# Patient Record
Sex: MALE | Race: WHITE | Employment: OTHER | URBAN - NONMETROPOLITAN AREA
[De-identification: names, ages, dates, MRNs, and addresses within clinical notes are randomized per-mention and may not be internally consistent; named-entity substitution may affect disease eponyms.]

---

## 2017-02-24 ENCOUNTER — HOSPITAL ENCOUNTER (OUTPATIENT)
Dept: PULMONOLOGY | Age: 67
Discharge: HOME | End: 2017-02-24
Payer: OTHER GOVERNMENT

## 2017-02-24 PROCEDURE — 94729 DIFFUSING CAPACITY: CPT

## 2017-02-24 PROCEDURE — 94727 GAS DIL/WSHOT DETER LNG VOL: CPT

## 2017-02-24 PROCEDURE — 6360000002 HC RX W HCPCS: Performed by: INTERNAL MEDICINE

## 2017-02-24 PROCEDURE — 94060 EVALUATION OF WHEEZING: CPT

## 2017-02-24 RX ORDER — ALBUTEROL SULFATE 2.5 MG/3ML
2.5 SOLUTION RESPIRATORY (INHALATION) EVERY 6 HOURS PRN
Status: DISCONTINUED | OUTPATIENT
Start: 2017-02-24 | End: 2017-02-26 | Stop reason: HOSPADM

## 2017-03-14 ENCOUNTER — OFFICE VISIT (OUTPATIENT)
Dept: PRIMARY CARE CLINIC | Age: 67
End: 2017-03-14
Payer: MEDICARE

## 2017-03-14 VITALS
BODY MASS INDEX: 39.93 KG/M2 | SYSTOLIC BLOOD PRESSURE: 140 MMHG | HEART RATE: 74 BPM | WEIGHT: 269.6 LBS | HEIGHT: 69 IN | OXYGEN SATURATION: 93 % | DIASTOLIC BLOOD PRESSURE: 82 MMHG

## 2017-03-14 DIAGNOSIS — I10 ESSENTIAL HYPERTENSION: Primary | ICD-10-CM

## 2017-03-14 DIAGNOSIS — L57.0 ACTINIC KERATOSIS OF SCALP: ICD-10-CM

## 2017-03-14 DIAGNOSIS — J44.9 CHRONIC OBSTRUCTIVE PULMONARY DISEASE, UNSPECIFIED COPD TYPE (HCC): ICD-10-CM

## 2017-03-14 DIAGNOSIS — E78.5 HYPERLIPIDEMIA, UNSPECIFIED HYPERLIPIDEMIA TYPE: ICD-10-CM

## 2017-03-14 PROCEDURE — 4040F PNEUMOC VAC/ADMIN/RCVD: CPT | Performed by: FAMILY MEDICINE

## 2017-03-14 PROCEDURE — G8427 DOCREV CUR MEDS BY ELIG CLIN: HCPCS | Performed by: FAMILY MEDICINE

## 2017-03-14 PROCEDURE — G8484 FLU IMMUNIZE NO ADMIN: HCPCS | Performed by: FAMILY MEDICINE

## 2017-03-14 PROCEDURE — 3017F COLORECTAL CA SCREEN DOC REV: CPT | Performed by: FAMILY MEDICINE

## 2017-03-14 PROCEDURE — 99214 OFFICE O/P EST MOD 30 MIN: CPT | Performed by: FAMILY MEDICINE

## 2017-03-14 PROCEDURE — 1036F TOBACCO NON-USER: CPT | Performed by: FAMILY MEDICINE

## 2017-03-14 PROCEDURE — G8926 SPIRO NO PERF OR DOC: HCPCS | Performed by: FAMILY MEDICINE

## 2017-03-14 PROCEDURE — G8419 CALC BMI OUT NRM PARAM NOF/U: HCPCS | Performed by: FAMILY MEDICINE

## 2017-03-14 PROCEDURE — 1123F ACP DISCUSS/DSCN MKR DOCD: CPT | Performed by: FAMILY MEDICINE

## 2017-03-14 PROCEDURE — 3023F SPIROM DOC REV: CPT | Performed by: FAMILY MEDICINE

## 2017-03-14 ASSESSMENT — ENCOUNTER SYMPTOMS
SHORTNESS OF BREATH: 0
COUGH: 0

## 2017-04-17 ENCOUNTER — HOSPITAL ENCOUNTER (OUTPATIENT)
Dept: GENERAL RADIOLOGY | Facility: HOSPITAL | Age: 67
Discharge: HOME | End: 2017-04-17
Admitting: ORTHOPAEDIC SURGERY

## 2017-04-17 ENCOUNTER — APPOINTMENT (OUTPATIENT)
Dept: PREADMISSION TESTING | Facility: HOSPITAL | Age: 67
End: 2017-04-17

## 2017-04-17 VITALS
WEIGHT: 263.8 LBS | SYSTOLIC BLOOD PRESSURE: 180 MMHG | DIASTOLIC BLOOD PRESSURE: 91 MMHG | HEART RATE: 60 BPM | BODY MASS INDEX: 39.98 KG/M2 | RESPIRATION RATE: 18 BRPM | HEIGHT: 68 IN | OXYGEN SATURATION: 95 %

## 2017-04-17 LAB
ALBUMIN SERPL-MCNC: 4 G/DL (ref 3.5–5)
ALBUMIN/GLOB SERPL: 1.2 G/DL (ref 1.1–2.5)
ALP SERPL-CCNC: 91 U/L (ref 24–120)
ALT SERPL W P-5'-P-CCNC: <15 U/L (ref 0–54)
ANION GAP SERPL CALCULATED.3IONS-SCNC: 12 MMOL/L (ref 4–13)
APTT PPP: 30.9 SECONDS (ref 24.1–34.8)
AST SERPL-CCNC: 27 U/L (ref 7–45)
BASOPHILS # BLD AUTO: 0.02 10*3/MM3 (ref 0–0.2)
BASOPHILS NFR BLD AUTO: 0.3 % (ref 0–2)
BILIRUB SERPL-MCNC: 0.6 MG/DL (ref 0.1–1)
BILIRUB UR QL STRIP: NEGATIVE
BUN BLD-MCNC: 20 MG/DL (ref 5–21)
BUN/CREAT SERPL: 23.3 (ref 7–25)
CALCIUM SPEC-SCNC: 9 MG/DL (ref 8.4–10.4)
CHLORIDE SERPL-SCNC: 102 MMOL/L (ref 98–110)
CLARITY UR: CLEAR
CO2 SERPL-SCNC: 27 MMOL/L (ref 24–31)
COLOR UR: YELLOW
CREAT BLD-MCNC: 0.86 MG/DL (ref 0.5–1.4)
DEPRECATED RDW RBC AUTO: 43.1 FL (ref 40–54)
EOSINOPHIL # BLD AUTO: 0.39 10*3/MM3 (ref 0–0.7)
EOSINOPHIL NFR BLD AUTO: 6 % (ref 0–4)
ERYTHROCYTE [DISTWIDTH] IN BLOOD BY AUTOMATED COUNT: 13.8 % (ref 12–15)
GFR SERPL CREATININE-BSD FRML MDRD: 89 ML/MIN/1.73
GLOBULIN UR ELPH-MCNC: 3.3 GM/DL
GLUCOSE BLD-MCNC: 118 MG/DL (ref 70–100)
GLUCOSE UR STRIP-MCNC: NEGATIVE MG/DL
HCT VFR BLD AUTO: 41.4 % (ref 40–52)
HGB BLD-MCNC: 14.1 G/DL (ref 14–18)
HGB UR QL STRIP.AUTO: NEGATIVE
IMM GRANULOCYTES # BLD: 0.02 10*3/MM3 (ref 0–0.03)
IMM GRANULOCYTES NFR BLD: 0.3 % (ref 0–5)
INR PPP: 0.9 (ref 0.91–1.09)
KETONES UR QL STRIP: NEGATIVE
LEUKOCYTE ESTERASE UR QL STRIP.AUTO: NEGATIVE
LYMPHOCYTES # BLD AUTO: 1.06 10*3/MM3 (ref 0.72–4.86)
LYMPHOCYTES NFR BLD AUTO: 16.3 % (ref 15–45)
MCH RBC QN AUTO: 29.2 PG (ref 28–32)
MCHC RBC AUTO-ENTMCNC: 34.1 G/DL (ref 33–36)
MCV RBC AUTO: 85.7 FL (ref 82–95)
MONOCYTES # BLD AUTO: 0.41 10*3/MM3 (ref 0.19–1.3)
MONOCYTES NFR BLD AUTO: 6.3 % (ref 4–12)
NEUTROPHILS # BLD AUTO: 4.62 10*3/MM3 (ref 1.87–8.4)
NEUTROPHILS NFR BLD AUTO: 70.8 % (ref 39–78)
NITRITE UR QL STRIP: NEGATIVE
PH UR STRIP.AUTO: 6 [PH] (ref 5–8)
PLATELET # BLD AUTO: 238 10*3/MM3 (ref 130–400)
PMV BLD AUTO: 10.1 FL (ref 6–12)
POTASSIUM BLD-SCNC: 4.3 MMOL/L (ref 3.5–5.3)
PROT SERPL-MCNC: 7.3 G/DL (ref 6.3–8.7)
PROT UR QL STRIP: NEGATIVE
PROTHROMBIN TIME: 12.4 SECONDS (ref 11.9–14.6)
RBC # BLD AUTO: 4.83 10*6/MM3 (ref 4.8–5.9)
SODIUM BLD-SCNC: 141 MMOL/L (ref 135–145)
SP GR UR STRIP: 1.02 (ref 1–1.03)
UROBILINOGEN UR QL STRIP: NORMAL
WBC NRBC COR # BLD: 6.52 10*3/MM3 (ref 4.8–10.8)

## 2017-04-17 PROCEDURE — 81003 URINALYSIS AUTO W/O SCOPE: CPT

## 2017-04-17 PROCEDURE — 93005 ELECTROCARDIOGRAM TRACING: CPT

## 2017-04-17 PROCEDURE — 71020 HC CHEST PA AND LATERAL: CPT

## 2017-04-17 PROCEDURE — 93010 ELECTROCARDIOGRAM REPORT: CPT | Performed by: INTERNAL MEDICINE

## 2017-04-17 PROCEDURE — 36415 COLL VENOUS BLD VENIPUNCTURE: CPT

## 2017-04-17 PROCEDURE — 85610 PROTHROMBIN TIME: CPT

## 2017-04-17 PROCEDURE — 80053 COMPREHEN METABOLIC PANEL: CPT

## 2017-04-17 PROCEDURE — 85730 THROMBOPLASTIN TIME PARTIAL: CPT

## 2017-04-17 PROCEDURE — 85025 COMPLETE CBC W/AUTO DIFF WBC: CPT

## 2017-04-17 RX ORDER — LISINOPRIL 10 MG/1
10 TABLET ORAL DAILY
COMMUNITY
End: 2019-05-21

## 2017-04-17 RX ORDER — ESCITALOPRAM OXALATE 10 MG/1
10 TABLET ORAL DAILY
COMMUNITY
End: 2019-05-21

## 2017-04-17 RX ORDER — PANTOPRAZOLE SODIUM 20 MG/1
20 TABLET, DELAYED RELEASE ORAL DAILY
COMMUNITY

## 2017-04-17 RX ORDER — OXYCODONE AND ACETAMINOPHEN 10; 325 MG/1; MG/1
1 TABLET ORAL EVERY 6 HOURS PRN
COMMUNITY
End: 2017-06-16 | Stop reason: HOSPADM

## 2017-04-17 RX ORDER — CELECOXIB 200 MG/1
200 CAPSULE ORAL DAILY
COMMUNITY
End: 2017-06-16 | Stop reason: HOSPADM

## 2017-04-17 NOTE — DISCHARGE INSTRUCTIONS
DAY OF SURGERY INSTRUCTIONS        YOUR SURGEON: SANDOR BEAN    PROCEDURE: NECK SURGERY    DATE OF SURGERY: April 24, 2017    ARRIVAL TIME: AS DIRECTED BY OFFICE    DAY OF SURGERY TAKE ONLY THESE MEDICATIONS: NONE        BEFORE YOU COME TO THE HOSPITAL  (Pre-op instructions)  • Do not eat, drink, smoke or chew gum after midnight the night before surgery.  This also includes no mints.  • Morning of surgery take only the medicines you have been instructed with a sip of water unless otherwise instructed  by your physician.  • Do not shave, wear makeup or dark nail polish.  • Remove all jewelry including rings.  • Leave anything you consider valuable at home.  • Leave your suitcase in the car until after your surgery.  • Bring the following with you if applicable:  o Picture ID and insurance, Medicare or Medicaid cards  o Co-pay/deductible required by insurance (cash, check, credit card)  o Medications (no narcotics) in original bottles (not a list) including all over-the-counter medications.  o Copy of advance directive, living will or power-of- documents if not brought to PAT  o CPAP or BIPAP mask and tubing  o Skin prep instruction sheet  o Relaxation aids (MP3 player, book, magazine)  • Confirm your arrival time with you surgeon the day before your surgery (surgery times are subject to change)  • On the day of surgery check in at registration located at the main entrance of the hospital.       Outpatient Surgery Guidelines, Adult  Outpatient procedures are those for which the person having the procedure is allowed to go home the same day as the procedure. Various procedures are done on an outpatient basis. You should follow some general guidelines if you will be having an outpatient procedure.  LET YOUR HEALTH CARE PROVIDER KNOW ABOUT:  · Any allergies you have.  · All medicines you are taking, including vitamins, herbs, eye drops, creams, and over-the-counter medicines.  · Previous problems you or  members of your family have had with the use of anesthetics.  · Any blood disorders you have.  · Previous surgeries you have had.  · Medical conditions you have.  RISKS AND COMPLICATIONS  Your health care provider will discuss possible risks and complications with you before surgery. Common risks and complications include:    · Problems due to the use of anesthetics.  · Blood loss and replacement (does not apply to minor surgical procedures).  · Temporary increase in pain due to surgery.  · Uncorrected pain or problems that the surgery was meant to correct.  · Infection.  · New damage.  BEFORE THE PROCEDURE  · Ask your health care provider about changing or stopping your regular medicines. You may need to stop taking certain medicines in the days or weeks before the procedure.  · Stop smoking at least 2 weeks before surgery. This lowers your risk for complications during and after surgery. Ask your health care provider for help with this if needed.  · Eat your usual meals and a light supper the day before surgery. Continue fluid intake. Do not drink alcohol.  · Do not eat or drink after midnight the night before your surgery.   · Arrange for someone to take you home and to stay with you for 24 hours after the procedure. Medicine given for your procedure may affect your ability to drive or to care for yourself.  · Call your health care provider's office if you develop an illness or problem that may prevent you from safely having your procedure.  AFTER THE PROCEDURE  After surgery, you will be taken to a recovery area, where your progress will be monitored. If there are no complications, you will be allowed to go home when you are awake, stable, and taking fluids well. You may have numbness around the surgical site. Healing will take some time. You will have tenderness at the surgical site and may have some swelling and bruising. You may also have some nausea.  HOME CARE INSTRUCTIONS  · Do not drive for 24 hours, or  as directed by your health care provider. Do not drive while taking prescription pain medicines.  · Do not drink alcohol for 24 hours.  · Do not make important decisions or sign legal documents for 24 hours.  · You may resume a normal diet and activities as directed.  · Do not lift anything heavier than 10 pounds (4.5 kg) or play contact sports until your health care provider says it is okay.  · Change your bandages (dressings) as directed.  · Only take over-the-counter or prescription medicines as directed by your health care provider.  · Follow up with your health care provider as directed.  SEEK MEDICAL CARE IF:  · You have increased bleeding (more than a small spot) from the surgical site.  · You have redness, swelling, or increasing pain in the wound.  · You see pus coming from the wound.  · You have a fever.  · You notice a bad smell coming from the wound or dressing.  · You feel lightheaded or faint.  · You develop a rash.  · You have trouble breathing.  · You develop allergies.  MAKE SURE YOU:  · Understand these instructions.  · Will watch your condition.  · Will get help right away if you are not doing well or get worse.     This information is not intended to replace advice given to you by your health care provider. Make sure you discuss any questions you have with your health care provider.     Document Released: 09/12/2002 Document Revised: 05/03/2016 Document Reviewed: 05/22/2014  [employer] Interactive Patient Education ©2016 [employer]       Fall Prevention in Hospitals, Adult  As a hospital patient, your condition and the treatments you receive can increase your risk for falls. Some additional risk factors for falls in a hospital include:  · Being in an unfamiliar environment.  · Being on bed rest.  · Your surgery.  · Taking certain medicines.  · Your tubing requirements, such as intravenous (IV) therapy or catheters.  It is important that you learn how to decrease fall risks while at the hospital.  Below are important tips that can help prevent falls.  SAFETY TIPS FOR PREVENTING FALLS  Talk about your risk of falling.  · Ask your health care provider why you are at risk for falling. Is it your medicine, illness, tubing placement, or something else?  · Make a plan with your health care provider to keep you safe from falls.  · Ask your health care provider or pharmacist about side effects of your medicines. Some medicines can make you dizzy or affect your coordination.  Ask for help.  · Ask for help before getting out of bed. You may need to press your call button.  · Ask for assistance in getting safely to the toilet.  · Ask for a walker or cane to be put at your bedside. Ask that most of the side rails on your bed be placed up before your health care provider leaves the room.  · Ask family or friends to sit with you.  · Ask for things that are out of your reach, such as your glasses, hearing aids, telephone, bedside table, or call button.  Follow these tips to avoid falling:  · Stay lying or seated, rather than standing, while waiting for help.  · Wear rubber-soled slippers or shoes whenever you walk in the hospital.  · Avoid quick, sudden movements.  ¨ Change positions slowly.  ¨ Sit on the side of your bed before standing.  ¨ Stand up slowly and wait before you start to walk.  · Let your health care provider know if there is a spill on the floor.  · Pay careful attention to the medical equipment, electrical cords, and tubes around you.  · When you need help, use your call button by your bed or in the bathroom. Wait for one of your health care providers to help you.  · If you feel dizzy or unsure of your footing, return to bed and wait for assistance.  · Avoid being distracted by the TV, telephone, or another person in your room.  · Do not lean or support yourself on rolling objects, such as IV poles or bedside tables.     This information is not intended to replace advice given to you by your health care  provider. Make sure you discuss any questions you have with your health care provider.     Document Released: 12/15/2001 Document Revised: 01/08/2016 Document Reviewed: 08/25/2013  [employer] Interactive Patient Education ©2016 [employer]       Surgical Site Infections FAQs  What is a Surgical Site Infection (SSI)?  A surgical site infection is an infection that occurs after surgery in the part of the body where the surgery took place. Most patients who have surgery do not develop an infection. However, infections develop in about 1 to 3 out of every 100 patients who have surgery.  Some of the common symptoms of a surgical site infection are:  · Redness and pain around the area where you had surgery  · Drainage of cloudy fluid from your surgical wound  · Fever  Can SSIs be treated?  Yes. Most surgical site infections can be treated with antibiotics. The antibiotic given to you depends on the bacteria (germs) causing the infection. Sometimes patients with SSIs also need another surgery to treat the infection.  What are some of the things that hospitals are doing to prevent SSIs?  To prevent SSIs, doctors, nurses, and other healthcare providers:  · Clean their hands and arms up to their elbows with an antiseptic agent just before the surgery.  · Clean their hands with soap and water or an alcohol-based hand rub before and after caring for each patient.  · May remove some of your hair immediately before your surgery using electric clippers if the hair is in the same area where the procedure will occur. They should not shave you with a razor.  · Wear special hair covers, masks, gowns, and gloves during surgery to keep the surgery area clean.  · Give you antibiotics before your surgery starts. In most cases, you should get antibiotics within 60 minutes before the surgery starts and the antibiotics should be stopped within 24 hours after surgery.  · Clean the skin at the site of your surgery with a special soap that  kills germs.  What can I do to help prevent SSIs?  Before your surgery:  · Tell your doctor about other medical problems you may have. Health problems such as allergies, diabetes, and obesity could affect your surgery and your treatment.  · Quit smoking. Patients who smoke get more infections. Talk to your doctor about how you can quit before your surgery.  · Do not shave near where you will have surgery. Shaving with a razor can irritate your skin and make it easier to develop an infection.  At the time of your surgery:  · Speak up if someone tries to shave you with a razor before surgery. Ask why you need to be shaved and talk with your surgeon if you have any concerns.  · Ask if you will get antibiotics before surgery.  After your surgery:  · Make sure that your healthcare providers clean their hands before examining you, either with soap and water or an alcohol-based hand rub.  · If you do not see your providers clean their hands, please ask them to do so.  · Family and friends who visit you should not touch the surgical wound or dressings.  · Family and friends should clean their hands with soap and water or an alcohol-based hand rub before and after visiting you. If you do not see them clean their hands, ask them to clean their hands.  What do I need to do when I go home from the hospital?  · Before you go home, your doctor or nurse should explain everything you need to know about taking care of your wound. Make sure you understand how to care for your wound before you leave the hospital.  · Always clean your hands before and after caring for your wound.  · Before you go home, make sure you know who to contact if you have questions or problems after you get home.  · If you have any symptoms of an infection, such as redness and pain at the surgery site, drainage, or fever, call your doctor immediately.  If you have additional questions, please ask your doctor or nurse.  Developed and co-sponsored by The Society  for Healthcare Epidemiology of [place] (SHEA); Infectious Diseases Society of [place] (IDSA); American Hospital Association; Association for Professionals in Infection Control and Epidemiology (APIC); Centers for Disease Control and Prevention (CDC); and The Joint Commission.     This information is not intended to replace advice given to you by your health care provider. Make sure you discuss any questions you have with your health care provider.     Document Released: 12/23/2014 Document Revised: 01/08/2016 Document Reviewed: 03/02/2016  [employer] Interactive Patient Education ©2016 [employer]       [facility]  CHG 4% Patient Instruction Sheet    Preparing the Skin Before Surgery  Preparing or “prepping” skin before surgery can reduce the risk of infection at the surgical site. To make the process easier,[facility] has chosen 4% Chlorhexidine Gluconate (CHG) antiseptic solution.   The steps below outline the prepping process and should be carefully followed.                                                                                                                                                      Prep the skin at the following time(s):                                                      We recommend you shower the night before surgery, and again the morning of surgery with the 4% CHG antiseptic solution using half of the bottle and a cloth each time.  Dress in clean clothes/sleepwear after showering.  See instructions below for application.          Do not apply any lotions or moisturizers.       Do not shave the area to be prepped for at least 2 days prior to surgery.    Clipping the hair may be done immediately prior to your surgery at the hospital    if needed.    Directions:  Thoroughly rinse your body with water.  Apply 4% CHG to a cloth and wash skin gently, paying special attention to the operative site.  Rinse again thoroughly.  Once you have begun using this product do not apply anything  else to your skin. If itching or redness persists, rinse affected areas and discontinue use.    When using this product:  • Keep out of eyes, ears, and mouth.  • If solution should contact these areas, rinse out promptly and thoroughly with water.  • For external use only.  • Do not use in genital area, on your face or head.      PATIENT/FAMILY/RESPONSIBLE PARTY VERBALIZES UNDERSTANDING OF ABOVE EDUCATION

## 2017-04-19 ENCOUNTER — HOSPITAL ENCOUNTER (INPATIENT)
Age: 67
LOS: 3 days | Discharge: HOME | DRG: 243 | End: 2017-04-22
Attending: EMERGENCY MEDICINE | Admitting: INTERNAL MEDICINE
Payer: MEDICARE

## 2017-04-19 ENCOUNTER — APPOINTMENT (OUTPATIENT)
Dept: GENERAL RADIOLOGY | Age: 67
DRG: 243 | End: 2017-04-19
Payer: MEDICARE

## 2017-04-19 DIAGNOSIS — R07.89 CHEST DISCOMFORT: Primary | ICD-10-CM

## 2017-04-19 DIAGNOSIS — I48.91 ATRIAL FIBRILLATION, NEW ONSET (HCC): ICD-10-CM

## 2017-04-19 LAB
ALBUMIN SERPL-MCNC: 4.3 G/DL (ref 3.5–5.2)
ALP BLD-CCNC: 92 U/L (ref 40–130)
ALT SERPL-CCNC: 21 U/L (ref 5–41)
ANION GAP SERPL CALCULATED.3IONS-SCNC: 17 MMOL/L (ref 7–19)
AST SERPL-CCNC: 33 U/L (ref 5–40)
BASE EXCESS ARTERIAL: -0.2 MMOL/L (ref -2–2)
BASOPHILS ABSOLUTE: 0.1 K/UL (ref 0–0.2)
BASOPHILS RELATIVE PERCENT: 0.5 % (ref 0–1)
BILIRUB SERPL-MCNC: 0.4 MG/DL (ref 0.2–1.2)
BUN BLDV-MCNC: 14 MG/DL (ref 8–23)
CALCIUM SERPL-MCNC: 9.5 MG/DL (ref 8.8–10.2)
CARBOXYHEMOGLOBIN ARTERIAL: 1.4 % (ref 0–5)
CHLORIDE BLD-SCNC: 97 MMOL/L (ref 98–111)
CO2: 26 MMOL/L (ref 22–29)
CREAT SERPL-MCNC: 1.3 MG/DL (ref 0.5–1.2)
EOSINOPHILS ABSOLUTE: 0.3 K/UL (ref 0–0.6)
EOSINOPHILS RELATIVE PERCENT: 2.5 % (ref 0–5)
GFR NON-AFRICAN AMERICAN: 55
GLOBULIN: 2.9 G/DL
GLUCOSE BLD-MCNC: 105 MG/DL (ref 74–109)
HCO3 ARTERIAL: 24 MMOL/L (ref 22–26)
HCT VFR BLD CALC: 46.3 % (ref 42–52)
HEMOGLOBIN, ART, EXTENDED: 14.8 G/DL (ref 14–18)
HEMOGLOBIN: 15.2 G/DL (ref 14–18)
INR BLD: 0.99 (ref 0.88–1.18)
LYMPHOCYTES ABSOLUTE: 1.5 K/UL (ref 1.1–4.5)
LYMPHOCYTES RELATIVE PERCENT: 13.6 % (ref 20–40)
MCH RBC QN AUTO: 29.5 PG (ref 27–31)
MCHC RBC AUTO-ENTMCNC: 32.8 G/DL (ref 33–37)
MCV RBC AUTO: 89.7 FL (ref 80–94)
METHEMOGLOBIN ARTERIAL: 0.6 %
MONOCYTES ABSOLUTE: 0.9 K/UL (ref 0–0.9)
MONOCYTES RELATIVE PERCENT: 8.2 % (ref 0–10)
NEUTROPHILS ABSOLUTE: 8.2 K/UL (ref 1.5–7.5)
NEUTROPHILS RELATIVE PERCENT: 74.7 % (ref 50–65)
O2 CONTENT ARTERIAL: 18.5 ML/DL
O2 SAT, ARTERIAL: 89.3 %
O2 THERAPY: ABNORMAL
PCO2 ARTERIAL: 37 MMHG (ref 35–45)
PDW BLD-RTO: 13.5 % (ref 11.5–14.5)
PERFORMED ON: NORMAL
PERFORMED ON: NORMAL
PH ARTERIAL: 7.42 (ref 7.35–7.45)
PLATELET # BLD: 291 K/UL (ref 130–400)
PMV BLD AUTO: 9.9 FL (ref 7.4–10.4)
PO2 ARTERIAL: 55 MMHG (ref 80–100)
POC TROPONIN I: 0 NG/ML (ref 0–0.08)
POC TROPONIN I: 0 NG/ML (ref 0–0.08)
POTASSIUM SERPL-SCNC: 4.2 MMOL/L (ref 3.5–5)
POTASSIUM, WHOLE BLOOD: 4.1
PRO-BNP: 178 PG/ML (ref 0–900)
PROTHROMBIN TIME: 13.1 SEC (ref 12–14.6)
RBC # BLD: 5.16 M/UL (ref 4.7–6.1)
SODIUM BLD-SCNC: 140 MMOL/L (ref 136–145)
TOTAL PROTEIN: 7.2 G/DL (ref 6.6–8.7)
TROPONIN: <0.01 NG/ML (ref 0–0.03)
WBC # BLD: 11 K/UL (ref 4.8–10.8)

## 2017-04-19 PROCEDURE — 6370000000 HC RX 637 (ALT 250 FOR IP): Performed by: INTERNAL MEDICINE

## 2017-04-19 PROCEDURE — 71010 XR CHEST PORTABLE: CPT

## 2017-04-19 PROCEDURE — 93005 ELECTROCARDIOGRAM TRACING: CPT

## 2017-04-19 PROCEDURE — 84132 ASSAY OF SERUM POTASSIUM: CPT

## 2017-04-19 PROCEDURE — 80053 COMPREHEN METABOLIC PANEL: CPT

## 2017-04-19 PROCEDURE — 36415 COLL VENOUS BLD VENIPUNCTURE: CPT

## 2017-04-19 PROCEDURE — 99223 1ST HOSP IP/OBS HIGH 75: CPT | Performed by: INTERNAL MEDICINE

## 2017-04-19 PROCEDURE — 83880 ASSAY OF NATRIURETIC PEPTIDE: CPT

## 2017-04-19 PROCEDURE — 36600 WITHDRAWAL OF ARTERIAL BLOOD: CPT

## 2017-04-19 PROCEDURE — 85610 PROTHROMBIN TIME: CPT

## 2017-04-19 PROCEDURE — 85025 COMPLETE CBC W/AUTO DIFF WBC: CPT

## 2017-04-19 PROCEDURE — 2580000003 HC RX 258: Performed by: INTERNAL MEDICINE

## 2017-04-19 PROCEDURE — 99285 EMERGENCY DEPT VISIT HI MDM: CPT

## 2017-04-19 PROCEDURE — 84484 ASSAY OF TROPONIN QUANT: CPT

## 2017-04-19 PROCEDURE — 6360000002 HC RX W HCPCS: Performed by: EMERGENCY MEDICINE

## 2017-04-19 PROCEDURE — 2700000000 HC OXYGEN THERAPY PER DAY

## 2017-04-19 PROCEDURE — 2140000000 HC CCU INTERMEDIATE R&B

## 2017-04-19 PROCEDURE — 82803 BLOOD GASES ANY COMBINATION: CPT

## 2017-04-19 PROCEDURE — 99284 EMERGENCY DEPT VISIT MOD MDM: CPT | Performed by: EMERGENCY MEDICINE

## 2017-04-19 RX ORDER — PANTOPRAZOLE SODIUM 40 MG/1
40 TABLET, DELAYED RELEASE ORAL
Status: DISCONTINUED | OUTPATIENT
Start: 2017-04-20 | End: 2017-04-22 | Stop reason: HOSPADM

## 2017-04-19 RX ORDER — 0.9 % SODIUM CHLORIDE 0.9 %
1000 INTRAVENOUS SOLUTION INTRAVENOUS ONCE
Status: DISCONTINUED | OUTPATIENT
Start: 2017-04-19 | End: 2017-04-21

## 2017-04-19 RX ORDER — ACETAMINOPHEN 325 MG/1
650 TABLET ORAL EVERY 4 HOURS PRN
Status: DISCONTINUED | OUTPATIENT
Start: 2017-04-19 | End: 2017-04-19 | Stop reason: SDUPTHER

## 2017-04-19 RX ORDER — SODIUM CHLORIDE 0.9 % (FLUSH) 0.9 %
10 SYRINGE (ML) INJECTION EVERY 12 HOURS SCHEDULED
Status: DISCONTINUED | OUTPATIENT
Start: 2017-04-19 | End: 2017-04-21 | Stop reason: SDUPTHER

## 2017-04-19 RX ORDER — CELECOXIB 200 MG/1
200 CAPSULE ORAL DAILY
Status: DISCONTINUED | OUTPATIENT
Start: 2017-04-19 | End: 2017-04-22 | Stop reason: HOSPADM

## 2017-04-19 RX ORDER — FENOFIBRATE 54 MG/1
54 TABLET ORAL DAILY
Status: DISCONTINUED | OUTPATIENT
Start: 2017-04-19 | End: 2017-04-20 | Stop reason: CLARIF

## 2017-04-19 RX ORDER — ACETAMINOPHEN 325 MG/1
650 TABLET ORAL EVERY 4 HOURS PRN
Status: DISCONTINUED | OUTPATIENT
Start: 2017-04-19 | End: 2017-04-21 | Stop reason: SDUPTHER

## 2017-04-19 RX ORDER — SODIUM CHLORIDE 9 MG/ML
INJECTION, SOLUTION INTRAVENOUS CONTINUOUS
Status: DISCONTINUED | OUTPATIENT
Start: 2017-04-19 | End: 2017-04-21 | Stop reason: SDUPTHER

## 2017-04-19 RX ORDER — CARBIDOPA/LEVODOPA 25MG-250MG
1 TABLET ORAL 2 TIMES DAILY
Status: DISCONTINUED | OUTPATIENT
Start: 2017-04-19 | End: 2017-04-20 | Stop reason: DRUGHIGH

## 2017-04-19 RX ORDER — LISINOPRIL 20 MG/1
40 TABLET ORAL DAILY
Status: DISCONTINUED | OUTPATIENT
Start: 2017-04-19 | End: 2017-04-22 | Stop reason: HOSPADM

## 2017-04-19 RX ORDER — ESCITALOPRAM OXALATE 10 MG/1
10 TABLET ORAL DAILY
Status: DISCONTINUED | OUTPATIENT
Start: 2017-04-19 | End: 2017-04-21 | Stop reason: SDUPTHER

## 2017-04-19 RX ORDER — ONDANSETRON 2 MG/ML
4 INJECTION INTRAMUSCULAR; INTRAVENOUS ONCE
Status: DISCONTINUED | OUTPATIENT
Start: 2017-04-19 | End: 2017-04-21

## 2017-04-19 RX ORDER — OXYCODONE HYDROCHLORIDE 5 MG/1
10 TABLET ORAL EVERY 4 HOURS PRN
Status: DISCONTINUED | OUTPATIENT
Start: 2017-04-19 | End: 2017-04-22 | Stop reason: HOSPADM

## 2017-04-19 RX ORDER — ASPIRIN 81 MG/1
81 TABLET, CHEWABLE ORAL DAILY
Status: DISCONTINUED | OUTPATIENT
Start: 2017-04-19 | End: 2017-04-22 | Stop reason: HOSPADM

## 2017-04-19 RX ORDER — SODIUM CHLORIDE 0.9 % (FLUSH) 0.9 %
10 SYRINGE (ML) INJECTION PRN
Status: DISCONTINUED | OUTPATIENT
Start: 2017-04-19 | End: 2017-04-21 | Stop reason: SDUPTHER

## 2017-04-19 RX ORDER — ALBUTEROL SULFATE 90 UG/1
2 AEROSOL, METERED RESPIRATORY (INHALATION) EVERY 6 HOURS PRN
Status: DISCONTINUED | OUTPATIENT
Start: 2017-04-19 | End: 2017-04-22 | Stop reason: HOSPADM

## 2017-04-19 RX ORDER — ONDANSETRON 2 MG/ML
4 INJECTION INTRAMUSCULAR; INTRAVENOUS EVERY 6 HOURS PRN
Status: DISCONTINUED | OUTPATIENT
Start: 2017-04-19 | End: 2017-04-21 | Stop reason: SDUPTHER

## 2017-04-19 RX ADMIN — ESCITALOPRAM OXALATE 10 MG: 10 TABLET, FILM COATED ORAL at 22:57

## 2017-04-19 RX ADMIN — WARFARIN SODIUM 7.5 MG: 2.5 TABLET ORAL at 22:57

## 2017-04-19 RX ADMIN — SODIUM CHLORIDE: 9 INJECTION, SOLUTION INTRAVENOUS at 20:13

## 2017-04-19 RX ADMIN — LISINOPRIL 40 MG: 20 TABLET ORAL at 22:56

## 2017-04-19 RX ADMIN — ASPIRIN 81 MG CHEWABLE TABLET 81 MG: 81 TABLET CHEWABLE at 22:57

## 2017-04-19 RX ADMIN — OXYCODONE HYDROCHLORIDE 10 MG: 5 TABLET ORAL at 22:56

## 2017-04-19 RX ADMIN — CELECOXIB 200 MG: 200 CAPSULE ORAL at 22:57

## 2017-04-19 RX ADMIN — ENOXAPARIN SODIUM 120 MG: 150 INJECTION SUBCUTANEOUS at 23:09

## 2017-04-19 RX ADMIN — FENOFIBRATE 54 MG: 54 TABLET ORAL at 22:57

## 2017-04-19 ASSESSMENT — ENCOUNTER SYMPTOMS
VOMITING: 0
NAUSEA: 1
COUGH: 0
SHORTNESS OF BREATH: 1
BACK PAIN: 0

## 2017-04-19 ASSESSMENT — PAIN SCALES - GENERAL
PAINLEVEL_OUTOF10: 0
PAINLEVEL_OUTOF10: 4
PAINLEVEL_OUTOF10: 3
PAINLEVEL_OUTOF10: 4
PAINLEVEL_OUTOF10: 3

## 2017-04-19 ASSESSMENT — PAIN DESCRIPTION - LOCATION
LOCATION: BACK
LOCATION: CHEST

## 2017-04-20 ENCOUNTER — APPOINTMENT (OUTPATIENT)
Dept: NUCLEAR MEDICINE | Age: 67
DRG: 243 | End: 2017-04-20
Payer: MEDICARE

## 2017-04-20 LAB
ALBUMIN SERPL-MCNC: 3.9 G/DL (ref 3.5–5.2)
ALP BLD-CCNC: 79 U/L (ref 40–130)
ALT SERPL-CCNC: 14 U/L (ref 5–41)
ANION GAP SERPL CALCULATED.3IONS-SCNC: 17 MMOL/L (ref 7–19)
AST SERPL-CCNC: 21 U/L (ref 5–40)
BASOPHILS ABSOLUTE: 0 K/UL (ref 0–0.2)
BASOPHILS RELATIVE PERCENT: 0.4 % (ref 0–1)
BILIRUB SERPL-MCNC: 0.3 MG/DL (ref 0.2–1.2)
BUN BLDV-MCNC: 22 MG/DL (ref 8–23)
CALCIUM SERPL-MCNC: 8.7 MG/DL (ref 8.8–10.2)
CHLORIDE BLD-SCNC: 93 MMOL/L (ref 98–111)
CHOLESTEROL, TOTAL: 201 MG/DL (ref 160–199)
CO2: 26 MMOL/L (ref 22–29)
CREAT SERPL-MCNC: 2.5 MG/DL (ref 0.5–1.2)
EKG P AXIS: NORMAL DEGREES
EKG P AXIS: NORMAL DEGREES
EKG P-R INTERVAL: NORMAL MS
EKG P-R INTERVAL: NORMAL MS
EKG Q-T INTERVAL: 478 MS
EKG Q-T INTERVAL: 506 MS
EKG QRS DURATION: 110 MS
EKG QRS DURATION: 120 MS
EKG QTC CALCULATION (BAZETT): 471 MS
EKG QTC CALCULATION (BAZETT): 489 MS
EKG T AXIS: 62 DEGREES
EKG T AXIS: 63 DEGREES
EOSINOPHILS ABSOLUTE: 0.3 K/UL (ref 0–0.6)
EOSINOPHILS RELATIVE PERCENT: 2.8 % (ref 0–5)
GFR NON-AFRICAN AMERICAN: 26
GLOBULIN: 2.8 G/DL
GLUCOSE BLD-MCNC: 114 MG/DL (ref 74–109)
HCT VFR BLD CALC: 39.9 % (ref 42–52)
HDLC SERPL-MCNC: 66 MG/DL (ref 55–121)
HEMOGLOBIN: 13.5 G/DL (ref 14–18)
INR BLD: 1.07 (ref 0.88–1.18)
LDL CHOLESTEROL CALCULATED: 78 MG/DL
LV EF: 60 %
LVEF MODALITY: NORMAL
LYMPHOCYTES ABSOLUTE: 1.3 K/UL (ref 1.1–4.5)
LYMPHOCYTES RELATIVE PERCENT: 13.5 % (ref 20–40)
MCH RBC QN AUTO: 29.7 PG (ref 27–31)
MCHC RBC AUTO-ENTMCNC: 33.8 G/DL (ref 33–37)
MCV RBC AUTO: 87.7 FL (ref 80–94)
MONOCYTES ABSOLUTE: 0.8 K/UL (ref 0–0.9)
MONOCYTES RELATIVE PERCENT: 7.9 % (ref 0–10)
NEUTROPHILS ABSOLUTE: 7.5 K/UL (ref 1.5–7.5)
NEUTROPHILS RELATIVE PERCENT: 75.1 % (ref 50–65)
PDW BLD-RTO: 13.6 % (ref 11.5–14.5)
PLATELET # BLD: 268 K/UL (ref 130–400)
PMV BLD AUTO: 9.9 FL (ref 7.4–10.4)
POTASSIUM SERPL-SCNC: 4.2 MMOL/L (ref 3.5–5)
PROTHROMBIN TIME: 13.9 SEC (ref 12–14.6)
RBC # BLD: 4.55 M/UL (ref 4.7–6.1)
SODIUM BLD-SCNC: 136 MMOL/L (ref 136–145)
T3 FREE: 3.9 PG/ML (ref 2–4.4)
T4 FREE: 1.2 NG/ML (ref 0.9–1.7)
TOTAL PROTEIN: 6.7 G/DL (ref 6.6–8.7)
TRIGL SERPL-MCNC: 286 MG/DL (ref 150–199)
TROPONIN: <0.01 NG/ML (ref 0–0.03)
TROPONIN: <0.01 NG/ML (ref 0–0.03)
TSH SERPL DL<=0.05 MIU/L-ACNC: 4.87 UIU/ML (ref 0.27–4.2)
WBC # BLD: 10 K/UL (ref 4.8–10.8)

## 2017-04-20 PROCEDURE — 3430000000 HC RX DIAGNOSTIC RADIOPHARMACEUTICAL: Performed by: INTERNAL MEDICINE

## 2017-04-20 PROCEDURE — 84443 ASSAY THYROID STIM HORMONE: CPT

## 2017-04-20 PROCEDURE — 93005 ELECTROCARDIOGRAM TRACING: CPT

## 2017-04-20 PROCEDURE — 2700000000 HC OXYGEN THERAPY PER DAY

## 2017-04-20 PROCEDURE — 80061 LIPID PANEL: CPT

## 2017-04-20 PROCEDURE — 93017 CV STRESS TEST TRACING ONLY: CPT

## 2017-04-20 PROCEDURE — 99221 1ST HOSP IP/OBS SF/LOW 40: CPT | Performed by: UROLOGY

## 2017-04-20 PROCEDURE — 84439 ASSAY OF FREE THYROXINE: CPT

## 2017-04-20 PROCEDURE — 36415 COLL VENOUS BLD VENIPUNCTURE: CPT

## 2017-04-20 PROCEDURE — 80053 COMPREHEN METABOLIC PANEL: CPT

## 2017-04-20 PROCEDURE — 85025 COMPLETE CBC W/AUTO DIFF WBC: CPT

## 2017-04-20 PROCEDURE — 6360000002 HC RX W HCPCS: Performed by: INTERNAL MEDICINE

## 2017-04-20 PROCEDURE — 6370000000 HC RX 637 (ALT 250 FOR IP): Performed by: INTERNAL MEDICINE

## 2017-04-20 PROCEDURE — 85610 PROTHROMBIN TIME: CPT

## 2017-04-20 PROCEDURE — 2140000000 HC CCU INTERMEDIATE R&B

## 2017-04-20 PROCEDURE — A9500 TC99M SESTAMIBI: HCPCS | Performed by: INTERNAL MEDICINE

## 2017-04-20 PROCEDURE — 84484 ASSAY OF TROPONIN QUANT: CPT

## 2017-04-20 PROCEDURE — 78452 HT MUSCLE IMAGE SPECT MULT: CPT

## 2017-04-20 PROCEDURE — 99024 POSTOP FOLLOW-UP VISIT: CPT | Performed by: INTERNAL MEDICINE

## 2017-04-20 PROCEDURE — 84481 FREE ASSAY (FT-3): CPT

## 2017-04-20 PROCEDURE — 93306 TTE W/DOPPLER COMPLETE: CPT

## 2017-04-20 RX ORDER — CARBIDOPA/LEVODOPA 25MG-250MG
1 TABLET ORAL NIGHTLY PRN
Status: DISCONTINUED | OUTPATIENT
Start: 2017-04-20 | End: 2017-04-21 | Stop reason: SDUPTHER

## 2017-04-20 RX ORDER — CHLORHEXIDINE GLUCONATE 4 G/100ML
SOLUTION TOPICAL ONCE
Status: DISCONTINUED | OUTPATIENT
Start: 2017-04-20 | End: 2017-04-21

## 2017-04-20 RX ORDER — FENOFIBRATE 160 MG/1
160 TABLET ORAL DAILY
Status: DISCONTINUED | OUTPATIENT
Start: 2017-04-21 | End: 2017-04-22 | Stop reason: HOSPADM

## 2017-04-20 RX ORDER — ESCITALOPRAM OXALATE 10 MG/1
10 TABLET ORAL EVERY EVENING
COMMUNITY
End: 2017-05-04 | Stop reason: SDUPTHER

## 2017-04-20 RX ORDER — CARBIDOPA/LEVODOPA 25MG-250MG
1 TABLET ORAL NIGHTLY PRN
COMMUNITY
End: 2018-02-18

## 2017-04-20 RX ADMIN — TETRAKIS(2-METHOXYISOBUTYLISOCYANIDE)COPPER(I) TETRAFLUOROBORATE 30 MILLICURIE: 1 INJECTION, POWDER, LYOPHILIZED, FOR SOLUTION INTRAVENOUS at 10:23

## 2017-04-20 RX ADMIN — PANTOPRAZOLE SODIUM 40 MG: 40 TABLET, DELAYED RELEASE ORAL at 06:19

## 2017-04-20 RX ADMIN — FENOFIBRATE 54 MG: 54 TABLET ORAL at 10:57

## 2017-04-20 RX ADMIN — TETRAKIS(2-METHOXYISOBUTYLISOCYANIDE)COPPER(I) TETRAFLUOROBORATE 10 MILLICURIE: 1 INJECTION, POWDER, LYOPHILIZED, FOR SOLUTION INTRAVENOUS at 10:22

## 2017-04-20 RX ADMIN — ASPIRIN 81 MG CHEWABLE TABLET 81 MG: 81 TABLET CHEWABLE at 10:58

## 2017-04-20 RX ADMIN — LISINOPRIL 40 MG: 20 TABLET ORAL at 10:57

## 2017-04-20 RX ADMIN — CARBIDOPA AND LEVODOPA 1 TABLET: 25; 250 TABLET ORAL at 10:57

## 2017-04-20 RX ADMIN — REGADENOSON 0.4 MG: 0.08 INJECTION, SOLUTION INTRAVENOUS at 10:23

## 2017-04-20 RX ADMIN — CELECOXIB 200 MG: 200 CAPSULE ORAL at 10:57

## 2017-04-20 RX ADMIN — OXYCODONE HYDROCHLORIDE 10 MG: 5 TABLET ORAL at 21:14

## 2017-04-20 RX ADMIN — OXYCODONE HYDROCHLORIDE 10 MG: 5 TABLET ORAL at 10:57

## 2017-04-20 RX ADMIN — MAGNESIUM HYDROXIDE 30 ML: 400 SUSPENSION ORAL at 16:41

## 2017-04-20 RX ADMIN — ESCITALOPRAM OXALATE 10 MG: 10 TABLET, FILM COATED ORAL at 10:57

## 2017-04-20 ASSESSMENT — PAIN SCALES - GENERAL
PAINLEVEL_OUTOF10: 4
PAINLEVEL_OUTOF10: 0
PAINLEVEL_OUTOF10: 5

## 2017-04-21 ENCOUNTER — APPOINTMENT (OUTPATIENT)
Dept: GENERAL RADIOLOGY | Age: 67
DRG: 243 | End: 2017-04-21
Payer: MEDICARE

## 2017-04-21 LAB
ANION GAP SERPL CALCULATED.3IONS-SCNC: 14 MMOL/L (ref 7–19)
BUN BLDV-MCNC: 27 MG/DL (ref 8–23)
CALCIUM SERPL-MCNC: 8.6 MG/DL (ref 8.8–10.2)
CHLORIDE BLD-SCNC: 101 MMOL/L (ref 98–111)
CO2: 26 MMOL/L (ref 22–29)
CREAT SERPL-MCNC: 1.9 MG/DL (ref 0.5–1.2)
GFR NON-AFRICAN AMERICAN: 36
GLUCOSE BLD-MCNC: 101 MG/DL (ref 74–109)
INR BLD: 1.08 (ref 0.88–1.18)
POTASSIUM SERPL-SCNC: 4.4 MMOL/L (ref 3.5–5)
PROTHROMBIN TIME: 14 SEC (ref 12–14.6)
SODIUM BLD-SCNC: 141 MMOL/L (ref 136–145)

## 2017-04-21 PROCEDURE — 6370000000 HC RX 637 (ALT 250 FOR IP): Performed by: INTERNAL MEDICINE

## 2017-04-21 PROCEDURE — 02H63JZ INSERTION OF PACEMAKER LEAD INTO RIGHT ATRIUM, PERCUTANEOUS APPROACH: ICD-10-PCS | Performed by: INTERNAL MEDICINE

## 2017-04-21 PROCEDURE — B2111ZZ FLUOROSCOPY OF MULTIPLE CORONARY ARTERIES USING LOW OSMOLAR CONTRAST: ICD-10-PCS | Performed by: INTERNAL MEDICINE

## 2017-04-21 PROCEDURE — 4A023N7 MEASUREMENT OF CARDIAC SAMPLING AND PRESSURE, LEFT HEART, PERCUTANEOUS APPROACH: ICD-10-PCS | Performed by: INTERNAL MEDICINE

## 2017-04-21 PROCEDURE — 71010 XR CHEST PORTABLE: CPT

## 2017-04-21 PROCEDURE — 93005 ELECTROCARDIOGRAM TRACING: CPT

## 2017-04-21 PROCEDURE — 36415 COLL VENOUS BLD VENIPUNCTURE: CPT

## 2017-04-21 PROCEDURE — 80048 BASIC METABOLIC PNL TOTAL CA: CPT

## 2017-04-21 PROCEDURE — C1898 LEAD, PMKR, OTHER THAN TRANS: HCPCS

## 2017-04-21 PROCEDURE — 99024 POSTOP FOLLOW-UP VISIT: CPT | Performed by: INTERNAL MEDICINE

## 2017-04-21 PROCEDURE — 93455 CORONARY ART/GRFT ANGIO S&I: CPT | Performed by: INTERNAL MEDICINE

## 2017-04-21 PROCEDURE — 2580000003 HC RX 258: Performed by: INTERNAL MEDICINE

## 2017-04-21 PROCEDURE — 02HK3JZ INSERTION OF PACEMAKER LEAD INTO RIGHT VENTRICLE, PERCUTANEOUS APPROACH: ICD-10-PCS | Performed by: INTERNAL MEDICINE

## 2017-04-21 PROCEDURE — 85610 PROTHROMBIN TIME: CPT

## 2017-04-21 PROCEDURE — 6360000002 HC RX W HCPCS

## 2017-04-21 PROCEDURE — 33208 INSRT HEART PM ATRIAL & VENT: CPT | Performed by: INTERNAL MEDICINE

## 2017-04-21 PROCEDURE — C1887 CATHETER, GUIDING: HCPCS

## 2017-04-21 PROCEDURE — 2500000003 HC RX 250 WO HCPCS

## 2017-04-21 PROCEDURE — 2140000000 HC CCU INTERMEDIATE R&B

## 2017-04-21 PROCEDURE — 6360000002 HC RX W HCPCS: Performed by: INTERNAL MEDICINE

## 2017-04-21 PROCEDURE — 0JH606Z INSERTION OF PACEMAKER, DUAL CHAMBER INTO CHEST SUBCUTANEOUS TISSUE AND FASCIA, OPEN APPROACH: ICD-10-PCS | Performed by: INTERNAL MEDICINE

## 2017-04-21 PROCEDURE — C1894 INTRO/SHEATH, NON-LASER: HCPCS

## 2017-04-21 PROCEDURE — 2720000001 HC MISC SURG SUPPLY STERILE $51-500

## 2017-04-21 PROCEDURE — 2709999900 HC NON-CHARGEABLE SUPPLY

## 2017-04-21 PROCEDURE — C1785 PMKR, DUAL, RATE-RESP: HCPCS

## 2017-04-21 PROCEDURE — 6370000000 HC RX 637 (ALT 250 FOR IP)

## 2017-04-21 PROCEDURE — B2151ZZ FLUOROSCOPY OF LEFT HEART USING LOW OSMOLAR CONTRAST: ICD-10-PCS | Performed by: INTERNAL MEDICINE

## 2017-04-21 RX ORDER — ESCITALOPRAM OXALATE 10 MG/1
10 TABLET ORAL EVERY EVENING
Status: DISCONTINUED | OUTPATIENT
Start: 2017-04-21 | End: 2017-04-22 | Stop reason: HOSPADM

## 2017-04-21 RX ORDER — SODIUM CHLORIDE 9 MG/ML
INJECTION, SOLUTION INTRAVENOUS CONTINUOUS
Status: ACTIVE | OUTPATIENT
Start: 2017-04-21 | End: 2017-04-21

## 2017-04-21 RX ORDER — ACETAMINOPHEN 325 MG/1
650 TABLET ORAL EVERY 4 HOURS PRN
Status: DISCONTINUED | OUTPATIENT
Start: 2017-04-21 | End: 2017-04-21

## 2017-04-21 RX ORDER — SODIUM CHLORIDE 0.9 % (FLUSH) 0.9 %
10 SYRINGE (ML) INJECTION PRN
Status: DISCONTINUED | OUTPATIENT
Start: 2017-04-21 | End: 2017-04-21

## 2017-04-21 RX ORDER — SENNA AND DOCUSATE SODIUM 50; 8.6 MG/1; MG/1
2 TABLET, FILM COATED ORAL DAILY PRN
Status: DISCONTINUED | OUTPATIENT
Start: 2017-04-21 | End: 2017-04-22 | Stop reason: HOSPADM

## 2017-04-21 RX ORDER — SODIUM CHLORIDE 0.9 % (FLUSH) 0.9 %
10 SYRINGE (ML) INJECTION EVERY 12 HOURS SCHEDULED
Status: DISCONTINUED | OUTPATIENT
Start: 2017-04-21 | End: 2017-04-22 | Stop reason: HOSPADM

## 2017-04-21 RX ORDER — SODIUM CHLORIDE 0.9 % (FLUSH) 0.9 %
10 SYRINGE (ML) INJECTION PRN
Status: DISCONTINUED | OUTPATIENT
Start: 2017-04-21 | End: 2017-04-21 | Stop reason: SDUPTHER

## 2017-04-21 RX ORDER — SODIUM CHLORIDE 0.9 % (FLUSH) 0.9 %
10 SYRINGE (ML) INJECTION EVERY 12 HOURS SCHEDULED
Status: DISCONTINUED | OUTPATIENT
Start: 2017-04-21 | End: 2017-04-21

## 2017-04-21 RX ORDER — SODIUM CHLORIDE 0.9 % (FLUSH) 0.9 %
10 SYRINGE (ML) INJECTION PRN
Status: DISCONTINUED | OUTPATIENT
Start: 2017-04-21 | End: 2017-04-22 | Stop reason: HOSPADM

## 2017-04-21 RX ORDER — ONDANSETRON 2 MG/ML
4 INJECTION INTRAMUSCULAR; INTRAVENOUS EVERY 6 HOURS PRN
Status: DISCONTINUED | OUTPATIENT
Start: 2017-04-21 | End: 2017-04-21

## 2017-04-21 RX ORDER — CARBIDOPA/LEVODOPA 25MG-250MG
1 TABLET ORAL NIGHTLY PRN
Status: DISCONTINUED | OUTPATIENT
Start: 2017-04-21 | End: 2017-04-22 | Stop reason: HOSPADM

## 2017-04-21 RX ORDER — SODIUM CHLORIDE 0.9 % (FLUSH) 0.9 %
10 SYRINGE (ML) INJECTION EVERY 12 HOURS SCHEDULED
Status: DISCONTINUED | OUTPATIENT
Start: 2017-04-21 | End: 2017-04-21 | Stop reason: SDUPTHER

## 2017-04-21 RX ORDER — ONDANSETRON 2 MG/ML
4 INJECTION INTRAMUSCULAR; INTRAVENOUS EVERY 6 HOURS PRN
Status: DISCONTINUED | OUTPATIENT
Start: 2017-04-21 | End: 2017-04-22 | Stop reason: HOSPADM

## 2017-04-21 RX ORDER — ACETAMINOPHEN 325 MG/1
650 TABLET ORAL EVERY 4 HOURS PRN
Status: DISCONTINUED | OUTPATIENT
Start: 2017-04-21 | End: 2017-04-22 | Stop reason: HOSPADM

## 2017-04-21 RX ADMIN — ASPIRIN 81 MG CHEWABLE TABLET 81 MG: 81 TABLET CHEWABLE at 11:29

## 2017-04-21 RX ADMIN — PANTOPRAZOLE SODIUM 40 MG: 40 TABLET, DELAYED RELEASE ORAL at 05:49

## 2017-04-21 RX ADMIN — CEFAZOLIN 3 G: 1 INJECTION, POWDER, FOR SOLUTION INTRAMUSCULAR; INTRAVENOUS; PARENTERAL at 15:43

## 2017-04-21 RX ADMIN — SODIUM CHLORIDE: 9 INJECTION, SOLUTION INTRAVENOUS at 11:26

## 2017-04-21 RX ADMIN — CELECOXIB 200 MG: 200 CAPSULE ORAL at 11:25

## 2017-04-21 RX ADMIN — ESCITALOPRAM OXALATE 10 MG: 10 TABLET ORAL at 17:17

## 2017-04-21 RX ADMIN — FENOFIBRATE 160 MG: 160 TABLET ORAL at 11:28

## 2017-04-21 RX ADMIN — STANDARDIZED SENNA CONCENTRATE AND DOCUSATE SODIUM 2 TABLET: 8.6; 5 TABLET, FILM COATED ORAL at 21:31

## 2017-04-21 RX ADMIN — ESCITALOPRAM OXALATE 10 MG: 10 TABLET, FILM COATED ORAL at 11:31

## 2017-04-21 RX ADMIN — OXYCODONE HYDROCHLORIDE 10 MG: 5 TABLET ORAL at 21:31

## 2017-04-21 RX ADMIN — LISINOPRIL 40 MG: 20 TABLET ORAL at 11:28

## 2017-04-21 RX ADMIN — Medication 10 ML: at 22:35

## 2017-04-21 RX ADMIN — CEFAZOLIN 3 G: 1 INJECTION, POWDER, FOR SOLUTION INTRAMUSCULAR; INTRAVENOUS; PARENTERAL at 21:34

## 2017-04-21 RX ADMIN — OXYCODONE HYDROCHLORIDE 10 MG: 5 TABLET ORAL at 11:38

## 2017-04-21 RX ADMIN — OXYCODONE HYDROCHLORIDE 10 MG: 5 TABLET ORAL at 05:49

## 2017-04-21 RX ADMIN — MAGNESIUM HYDROXIDE 30 ML: 400 SUSPENSION ORAL at 15:53

## 2017-04-21 ASSESSMENT — PAIN SCALES - GENERAL
PAINLEVEL_OUTOF10: 8
PAINLEVEL_OUTOF10: 4
PAINLEVEL_OUTOF10: 2
PAINLEVEL_OUTOF10: 4
PAINLEVEL_OUTOF10: 8

## 2017-04-21 ASSESSMENT — PAIN DESCRIPTION - LOCATION: LOCATION: BACK

## 2017-04-22 VITALS
OXYGEN SATURATION: 97 % | TEMPERATURE: 98.9 F | HEIGHT: 68 IN | WEIGHT: 267.4 LBS | BODY MASS INDEX: 40.53 KG/M2 | RESPIRATION RATE: 18 BRPM | SYSTOLIC BLOOD PRESSURE: 155 MMHG | HEART RATE: 68 BPM | DIASTOLIC BLOOD PRESSURE: 88 MMHG

## 2017-04-22 LAB
ANION GAP SERPL CALCULATED.3IONS-SCNC: 14 MMOL/L (ref 7–19)
BUN BLDV-MCNC: 16 MG/DL (ref 8–23)
CALCIUM SERPL-MCNC: 8.7 MG/DL (ref 8.8–10.2)
CHLORIDE BLD-SCNC: 100 MMOL/L (ref 98–111)
CO2: 26 MMOL/L (ref 22–29)
CREAT SERPL-MCNC: 0.9 MG/DL (ref 0.5–1.2)
GFR NON-AFRICAN AMERICAN: >60
GLUCOSE BLD-MCNC: 93 MG/DL (ref 74–109)
INR BLD: 1.12 (ref 0.88–1.18)
POTASSIUM SERPL-SCNC: 4.1 MMOL/L (ref 3.5–5)
PROTHROMBIN TIME: 14.4 SEC (ref 12–14.6)
SODIUM BLD-SCNC: 140 MMOL/L (ref 136–145)

## 2017-04-22 PROCEDURE — 99024 POSTOP FOLLOW-UP VISIT: CPT | Performed by: INTERNAL MEDICINE

## 2017-04-22 PROCEDURE — 6370000000 HC RX 637 (ALT 250 FOR IP): Performed by: INTERNAL MEDICINE

## 2017-04-22 PROCEDURE — 2580000003 HC RX 258: Performed by: INTERNAL MEDICINE

## 2017-04-22 PROCEDURE — 85610 PROTHROMBIN TIME: CPT

## 2017-04-22 PROCEDURE — 36415 COLL VENOUS BLD VENIPUNCTURE: CPT

## 2017-04-22 PROCEDURE — 80048 BASIC METABOLIC PNL TOTAL CA: CPT

## 2017-04-22 PROCEDURE — 93005 ELECTROCARDIOGRAM TRACING: CPT

## 2017-04-22 RX ORDER — BISACODYL 10 MG
10 SUPPOSITORY, RECTAL RECTAL DAILY PRN
Status: DISCONTINUED | OUTPATIENT
Start: 2017-04-22 | End: 2017-04-22 | Stop reason: HOSPADM

## 2017-04-22 RX ADMIN — OXYCODONE HYDROCHLORIDE 10 MG: 5 TABLET ORAL at 13:54

## 2017-04-22 RX ADMIN — BISACODYL 10 MG: 10 SUPPOSITORY RECTAL at 11:21

## 2017-04-22 RX ADMIN — Medication 10 ML: at 08:10

## 2017-04-22 RX ADMIN — ASPIRIN 81 MG CHEWABLE TABLET 81 MG: 81 TABLET CHEWABLE at 08:09

## 2017-04-22 RX ADMIN — OXYCODONE HYDROCHLORIDE 10 MG: 5 TABLET ORAL at 06:09

## 2017-04-22 RX ADMIN — PANTOPRAZOLE SODIUM 40 MG: 40 TABLET, DELAYED RELEASE ORAL at 06:09

## 2017-04-22 RX ADMIN — FENOFIBRATE 160 MG: 160 TABLET ORAL at 08:09

## 2017-04-22 RX ADMIN — LISINOPRIL 40 MG: 20 TABLET ORAL at 08:09

## 2017-04-22 RX ADMIN — CELECOXIB 200 MG: 200 CAPSULE ORAL at 08:09

## 2017-04-22 ASSESSMENT — PAIN SCALES - GENERAL
PAINLEVEL_OUTOF10: 3

## 2017-04-24 ENCOUNTER — TELEPHONE (OUTPATIENT)
Dept: PRIMARY CARE CLINIC | Age: 67
End: 2017-04-24

## 2017-04-25 LAB
EKG P AXIS: 48 DEGREES
EKG P AXIS: 50 DEGREES
EKG P AXIS: 63 DEGREES
EKG P AXIS: NORMAL DEGREES
EKG P-R INTERVAL: 130 MS
EKG P-R INTERVAL: 154 MS
EKG P-R INTERVAL: 186 MS
EKG P-R INTERVAL: NORMAL MS
EKG Q-T INTERVAL: 416 MS
EKG Q-T INTERVAL: 424 MS
EKG Q-T INTERVAL: 458 MS
EKG Q-T INTERVAL: 474 MS
EKG QRS DURATION: 106 MS
EKG QRS DURATION: 106 MS
EKG QRS DURATION: 112 MS
EKG QRS DURATION: 122 MS
EKG QTC CALCULATION (BAZETT): 433 MS
EKG QTC CALCULATION (BAZETT): 439 MS
EKG QTC CALCULATION (BAZETT): 463 MS
EKG QTC CALCULATION (BAZETT): 469 MS
EKG T AXIS: 41 DEGREES
EKG T AXIS: 47 DEGREES
EKG T AXIS: 54 DEGREES
EKG T AXIS: 59 DEGREES

## 2017-04-26 PROBLEM — Z95.0 PACEMAKER: Status: ACTIVE | Noted: 2017-04-21

## 2017-05-01 ENCOUNTER — OFFICE VISIT (OUTPATIENT)
Dept: CARDIOLOGY | Age: 67
End: 2017-05-01

## 2017-05-01 DIAGNOSIS — Z51.89 VISIT FOR WOUND CHECK: Primary | ICD-10-CM

## 2017-05-01 PROCEDURE — 1036F TOBACCO NON-USER: CPT | Performed by: INTERNAL MEDICINE

## 2017-05-01 PROCEDURE — 99999 PR OFFICE/OUTPT VISIT,PROCEDURE ONLY: CPT | Performed by: INTERNAL MEDICINE

## 2017-05-04 ENCOUNTER — OFFICE VISIT (OUTPATIENT)
Dept: PRIMARY CARE CLINIC | Age: 67
End: 2017-05-04
Payer: MEDICARE

## 2017-05-04 VITALS
RESPIRATION RATE: 20 BRPM | DIASTOLIC BLOOD PRESSURE: 90 MMHG | HEIGHT: 68 IN | BODY MASS INDEX: 39.4 KG/M2 | HEART RATE: 76 BPM | SYSTOLIC BLOOD PRESSURE: 160 MMHG | WEIGHT: 260 LBS | OXYGEN SATURATION: 97 % | TEMPERATURE: 98.2 F

## 2017-05-04 DIAGNOSIS — I49.5 SICK SINUS SYNDROME DUE TO SA NODE DYSFUNCTION (HCC): Primary | ICD-10-CM

## 2017-05-04 DIAGNOSIS — Z95.0 PACEMAKER: ICD-10-CM

## 2017-05-04 DIAGNOSIS — F32.A DEPRESSION, UNSPECIFIED DEPRESSION TYPE: ICD-10-CM

## 2017-05-04 DIAGNOSIS — I10 ESSENTIAL HYPERTENSION: ICD-10-CM

## 2017-05-04 PROCEDURE — 99496 TRANSJ CARE MGMT HIGH F2F 7D: CPT | Performed by: FAMILY MEDICINE

## 2017-05-04 RX ORDER — ESCITALOPRAM OXALATE 20 MG/1
20 TABLET ORAL DAILY
Qty: 30 TABLET | Refills: 11 | Status: SHIPPED | OUTPATIENT
Start: 2017-05-04 | End: 2017-06-07 | Stop reason: DRUGHIGH

## 2017-05-04 RX ORDER — CHLORTHALIDONE 25 MG/1
25 TABLET ORAL DAILY
Qty: 30 TABLET | Refills: 11 | Status: SHIPPED | OUTPATIENT
Start: 2017-05-04 | End: 2017-09-11

## 2017-06-02 ENCOUNTER — OFFICE VISIT (OUTPATIENT)
Dept: CARDIOLOGY | Age: 67
End: 2017-06-02
Payer: MEDICARE

## 2017-06-02 VITALS
DIASTOLIC BLOOD PRESSURE: 88 MMHG | SYSTOLIC BLOOD PRESSURE: 158 MMHG | HEART RATE: 76 BPM | BODY MASS INDEX: 40.01 KG/M2 | HEIGHT: 68 IN | WEIGHT: 264 LBS

## 2017-06-02 DIAGNOSIS — Z95.0 PACEMAKER: ICD-10-CM

## 2017-06-02 DIAGNOSIS — I49.5 SICK SINUS SYNDROME DUE TO SA NODE DYSFUNCTION (HCC): Primary | ICD-10-CM

## 2017-06-02 DIAGNOSIS — I48.91 ATRIAL FIBRILLATION WITH SLOW VENTRICULAR RESPONSE (HCC): ICD-10-CM

## 2017-06-02 PROCEDURE — G8417 CALC BMI ABV UP PARAM F/U: HCPCS | Performed by: CLINICAL NURSE SPECIALIST

## 2017-06-02 PROCEDURE — 99213 OFFICE O/P EST LOW 20 MIN: CPT | Performed by: CLINICAL NURSE SPECIALIST

## 2017-06-02 PROCEDURE — 3017F COLORECTAL CA SCREEN DOC REV: CPT | Performed by: CLINICAL NURSE SPECIALIST

## 2017-06-02 PROCEDURE — 1036F TOBACCO NON-USER: CPT | Performed by: CLINICAL NURSE SPECIALIST

## 2017-06-02 PROCEDURE — 4040F PNEUMOC VAC/ADMIN/RCVD: CPT | Performed by: CLINICAL NURSE SPECIALIST

## 2017-06-02 PROCEDURE — 1123F ACP DISCUSS/DSCN MKR DOCD: CPT | Performed by: CLINICAL NURSE SPECIALIST

## 2017-06-02 PROCEDURE — G8427 DOCREV CUR MEDS BY ELIG CLIN: HCPCS | Performed by: CLINICAL NURSE SPECIALIST

## 2017-06-02 PROCEDURE — 93288 INTERROG EVL PM/LDLS PM IP: CPT | Performed by: CLINICAL NURSE SPECIALIST

## 2017-06-07 ENCOUNTER — OFFICE VISIT (OUTPATIENT)
Dept: PRIMARY CARE CLINIC | Age: 67
End: 2017-06-07
Payer: MEDICARE

## 2017-06-07 VITALS
HEIGHT: 68 IN | DIASTOLIC BLOOD PRESSURE: 82 MMHG | WEIGHT: 265.2 LBS | OXYGEN SATURATION: 96 % | BODY MASS INDEX: 40.19 KG/M2 | SYSTOLIC BLOOD PRESSURE: 162 MMHG | TEMPERATURE: 98.8 F | HEART RATE: 70 BPM

## 2017-06-07 DIAGNOSIS — I49.5 SICK SINUS SYNDROME DUE TO SA NODE DYSFUNCTION (HCC): ICD-10-CM

## 2017-06-07 DIAGNOSIS — F32.A DEPRESSION, UNSPECIFIED DEPRESSION TYPE: ICD-10-CM

## 2017-06-07 DIAGNOSIS — I10 ESSENTIAL HYPERTENSION: Primary | ICD-10-CM

## 2017-06-07 PROCEDURE — G8417 CALC BMI ABV UP PARAM F/U: HCPCS | Performed by: FAMILY MEDICINE

## 2017-06-07 PROCEDURE — G8427 DOCREV CUR MEDS BY ELIG CLIN: HCPCS | Performed by: FAMILY MEDICINE

## 2017-06-07 PROCEDURE — 1036F TOBACCO NON-USER: CPT | Performed by: FAMILY MEDICINE

## 2017-06-07 PROCEDURE — 99214 OFFICE O/P EST MOD 30 MIN: CPT | Performed by: FAMILY MEDICINE

## 2017-06-07 PROCEDURE — 3017F COLORECTAL CA SCREEN DOC REV: CPT | Performed by: FAMILY MEDICINE

## 2017-06-07 PROCEDURE — 1123F ACP DISCUSS/DSCN MKR DOCD: CPT | Performed by: FAMILY MEDICINE

## 2017-06-07 PROCEDURE — 4040F PNEUMOC VAC/ADMIN/RCVD: CPT | Performed by: FAMILY MEDICINE

## 2017-06-07 RX ORDER — ESCITALOPRAM OXALATE 20 MG/1
10 TABLET ORAL DAILY
Qty: 30 TABLET | Refills: 0 | Status: SHIPPED
Start: 2017-06-07 | End: 2017-11-08 | Stop reason: DRUGHIGH

## 2017-06-07 RX ORDER — AMLODIPINE BESYLATE 5 MG/1
5 TABLET ORAL DAILY
Qty: 30 TABLET | Refills: 3 | Status: SHIPPED | OUTPATIENT
Start: 2017-06-07 | End: 2017-10-06 | Stop reason: SDUPTHER

## 2017-06-07 ASSESSMENT — ENCOUNTER SYMPTOMS
SHORTNESS OF BREATH: 0
COLOR CHANGE: 0
COUGH: 0

## 2017-06-09 ENCOUNTER — APPOINTMENT (OUTPATIENT)
Dept: PREADMISSION TESTING | Facility: HOSPITAL | Age: 67
End: 2017-06-09

## 2017-06-09 ENCOUNTER — HOSPITAL ENCOUNTER (OUTPATIENT)
Dept: GENERAL RADIOLOGY | Facility: HOSPITAL | Age: 67
Discharge: HOME | End: 2017-06-09
Admitting: ORTHOPAEDIC SURGERY

## 2017-06-09 VITALS
OXYGEN SATURATION: 95 % | BODY MASS INDEX: 39.56 KG/M2 | HEART RATE: 66 BPM | WEIGHT: 261 LBS | DIASTOLIC BLOOD PRESSURE: 84 MMHG | RESPIRATION RATE: 20 BRPM | SYSTOLIC BLOOD PRESSURE: 150 MMHG | HEIGHT: 68 IN

## 2017-06-09 LAB
ALBUMIN SERPL-MCNC: 4.4 G/DL (ref 3.5–5)
ALBUMIN/GLOB SERPL: 1.2 G/DL (ref 1.1–2.5)
ALP SERPL-CCNC: 91 U/L (ref 24–120)
ALT SERPL W P-5'-P-CCNC: 20 U/L (ref 0–54)
ANION GAP SERPL CALCULATED.3IONS-SCNC: 11 MMOL/L (ref 4–13)
APTT PPP: 30.3 SECONDS (ref 24.1–34.8)
AST SERPL-CCNC: 38 U/L (ref 7–45)
BACTERIA UR QL AUTO: ABNORMAL /HPF
BASOPHILS # BLD AUTO: 0.03 10*3/MM3 (ref 0–0.2)
BASOPHILS NFR BLD AUTO: 0.4 % (ref 0–2)
BILIRUB SERPL-MCNC: 0.7 MG/DL (ref 0.1–1)
BILIRUB UR QL STRIP: NEGATIVE
BUN BLD-MCNC: 18 MG/DL (ref 5–21)
BUN/CREAT SERPL: 17.8 (ref 7–25)
CALCIUM SPEC-SCNC: 9.5 MG/DL (ref 8.4–10.4)
CHLORIDE SERPL-SCNC: 105 MMOL/L (ref 98–110)
CLARITY UR: CLEAR
CO2 SERPL-SCNC: 28 MMOL/L (ref 24–31)
COLOR UR: YELLOW
CREAT BLD-MCNC: 1.01 MG/DL (ref 0.5–1.4)
DEPRECATED RDW RBC AUTO: 45.9 FL (ref 40–54)
EOSINOPHIL # BLD AUTO: 0.31 10*3/MM3 (ref 0–0.7)
EOSINOPHIL NFR BLD AUTO: 3.7 % (ref 0–4)
ERYTHROCYTE [DISTWIDTH] IN BLOOD BY AUTOMATED COUNT: 14.1 % (ref 12–15)
GFR SERPL CREATININE-BSD FRML MDRD: 74 ML/MIN/1.73
GLOBULIN UR ELPH-MCNC: 3.6 GM/DL
GLUCOSE BLD-MCNC: 93 MG/DL (ref 70–100)
GLUCOSE UR STRIP-MCNC: NEGATIVE MG/DL
HCT VFR BLD AUTO: 42.2 % (ref 40–52)
HGB BLD-MCNC: 14 G/DL (ref 14–18)
HGB UR QL STRIP.AUTO: ABNORMAL
HYALINE CASTS UR QL AUTO: ABNORMAL /LPF
IMM GRANULOCYTES # BLD: 0.04 10*3/MM3 (ref 0–0.03)
IMM GRANULOCYTES NFR BLD: 0.5 % (ref 0–5)
INR PPP: 0.89 (ref 0.91–1.09)
KETONES UR QL STRIP: NEGATIVE
LEUKOCYTE ESTERASE UR QL STRIP.AUTO: NEGATIVE
LYMPHOCYTES # BLD AUTO: 1.51 10*3/MM3 (ref 0.72–4.86)
LYMPHOCYTES NFR BLD AUTO: 18.2 % (ref 15–45)
MCH RBC QN AUTO: 29.5 PG (ref 28–32)
MCHC RBC AUTO-ENTMCNC: 33.2 G/DL (ref 33–36)
MCV RBC AUTO: 89 FL (ref 82–95)
MONOCYTES # BLD AUTO: 0.73 10*3/MM3 (ref 0.19–1.3)
MONOCYTES NFR BLD AUTO: 8.8 % (ref 4–12)
NEUTROPHILS # BLD AUTO: 5.66 10*3/MM3 (ref 1.87–8.4)
NEUTROPHILS NFR BLD AUTO: 68.4 % (ref 39–78)
NITRITE UR QL STRIP: NEGATIVE
PH UR STRIP.AUTO: 5.5 [PH] (ref 5–8)
PLATELET # BLD AUTO: 282 10*3/MM3 (ref 130–400)
PMV BLD AUTO: 9.8 FL (ref 6–12)
POTASSIUM BLD-SCNC: 4.7 MMOL/L (ref 3.5–5.3)
PROT SERPL-MCNC: 8 G/DL (ref 6.3–8.7)
PROT UR QL STRIP: NEGATIVE
PROTHROMBIN TIME: 12.3 SECONDS (ref 11.9–14.6)
RBC # BLD AUTO: 4.74 10*6/MM3 (ref 4.8–5.9)
RBC # UR: ABNORMAL /HPF
REF LAB TEST METHOD: ABNORMAL
SODIUM BLD-SCNC: 144 MMOL/L (ref 135–145)
SP GR UR STRIP: 1.02 (ref 1–1.03)
SQUAMOUS #/AREA URNS HPF: ABNORMAL /HPF
UROBILINOGEN UR QL STRIP: ABNORMAL
WBC NRBC COR # BLD: 8.28 10*3/MM3 (ref 4.8–10.8)
WBC UR QL AUTO: ABNORMAL /HPF

## 2017-06-09 PROCEDURE — 93010 ELECTROCARDIOGRAM REPORT: CPT | Performed by: INTERNAL MEDICINE

## 2017-06-09 PROCEDURE — 80053 COMPREHEN METABOLIC PANEL: CPT | Performed by: ORTHOPAEDIC SURGERY

## 2017-06-09 PROCEDURE — 85610 PROTHROMBIN TIME: CPT | Performed by: ORTHOPAEDIC SURGERY

## 2017-06-09 PROCEDURE — 81001 URINALYSIS AUTO W/SCOPE: CPT | Performed by: ORTHOPAEDIC SURGERY

## 2017-06-09 PROCEDURE — 93005 ELECTROCARDIOGRAM TRACING: CPT

## 2017-06-09 PROCEDURE — 85730 THROMBOPLASTIN TIME PARTIAL: CPT | Performed by: ORTHOPAEDIC SURGERY

## 2017-06-09 PROCEDURE — 85025 COMPLETE CBC W/AUTO DIFF WBC: CPT | Performed by: ORTHOPAEDIC SURGERY

## 2017-06-09 PROCEDURE — 71010 HC CHEST PA OR AP: CPT

## 2017-06-09 RX ORDER — ALBUTEROL SULFATE 90 UG/1
2 AEROSOL, METERED RESPIRATORY (INHALATION) EVERY 4 HOURS PRN
COMMUNITY

## 2017-06-09 RX ORDER — AMLODIPINE BESYLATE 5 MG/1
5 TABLET ORAL DAILY
COMMUNITY
End: 2019-05-21

## 2017-06-09 RX ORDER — IBUPROFEN 200 MG
200 TABLET ORAL EVERY 6 HOURS PRN
COMMUNITY
End: 2017-06-16 | Stop reason: HOSPADM

## 2017-06-09 NOTE — DISCHARGE INSTRUCTIONS
DAY OF SURGERY INSTRUCTIONS        YOUR SURGEON: dr andrade    PROCEDURE: ***CORPECTOMY C6, PARTIAL CORPECTOMY C5, ANTERIOR CERVICAL DISCECTOMY C3-5, ANTERIOR FUSION WITH INSTRUMENTATION C3-7  CERVICAL DISCECTOMY C 3-5, ANTERIOR FUSION WITH INSTRUMENTATION C 3-7    DATE OF SURGERY: ***6/14/2017    ARRIVAL TIME: AS DIRECTED BY OFFICE    DAY OF SURGERY TAKE ONLY THESE MEDICATIONS: ***amlodipine, and may use inhalers if needed        BEFORE YOU COME TO THE HOSPITAL  (Pre-op instructions)  • Do not eat, drink, smoke or chew gum after midnight the night before surgery.  This also includes no mints.  • Morning of surgery take only the medicines you have been instructed with a sip of water unless otherwise instructed  by your physician.  • Do not shave, wear makeup or dark nail polish.  • Remove all jewelry including rings.  • Leave anything you consider valuable at home.  • Leave your suitcase in the car until after your surgery.  • Bring the following with you if applicable:  o Picture ID and insurance, Medicare or Medicaid cards  o Co-pay/deductible required by insurance (cash, check, credit card)  o Copy of advance directive, living will or power-of- documents if not brought to PAT  o CPAP or BIPAP mask and tubing  o Relaxation aids (MP3 player, book, magazine)  • Confirm your arrival time with you surgeon the day before your surgery (surgery times are subject to change)  • On the day of surgery check in at registration located at the main entrance of the hospital.       Outpatient Surgery Guidelines, Adult  Outpatient procedures are those for which the person having the procedure is allowed to go home the same day as the procedure. Various procedures are done on an outpatient basis. You should follow some general guidelines if you will be having an outpatient procedure.  LET YOUR HEALTH CARE PROVIDER KNOW ABOUT:  · Any allergies you have.  · All medicines you are taking, including vitamins, herbs, eye drops,  creams, and over-the-counter medicines.  · Previous problems you or members of your family have had with the use of anesthetics.  · Any blood disorders you have.  · Previous surgeries you have had.  · Medical conditions you have.  RISKS AND COMPLICATIONS  Your health care provider will discuss possible risks and complications with you before surgery. Common risks and complications include:    · Problems due to the use of anesthetics.  · Blood loss and replacement (does not apply to minor surgical procedures).  · Temporary increase in pain due to surgery.  · Uncorrected pain or problems that the surgery was meant to correct.  · Infection.  · New damage.  BEFORE THE PROCEDURE  · Ask your health care provider about changing or stopping your regular medicines. You may need to stop taking certain medicines in the days or weeks before the procedure.  · Stop smoking at least 2 weeks before surgery. This lowers your risk for complications during and after surgery. Ask your health care provider for help with this if needed.  · Eat your usual meals and a light supper the day before surgery. Continue fluid intake. Do not drink alcohol.  · Do not eat or drink after midnight the night before your surgery.   · Arrange for someone to take you home and to stay with you for 24 hours after the procedure. Medicine given for your procedure may affect your ability to drive or to care for yourself.  · Call your health care provider's office if you develop an illness or problem that may prevent you from safely having your procedure.  AFTER THE PROCEDURE  After surgery, you will be taken to a recovery area, where your progress will be monitored. If there are no complications, you will be allowed to go home when you are awake, stable, and taking fluids well. You may have numbness around the surgical site. Healing will take some time. You will have tenderness at the surgical site and may have some swelling and bruising. You may also have  some nausea.  HOME CARE INSTRUCTIONS  · Do not drive for 24 hours, or as directed by your health care provider. Do not drive while taking prescription pain medicines.  · Do not drink alcohol for 24 hours.  · Do not make important decisions or sign legal documents for 24 hours.  · You may resume a normal diet and activities as directed.  · Do not lift anything heavier than 10 pounds (4.5 kg) or play contact sports until your health care provider says it is okay.  · Change your bandages (dressings) as directed.  · Only take over-the-counter or prescription medicines as directed by your health care provider.  · Follow up with your health care provider as directed.  SEEK MEDICAL CARE IF:  · You have increased bleeding (more than a small spot) from the surgical site.  · You have redness, swelling, or increasing pain in the wound.  · You see pus coming from the wound.  · You have a fever.  · You notice a bad smell coming from the wound or dressing.  · You feel lightheaded or faint.  · You develop a rash.  · You have trouble breathing.  · You develop allergies.  MAKE SURE YOU:  · Understand these instructions.  · Will watch your condition.  · Will get help right away if you are not doing well or get worse.     This information is not intended to replace advice given to you by your health care provider. Make sure you discuss any questions you have with your health care provider.     Document Released: 09/12/2002 Document Revised: 05/03/2016 Document Reviewed: 05/22/2014  [employer] Interactive Patient Education ©2016 [employer]       Fall Prevention in Hospitals, Adult  As a hospital patient, your condition and the treatments you receive can increase your risk for falls. Some additional risk factors for falls in a hospital include:  · Being in an unfamiliar environment.  · Being on bed rest.  · Your surgery.  · Taking certain medicines.  · Your tubing requirements, such as intravenous (IV) therapy or catheters.  It is  important that you learn how to decrease fall risks while at the hospital. Below are important tips that can help prevent falls.  SAFETY TIPS FOR PREVENTING FALLS  Talk about your risk of falling.  · Ask your health care provider why you are at risk for falling. Is it your medicine, illness, tubing placement, or something else?  · Make a plan with your health care provider to keep you safe from falls.  · Ask your health care provider or pharmacist about side effects of your medicines. Some medicines can make you dizzy or affect your coordination.  Ask for help.  · Ask for help before getting out of bed. You may need to press your call button.  · Ask for assistance in getting safely to the toilet.  · Ask for a walker or cane to be put at your bedside. Ask that most of the side rails on your bed be placed up before your health care provider leaves the room.  · Ask family or friends to sit with you.  · Ask for things that are out of your reach, such as your glasses, hearing aids, telephone, bedside table, or call button.  Follow these tips to avoid falling:  · Stay lying or seated, rather than standing, while waiting for help.  · Wear rubber-soled slippers or shoes whenever you walk in the hospital.  · Avoid quick, sudden movements.  ¨ Change positions slowly.  ¨ Sit on the side of your bed before standing.  ¨ Stand up slowly and wait before you start to walk.  · Let your health care provider know if there is a spill on the floor.  · Pay careful attention to the medical equipment, electrical cords, and tubes around you.  · When you need help, use your call button by your bed or in the bathroom. Wait for one of your health care providers to help you.  · If you feel dizzy or unsure of your footing, return to bed and wait for assistance.  · Avoid being distracted by the TV, telephone, or another person in your room.  · Do not lean or support yourself on rolling objects, such as IV poles or bedside tables.     This  information is not intended to replace advice given to you by your health care provider. Make sure you discuss any questions you have with your health care provider.     Document Released: 12/15/2001 Document Revised: 01/08/2016 Document Reviewed: 08/25/2013  [employer] Interactive Patient Education ©2016 [employer]       Surgical Site Infections FAQs  What is a Surgical Site Infection (SSI)?  A surgical site infection is an infection that occurs after surgery in the part of the body where the surgery took place. Most patients who have surgery do not develop an infection. However, infections develop in about 1 to 3 out of every 100 patients who have surgery.  Some of the common symptoms of a surgical site infection are:  · Redness and pain around the area where you had surgery  · Drainage of cloudy fluid from your surgical wound  · Fever  Can SSIs be treated?  Yes. Most surgical site infections can be treated with antibiotics. The antibiotic given to you depends on the bacteria (germs) causing the infection. Sometimes patients with SSIs also need another surgery to treat the infection.  What are some of the things that hospitals are doing to prevent SSIs?  To prevent SSIs, doctors, nurses, and other healthcare providers:  · Clean their hands and arms up to their elbows with an antiseptic agent just before the surgery.  · Clean their hands with soap and water or an alcohol-based hand rub before and after caring for each patient.  · May remove some of your hair immediately before your surgery using electric clippers if the hair is in the same area where the procedure will occur. They should not shave you with a razor.  · Wear special hair covers, masks, gowns, and gloves during surgery to keep the surgery area clean.  · Give you antibiotics before your surgery starts. In most cases, you should get antibiotics within 60 minutes before the surgery starts and the antibiotics should be stopped within 24 hours after  surgery.  · Clean the skin at the site of your surgery with a special soap that kills germs.  What can I do to help prevent SSIs?  Before your surgery:  · Tell your doctor about other medical problems you may have. Health problems such as allergies, diabetes, and obesity could affect your surgery and your treatment.  · Quit smoking. Patients who smoke get more infections. Talk to your doctor about how you can quit before your surgery.  · Do not shave near where you will have surgery. Shaving with a razor can irritate your skin and make it easier to develop an infection.  At the time of your surgery:  · Speak up if someone tries to shave you with a razor before surgery. Ask why you need to be shaved and talk with your surgeon if you have any concerns.  · Ask if you will get antibiotics before surgery.  After your surgery:  · Make sure that your healthcare providers clean their hands before examining you, either with soap and water or an alcohol-based hand rub.  · If you do not see your providers clean their hands, please ask them to do so.  · Family and friends who visit you should not touch the surgical wound or dressings.  · Family and friends should clean their hands with soap and water or an alcohol-based hand rub before and after visiting you. If you do not see them clean their hands, ask them to clean their hands.  What do I need to do when I go home from the hospital?  · Before you go home, your doctor or nurse should explain everything you need to know about taking care of your wound. Make sure you understand how to care for your wound before you leave the hospital.  · Always clean your hands before and after caring for your wound.  · Before you go home, make sure you know who to contact if you have questions or problems after you get home.  · If you have any symptoms of an infection, such as redness and pain at the surgery site, drainage, or fever, call your doctor immediately.  If you have additional  questions, please ask your doctor or nurse.  Developed and co-sponsored by The Society for Healthcare Epidemiology of [place] (SHEA); Infectious Diseases Society of [place] (IDSA); American Hospital Association; Association for Professionals in Infection Control and Epidemiology (APIC); Centers for Disease Control and Prevention (CDC); and The Joint Commission.     This information is not intended to replace advice given to you by your health care provider. Make sure you discuss any questions you have with your health care provider.     Document Released: 12/23/2014 Document Revised: 01/08/2016 Document Reviewed: 03/02/2016  [employer] Interactive Patient Education ©2016 [employer]       [facility]  CHG 4% Patient Instruction Sheet    Preparing the Skin Before Surgery  Preparing or “prepping” skin before surgery can reduce the risk of infection at the surgical site. To make the process easier,[facility] has chosen 4% Chlorhexidine Gluconate (CHG) antiseptic solution.   The steps below outline the prepping process and should be carefully followed.                                                                                                                                                      Prep the skin at the following time(s):                                                      We recommend you shower the night before surgery, and again the morning of surgery with the 4% CHG antiseptic solution using half of the bottle and a cloth each time.  Dress in clean clothes/sleepwear after showering.  See instructions below for application.          Do not apply any lotions or moisturizers.       Do not shave the area to be prepped for at least 2 days prior to surgery.    Clipping the hair may be done immediately prior to your surgery at the hospital    if needed.    Directions:  Thoroughly rinse your body with water.  Apply 4% CHG to a cloth and wash skin gently, paying special attention to the operative site.   Rinse again thoroughly.  Once you have begun using this product do not apply anything else to your skin. If itching or redness persists, rinse affected areas and discontinue use.    When using this product:  • Keep out of eyes, ears, and mouth.  • If solution should contact these areas, rinse out promptly and thoroughly with water.  • For external use only.  • Do not use in genital area, on your face or head.      PATIENT/FAMILY/RESPONSIBLE PARTY VERBALIZES UNDERSTANDING OF ABOVE EDUCATION.  COPY OF PAIN SCALE GIVEN AND REVIEWED WITH VERBALIZED UNDERSTANDING.

## 2017-06-14 ENCOUNTER — ANESTHESIA EVENT (OUTPATIENT)
Dept: PERIOP | Facility: HOSPITAL | Age: 67
End: 2017-06-14

## 2017-06-14 ENCOUNTER — HOSPITAL ENCOUNTER (INPATIENT)
Facility: HOSPITAL | Age: 67
LOS: 2 days | Discharge: HOME | End: 2017-06-16
Attending: ORTHOPAEDIC SURGERY | Admitting: ORTHOPAEDIC SURGERY

## 2017-06-14 ENCOUNTER — APPOINTMENT (OUTPATIENT)
Dept: GENERAL RADIOLOGY | Facility: HOSPITAL | Age: 67
End: 2017-06-14

## 2017-06-14 ENCOUNTER — ANESTHESIA (OUTPATIENT)
Dept: PERIOP | Facility: HOSPITAL | Age: 67
End: 2017-06-14

## 2017-06-14 DIAGNOSIS — Z74.09 MOBILITY IMPAIRED: ICD-10-CM

## 2017-06-14 DIAGNOSIS — Z78.9 DECREASED ACTIVITIES OF DAILY LIVING (ADL): ICD-10-CM

## 2017-06-14 PROBLEM — M48.02 STENOSIS, CERVICAL SPINE: Status: ACTIVE | Noted: 2017-06-14

## 2017-06-14 LAB
ABO GROUP BLD: NORMAL
BLD GP AB SCN SERPL QL: NEGATIVE
RH BLD: POSITIVE

## 2017-06-14 PROCEDURE — 86900 BLOOD TYPING SEROLOGIC ABO: CPT | Performed by: ORTHOPAEDIC SURGERY

## 2017-06-14 PROCEDURE — G8978 MOBILITY CURRENT STATUS: HCPCS | Performed by: PHYSICAL THERAPIST

## 2017-06-14 PROCEDURE — G8988 SELF CARE GOAL STATUS: HCPCS

## 2017-06-14 PROCEDURE — 97161 PT EVAL LOW COMPLEX 20 MIN: CPT

## 2017-06-14 PROCEDURE — 0RB30ZZ EXCISION OF CERVICAL VERTEBRAL DISC, OPEN APPROACH: ICD-10-PCS | Performed by: ORTHOPAEDIC SURGERY

## 2017-06-14 PROCEDURE — 86850 RBC ANTIBODY SCREEN: CPT | Performed by: ORTHOPAEDIC SURGERY

## 2017-06-14 PROCEDURE — 25010000002 MIDAZOLAM PER 1 MG: Performed by: NURSE ANESTHETIST, CERTIFIED REGISTERED

## 2017-06-14 PROCEDURE — 94799 UNLISTED PULMONARY SVC/PX: CPT

## 2017-06-14 PROCEDURE — 25010000002 HYDROMORPHONE PER 4 MG: Performed by: NURSE ANESTHETIST, CERTIFIED REGISTERED

## 2017-06-14 PROCEDURE — 76000 FLUOROSCOPY <1 HR PHYS/QHP: CPT

## 2017-06-14 PROCEDURE — C1713 ANCHOR/SCREW BN/BN,TIS/BN: HCPCS | Performed by: ORTHOPAEDIC SURGERY

## 2017-06-14 PROCEDURE — 25010000002 PROPOFOL 1000 MG/ML EMULSION: Performed by: NURSE ANESTHETIST, CERTIFIED REGISTERED

## 2017-06-14 PROCEDURE — 25010000003 CEFAZOLIN PER 500 MG: Performed by: NURSE ANESTHETIST, CERTIFIED REGISTERED

## 2017-06-14 PROCEDURE — 25010000002 HYDROMORPHONE PER 4 MG: Performed by: ORTHOPAEDIC SURGERY

## 2017-06-14 PROCEDURE — 94640 AIRWAY INHALATION TREATMENT: CPT

## 2017-06-14 PROCEDURE — 72040 X-RAY EXAM NECK SPINE 2-3 VW: CPT

## 2017-06-14 PROCEDURE — 97165 OT EVAL LOW COMPLEX 30 MIN: CPT

## 2017-06-14 PROCEDURE — G8979 MOBILITY GOAL STATUS: HCPCS | Performed by: PHYSICAL THERAPIST

## 2017-06-14 PROCEDURE — G8987 SELF CARE CURRENT STATUS: HCPCS

## 2017-06-14 PROCEDURE — 0RG20A0 FUSION OF 2 OR MORE CERVICAL VERTEBRAL JOINTS WITH INTERBODY FUSION DEVICE, ANTERIOR APPROACH, ANTERIOR COLUMN, OPEN APPROACH: ICD-10-PCS | Performed by: ORTHOPAEDIC SURGERY

## 2017-06-14 PROCEDURE — 94760 N-INVAS EAR/PLS OXIMETRY 1: CPT

## 2017-06-14 PROCEDURE — 86901 BLOOD TYPING SEROLOGIC RH(D): CPT | Performed by: ORTHOPAEDIC SURGERY

## 2017-06-14 PROCEDURE — 25010000002 PROPOFOL 10 MG/ML EMULSION: Performed by: NURSE ANESTHETIST, CERTIFIED REGISTERED

## 2017-06-14 PROCEDURE — 4A11X4G MONITORING OF PERIPHERAL NERVOUS ELECTRICAL ACTIVITY, INTRAOPERATIVE, EXTERNAL APPROACH: ICD-10-PCS | Performed by: ORTHOPAEDIC SURGERY

## 2017-06-14 DEVICE — IMPLANTABLE DEVICE: Type: IMPLANTABLE DEVICE | Site: SPINE CERVICAL | Status: FUNCTIONAL

## 2017-06-14 DEVICE — SPACR PEEK COHERE 7D LRD 8X14X16MM: Type: IMPLANTABLE DEVICE | Site: SPINE CERVICAL | Status: FUNCTIONAL

## 2017-06-14 DEVICE — SCRW BONE MAXAN VA 4X14MM: Type: IMPLANTABLE DEVICE | Site: SPINE CERVICAL | Status: FUNCTIONAL

## 2017-06-14 DEVICE — IMPLANTABLE DEVICE: Type: IMPLANTABLE DEVICE | Status: FUNCTIONAL

## 2017-06-14 DEVICE — MATRX BONE PRIMAGEN CELL 1CC: Type: IMPLANTABLE DEVICE | Status: FUNCTIONAL

## 2017-06-14 RX ORDER — SODIUM CHLORIDE 0.9 % (FLUSH) 0.9 %
1-10 SYRINGE (ML) INJECTION AS NEEDED
Status: DISCONTINUED | OUTPATIENT
Start: 2017-06-14 | End: 2017-06-16 | Stop reason: HOSPADM

## 2017-06-14 RX ORDER — ONDANSETRON 2 MG/ML
4 INJECTION INTRAMUSCULAR; INTRAVENOUS AS NEEDED
Status: DISCONTINUED | OUTPATIENT
Start: 2017-06-14 | End: 2017-06-14 | Stop reason: HOSPADM

## 2017-06-14 RX ORDER — DIPHENHYDRAMINE HCL 25 MG
25 CAPSULE ORAL NIGHTLY PRN
Status: DISCONTINUED | OUTPATIENT
Start: 2017-06-14 | End: 2017-06-16 | Stop reason: HOSPADM

## 2017-06-14 RX ORDER — HYDRALAZINE HYDROCHLORIDE 20 MG/ML
5 INJECTION INTRAMUSCULAR; INTRAVENOUS
Status: DISCONTINUED | OUTPATIENT
Start: 2017-06-14 | End: 2017-06-14 | Stop reason: HOSPADM

## 2017-06-14 RX ORDER — MORPHINE SULFATE 2 MG/ML
2 INJECTION, SOLUTION INTRAMUSCULAR; INTRAVENOUS AS NEEDED
Status: DISCONTINUED | OUTPATIENT
Start: 2017-06-14 | End: 2017-06-14 | Stop reason: HOSPADM

## 2017-06-14 RX ORDER — FAMOTIDINE 20 MG/1
20 TABLET, FILM COATED ORAL 2 TIMES DAILY
Status: DISCONTINUED | OUTPATIENT
Start: 2017-06-14 | End: 2017-06-16 | Stop reason: HOSPADM

## 2017-06-14 RX ORDER — SODIUM CHLORIDE 9 MG/ML
INJECTION, SOLUTION INTRAVENOUS AS NEEDED
Status: DISCONTINUED | OUTPATIENT
Start: 2017-06-14 | End: 2017-06-14 | Stop reason: HOSPADM

## 2017-06-14 RX ORDER — ALBUTEROL SULFATE 2.5 MG/3ML
2.5 SOLUTION RESPIRATORY (INHALATION) EVERY 4 HOURS PRN
Status: DISCONTINUED | OUTPATIENT
Start: 2017-06-14 | End: 2017-06-16 | Stop reason: HOSPADM

## 2017-06-14 RX ORDER — NALOXONE HCL 0.4 MG/ML
0.04 VIAL (ML) INJECTION AS NEEDED
Status: DISCONTINUED | OUTPATIENT
Start: 2017-06-14 | End: 2017-06-14 | Stop reason: HOSPADM

## 2017-06-14 RX ORDER — SUFENTANIL CITRATE 50 UG/ML
INJECTION EPIDURAL; INTRAVENOUS AS NEEDED
Status: DISCONTINUED | OUTPATIENT
Start: 2017-06-14 | End: 2017-06-14 | Stop reason: SURG

## 2017-06-14 RX ORDER — MIDAZOLAM HYDROCHLORIDE 1 MG/ML
1 INJECTION INTRAMUSCULAR; INTRAVENOUS
Status: DISCONTINUED | OUTPATIENT
Start: 2017-06-14 | End: 2017-06-14 | Stop reason: HOSPADM

## 2017-06-14 RX ORDER — BUDESONIDE AND FORMOTEROL FUMARATE DIHYDRATE 160; 4.5 UG/1; UG/1
2 AEROSOL RESPIRATORY (INHALATION)
Status: DISCONTINUED | OUTPATIENT
Start: 2017-06-14 | End: 2017-06-16 | Stop reason: HOSPADM

## 2017-06-14 RX ORDER — KETAMINE HYDROCHLORIDE 50 MG/ML
INJECTION, SOLUTION, CONCENTRATE INTRAMUSCULAR; INTRAVENOUS AS NEEDED
Status: DISCONTINUED | OUTPATIENT
Start: 2017-06-14 | End: 2017-06-14 | Stop reason: SURG

## 2017-06-14 RX ORDER — IPRATROPIUM BROMIDE AND ALBUTEROL SULFATE 2.5; .5 MG/3ML; MG/3ML
3 SOLUTION RESPIRATORY (INHALATION) ONCE
Status: COMPLETED | OUTPATIENT
Start: 2017-06-14 | End: 2017-06-14

## 2017-06-14 RX ORDER — PROPOFOL 10 MG/ML
VIAL (ML) INTRAVENOUS AS NEEDED
Status: DISCONTINUED | OUTPATIENT
Start: 2017-06-14 | End: 2017-06-14 | Stop reason: SURG

## 2017-06-14 RX ORDER — ONDANSETRON 2 MG/ML
4 INJECTION INTRAMUSCULAR; INTRAVENOUS EVERY 6 HOURS PRN
Status: DISCONTINUED | OUTPATIENT
Start: 2017-06-14 | End: 2017-06-16 | Stop reason: HOSPADM

## 2017-06-14 RX ORDER — AMLODIPINE BESYLATE 5 MG/1
5 TABLET ORAL DAILY
Status: DISCONTINUED | OUTPATIENT
Start: 2017-06-14 | End: 2017-06-16 | Stop reason: HOSPADM

## 2017-06-14 RX ORDER — LIDOCAINE HYDROCHLORIDE 20 MG/ML
INJECTION, SOLUTION INFILTRATION; PERINEURAL AS NEEDED
Status: DISCONTINUED | OUTPATIENT
Start: 2017-06-14 | End: 2017-06-14 | Stop reason: SURG

## 2017-06-14 RX ORDER — LIDOCAINE HYDROCHLORIDE 40 MG/ML
SOLUTION TOPICAL AS NEEDED
Status: DISCONTINUED | OUTPATIENT
Start: 2017-06-14 | End: 2017-06-14 | Stop reason: SURG

## 2017-06-14 RX ORDER — ALBUTEROL SULFATE 90 UG/1
2 AEROSOL, METERED RESPIRATORY (INHALATION) EVERY 4 HOURS PRN
Status: DISCONTINUED | OUTPATIENT
Start: 2017-06-14 | End: 2017-06-14 | Stop reason: CLARIF

## 2017-06-14 RX ORDER — SODIUM CHLORIDE 0.9 % (FLUSH) 0.9 %
1-10 SYRINGE (ML) INJECTION AS NEEDED
Status: DISCONTINUED | OUTPATIENT
Start: 2017-06-14 | End: 2017-06-14 | Stop reason: HOSPADM

## 2017-06-14 RX ORDER — SODIUM CHLORIDE, SODIUM LACTATE, POTASSIUM CHLORIDE, CALCIUM CHLORIDE 600; 310; 30; 20 MG/100ML; MG/100ML; MG/100ML; MG/100ML
100 INJECTION, SOLUTION INTRAVENOUS CONTINUOUS PRN
Status: DISCONTINUED | OUTPATIENT
Start: 2017-06-14 | End: 2017-06-14 | Stop reason: HOSPADM

## 2017-06-14 RX ORDER — ONDANSETRON 2 MG/ML
4 INJECTION INTRAMUSCULAR; INTRAVENOUS EVERY 6 HOURS PRN
Status: DISCONTINUED | OUTPATIENT
Start: 2017-06-14 | End: 2017-06-14 | Stop reason: SDUPTHER

## 2017-06-14 RX ORDER — METOCLOPRAMIDE HYDROCHLORIDE 5 MG/ML
5 INJECTION INTRAMUSCULAR; INTRAVENOUS
Status: DISCONTINUED | OUTPATIENT
Start: 2017-06-14 | End: 2017-06-14 | Stop reason: HOSPADM

## 2017-06-14 RX ORDER — LABETALOL HYDROCHLORIDE 5 MG/ML
5 INJECTION, SOLUTION INTRAVENOUS
Status: DISCONTINUED | OUTPATIENT
Start: 2017-06-14 | End: 2017-06-14 | Stop reason: HOSPADM

## 2017-06-14 RX ORDER — MAGNESIUM HYDROXIDE 1200 MG/15ML
LIQUID ORAL AS NEEDED
Status: DISCONTINUED | OUTPATIENT
Start: 2017-06-14 | End: 2017-06-14 | Stop reason: HOSPADM

## 2017-06-14 RX ORDER — FAMOTIDINE 10 MG/ML
20 INJECTION, SOLUTION INTRAVENOUS 2 TIMES DAILY
Status: DISCONTINUED | OUTPATIENT
Start: 2017-06-14 | End: 2017-06-16 | Stop reason: HOSPADM

## 2017-06-14 RX ORDER — SODIUM CHLORIDE 9 MG/ML
75 INJECTION, SOLUTION INTRAVENOUS CONTINUOUS
Status: DISCONTINUED | OUTPATIENT
Start: 2017-06-14 | End: 2017-06-16 | Stop reason: HOSPADM

## 2017-06-14 RX ORDER — IPRATROPIUM BROMIDE AND ALBUTEROL SULFATE 2.5; .5 MG/3ML; MG/3ML
3 SOLUTION RESPIRATORY (INHALATION) ONCE AS NEEDED
Status: DISCONTINUED | OUTPATIENT
Start: 2017-06-14 | End: 2017-06-14 | Stop reason: HOSPADM

## 2017-06-14 RX ORDER — FLUMAZENIL 0.1 MG/ML
0.2 INJECTION INTRAVENOUS AS NEEDED
Status: DISCONTINUED | OUTPATIENT
Start: 2017-06-14 | End: 2017-06-14 | Stop reason: HOSPADM

## 2017-06-14 RX ORDER — SODIUM CHLORIDE, SODIUM LACTATE, POTASSIUM CHLORIDE, CALCIUM CHLORIDE 600; 310; 30; 20 MG/100ML; MG/100ML; MG/100ML; MG/100ML
100 INJECTION, SOLUTION INTRAVENOUS CONTINUOUS
Status: DISCONTINUED | OUTPATIENT
Start: 2017-06-14 | End: 2017-06-14 | Stop reason: SDUPTHER

## 2017-06-14 RX ORDER — OXYCODONE HCL 20 MG/1
20 TABLET, FILM COATED, EXTENDED RELEASE ORAL ONCE
Status: COMPLETED | OUTPATIENT
Start: 2017-06-14 | End: 2017-06-14

## 2017-06-14 RX ORDER — SODIUM CHLORIDE 9 MG/ML
75 INJECTION, SOLUTION INTRAVENOUS CONTINUOUS
Status: DISCONTINUED | OUTPATIENT
Start: 2017-06-14 | End: 2017-06-14 | Stop reason: SDUPTHER

## 2017-06-14 RX ORDER — SODIUM CHLORIDE, SODIUM LACTATE, POTASSIUM CHLORIDE, CALCIUM CHLORIDE 600; 310; 30; 20 MG/100ML; MG/100ML; MG/100ML; MG/100ML
100 INJECTION, SOLUTION INTRAVENOUS CONTINUOUS
Status: DISCONTINUED | OUTPATIENT
Start: 2017-06-14 | End: 2017-06-16 | Stop reason: HOSPADM

## 2017-06-14 RX ORDER — ONDANSETRON 4 MG/1
4 TABLET, FILM COATED ORAL EVERY 6 HOURS PRN
Status: DISCONTINUED | OUTPATIENT
Start: 2017-06-14 | End: 2017-06-16 | Stop reason: HOSPADM

## 2017-06-14 RX ORDER — OXYCODONE AND ACETAMINOPHEN 10; 325 MG/1; MG/1
2 TABLET ORAL EVERY 4 HOURS PRN
Status: DISCONTINUED | OUTPATIENT
Start: 2017-06-14 | End: 2017-06-16 | Stop reason: HOSPADM

## 2017-06-14 RX ORDER — MEPERIDINE HYDROCHLORIDE 25 MG/ML
12.5 INJECTION INTRAMUSCULAR; INTRAVENOUS; SUBCUTANEOUS
Status: DISCONTINUED | OUTPATIENT
Start: 2017-06-14 | End: 2017-06-14 | Stop reason: HOSPADM

## 2017-06-14 RX ORDER — MIDAZOLAM HYDROCHLORIDE 1 MG/ML
2 INJECTION INTRAMUSCULAR; INTRAVENOUS
Status: DISCONTINUED | OUTPATIENT
Start: 2017-06-14 | End: 2017-06-14 | Stop reason: HOSPADM

## 2017-06-14 RX ORDER — CEFAZOLIN SODIUM 1 G/3ML
INJECTION, POWDER, FOR SOLUTION INTRAMUSCULAR; INTRAVENOUS AS NEEDED
Status: DISCONTINUED | OUTPATIENT
Start: 2017-06-14 | End: 2017-06-14 | Stop reason: SURG

## 2017-06-14 RX ORDER — LISINOPRIL 10 MG/1
10 TABLET ORAL DAILY
Status: DISCONTINUED | OUTPATIENT
Start: 2017-06-14 | End: 2017-06-16 | Stop reason: HOSPADM

## 2017-06-14 RX ORDER — ESCITALOPRAM OXALATE 10 MG/1
10 TABLET ORAL DAILY
Status: DISCONTINUED | OUTPATIENT
Start: 2017-06-14 | End: 2017-06-16 | Stop reason: HOSPADM

## 2017-06-14 RX ORDER — ONDANSETRON 4 MG/1
4 TABLET, ORALLY DISINTEGRATING ORAL EVERY 6 HOURS PRN
Status: DISCONTINUED | OUTPATIENT
Start: 2017-06-14 | End: 2017-06-16 | Stop reason: HOSPADM

## 2017-06-14 RX ADMIN — CEFAZOLIN 1 G: 1 INJECTION, POWDER, FOR SOLUTION INTRAMUSCULAR; INTRAVENOUS; PARENTERAL at 14:15

## 2017-06-14 RX ADMIN — SODIUM CHLORIDE, POTASSIUM CHLORIDE, SODIUM LACTATE AND CALCIUM CHLORIDE: 600; 310; 30; 20 INJECTION, SOLUTION INTRAVENOUS at 08:45

## 2017-06-14 RX ADMIN — HYDROMORPHONE HYDROCHLORIDE 0.5 MG: 1 INJECTION, SOLUTION INTRAMUSCULAR; INTRAVENOUS; SUBCUTANEOUS at 12:35

## 2017-06-14 RX ADMIN — LISINOPRIL 10 MG: 10 TABLET ORAL at 14:08

## 2017-06-14 RX ADMIN — LIDOCAINE HYDROCHLORIDE 100 MG: 20 INJECTION, SOLUTION INFILTRATION; PERINEURAL at 07:03

## 2017-06-14 RX ADMIN — SODIUM CHLORIDE 75 ML/HR: 9 INJECTION, SOLUTION INTRAVENOUS at 13:37

## 2017-06-14 RX ADMIN — OXYCODONE HYDROCHLORIDE AND ACETAMINOPHEN 2 TABLET: 10; 325 TABLET ORAL at 16:56

## 2017-06-14 RX ADMIN — SUFENTANIL CITRATE 50 MCG: 50 INJECTION, SOLUTION EPIDURAL; INTRAVENOUS at 07:45

## 2017-06-14 RX ADMIN — LABETALOL HYDROCHLORIDE 5 MG: 5 INJECTION, SOLUTION INTRAVENOUS at 11:55

## 2017-06-14 RX ADMIN — ESCITALOPRAM 10 MG: 10 TABLET, FILM COATED ORAL at 14:07

## 2017-06-14 RX ADMIN — PROPOFOL 200 MG: 10 INJECTION, EMULSION INTRAVENOUS at 07:03

## 2017-06-14 RX ADMIN — OXYCODONE HYDROCHLORIDE AND ACETAMINOPHEN 2 TABLET: 10; 325 TABLET ORAL at 22:16

## 2017-06-14 RX ADMIN — HYDROMORPHONE HYDROCHLORIDE 1 MG: 1 INJECTION, SOLUTION INTRAMUSCULAR; INTRAVENOUS; SUBCUTANEOUS at 13:44

## 2017-06-14 RX ADMIN — BUDESONIDE AND FORMOTEROL FUMARATE DIHYDRATE 2 PUFF: 160; 4.5 AEROSOL RESPIRATORY (INHALATION) at 21:12

## 2017-06-14 RX ADMIN — SUFENTANIL CITRATE 50 MCG: 50 INJECTION, SOLUTION EPIDURAL; INTRAVENOUS at 07:03

## 2017-06-14 RX ADMIN — SUFENTANIL CITRATE 50 MCG: 50 INJECTION, SOLUTION EPIDURAL; INTRAVENOUS at 07:06

## 2017-06-14 RX ADMIN — KETAMINE HYDROCHLORIDE 50 MG: 50 INJECTION, SOLUTION, CONCENTRATE INTRAMUSCULAR; INTRAVENOUS at 07:00

## 2017-06-14 RX ADMIN — CEFAZOLIN 2 G: 1 INJECTION, POWDER, FOR SOLUTION INTRAVENOUS at 07:00

## 2017-06-14 RX ADMIN — SODIUM CHLORIDE, POTASSIUM CHLORIDE, SODIUM LACTATE AND CALCIUM CHLORIDE 100 ML/HR: 600; 310; 30; 20 INJECTION, SOLUTION INTRAVENOUS at 06:37

## 2017-06-14 RX ADMIN — OXYCODONE HYDROCHLORIDE 20 MG: 20 TABLET, FILM COATED, EXTENDED RELEASE ORAL at 06:00

## 2017-06-14 RX ADMIN — MIDAZOLAM HYDROCHLORIDE 2 MG: 1 INJECTION, SOLUTION INTRAMUSCULAR; INTRAVENOUS at 06:43

## 2017-06-14 RX ADMIN — LIDOCAINE HYDROCHLORIDE 1 EACH: 40 SOLUTION TOPICAL at 07:04

## 2017-06-14 RX ADMIN — AMLODIPINE BESYLATE 5 MG: 5 TABLET ORAL at 14:07

## 2017-06-14 RX ADMIN — KETAMINE HYDROCHLORIDE 100 MG: 50 INJECTION, SOLUTION, CONCENTRATE INTRAMUSCULAR; INTRAVENOUS at 08:29

## 2017-06-14 RX ADMIN — FAMOTIDINE 20 MG: 20 TABLET, FILM COATED ORAL at 14:07

## 2017-06-14 RX ADMIN — PROPOFOL 25 MCG/KG/MIN: 10 INJECTION, EMULSION INTRAVENOUS at 07:00

## 2017-06-14 RX ADMIN — SODIUM CHLORIDE, POTASSIUM CHLORIDE, SODIUM LACTATE AND CALCIUM CHLORIDE: 600; 310; 30; 20 INJECTION, SOLUTION INTRAVENOUS at 08:40

## 2017-06-14 RX ADMIN — SODIUM CHLORIDE, POTASSIUM CHLORIDE, SODIUM LACTATE AND CALCIUM CHLORIDE 100 ML/HR: 600; 310; 30; 20 INJECTION, SOLUTION INTRAVENOUS at 06:36

## 2017-06-14 RX ADMIN — LIDOCAINE HYDROCHLORIDE 0.5 ML: 10 INJECTION, SOLUTION EPIDURAL; INFILTRATION; INTRACAUDAL; PERINEURAL at 06:36

## 2017-06-14 RX ADMIN — HYDROMORPHONE HYDROCHLORIDE 0.5 MG: 1 INJECTION, SOLUTION INTRAMUSCULAR; INTRAVENOUS; SUBCUTANEOUS at 12:40

## 2017-06-14 RX ADMIN — CEFAZOLIN 1 G: 1 INJECTION, POWDER, FOR SOLUTION INTRAMUSCULAR; INTRAVENOUS; PARENTERAL at 22:16

## 2017-06-14 RX ADMIN — IPRATROPIUM BROMIDE AND ALBUTEROL SULFATE 3 ML: .5; 3 SOLUTION RESPIRATORY (INHALATION) at 06:43

## 2017-06-14 NOTE — PLAN OF CARE
Problem: Patient Care Overview (Adult)  Goal: Plan of Care Review  Outcome: Ongoing (interventions implemented as appropriate)    06/14/17 1631   Coping/Psychosocial Response Interventions   Plan Of Care Reviewed With patient   Patient Care Overview   Progress progress toward functional goals as expected   Outcome Evaluation   Outcome Summary/Follow up Plan Physical therapy evaluation completed. Patient presents with impaired balance and decreased functional activity tolerance . PT is CGA for gait and transfers. Patient will benefit from skilled therapy for othrotic training, education for precaution, gait , stair and transfer trainng to decrease risk for fall. Therapy recommends pt to discharge home with assist.          Problem: Inpatient Physical Therapy  Goal: Bed Mobility Goal LTG- PT  Outcome: Ongoing (interventions implemented as appropriate)    06/14/17 1631   Bed Mobility PT LTG   Bed Mobility PT LTG, Date Established 06/14/17   Bed Mobility PT LTG, Time to Achieve by discharge   Bed Mobility PT LTG, Activity Type all bed mobility   Bed Mobility PT LTG, [place] Level independent       Goal: Transfer Training Goal 1 LTG- PT  Outcome: Ongoing (interventions implemented as appropriate)    06/14/17 1631   Transfer Training PT LTG   Transfer Training PT LTG, Date Established 06/14/17   Transfer Training PT LTG, Time to Achieve by discharge   Transfer Training PT LTG, Activity Type all transfers   Transfer Training PT LTG, [place] Level independent       Goal: Gait Training Goal LTG- PT  Outcome: Ongoing (interventions implemented as appropriate)    06/14/17 1631   Gait Training PT LTG   Gait Training Goal PT LTG, Date Established 06/14/17   Gait Training Goal PT LTG, Time to Achieve by discharge   Gait Training Goal PT LTG, [place] Level independent   Gait Training Goal PT LTG, Distance to Achieve 350 ft       Goal: Stair Training Goal LTG- PT  Outcome: Ongoing (interventions implemented as  appropriate)    06/14/17 1631   Stair Training PT LTG   Stair Training Goal PT LTG, Date Established 06/14/17   Stair Training Goal PT LTG, Time to Achieve by discharge   Stair Training Goal PT LTG, Number of Steps 4   Stair Training Goal PT LTG, [place] Level independent

## 2017-06-14 NOTE — PLAN OF CARE
Problem: Perioperative Period (Adult)  Goal: Signs and Symptoms of Listed Potential Problems Will be Absent or Manageable (Perioperative Period)  Outcome: Outcome(s) achieved Date Met:  06/14/17 06/14/17 1719   Perioperative Period   Problems Assessed (Perioperative Period) pain   Problems Present (Perioperative Period) pain         Problem: Fall Risk (Adult)  Goal: Identify Related Risk Factors and Signs and Symptoms  Outcome: Outcome(s) achieved Date Met:  06/14/17 06/14/17 1719   Fall Risk   Fall Risk: Related Risk Factors environment unfamiliar

## 2017-06-14 NOTE — ANESTHESIA PREPROCEDURE EVALUATION
Anesthesia Evaluation     Patient summary reviewed and Nursing notes reviewed   NPO Solid Status: > 8 hours  NPO Liquid Status: > 8 hours     Airway   Mallampati: II  no difficulty expected  Dental    (+) poor dentition        Pulmonary    (+) COPD mild,   Cardiovascular   Exercise tolerance: good (4-7 METS)    ECG reviewed    (+) pacemaker (medtronic atrial paced) interrogated <1 month ago, hypertension poorly controlled,       Neuro/Psych  (+) numbness, psychiatric history Anxiety,    GI/Hepatic/Renal/Endo    (+) obesity, morbid obesity, GERD,     Musculoskeletal     (+) neck pain,   Abdominal    Substance History      OB/GYN          Other   (+) arthritis                                   Anesthesia Plan    ASA 3     general and MAC   total IV anesthesia  intravenous induction   Anesthetic plan and risks discussed with patient.

## 2017-06-14 NOTE — ANESTHESIA PROCEDURE NOTES
Airway  Urgency: elective    Airway not difficult    General Information and Staff    Patient location during procedure: OR  CRNA: PALAK BIANCHI    Indications and Patient Condition  Indications for airway management: airway protection    Preoxygenated: yes  MILS maintained throughout  Mask difficulty assessment: 1 - vent by mask    Final Airway Details  Final airway type: endotracheal airway      Successful airway: ETT  Cuffed: yes   Successful intubation technique: direct laryngoscopy  Endotracheal tube insertion site: oral  Blade: [place]  Blade size: #2  ETT size: 7.5 mm  Cormack-Lehane Classification: grade I - full view of glottis  Placement verified by: chest auscultation, capnometry and palpation of cuff   Cuff volume (mL): 6  Measured from: lips  ETT to lips (cm): 21  Number of attempts at approach: 1

## 2017-06-14 NOTE — THERAPY EVALUATION
Acute Care - Physical Therapy Initial Evaluation  [facility]     Patient Name: Chinedu Monaco  : 1950  MRN: [id number]  Today's Date: 2017   Onset of Illness/Injury or Date of Surgery Date: 17  Date of Referral to PT: 17  Referring Physician: ANGUS Aguilar      Admit Date: 2017     Visit Dx:    ICD-10-CM ICD-9-CM   1. Decreased activities of daily living (ADL) Z78.9 V49.89   2. Mobility impaired Z74.09 799.89     Patient Active Problem List   Diagnosis   • Stenosis, cervical spine     Past Medical History:   Diagnosis Date   • Anxiety    • Chronic neck pain    • COPD (chronic obstructive pulmonary disease)    • Degenerative cervical disc    • GERD (gastroesophageal reflux disease)    • Hypertension    • Irregular heart beat     sss, had pacemaker implanted 2017   • Neck pain    • Osteoarthritis    • Radiculopathy of cervical region    • Wears glasses      Past Surgical History:   Procedure Laterality Date   • PACEMAKER IMPLANTATION  2017    dr james, [facility]   • TOTAL KNEE ARTHROPLASTY Bilateral               PT ASSESSMENT (last 72 hours)      PT Evaluation       17 1554 17 1541    Rehab Evaluation    Document Type evaluation   See MAR  -ND evaluation  -MS (r) RZ (t) MS (c)    Subjective Information agree to therapy;complains of;fatigue;pain  -ND agree to therapy;complains of;fatigue;pain  -MS    General Information    Patient Profile Review yes  -ND yes  -MS (r) RZ (t) MS (c)    Onset of Illness/Injury or Date of Surgery Date 17  -ND 17  -MS (r) RZ (t) MS (c)    Referring Physician ANGUS Aguilar  -ND John Prince PA  -MS (r) RZ (t) MS (c)    General Observations Pt. fowlers, alert, ASPEN Collar, IV site, SCDs donned  -ND Pt alert in fowlers position, hemovac intact, C collar on, IV and folley cath intact.  SCDs donned   -MS (r) RZ (t) MS (c)    Pertinent History Of Current Problem Pt. having increasing neck pain through  shoulders down arms, numbness/tingling BUE. S/P 6/14/17 corpectomy C6, partial corpectomy C5, anterior cervical discectomy C3-5, anterior fusion with instrumentation. Labs therapeutic. XR cervical spine, XR chest.   -ND Pt had chronic neck pain that failed conservative care. Patient had a hx of B UE radiculoparthy in C4- C5 distribution. MRI indicated severe stenosis C3- C7 worse at C3-C4, C5-C6 B and worse on Left C6- C7 and C4- 5 moderately severe. Pt was admitted on 6/14/17 for elective surgery and underwent C6 corpectomy, C5 partial corpectomy, ACDF C3-4 , C4-5, anterior interbody fusion C5-6, C6-7 and anterior spinal fusion with instrumentation C3-7      -MS (r) RZ (t) MS (c)    Precautions/Limitations brace on when up;fall precautions;spinal precautions  -ND spinal precautions;brace on when up  -MS    Prior Level of Function independent:;all household mobility;community mobility;ADL's;home management;cleaning;driving  -ND independent:;community mobility;gait;transfer;ADL's;home management;cooking;cleaning;driving  -MS (r) RZ (t) MS (c)    Equipment Currently Used at Home raised toilet;grab bar  -ND grab bar;raised toilet  -MS (r) RZ (t) MS (c)    Plans/Goals Discussed With patient;agreed upon  -ND patient;agreed upon  -MS (r) RZ (t) MS (c)    Risks Reviewed patient:;LOB;nausea/vomiting;dizziness;increased discomfort;change in vital signs;increased drainage;lines disloged  -ND patient:;LOB;nausea/vomiting;dizziness;increased discomfort;lines disloged  -MS (r) RZ (t) MS (c)    Benefits Reviewed patient:;improve function;increase independence;increase strength;increase balance;decrease pain;decrease risk of DVT;improve skin integrity;increase knowledge  -ND patient:;improve function;increase strength;increase independence;increase balance;decrease pain  -MS (r) RZ (t) MS (c)    Barriers to Rehab none identified  -ND none identified  -MS (r) RZ (t) MS (c)    Living Environment    Lives With spouse  -ND spouse  -MS  (r) RZ (t) MS (c)    Living Arrangements house  -ND house  -MS (r) RZ (t) MS (c)    Home Accessibility bed and bath on same level;stairs to enter home   walk in shower  -ND bed and bath on same level;stairs to enter home   walk in shower  -MS (r) RZ (t) MS (c)    Number of Stairs to Enter Home 2  -ND 2  -MS (r) RZ (t) MS (c)    Clinical Impression    Date of Referral to PT  06/14/17  -MS (r) RZ (t) MS (c)    PT Diagnosis  Patient presents with decreased balance and decreased functional activity tolerance   -MS (r) RZ (t) MS (c)    Prognosis  Good  -MS (r) RZ (t) MS (c)    Patient/Family Goals Statement  return home   -MS (r) RZ (t) MS (c)    Criteria for Skilled Therapeutic Interventions Met  yes;treatment indicated  -MS (r) RZ (t) MS (c)    Pathology/Pathophysiology Noted (Describe Specifically for Each System)  musculoskeletal  -MS (r) RZ (t) MS (c)    Impairments Found (describe specific impairments)  aerobic capacity/endurance;gait, locomotion, and balance;muscle performance  -MS (r) RZ (t) MS (c)    Rehab Potential  good, to achieve stated therapy goals  -MS (r) RZ (t) MS (c)    Predicted Duration of Therapy Intervention (days/wks)  until discharge   -MS (r) RZ (t) MS (c)    Pain Assessment    Pain Assessment 0-10  -ND 0-10  -MS (r) RZ (t) MS (c)    Pain Score 4  -ND 4  -MS (r) RZ (t) MS (c)    Pain Type Chronic pain;Surgical pain  -ND Chronic pain;Surgical pain  -MS (r) RZ (t) MS (c)    Pain Location Neck  -ND Neck   incison   -MS (r) RZ (t) MS (c)    Pain Descriptors Aching  -ND Aching  -MS (r) RZ (t) MS (c)    Pain Frequency Constant/continuous  -ND Constant/continuous  -MS (r) RZ (t) MS (c)    Pain Intervention(s) Medication (See MAR);Repositioned  -ND Medication (See MAR);Repositioned;Ambulation/increased activity  -MS (r) RZ (t) MS (c)    Response to Interventions tolerated  -ND Patient tolerated intervention well with out increased discomfort   -MS (r) RZ (t) MS (c)    Vision Assessment/Intervention     Visual Impairment WFL with corrective lenses  -ND WFL with corrective lenses  -MS (r) RZ (t) MS (c)    Cognitive Assessment/Intervention    Current Cognitive/Communication Assessment functional  -ND functional  -MS (r) RZ (t) MS (c)    Orientation Status oriented x 4  -ND oriented x 4  -MS (r) RZ (t) MS (c)    Follows Commands/Answers Questions 100% of the time;able to follow multi-step instructions  -% of the time;able to follow multi-step instructions  -MS (r) RZ (t) MS (c)    Personal Safety WNL/WFL  -ND WNL/WFL  -MS (r) RZ (t) MS (c)    Personal Safety Interventions fall prevention program maintained;gait belt;nonskid shoes/slippers when out of bed;supervised activity  -ND fall prevention program maintained;gait belt;muscle strengthening facilitated;supervised activity;nonskid shoes/slippers when out of bed  -MS (r) RZ (t) MS (c)    ROM (Range of Motion)    General ROM no range of motion deficits identified  -ND no range of motion deficits identified  -MS (r) RZ (t) MS (c)    General ROM Detail  B UE and LE ROM WNL   -MS (r) RZ (t) MS (c)    MMT (Manual Muscle Testing)    General MMT Assessment no strength deficits identified  -ND no strength deficits identified  -MS (r) RZ (t) MS (c)    General MMT Assessment Detail  B LE strength grossly 4+/5  -MS (r) RZ (t) MS (c)    Bed Mobility, Assessment/Treatment    Bed Mobility, Assistive Device bed rails;head of bed elevated  -ND bed rails;head of bed elevated  -MS (r) RZ (t) MS (c)    Bed Mobility, Scoot/Bridge, [place] contact guard assist  -ND contact guard assist;verbal cues required  -MS (r) RZ (t) MS (c)    Bed Mob, Supine to Sit, [place] contact guard assist  -ND contact guard assist;verbal cues required  -MS (r) RZ (t) MS (c)    Bed Mob, Sit to Supine, [place] supervision required  -ND supervision required;verbal cues required  -MS (r) RZ (t) MS (c)    Bed Mobility, Safety Issues  decreased use of arms for pushing/pulling  -MS (r) RZ (t) MS  (c)    Bed Mobility, Impairments  pain;impaired balance  -MS (r) RZ (t) MS (c)    Transfer Assessment/Treatment    Transfers, Sit-Stand [place] verbal cues required;nonverbal cues required (demo/gesture);contact guard assist  -ND contact guard assist;verbal cues required;nonverbal cues required (demo/gesture)  -MS (r) RZ (t) MS (c)    Transfers, Stand-Sit [place] verbal cues required;nonverbal cues required (demo/gesture);contact guard assist  -ND contact guard assist;verbal cues required;nonverbal cues required (demo/gesture)  -MS (r) RZ (t) MS (c)    Transfer, Safety Issues step length decreased  -ND step length decreased;balance decreased during turns  -MS (r) RZ (t) MS (c)    Transfer, Impairments strength decreased;impaired balance  -ND strength decreased;impaired balance;pain  -MS (r) RZ (t) MS (c)    Gait Assessment/Treatment    Gait, [place] Level  contact guard assist;verbal cues required;nonverbal cues required (demo/gesture)  -MS (r) RZ (t) MS (c)    Gait, Distance (Feet)  250  -MS (r) RZ (t) MS (c)    Gait, Gait Pattern Analysis  swing-through gait  -MS (r) RZ (t) MS (c)    Gait, Gait Deviations  bilateral:;[place] decreased;decreased heel strike;double stance time increased;narrow base  -MS (r) RZ (t) MS (c)    Gait, Safety Issues  step length decreased;balance decreased during turns  -MS (r) RZ (t) MS (c)    Gait, Impairments  pain;strength decreased;impaired balance  -MS (r) RZ (t) MS (c)    Motor Skills/Interventions    Additional Documentation Balance Skills Training (Group);Fine Motor Coordination Training (Group);Gross Motor Coordination Training (Group)  -ND Balance Skills Training (Group)  -MS (r) RZ (t) MS (c)    Balance Skills Training    Sitting-Level of Assistance Close supervision  -ND Close supervision  -MS (r) RZ (t) MS (c)    Sitting-Balance Support Right upper extremity supported;Left upper extremity supported;Feet supported  -ND Right upper extremity supported;Left upper  extremity supported;Feet supported  -MS (r) RZ (t) MS (c)    Sitting-Balance Activities  Reaching for objects;Reaching across midline  -MS (r) RZ (t) MS (c)    Standing-Level of Assistance Contact guard  -ND Contact guard  -MS (r) RZ (t) MS (c)    Static Standing Balance Support No upper extremity supported  -ND No upper extremity supported  -MS (r) RZ (t) MS (c)    Standing-Balance Activities  Weight Shift A-P;Forward lean;Reaching for objects  -MS (r) RZ (t) MS (c)    Gait Balance-Level of Assistance Contact guard  -ND Contact guard  -MS (r) RZ (t) MS (c)    Gait Balance Support No upper extremity supported  -ND No upper extremity supported  -MS (r) RZ (t) MS (c)    Orthotics Prosthetics    Additional Documentation  Orthosis Location (Group);Orthosis Management/Training (Group)  -MS (r) RZ (t) MS (c)    Orthosis Location    Orthosis Location/Type  neck/back  -MS (r) RZ (t) MS (c)    Orthosis Management/Training    Orthosis Indications  immobilize, protect/position healing structures;rest, reduce pain;restrict motion  -MS (r) RZ (t) MS (c)    Orthosis Skills Training  doffing orthosis;donning orthosis;orthosis maintenance;purpose/goals of orthosis;restrictions/precautions  -MS (r) RZ (t) MS (c)    Orthosis Wear Schedule  wear full time  -MS (r) RZ (t) MS (c)    Orthosis Adjustment Comment  tightened and centered   -MS (r) RZ (t) MS (c)    Sensory Assessment/Intervention    Sensory Impairment --   Able to localize and discriminate light and deep touch  -ND --   B LE and UE intact and symmetrical light touch, sharp dull  -MS (r) RZ (t) MS (c)    Positioning and Restraints    Pre-Treatment Position in bed  -ND in bed  -MS (r) RZ (t) MS (c)    Post Treatment Position bed  -ND bed  -MS (r) RZ (t) MS (c)    In Bed fowlers;call light within reach;encouraged to call for assist;side rails up x2  -ND fowlers;call light within reach;encouraged to call for assist;side rails up x2;with brace  -MS (r) RZ (t) MS (c)      06/14/17  1540       Rehab Evaluation    Document Type --  -ND     General Information    Patient Profile Review --  -ND     Onset of Illness/Injury or Date of Surgery Date --  -ND     Referring Physician --  -ND     Pertinent History Of Current Problem --  -ND     Precautions/Limitations --  -ND       User Key  (r) = Recorded By, (t) = Taken By, (c) = Cosigned By    Initials Name Provider Type    MS Sanjana JARVIS Alexis, PT DPT Physical Therapist    ND Johnna Montesinos, OTR/L Occupational Therapist    BRIT Smith, PT Student PT Student          Physical Therapy Education     Title: PT OT SLP Therapies (Active)     Topic: Physical Therapy (Active)     Point: Mobility training (Done)    Learning Progress Summary    Learner Readiness Method Response Comment Documented by Status   Patient Acceptance E VU Pt was educated on spinal precautions, orthosis wear schedule, gait and transfer training. Pt educated on therapy's role in their care.  06/14/17 1635 Done               Point: Precautions (Done)    Learning Progress Summary    Learner Readiness Method Response Comment Documented by Status   Patient Acceptance E VU Pt was educated on spinal precautions, orthosis wear schedule, gait and transfer training. Pt educated on therapy's role in their care.  06/14/17 1635 Done                      User Key     Initials Effective Dates Name Provider Type Discipline     05/08/17 -  Jessika Smith, PT Student PT Student PT                PT Recommendation and Plan  Anticipated Discharge Disposition: home with assist  Planned Therapy Interventions: balance training, gait training, bed mobility training, orthotic fitting/training, patient/family education, stair training, strengthening, transfer training, home exercise program  PT Frequency: 2 times/day  Plan of Care Review  Plan Of Care Reviewed With: patient  Progress: progress toward functional goals as expected  Outcome Summary/Follow up Plan: Physical therapy evaluation  completed. Patient presents with impaired balance and decreased functional activity tolerance . PT is CGA for gait and transfers.  Patient will benefit from skilled therapy for othrotic  training, education for precaution, gait , stair and transfer trainng to decrease risk for fall. Therapy recommends pt to discharge home with assist.           IP PT Goals       06/14/17 1631          Bed Mobility PT LTG    Bed Mobility PT LTG, Date Established 06/14/17  -MS (r) RZ (t) MS (c)      Bed Mobility PT LTG, Time to Achieve by discharge  -MS (r) RZ (t) MS (c)      Bed Mobility PT LTG, Activity Type all bed mobility  -MS (r) RZ (t) MS (c)      Bed Mobility PT LTG, [place] Level independent  -MS (r) RZ (t) MS (c)      Transfer Training PT LTG    Transfer Training PT LTG, Date Established 06/14/17  -MS (r) RZ (t) MS (c)      Transfer Training PT LTG, Time to Achieve by discharge  -MS (r) RZ (t) MS (c)      Transfer Training PT LTG, Activity Type all transfers  -MS (r) RZ (t) MS (c)      Transfer Training PT LTG, [place] Level independent  -MS (r) RZ (t) MS (c)      Gait Training PT LTG    Gait Training Goal PT LTG, Date Established 06/14/17  -MS (r) RZ (t) MS (c)      Gait Training Goal PT LTG, Time to Achieve by discharge  -MS (r) RZ (t) MS (c)      Gait Training Goal PT LTG, [place] Level independent  -MS (r) RZ (t) MS (c)      Gait Training Goal PT LTG, Distance to Achieve 350 ft  -MS (r) RZ (t) MS (c)      Stair Training PT LTG    Stair Training Goal PT LTG, Date Established 06/14/17  -MS (r) RZ (t) MS (c)      Stair Training Goal PT LTG, Time to Achieve by discharge  -MS (r) RZ (t) MS (c)      Stair Training Goal PT LTG, Number of Steps 4  -MS (r) RZ (t) MS (c)      Stair Training Goal PT LTG, [place] Level independent  -MS (r) RZ (t) MS (c)        User Key  (r) = Recorded By, (t) = Taken By, (c) = Cosigned By    Initials Name Provider Type    MS Sanjana Espinoza, PT DPT Physical Therapist    BRIT Rosen  CHRIS Smith, PT Student PT Student                Outcome Measures       06/14/17 1600 06/14/17 1541       How much help from another person do you currently need...    Turning from your back to your side while in flat bed without using bedrails?  4  -MS (r) RZ (t) MS (c)     Moving from lying on back to sitting on the side of a flat bed without bedrails?  3  -MS (r) RZ (t) MS (c)     Moving to and from a bed to a chair (including a wheelchair)?  4  -MS (r) RZ (t) MS (c)     Standing up from a chair using your arms (e.g., wheelchair, bedside chair)?  3  -MS (r) RZ (t) MS (c)     Climbing 3-5 steps with a railing?  3  -MS (r) RZ (t) MS (c)     To walk in hospital room?  3  -MS (r) RZ (t) MS (c)     AM-PAC 6 Clicks Score  20  -MS (r) RZ (t)     How much help from another is currently needed...    Putting on and taking off regular lower body clothing? 3  -ND      Bathing (including washing, rinsing, and drying) 3  -ND      Toileting (which includes using toilet bed pan or urinal) 3  -ND      Putting on and taking off regular upper body clothing 3  -ND      Taking care of personal grooming (such as brushing teeth) 3  -ND      Eating meals 4  -ND      Score 19  -ND      Functional Assessment    Outcome Measure Options AM-PAC 6 Clicks Daily Activity (OT)  -ND AM-PAC 6 Clicks Basic Mobility (PT)  -MS (r) RZ (t) MS (c)       User Key  (r) = Recorded By, (t) = Taken By, (c) = Cosigned By    Initials Name Provider Type    MS Sanjana Espinoza, PT DPT Physical Therapist    RADHA Montesinos, OTR/L Occupational Therapist    BRIT Smith, PT Student PT Student           Time Calculation:         PT Charges       06/14/17 1636          Time Calculation    Start Time 1538  -MS (r) RZ (t) MS (c)      Stop Time 1616  -MS (r) RZ (t) MS (c)      Time Calculation (min) 38 min  -MS (r) RZ (t)      PT Received On 06/14/17  -MS (r) RZ (t) MS (c)      PT Goal Re-Cert Due Date 06/14/17  -MS (r) RZ (t) MS (c)        User Key  (r) =  Recorded By, (t) = Taken By, (c) = Cosigned By    Initials Name Provider Type    MS Sanjana JARVIS Alexis, PT DPT Physical Therapist    RCARLOS Smith, PT Student PT Student          Therapy Charges for Today     Code Description Service Date Service Provider Modifiers Qty    24127465967 HC PT EVAL LOW COMPLEXITY 3 6/14/2017 Jessika Smith, PT Student GP, KX 1          PT G-Codes  PT Professional Judgement Used?: Yes  Outcome Measure Options: AM-PAC 6 Clicks Daily Activity (OT)  Score: 20  Functional Limitation: Mobility: Walking and moving around  Mobility: Walking and Moving Around Current Status (): At least 20 percent but less than 40 percent impaired, limited or restricted  Mobility: Walking and Moving Around Goal Status (): At least 1 percent but less than 20 percent impaired, limited or restricted      Jessika Smith, PT Student  6/14/2017

## 2017-06-14 NOTE — OP NOTE
CERVICAL CORPECTOMY, CERVICAL DISCECTOMY ANTERIOR FUSION WITH INSTRUMENTATION  Procedure Note    Chinedu Monaco  6/14/2017    Pre-op Diagnosis:     1.  Increasing chronic neck pain  2.  Bilateral shoulder and arm radiculopathy, right worse than left  3.  Mild cervical spondylotic myelopathy  4.  Multilevel cervical degenerative disc disease C3 to C7  5.  Multilevel cervical facet arthropathy  6.  Central and severe bilateral foraminal stenosis C3 7  7.  Occipital headaches    Post-op Diagnosis:    same    Procedure/CPT® Codes:    1.  Corpectomy C6  2.  Partial corpectomy C5  3.  Anterior cervical discectomy with interbody fusion C3-4, C4-5  4.  Anterior interbody fusion C5-6, C6-7  5.  Anterior spinal instrumentation C3 to 7 (Biomet anterior plate and screws)  6.  Use of PEEK interbody biomechanical device for fusion C3-4, C4-5, C5-7 ([employer] PEEK spacers x 2, Lanx corpectomy spacer)  7.  Use of locally obtained autograft bone for fusion C3 to 7  8.  Use of allograft bone matrix for fusion C3-7  9.  Use of operating microscope for decompression and microdissection  10.  Use of fluoroscopy for confirmation of surgical level, placement of instrumentation and PEEK spacers  11.  Intraoperative neuromonitoring    Anesthesia: General    Surgeon: TIFFANY Marc MD    Assistant: John Prince PA-C    Estimated Blood Loss: 200 mL    Complications: None    Condition: Stable to PACU.    Indications:    The patient is a 66-year-old who sees Dr. Herb Vanegas for medical issues.  He presented to the office with increasing chronic neck pain as well as symptoms down both shoulders and arms with the right side worse than the left.  He had signs and symptoms consistent with early cervical spondylotic myelopathy.  Imaging studies revealed advanced degenerative changes throughout the cervical spine with degenerative disc disease and facet arthropathy contributing to central and severe foraminal stenosis from C3 to  7.    After failing conservative measures, it was mutually decided that surgery would be the best option.  Risks, benefits, and complications of surgery were discussed with the patient.  The patient appeared well informed and wished to proceed.  We specifically discussed the risks of infection, blood loss, nerve root injury, CSF leak, spinal cord injury, and the possibility of incomplete resolution of symptoms.  We also discussed the possible risk of a nonunion and the potential need for additional surgery in the event of a pseudoarthrosis or hardware failure.    Operative Procedure:    After obtaining informed consent and verifying the correct operative levels, the patient was brought to the operating room and placed supine on an operating table.  A general anesthetic was provided by the anesthesia service with the assistance of an endotracheal tube.  Once this was properly positioned and secured, a bump was placed under the patient's shoulders placing the neck into a gentle extended position.  Head halter traction was then used with 10 pounds weight to maintain head position.  The shoulders were taped using 3 inch wide cloth tape to assist with radiographic visualization.  Fluoroscopy was used to identify the disc spaces from C3 to 7, and the skin was marked for our planned incision.  The anterior neck region was then prepped and draped in usual sterile fashion.  A surgical timeout was taken to confirm this was the correct patient, we are working at the correct levels, and that preoperative antibiotics were given in a timely fashion.    A transverse incision was then created over the anterior cervical region using a 10 blade scalpel directly centered over the levels of interest.  Dissection was carried sharply through subcutaneous tissues.  The platysma was divided in line with the incision and blunt dissection was carried along the medial border of the sternocleidomastoid muscle, lateral to the strap musculature  the neck.  The carotid pulse was then palpated, and dissection was carried medial to the carotid sheath and lateral to the trachea and esophagus.  Handheld Cloward retractors along with Kitners were used to dissect through pretracheal and prevertebral fascia.  A radiographic marker was placed into one of the disc spaces and fluoroscopy was used to confirm that we were indeed at the correct levels.  The longus coli muscles were then elevated from either side of the spine using Bovie cautery.    An initial discectomy was started by creating an annulotomy with Bovie cautery.  A Henning elevator was used to remove some of the disc material off of the endplates.  Disc material was initially removed using forward angled curettes and pituitaries.  Some anterior osteophytes were removed using a rongeur.  All retrieved bone was cleaned, morcellized and saved for later packing into the disc spaces to assist with obtaining a fusion.  [place] pins were then placed into C3 and C5 to allow gentle distraction across the disc spaces of C3-4 and C4-5.  The microscope was then positioned for the microdissection and decompression portion of the procedure.    I started at the C3-4 and C4-5 disc spaces using Kerrisons to remove remaining anterior osteophytes off of the endplates.  Straight curettes were used to remove the cartilaginous endplates and all remaining disc material.  The high-speed bur was then used to remove posterior osteophytes and to contour the endplates in preparation for fusion.  A forward angled curette was used to free up the posterior margins of the vertebral bodies, releasing the posterior longitudinal ligament.  Posterior osteophytes, posterior disc material, and the PLL were all resected using Kerrisons.  Kerrisons were also used to perform a bilateral neural foraminotomy.  At the end of the discectomy decompression, the central spinal canal and the exiting nerve roots at both C3-4 and C4-5 appeared to be free of  compression.  Bleeding in the epidural space was controlled using FloSeal.  The wound was then copiously irrigated with saline solution.    Trial spacers from the [employer] PEEK instrumentation set were tapped into position.  Once the appropriate sized spacer was determined using the trials, actual PEEK spacers the same size as the trials were delivered to the field and a sterile fashion.  The spacers where packed as tightly as possible with locally obtained autograft bone and allograft bone chips.  The spacers were then malleted into position into the disc spaces of C3-4 and C4-5.  They were placed as PEEK interbody biomechanical devices to assist with fusion.    After confirming appropriate position of the spacers, the Veblen pins were removed and attention was turned to C5-6 and C6-7.      Given the relatively severe central stenosis as well as the advanced degenerative changes at both C5-6 and C6-7, it was felt necessary to perform a corpectomy of C6 in order to adequately and safely decompress the central spinal canal.  After initially preparing the disc spaces previously, I used a rongeur to remove the central portion of the C6 vertebral body.  All retrieved bone was saved for later packing back into spacers to assist with obtaining a fusion.  The rongeur was used as posterior as safely possible, leaving a thin shelf of posterior bone.  I used a forward angled curettes to release this as much as possible and then resected it with Kerrisons.  The curettes were used to release the posterior longitudinal ligament and this was resected using Kerrisons as well revealing the central spinal canal.  Kerrisons were used to remove osteophytes from the superior endplate of C7 and to perform a neural foraminotomy of the exiting nerve roots at that area.  Bleeding in the epidural space was controlled using FloSeal.    There was still very severe stenosis at the posterior aspect of the endplate inferiorly of C5.  I  tried to decompress this area with Kerrisons and curettes, but it was felt that a partial corpectomy would be necessary to more safely decompress this area.  Using a combination of Kerrisons, the high-speed bur, and the rongeur, I removed approximately 50% of the C5 vertebral body inferiorly.  This allowed much more visualization to safely decompress the central spinal canal.  I then performed a bilateral neural foraminotomy with Kerrisons of the exiting nerve roots at what was the C5-6 disc space.  Again bleeding was controlled using FloSeal.  The wound was copiously irrigated with saline solution.  At the end of the decompression involving the corpectomy of C6 and the partial corpectomy of C5, the central spinal canal and all exiting nerve roots were free of compression.    The calipers were then used to measure the corpectomy defect from the superior endplate of C7 up to the remaining portion of C5.  An appropriately sized corpectomy cage from the [employer] instrumentation set with the larger footprint was then packed as tightly as possible with locally obtained autograft bone.  This PEEK corpectomy cage was then tapped into position spanning the corpectomy defect from the remaining portion of C5 down to C7.  This spacer was placed as a PEEK interbody by mechanical device to assist with fusion.    After confirming that all the PEEK spacers were properly positioned with fluoroscopy, remaining anterior osteophytes were contoured off of the vertebral bodies using a combination of the high-speed bur and the Rongeur to allow for the best possible plate fixation.  An anterior plate from the Biomet instrumentation set was then chosen to span C3 to 7.  This was held into position using a series of screws.    Final fluoroscopy imaging confirmed adequate position of the plate and screws as well as the PEEK spacers.  All implants appeared to be properly positioned with good maintenance of cervical alignment.  A final inspection  of the operative field was then undertaken to ensure that we had adequate hemostasis.  Bleeding at this point was controlled with FloSeal and bipolar cautery.  A drain was then placed anterior to the spine exiting through a small poke hole inferior of the incision.    Closure was accomplished by reapproximating the platysma with a 3-0 Vicryl.  Immediate subcutaneous tissues were reapproximated with a 4-0 Vicryl in a buried fashion.  Final skin closure was accomplished with Mastisol and Steri-Strips.  The wound was then washed and a sterile Bioclusive dressing was applied.  The patient was then placed into a hard cervical collar, extubated, and sent to the recovery room in good stable condition.    The patient tolerated the procedure well.  There were no complications.  We estimated blood loss to be 200 mL.  Intraoperative neuromonitoring was used during the procedure.  We used motor evoked potentials, free run EMG, and SSEPs.  There were no neuro monitoring signal changes during the procedure.    John Prince PA-C provided critical assistance during the procedure.  His assistance was medically necessary in order to allow the procedure to occur in the most safe and efficient manner.  He assisted with not only patient positioning and wound closure, but more importantly with retraction of delicate neurovascular structures, assistance during the corpectomy and discectomy decompressions, as well as the placement of the PEEK spacers and instrumentation to obtain a fusion.    TIFFANY Marc MD     Date: 6/14/2017  Time: 11:13 AM

## 2017-06-14 NOTE — THERAPY EVALUATION
Acute Care - Occupational Therapy Initial Evaluation  [facility]     Patient Name: Chinedu Monaco  : 1950  MRN: [id number]  Today's Date: 2017  Onset of Illness/Injury or Date of Surgery Date: 17  Date of Referral to OT: 17  Referring Physician: ANGUS Aguilar    Admit Date: 2017       ICD-10-CM ICD-9-CM   1. Decreased activities of daily living (ADL) Z78.9 V49.89     Patient Active Problem List   Diagnosis   • Stenosis, cervical spine     Past Medical History:   Diagnosis Date   • Anxiety    • Chronic neck pain    • COPD (chronic obstructive pulmonary disease)    • Degenerative cervical disc    • GERD (gastroesophageal reflux disease)    • Hypertension    • Irregular heart beat     sss, had pacemaker implanted 2017   • Neck pain    • Osteoarthritis    • Radiculopathy of cervical region    • Wears glasses      Past Surgical History:   Procedure Laterality Date   • PACEMAKER IMPLANTATION  2017    dr james, [facility]ronic   • TOTAL KNEE ARTHROPLASTY Bilateral               OT ASSESSMENT FLOWSHEET (last 72 hours)      OT Evaluation       17 1554 17 1541 17 1540          Rehab Evaluation    Document Type evaluation   See MAR  -ND (P)  evaluation  -RZ --  -ND      Subjective Information agree to therapy;complains of;fatigue;pain  -ND        General Information    Patient Profile Review yes  -ND (P)  yes  -RZ --  -ND      Onset of Illness/Injury or Date of Surgery Date 17  -ND (P)  17  -RZ --  -ND      Referring Physician ANGUS Aguilar  -ND (P)  John Prince PA  -RZ --  -ND      General Observations Pt. fowlers, alert, ASPEN Collar, IV site, SCDs donned  -ND (P)  Pt alert in fowlers position, C collar on, IV and folley cath intact.  SCDs donned   -RZ       Pertinent History Of Current Problem Pt. having increasing neck pain through shoulders down arms, numbness/tingling BUE. S/P 17 corpectomy C6, partial corpectomy C5,  anterior cervical discectomy C3-5, anterior fusion with instrumentation. Labs therapeutic. XR cervical spine, XR chest.   -ND (P)  Pt had chronic neck pain that failed conservative care. Patient had a hx of B UE radiculoparthy in C4- C5 distribution. MRI indicated severe stenosis C3- C7 worse at C3-C4, C5-C6 B and worse on Left C6- C7 and C4- 5 moderately severe. Pt was admitted on 6/14/17 for elective surgery and underwent C6 corpectomy, C5 partial corpectomy, ACDF C3-4 , C4-5, anterior interbody fusion C5-6, C6-7 and anterior spinal fusion with instrumentation C3-7      -RZ --  -ND      Precautions/Limitations brace on when up;fall precautions;spinal precautions  -ND (P)  spinal precautions;pacemaker;brace on when up  -RZ --  -ND      Prior Level of Function independent:;all household mobility;community mobility;ADL's;home management;cleaning;driving  -ND (P)  independent:;community mobility;gait;transfer;ADL's;home management;cooking;cleaning;driving  -RZ       Equipment Currently Used at Home raised toilet;grab bar  -ND (P)  grab bar;raised toilet  -RZ       Plans/Goals Discussed With patient;agreed upon  -ND (P)  patient;agreed upon  -RZ       Risks Reviewed patient:;LOB;nausea/vomiting;dizziness;increased discomfort;change in vital signs;increased drainage;lines disloged  -ND (P)  patient:;LOB;nausea/vomiting;dizziness;increased discomfort;lines disloged  -RZ       Benefits Reviewed patient:;improve function;increase independence;increase strength;increase balance;decrease pain;decrease risk of DVT;improve skin integrity;increase knowledge  -ND (P)  patient:;improve function;increase strength;increase independence;increase balance;decrease pain  -RZ       Barriers to Rehab none identified  -ND (P)  none identified  -RZ       Living Environment    Lives With spouse  -ND (P)  spouse  -RZ       Living Arrangements house  -ND (P)  house  -RZ       Home Accessibility bed and bath on same level;stairs to enter home    walk in shower  -ND (P)  bed and bath on same level;stairs to enter home   walk in shower  -RZ       Number of Stairs to Enter Home 2  -ND (P)  2  -RZ       Clinical Impression    Date of Referral to OT 06/14/17  -ND  --  -ND      OT Diagnosis decreased adl  -ND  --  -ND      Prognosis GOOD  -ND        Impairments Found (describe specific impairments) gait, locomotion, and balance;ergonomics and body mechanics  -ND        Patient/Family Goals Statement to go home  -ND        Criteria for Skilled Therapeutic Interventions Met yes;treatment indicated  -ND        Rehab Potential good, to achieve stated therapy goals  -ND        Therapy Frequency 3-5 times/wk  -ND        Predicted Duration of Therapy Intervention (days/wks) 10 days  -ND        Anticipated Discharge Disposition home with assist  -ND        Pain Assessment    Pain Assessment 0-10  -ND (P)  0-10  -RZ       Pain Score 4  -ND        Pain Type Chronic pain;Surgical pain  -ND        Pain Location Neck  -ND        Pain Descriptors Aching  -ND        Pain Frequency Constant/continuous  -ND        Pain Intervention(s) Medication (See MAR);Repositioned  -ND        Response to Interventions tolerated  -ND        Vision Assessment/Intervention    Visual Impairment WFL with corrective lenses  -ND        Cognitive Assessment/Intervention    Current Cognitive/Communication Assessment functional  -ND        Orientation Status oriented x 4  -ND        Follows Commands/Answers Questions 100% of the time;able to follow multi-step instructions  -ND        Personal Safety WNL/WFL  -ND        Personal Safety Interventions fall prevention program maintained;gait belt;nonskid shoes/slippers when out of bed;supervised activity  -ND        ROM (Range of Motion)    General ROM no range of motion deficits identified  -ND        MMT (Manual Muscle Testing)    General MMT Assessment no strength deficits identified  -ND        Bed Mobility, Assessment/Treatment    Bed Mobility,  Assistive Device bed rails;head of bed elevated  -ND        Bed Mobility, Scoot/Bridge, [place] contact guard assist  -ND        Bed Mob, Supine to Sit, [place] contact guard assist  -ND        Bed Mob, Sit to Supine, [place] supervision required  -ND        Transfer Assessment/Treatment    Transfers, Sit-Stand [place] verbal cues required;nonverbal cues required (demo/gesture);contact guard assist  -ND        Transfers, Stand-Sit [place] verbal cues required;nonverbal cues required (demo/gesture);contact guard assist  -ND        Transfer, Safety Issues step length decreased  -ND        Transfer, Impairments strength decreased;impaired balance  -ND        Functional Mobility    Functional Mobility- Ind. Level nonverbal cues required (demo/gesture);contact guard assist;verbal cues required  -ND        Functional Mobility-Distance (Feet) --   in room in galvan  -ND        Functional Mobility- Safety Issues step length decreased  -ND        Lower Body Dressing Assessment/Training    LB Dressing Assess/Train, Clothing Type doffing:;donning:;socks  -ND        LB Dressing Assess/Train, Position edge of bed  -ND        LB Dressing Assess/Train, [place] contact guard assist  -ND        LB Dressing Assess/Train, Impairments strength decreased;impaired balance  -ND        Motor Skills/Interventions    Additional Documentation Balance Skills Training (Group);Fine Motor Coordination Training (Group);Gross Motor Coordination Training (Group)  -ND        Balance Skills Training    Sitting-Level of Assistance Close supervision  -ND        Sitting-Balance Support Right upper extremity supported;Left upper extremity supported;Feet supported  -ND        Standing-Level of Assistance Contact guard  -ND        Static Standing Balance Support No upper extremity supported  -ND        Gait Balance-Level of Assistance Contact guard  -ND        Gait Balance Support No upper extremity supported  -ND        Sensory  Assessment/Intervention    Sensory Impairment --   Able to localize and discriminate light and deep touch  -ND        General Therapy Interventions    Planned Therapy Interventions activity intolerance;ADL retraining;bed mobility training;balance training;transfer training  -ND        Positioning and Restraints    Pre-Treatment Position in bed  -ND        Post Treatment Position bed  -ND        In Bed fowlers;call light within reach;encouraged to call for assist;side rails up x2  -ND          User Key  (r) = Recorded By, (t) = Taken By, (c) = Cosigned By    Initials Name Effective Dates    ND Johnna Montesinos, OTR/L 10/21/16 -     RZ Jessika Smith, PT Student 05/08/17 -            Occupational Therapy Education     Title: PT OT SLP Therapies (Done)     Topic: Occupational Therapy (Done)     Point: ADL training (Done)    Description: Instruct learner(s) on proper safety adaptation and remediation techniques during self care or transfers.   Instruct in proper use of assistive devices.    Learning Progress Summary    Learner Readiness Method Response Comment Documented by Status   Patient Acceptance E LILA,NR Pt. educated on cervical precautions, cervical collar, benefit of activity, progression with poc ND 06/14/17 1621 Done               Point: Home exercise program (Done)    Description: Instruct learner(s) on appropriate technique for monitoring, assisting and/or progressing therapeutic exercises/activities.    Learning Progress Summary    Learner Readiness Method Response Comment Documented by Status   Patient Acceptance E LILA,NR Pt. educated on cervical precautions, cervical collar, benefit of activity, progression with poc ND 06/14/17 1621 Done               Point: Precautions (Done)    Description: Instruct learner(s) on prescribed precautions during self-care and functional transfers.    Learning Progress Summary    Learner Readiness Method Response Comment Documented by Status   Patient Acceptance E LILA,NR Pt.  educated on cervical precautions, cervical collar, benefit of activity, progression with poc ND 06/14/17 1621 Done               Point: Body mechanics (Done)    Description: Instruct learner(s) on proper positioning and spine alignment during self-care, functional mobility activities and/or exercises.    Learning Progress Summary    Learner Readiness Method Response Comment Documented by Status   Patient Acceptance E VU,NR Pt. educated on cervical precautions, cervical collar, benefit of activity, progression with poc ND 06/14/17 1621 Done                      User Key     Initials Effective Dates Name Provider Type Discipline    ND 10/21/16 -  Johnna Montesinos, OTR/L Occupational Therapist OT                  OT Recommendation and Plan  Anticipated Discharge Disposition: home with assist  Planned Therapy Interventions: activity intolerance, ADL retraining, bed mobility training, balance training, transfer training  Therapy Frequency: 3-5 times/wk  Plan of Care Review  Plan Of Care Reviewed With: patient  Progress: progress toward functional goals as expected  Outcome Summary/Follow up Plan: OT eval completed. Pt. required CGA for bed mob. Pt. CGA for LB dressing. Pt. CGA to complete sit to stand t/f and fx mob. Pt. cont to benefit from skilled OT d/t decreased balance, decreased act tolerance, decreased knowledge, decreased ind in ADLs. 6/14/17 1615          OT Goals       06/14/17 1622          Transfer Training OT LTG    Transfer Training OT LTG, Date Established 06/14/17  -ND      Transfer Training OT LTG, Time to Achieve by discharge  -ND      Transfer Training OT LTG, Activity Type all transfers  -ND      Transfer Training OT LTG, [place] Level independent  -ND      Patient Education OT LTG    Patient Education OT LTG, Date Established 06/14/17  -ND      Patient Education OT LTG, Time to Achieve by discharge  -ND      Patient Education OT LTG, Education Type precautions per surgeon;brace  use/care;positioning;posture/body mechanics;home safety;energy conservation;work simplification  -ND      Patient Education OT LTG, Education Understanding independent;demonstrates adequately;verbalizes understanding  -ND      ADL OT LTG    ADL OT LTG, Date Established 06/14/17  -ND      ADL OT LTG, Time to Achieve by discharge  -ND      ADL OT LTG, Activity Type ADL skills  -ND      ADL OT LTG, [place] Level standby assist  -ND        User Key  (r) = Recorded By, (t) = Taken By, (c) = Cosigned By    Initials Name Provider Type    RADHA Montesinos OTR/L Occupational Therapist                Outcome Measures       06/14/17 1600          How much help from another is currently needed...    Putting on and taking off regular lower body clothing? 3  -ND      Bathing (including washing, rinsing, and drying) 3  -ND      Toileting (which includes using toilet bed pan or urinal) 3  -ND      Putting on and taking off regular upper body clothing 3  -ND      Taking care of personal grooming (such as brushing teeth) 3  -ND      Eating meals 4  -ND      Score 19  -ND      Functional Assessment    Outcome Measure Options AM-PAC 6 Clicks Daily Activity (OT)  -ND        User Key  (r) = Recorded By, (t) = Taken By, (c) = Cosigned By    Initials Name Provider Type    RADHA Montesinos OTR/L Occupational Therapist          Time Calculation:   OT Start Time: 1541 (10 min chart review not included)  OT Stop Time: 1615  OT Time Calculation (min): 34 min    Therapy Charges for Today     Code Description Service Date Service Provider Modifiers Qty    92676251156 HC OT SELFCARE CURRENT 6/14/2017 ATTILA Yoo/L GO, CK 1    04556970321 HC OT SELFCARE PROJECTED 6/14/2017 ATTILA Yoo/L GO, CJ 1    [id number]  OT EVAL LOW COMPLEXITY 4 6/14/2017 ATTILA Yoo/L GO, KX 1          OT G-codes  OT Functional Scales Options: AM-PAC 6 Clicks Daily Activity (OT)  Functional Limitation: Self care  Self Care  Current Status (): At least 40 percent but less than 60 percent impaired, limited or restricted  Self Care Goal Status (): At least 20 percent but less than 40 percent impaired, limited or restricted    Johnna Montesinos, OTR/COURTNEY  6/14/2017

## 2017-06-14 NOTE — PLAN OF CARE
Problem: Patient Care Overview (Adult)  Goal: Plan of Care Review  Outcome: Ongoing (interventions implemented as appropriate)    06/14/17 1311   Coping/Psychosocial Response Interventions   Plan Of Care Reviewed With patient   Patient Care Overview   Progress improving   Outcome Evaluation   Outcome Summary/Follow up Plan dilaudid helped--meets pacu dc criteria         Problem: Perioperative Period (Adult)  Goal: Signs and Symptoms of Listed Potential Problems Will be Absent or Manageable (Perioperative Period)  Outcome: Ongoing (interventions implemented as appropriate)

## 2017-06-14 NOTE — PLAN OF CARE
Problem: Patient Care Overview (Adult)  Goal: Plan of Care Review  Outcome: Ongoing (interventions implemented as appropriate)    06/14/17 0636   Coping/Psychosocial Response Interventions   Plan Of Care Reviewed With patient   Patient Care Overview   Progress no change         Problem: Perioperative Period (Adult)  Goal: Signs and Symptoms of Listed Potential Problems Will be Absent or Manageable (Perioperative Period)  Outcome: Ongoing (interventions implemented as appropriate)    06/14/17 0636   Perioperative Period   Problems Assessed (Perioperative Period) hypothermia;physiologic stress response;situational response   Problems Present (Perioperative Period) none

## 2017-06-14 NOTE — PLAN OF CARE
Problem: Patient Care Overview (Adult)  Goal: Plan of Care Review  Outcome: Ongoing (interventions implemented as appropriate)    06/14/17 1622   Coping/Psychosocial Response Interventions   Plan Of Care Reviewed With patient   Patient Care Overview   Progress progress toward functional goals as expected   Outcome Evaluation   Outcome Summary/Follow up Plan OT eval completed. Pt. required CGA for bed mob. Pt. CGA for LB dressing. Pt. CGA to complete sit to stand t/f and fx mob. Pt. cont to benefit from skilled OT d/t decreased balance, decreased act tolerance, decreased knowledge, decreased ind in ADLs. 6/14/17 1615         Problem: Inpatient Occupational Therapy  Goal: Transfer Training Goal 1 LTG- OT  Outcome: Ongoing (interventions implemented as appropriate)    06/14/17 1622   Transfer Training OT LTG   Transfer Training OT LTG, Date Established 06/14/17   Transfer Training OT LTG, Time to Achieve by discharge   Transfer Training OT LTG, Activity Type all transfers   Transfer Training OT LTG, [place] Level independent       Goal: Patient Education Goal LTG- OT  Outcome: Ongoing (interventions implemented as appropriate)    06/14/17 1622   Patient Education OT LTG   Patient Education OT LTG, Date Established 06/14/17   Patient Education OT LTG, Time to Achieve by discharge   Patient Education OT LTG, Education Type precautions per surgeon;brace use/care;positioning;posture/body mechanics;home safety;energy conservation;work simplification   Patient Education OT LTG, Education Understanding independent;demonstrates adequately;verbalizes understanding       Goal: ADL Goal LTG- OT  Outcome: Ongoing (interventions implemented as appropriate)    06/14/17 1622   ADL OT LTG   ADL OT LTG, Date Established 06/14/17   ADL OT LTG, Time to Achieve by discharge   ADL OT LTG, Activity Type ADL skills   ADL OT LTG, [place] Level standby assist

## 2017-06-14 NOTE — ANESTHESIA POSTPROCEDURE EVALUATION
Patient: Chinedu Monaco    Procedure Summary     Date Anesthesia Start Anesthesia Stop Room / Location    06/14/17 0700 1128 [facility] / [facility]       Procedure Diagnosis Surgeon Provider    CORPECTOMY C6, PARTIAL CORPECTOMY C5, ANTERIOR CERVICAL DISCECTOMY C3-5, ANTERIOR FUSION WITH INSTRUMENTATION C3-7 (N/A Spine Cervical); CERVICAL DISCECTOMY C 3-5, ANTERIOR FUSION WITH INSTRUMENTATION C 3-7 (N/A Spine Cervical) (M54.12) MD Flavio Bailey CRNA          Anesthesia Type: general, MAC  Last vitals  /79 (06/14/17 1245)    Temp      Pulse 71 (06/14/17 1245)   Resp 20 (06/14/17 1245)    SpO2 95 % (06/14/17 1245)      Post Anesthesia Care and Evaluation    Patient location during evaluation: PACU  Patient participation: complete - patient participated  Level of consciousness: awake  Pain score: 2  Pain management: adequate  Airway patency: patent  Anesthetic complications: No anesthetic complications  PONV Status: none  Cardiovascular status: acceptable  Respiratory status: acceptable  Hydration status: acceptable    Comments: Blood pressure 175/79, pulse 71, temperature 97.5 °F (36.4 °C), temperature source Temporal Artery , resp. rate 20, SpO2 95 %.

## 2017-06-15 PROBLEM — J43.1 PANLOBULAR EMPHYSEMA (HCC): Chronic | Status: ACTIVE | Noted: 2017-06-15

## 2017-06-15 PROBLEM — I10 ESSENTIAL HYPERTENSION: Chronic | Status: ACTIVE | Noted: 2017-06-15

## 2017-06-15 PROBLEM — K21.9 GASTROESOPHAGEAL REFLUX DISEASE WITHOUT ESOPHAGITIS: Chronic | Status: ACTIVE | Noted: 2017-06-15

## 2017-06-15 PROBLEM — F41.9 ANXIETY: Chronic | Status: ACTIVE | Noted: 2017-06-15

## 2017-06-15 LAB
ANION GAP SERPL CALCULATED.3IONS-SCNC: 14 MMOL/L (ref 4–13)
BASOPHILS # BLD AUTO: 0.02 10*3/MM3 (ref 0–0.2)
BASOPHILS NFR BLD AUTO: 0.1 % (ref 0–2)
BUN BLD-MCNC: 10 MG/DL (ref 5–21)
BUN/CREAT SERPL: 12.7 (ref 7–25)
CALCIUM SPEC-SCNC: 8.9 MG/DL (ref 8.4–10.4)
CHLORIDE SERPL-SCNC: 101 MMOL/L (ref 98–110)
CO2 SERPL-SCNC: 27 MMOL/L (ref 24–31)
CREAT BLD-MCNC: 0.79 MG/DL (ref 0.5–1.4)
DEPRECATED RDW RBC AUTO: 46.2 FL (ref 40–54)
EOSINOPHIL # BLD AUTO: 0.04 10*3/MM3 (ref 0–0.7)
EOSINOPHIL NFR BLD AUTO: 0.3 % (ref 0–4)
ERYTHROCYTE [DISTWIDTH] IN BLOOD BY AUTOMATED COUNT: 14.4 % (ref 12–15)
GFR SERPL CREATININE-BSD FRML MDRD: 98 ML/MIN/1.73
GLUCOSE BLD-MCNC: 138 MG/DL (ref 70–100)
HCT VFR BLD AUTO: 41.4 % (ref 40–52)
HGB BLD-MCNC: 13.7 G/DL (ref 14–18)
IMM GRANULOCYTES # BLD: 0.04 10*3/MM3 (ref 0–0.03)
IMM GRANULOCYTES NFR BLD: 0.3 % (ref 0–5)
LYMPHOCYTES # BLD AUTO: 0.85 10*3/MM3 (ref 0.72–4.86)
LYMPHOCYTES NFR BLD AUTO: 6.4 % (ref 15–45)
MCH RBC QN AUTO: 29.5 PG (ref 28–32)
MCHC RBC AUTO-ENTMCNC: 33.1 G/DL (ref 33–36)
MCV RBC AUTO: 89 FL (ref 82–95)
MONOCYTES # BLD AUTO: 1.18 10*3/MM3 (ref 0.19–1.3)
MONOCYTES NFR BLD AUTO: 8.8 % (ref 4–12)
NEUTROPHILS # BLD AUTO: 11.25 10*3/MM3 (ref 1.87–8.4)
NEUTROPHILS NFR BLD AUTO: 84.1 % (ref 39–78)
PLATELET # BLD AUTO: 231 10*3/MM3 (ref 130–400)
PMV BLD AUTO: 10.3 FL (ref 6–12)
POTASSIUM BLD-SCNC: 3.9 MMOL/L (ref 3.5–5.3)
RBC # BLD AUTO: 4.65 10*6/MM3 (ref 4.8–5.9)
SODIUM BLD-SCNC: 142 MMOL/L (ref 135–145)
WBC NRBC COR # BLD: 13.38 10*3/MM3 (ref 4.8–10.8)

## 2017-06-15 PROCEDURE — 63710000001 DIPHENHYDRAMINE PER 50 MG: Performed by: ORTHOPAEDIC SURGERY

## 2017-06-15 PROCEDURE — 94799 UNLISTED PULMONARY SVC/PX: CPT

## 2017-06-15 PROCEDURE — 80048 BASIC METABOLIC PNL TOTAL CA: CPT | Performed by: ORTHOPAEDIC SURGERY

## 2017-06-15 PROCEDURE — 25010000002 DEXAMETHASONE PER 1 MG: Performed by: PHYSICIAN ASSISTANT

## 2017-06-15 PROCEDURE — 25010000002 HYDRALAZINE PER 20 MG: Performed by: PHYSICIAN ASSISTANT

## 2017-06-15 PROCEDURE — 85025 COMPLETE CBC W/AUTO DIFF WBC: CPT | Performed by: ORTHOPAEDIC SURGERY

## 2017-06-15 PROCEDURE — 97116 GAIT TRAINING THERAPY: CPT

## 2017-06-15 PROCEDURE — 97535 SELF CARE MNGMENT TRAINING: CPT

## 2017-06-15 PROCEDURE — 25010000002 HYDROMORPHONE PER 4 MG: Performed by: ORTHOPAEDIC SURGERY

## 2017-06-15 RX ORDER — DEXAMETHASONE SODIUM PHOSPHATE 10 MG/ML
10 INJECTION INTRAMUSCULAR; INTRAVENOUS EVERY 8 HOURS
Status: COMPLETED | OUTPATIENT
Start: 2017-06-15 | End: 2017-06-16

## 2017-06-15 RX ORDER — HYDRALAZINE HYDROCHLORIDE 20 MG/ML
10 INJECTION INTRAMUSCULAR; INTRAVENOUS ONCE
Status: COMPLETED | OUTPATIENT
Start: 2017-06-15 | End: 2017-06-15

## 2017-06-15 RX ORDER — HYDRALAZINE HYDROCHLORIDE 20 MG/ML
10 INJECTION INTRAMUSCULAR; INTRAVENOUS EVERY 6 HOURS PRN
Status: DISCONTINUED | OUTPATIENT
Start: 2017-06-15 | End: 2017-06-16 | Stop reason: HOSPADM

## 2017-06-15 RX ORDER — CLONIDINE HYDROCHLORIDE 0.1 MG/1
0.1 TABLET ORAL 4 TIMES DAILY PRN
Status: DISCONTINUED | OUTPATIENT
Start: 2017-06-15 | End: 2017-06-16 | Stop reason: HOSPADM

## 2017-06-15 RX ADMIN — LISINOPRIL 10 MG: 10 TABLET ORAL at 08:23

## 2017-06-15 RX ADMIN — DEXAMETHASONE SODIUM PHOSPHATE 10 MG: 10 INJECTION, SOLUTION INTRAMUSCULAR; INTRAVENOUS at 16:45

## 2017-06-15 RX ADMIN — HYDRALAZINE HYDROCHLORIDE 10 MG: 20 INJECTION INTRAMUSCULAR; INTRAVENOUS at 14:59

## 2017-06-15 RX ADMIN — HYDROMORPHONE HYDROCHLORIDE 1 MG: 1 INJECTION, SOLUTION INTRAMUSCULAR; INTRAVENOUS; SUBCUTANEOUS at 01:42

## 2017-06-15 RX ADMIN — ESCITALOPRAM 10 MG: 10 TABLET, FILM COATED ORAL at 09:53

## 2017-06-15 RX ADMIN — FAMOTIDINE 20 MG: 20 TABLET, FILM COATED ORAL at 17:01

## 2017-06-15 RX ADMIN — HYDROMORPHONE HYDROCHLORIDE 1 MG: 1 INJECTION, SOLUTION INTRAMUSCULAR; INTRAVENOUS; SUBCUTANEOUS at 13:02

## 2017-06-15 RX ADMIN — CEFAZOLIN 1 G: 1 INJECTION, POWDER, FOR SOLUTION INTRAMUSCULAR; INTRAVENOUS; PARENTERAL at 05:03

## 2017-06-15 RX ADMIN — BUDESONIDE AND FORMOTEROL FUMARATE DIHYDRATE 2 PUFF: 160; 4.5 AEROSOL RESPIRATORY (INHALATION) at 08:03

## 2017-06-15 RX ADMIN — OXYCODONE HYDROCHLORIDE AND ACETAMINOPHEN 2 TABLET: 10; 325 TABLET ORAL at 08:31

## 2017-06-15 RX ADMIN — FAMOTIDINE 20 MG: 20 TABLET, FILM COATED ORAL at 08:23

## 2017-06-15 RX ADMIN — DIPHENHYDRAMINE HYDROCHLORIDE 25 MG: 25 CAPSULE ORAL at 05:02

## 2017-06-15 RX ADMIN — OXYCODONE HYDROCHLORIDE AND ACETAMINOPHEN 2 TABLET: 10; 325 TABLET ORAL at 16:46

## 2017-06-15 RX ADMIN — DEXAMETHASONE SODIUM PHOSPHATE 10 MG: 10 INJECTION, SOLUTION INTRAMUSCULAR; INTRAVENOUS at 08:23

## 2017-06-15 RX ADMIN — HYDROMORPHONE HYDROCHLORIDE 1 MG: 1 INJECTION, SOLUTION INTRAMUSCULAR; INTRAVENOUS; SUBCUTANEOUS at 04:50

## 2017-06-15 RX ADMIN — OXYCODONE HYDROCHLORIDE AND ACETAMINOPHEN 2 TABLET: 10; 325 TABLET ORAL at 20:48

## 2017-06-15 RX ADMIN — BUDESONIDE AND FORMOTEROL FUMARATE DIHYDRATE 2 PUFF: 160; 4.5 AEROSOL RESPIRATORY (INHALATION) at 20:57

## 2017-06-15 RX ADMIN — HYDRALAZINE HYDROCHLORIDE 10 MG: 20 INJECTION INTRAMUSCULAR; INTRAVENOUS at 08:24

## 2017-06-15 RX ADMIN — AMLODIPINE BESYLATE 5 MG: 5 TABLET ORAL at 08:23

## 2017-06-15 RX ADMIN — HYDRALAZINE HYDROCHLORIDE 10 MG: 20 INJECTION INTRAMUSCULAR; INTRAVENOUS at 16:45

## 2017-06-15 NOTE — PLAN OF CARE
Problem: Patient Care Overview (Adult)  Goal: Plan of Care Review  Outcome: Ongoing (interventions implemented as appropriate)    06/15/17 0930   Coping/Psychosocial Response Interventions   Plan Of Care Reviewed With patient   Patient Care Overview   Progress improving   Outcome Evaluation   Outcome Summary/Follow up Plan Pt improving well. Pt requires supervision for transfers and SBA for gait training. Pt ambulated 400 feet with no AD. Educated pt and wife on proper use/care of brace, don/doff brace and extra pads, as well as safety with mobility. Pt's O2 stated above 93 on room air throughout treatment session, so doffed O2 and notified RN. Continue with PT POC.

## 2017-06-15 NOTE — THERAPY TREATMENT NOTE
Acute Care - Physical Therapy Treatment Note  [facility]     Patient Name: Chinedu Monaco  : 1950  MRN: [id number]  Today's Date: 6/15/2017  Onset of Illness/Injury or Date of Surgery Date: 17  Date of Referral to PT: 17  Referring Physician: ANGUS Aguilar    Admit Date: 2017    Visit Dx:    ICD-10-CM ICD-9-CM   1. Decreased activities of daily living (ADL) Z78.9 V49.89   2. Mobility impaired Z74.09 799.89     Patient Active Problem List   Diagnosis   • Stenosis, cervical spine               Adult Rehabilitation Note       06/15/17 0930 06/15/17 0729       Rehab Assessment/Intervention    Discipline physical therapy assistant  -TR occupational therapy assistant  -TS     Document Type therapy note (daily note)  -TR therapy note (daily note)  -TS     Subjective Information agree to therapy  -TR agree to therapy;no complaints  -TS     Patient Effort, Rehab Treatment good  -TR      Precautions/Limitations fall precautions;spinal precautions  -TR fall precautions;spinal precautions   aspen collar at all time  -TS     Equipment Issued to Patient  --   second set of pads for aspen collar  -TS     Recorded by [TR] Dorcas Quinteros, IRVIN [TS] Randa Conde, DIAZ/L     Pain Assessment    Pain Assessment 0-10  -TR No/denies pain  -TS     Pain Score 3  -TR      Post Pain Score 3  -TR      Pain Type Surgical pain  -TR      Pain Location Neck  -TR      Pain Orientation Anterior  -TR      Pain Descriptors Aching  -TR      Pain Frequency Constant/continuous  -TR      Pain Intervention(s) Medication (See MAR)  -TR      Response to Interventions tolerated  -TR      Recorded by [TR] Dorcas Quinteros, IRVIN [TS] Randa Conde, DIAZ/L     Cognitive Assessment/Intervention    Current Cognitive/Communication Assessment functional  -TR      Orientation Status oriented x 4  -TR      Follows Commands/Answers Questions able to follow multi-step instructions;100% of the time  -TR      Personal Safety  Interventions fall prevention program maintained;gait belt;nonskid shoes/slippers when out of bed  -TR fall prevention program maintained;gait belt;nonskid shoes/slippers when out of bed  -TS     Recorded by [TR] Dorcas Quinteros PTA [TS] EMILY Levin/L     Bed Mobility, Assessment/Treatment    Bed Mobility, Assistive Device  head of bed elevated  -TS     Bed Mobility, Scoot/Bridge, [place]  supervision required  -TS     Bed Mob, Supine to Sit, [place]  supervision required  -TS     Bed Mobility, Comment up in chair  -TR      Recorded by [TR] Dorcas Quinteros PTA [TS] EMILY Levin/L     Transfer Assessment/Treatment    Transfers, Sit-Stand [place] stand by assist  -TR stand by assist  -TS     Transfers, Stand-Sit [place] stand by assist  -TR stand by assist  -TS     Toilet Transfer, [place] supervision required  -TR      Transfer, Impairments strength decreased  -TR      Recorded by [TR] Dorcas Quinteros PTA [TS] EMILY Levin/L     Gait Assessment/Treatment    Gait, [place] Level stand by assist;verbal cues required  -TR      Gait, Distance (Feet) 400  -TR      Gait, Gait Pattern Analysis swing-through gait  -TR      Gait, Gait Deviations narrow base  -TR      Gait, Impairments strength decreased  -TR      Gait, Comment initally pt with increased carmen requiring V/C's to slowdown   -TR      Recorded by [TR] Dorcas Quinteros PTA      Functional Mobility    Functional Mobility- Ind. Level  --   SBA  -TS     Functional Mobility- Comment  in room  -TS     Recorded by  [TS] MEILY Levin/L     Lower Body Bathing Assessment/Training    LB Bathing Assess/Train, Position  sitting;standing  -TS     LB Bathing Assess/Train, [place] Level  set up required  -TS     Recorded by  [TS] EMILY Levin/L     Lower Body Dressing Assessment/Training    LB Dressing Assess/Train, Clothing Type  doffing:;donning:;socks  -TS      LB Dressing Assess/Train, Position  sitting  -TS     LB Dressing Assess/Train, [place]  set up required  -TS     LB Dressing Assess/Train, Comment  pt able to cross BLE over opposite knee to get to foot  -TS     Recorded by  [TS] LUIS EDUARDO Levin     Toileting Assessment/Training    Toileting Assess/Train, Assistive Device  urinal  -TS     Toileting Assess/Train, Position  standing  -TS     Toileting Assess/Train, Indepen Level  set up required  -TS     Recorded by  [TS] LUIS EDUARDO Levin     Grooming Assessment/Training    Grooming Assess/Train, Position  sitting  -TS     Grooming Assess/Train, Indepen Level  set up required  -TS     Recorded by  [TS] LUIS EDUARDO Levin     Orthotics Prosthetics    Additional Documentation Orthosis Location (Group);Orthosis Management/Training (Group)  -TR      Recorded by [TR] Dorcas Quinteros PTA      Orthosis Location    Orthosis Location/Type neck/back  -TR      Orthosis, Neck/Back --   [place]  -TR      Recorded by [TR] Dorcas Quinteros PTA      Orthosis Management/Training    Orthosis Indications immobilize, protect/position healing structures;rest, reduce pain;restrict motion  -TR      Orthosis Skills Training doffing orthosis;donning orthosis;activity limitations;orthosis maintenance  -TR orthosis cleaning;orthosis maintenance;restrictions/precautions  -TS     Orthosis Wear Schedule wear full time  -TR      Recorded by [TR] Dorcas Quinteros PTA [TS] LUIS EDUARDO Levin     Positioning and Restraints    Pre-Treatment Position sitting in chair/recliner  -TR in bed  -TS     Post Treatment Position chair  -TR chair  -TS     In Chair reclined;call light within reach;encouraged to call for assist;with family/caregiver  -TR sitting;call light within reach;encouraged to call for assist;with brace  -TS     Recorded by [TR] Dorcas Quinteros PTA [TS] LUIS EDUARDO Levin       User Key  (r) = Recorded By, (t) = Taken  By, (c) = Cosigned By    Initials Name Effective Dates    TS Randa HUSEYIN Conde, DIAZ/L 08/02/16 -     TR Dorcas Maude [place], PTA 08/02/16 -                 IP PT Goals       06/14/17 1631          Bed Mobility PT LTG    Bed Mobility PT LTG, Date Established 06/14/17  -MS (r) RZ (t) MS (c)      Bed Mobility PT LTG, Time to Achieve by discharge  -MS (r) RZ (t) MS (c)      Bed Mobility PT LTG, Activity Type all bed mobility  -MS (r) RZ (t) MS (c)      Bed Mobility PT LTG, [place] Level independent  -MS (r) RZ (t) MS (c)      Transfer Training PT LTG    Transfer Training PT LTG, Date Established 06/14/17  -MS (r) RZ (t) MS (c)      Transfer Training PT LTG, Time to Achieve by discharge  -MS (r) RZ (t) MS (c)      Transfer Training PT LTG, Activity Type all transfers  -MS (r) RZ (t) MS (c)      Transfer Training PT LTG, [place] Level independent  -MS (r) RZ (t) MS (c)      Gait Training PT LTG    Gait Training Goal PT LTG, Date Established 06/14/17  -MS (r) RZ (t) MS (c)      Gait Training Goal PT LTG, Time to Achieve by discharge  -MS (r) RZ (t) MS (c)      Gait Training Goal PT LTG, [place] Level independent  -MS (r) RZ (t) MS (c)      Gait Training Goal PT LTG, Distance to Achieve 350 ft  -MS (r) RZ (t) MS (c)      Stair Training PT LTG    Stair Training Goal PT LTG, Date Established 06/14/17  -MS (r) RZ (t) MS (c)      Stair Training Goal PT LTG, Time to Achieve by discharge  -MS (r) RZ (t) MS (c)      Stair Training Goal PT LTG, Number of Steps 4  -MS (r) RZ (t) MS (c)      Stair Training Goal PT LTG, [place] Level independent  -MS (r) RZ (t) MS (c)        User Key  (r) = Recorded By, (t) = Taken By, (c) = Cosigned By    Initials Name Provider Type    MS Sanjana Espinoza, PT DPT Physical Therapist    BRIT Smith, PT Student PT Student          Physical Therapy Education     Title: PT OT SLP Therapies (Active)     Topic: Physical Therapy (Active)     Point: Mobility training (Done)     Learning Progress Summary    Learner Readiness Method Response Comment Documented by Status   Patient Acceptance E VU Pt was educated on spinal precautions, orthosis wear schedule, gait and transfer training. Pt educated on therapy's role in their care. R 06/14/17 1635 Done               Point: Precautions (Done)    Learning Progress Summary    Learner Readiness Method Response Comment Documented by Status   Patient Acceptance E,D,TB VU,DU Neck precautions, bracde use/care, connie doff brace and additional pads. TR 06/15/17 0930 Done    Acceptance E VU Pt was educated on spinal precautions, orthosis wear schedule, gait and transfer training. Pt educated on therapy's role in their care. R 06/14/17 1635 Done   Significant Other Acceptance E,D,TB VU,DU Neck precautions, bracde use/care, connie doff brace and additional pads. TR 06/15/17 0930 Done                      User Key     Initials Effective Dates Name Provider Type Discipline    TR 08/02/16 -  Dorcas Quinteros, IRVIN Physical Therapy Assistant PT     05/08/17 -  Jessika Smith PT Student PT Student PT                    PT Recommendation and Plan  Anticipated Discharge Disposition: home with assist  Planned Therapy Interventions: balance training, gait training, bed mobility training, orthotic fitting/training, patient/family education, stair training, strengthening, transfer training, home exercise program  PT Frequency: 2 times/day  Plan of Care Review  Plan Of Care Reviewed With: patient  Progress: improving  Outcome Summary/Follow up Plan: Pt improvingn well. Pt requires supervision for transfers and SBA for gait training. Pt ambualted 400 feet with no AD. Educayed pt and wife on proper use/care of brace, don/doff brace and extra pads, as well as safety withmobility. Pt's O2 stated above 93 on room air throughout treatment session, so doffed O2 and notified RN.           Outcome Measures       06/15/17 0930 06/15/17 0900 06/14/17 1600    How much help  from another person do you currently need...    Turning from your back to your side while in flat bed without using bedrails? 4  -TR      Moving from lying on back to sitting on the side of a flat bed without bedrails? 3  -TR      Moving to and from a bed to a chair (including a wheelchair)? 4  -TR      Standing up from a chair using your arms (e.g., wheelchair, bedside chair)? 4  -TR      Climbing 3-5 steps with a railing? 3  -TR      To walk in hospital room? 3  -TR      AM-PAC 6 Clicks Score 21  -TR      How much help from another is currently needed...    Putting on and taking off regular lower body clothing?  3  -TS 3  -ND    Bathing (including washing, rinsing, and drying)  3  -TS 3  -ND    Toileting (which includes using toilet bed pan or urinal)  3  -TS 3  -ND    Putting on and taking off regular upper body clothing  4  -TS 3  -ND    Taking care of personal grooming (such as brushing teeth)  4  -TS 3  -ND    Eating meals  4  -TS 4  -ND    Score  21  -TS 19  -ND    Functional Assessment    Outcome Measure Options AM-PAC 6 Clicks Basic Mobility (PT)  -TR AM-PAC 6 Clicks Daily Activity (OT)  -TS AM-PAC 6 Clicks Daily Activity (OT)  -ND      06/14/17 1541          How much help from another person do you currently need...    Turning from your back to your side while in flat bed without using bedrails? 4  -MS (r) RZ (t) MS (c)      Moving from lying on back to sitting on the side of a flat bed without bedrails? 3  -MS (r) RZ (t) MS (c)      Moving to and from a bed to a chair (including a wheelchair)? 4  -MS (r) RZ (t) MS (c)      Standing up from a chair using your arms (e.g., wheelchair, bedside chair)? 3  -MS (r) RZ (t) MS (c)      Climbing 3-5 steps with a railing? 3  -MS (r) RZ (t) MS (c)      To walk in hospital room? 3  -MS (r) RZ (t) MS (c)      AM-PAC 6 Clicks Score 20  -MS (r) RZ (t)      Functional Assessment    Outcome Measure Options AM-PAC 6 Clicks Basic Mobility (PT)  -MS (r) RZ (t) MS (c)         User Key  (r) = Recorded By, (t) = Taken By, (c) = Cosigned By    Initials Name Provider Type    TS Randa Conde, DIAZ/L Occupational Therapy Assistant    MS Sanjana Espinoza, PT DPT Physical Therapist    SARAH Quinteros PTA Physical Therapy Assistant    RADHA Montesinos, OTR/L Occupational Therapist    BRIT Smith, PT Student PT Student           Time Calculation:         PT Charges       06/15/17 0930          Time Calculation    Start Time 0930  -TR      Stop Time 0955  -TR      Time Calculation (min) 25 min  -TR      PT Received On 06/15/17  -TR      PT Goal Re-Cert Due Date 06/24/17  -TR      Time Calculation- PT    Total Timed Code Minutes- PT 25 minute(s)  -TR        User Key  (r) = Recorded By, (t) = Taken By, (c) = Cosigned By    Initials Name Provider Type    SARAH Quinteros PTA Physical Therapy Assistant          Therapy Charges for Today     Code Description Service Date Service Provider Modifiers Qty    19089924069 HC GAIT TRAINING EA 15 MIN 6/15/2017 Dorcas Quinteros PTA GP, KX 2          PT G-Codes  PT Professional Judgement Used?: Yes  Outcome Measure Options: AM-PAC 6 Clicks Basic Mobility (PT)  Score: 20  Functional Limitation: Mobility: Walking and moving around  Mobility: Walking and Moving Around Current Status (): At least 20 percent but less than 40 percent impaired, limited or restricted  Mobility: Walking and Moving Around Goal Status (): At least 1 percent but less than 20 percent impaired, limited or restricted    Dorcas Quinteros PTA  6/15/2017

## 2017-06-15 NOTE — THERAPY TREATMENT NOTE
Acute Care - Occupational Therapy Treatment Note  [facility]     Patient Name: Chinedu Monaco  : 1950  MRN: [id number]  Today's Date: 6/15/2017  Onset of Illness/Injury or Date of Surgery Date: 17  Date of Referral to OT: 17  Referring Physician: ANGUS Aguilar      Admit Date: 2017    Visit Dx:     ICD-10-CM ICD-9-CM   1. Decreased activities of daily living (ADL) Z78.9 V49.89   2. Mobility impaired Z74.09 799.89     Patient Active Problem List   Diagnosis   • Stenosis, cervical spine             Adult Rehabilitation Note       06/15/17 0729          Rehab Assessment/Intervention    Discipline occupational therapy assistant  -TS      Document Type therapy note (daily note)  -TS      Subjective Information agree to therapy;no complaints  -TS      Precautions/Limitations fall precautions;spinal precautions   aspen collar at all time  -TS      Equipment Issued to Patient --   second set of pads for aspen collar  -TS      Recorded by [TS] EMILY Levin/L      Pain Assessment    Pain Assessment No/denies pain  -TS      Recorded by [TS] EMILY Levin/L      Cognitive Assessment/Intervention    Personal Safety Interventions fall prevention program maintained;gait belt;nonskid shoes/slippers when out of bed  -TS      Recorded by [TS] EMILY Levin/L      Bed Mobility, Assessment/Treatment    Bed Mobility, Assistive Device head of bed elevated  -TS      Bed Mobility, Scoot/Bridge, [place] supervision required  -TS      Bed Mob, Supine to Sit, [place] supervision required  -TS      Recorded by [TS] EMILY Levin/L      Transfer Assessment/Treatment    Transfers, Sit-Stand [place] stand by assist  -TS      Transfers, Stand-Sit [place] stand by assist  -TS      Recorded by [TS] EMILY Levin/L      Functional Mobility    Functional Mobility- Ind. Level --   SBA  -TS      Functional Mobility- Comment in room  -TS       Recorded by [TS] LUIS EDUARDO Levin      Lower Body Bathing Assessment/Training    LB Bathing Assess/Train, Position sitting;standing  -TS      LB Bathing Assess/Train, [place] Level set up required  -TS      Recorded by [TS] LUIS EDUARDO Levin      Lower Body Dressing Assessment/Training    LB Dressing Assess/Train, Clothing Type doffing:;donning:;socks  -TS      LB Dressing Assess/Train, Position sitting  -TS      LB Dressing Assess/Train, [place] set up required  -TS      LB Dressing Assess/Train, Comment pt able to cross BLE over opposite knee to get to foot  -TS      Recorded by [TS] LUIS EDUARDO Levin      Toileting Assessment/Training    Toileting Assess/Train, Assistive Device urinal  -TS      Toileting Assess/Train, Position standing  -TS      Toileting Assess/Train, Indep[place] Level set up required  -TS      Recorded by [TS] LUIS EDUARDO Levin      Grooming Assessment/Training    Grooming Assess/Train, Position sitting  -TS      Grooming Assess/Train, Indep[place] Level set up required  -TS      Recorded by [TS] LUIS EDUARDO Levin      Orthosis Management/Training    Orthosis Skills Training orthosis cleaning;orthosis maintenance;restrictions/precautions  -TS      Recorded by [TS] LUIS EDUARDO Levin      Positioning and Restraints    Pre-Treatment Position in bed  -TS      Post Treatment Position chair  -TS      In Chair sitting;call light within reach;encouraged to call for assist;with brace  -TS      Recorded by [TS] LUIS EDUARDO Levin        User Key  (r) = Recorded By, (t) = Taken By, (c) = Cosigned By    Initials Name Effective Dates    TS LUIS EDUARDO Levin 08/02/16 -                 OT Goals       06/14/17 1622          Transfer Training OT LTG    Transfer Training OT LTG, Date Established 06/14/17  -ND      Transfer Training OT LTG, Time to Achieve by discharge  -ND      Transfer Training OT LTG, Activity Type all transfers   -ND      Transfer Training OT LTG, [place] Level independent  -ND      Patient Education OT LTG    Patient Education OT LTG, Date Established 06/14/17  -ND      Patient Education OT LTG, Time to Achieve by discharge  -ND      Patient Education OT LTG, Education Type precautions per surgeon;brace use/care;positioning;posture/body mechanics;home safety;energy conservation;work simplification  -ND      Patient Education OT LTG, Education Understanding independent;demonstrates adequately;verbalizes understanding  -ND      ADL OT LTG    ADL OT LTG, Date Established 06/14/17  -ND      ADL OT LTG, Time to Achieve by discharge  -ND      ADL OT LTG, Activity Type ADL skills  -ND      ADL OT LTG, [place] Level standby assist  -ND        User Key  (r) = Recorded By, (t) = Taken By, (c) = Cosigned By    Initials Name Provider Type    RADHA Montesinos, OTR/L Occupational Therapist          Occupational Therapy Education     Title: PT OT SLP Therapies (Active)     Topic: Occupational Therapy (Done)     Point: ADL training (Done)    Description: Instruct learner(s) on proper safety adaptation and remediation techniques during self care or transfers.   Instruct in proper use of assistive devices.    Learning Progress Summary    Learner Readiness Method Response Comment Documented by Status   Patient Acceptance E,D LILA [place] collar, ADL's, neck precautions TS 06/15/17 0922 Done    Acceptance E LILA,NR Pt. educated on cervical precautions, cervical collar, benefit of activity, progression with poc ND 06/14/17 1621 Done               Point: Home exercise program (Done)    Description: Instruct learner(s) on appropriate technique for monitoring, assisting and/or progressing therapeutic exercises/activities.    Learning Progress Summary    Learner Readiness Method Response Comment Documented by Status   Patient Acceptance E LILA,NR Pt. educated on cervical precautions, cervical collar, benefit of activity, progression with poc  ND 06/14/17 1621 Done               Point: Precautions (Done)    Description: Instruct learner(s) on prescribed precautions during self-care and functional transfers.    Learning Progress Summary    Learner Readiness Method Response Comment Documented by Status   Patient Acceptance E,D VU [place] collar, ADL's, neck precautions TS 06/15/17 0922 Done    Acceptance E VU,NR Pt. educated on cervical precautions, cervical collar, benefit of activity, progression with poc ND 06/14/17 1621 Done               Point: Body mechanics (Done)    Description: Instruct learner(s) on proper positioning and spine alignment during self-care, functional mobility activities and/or exercises.    Learning Progress Summary    Learner Readiness Method Response Comment Documented by Status   Patient Acceptance E VU,NR Pt. educated on cervical precautions, cervical collar, benefit of activity, progression with poc ND 06/14/17 1621 Done                      User Key     Initials Effective Dates Name Provider Type Discipline     08/02/16 -  Randa Conde DIAZ/L Occupational Therapy Assistant OT    ND 10/21/16 -  Johnna Montesinos, OTR/L Occupational Therapist OT                  OT Recommendation and Plan  Anticipated Discharge Disposition: home with assist  Planned Therapy Interventions: activity intolerance, ADL retraining, bed mobility training, balance training, transfer training  Therapy Frequency: 3-5 times/wk  Plan of Care Review  Plan Of Care Reviewed With: patient  Progress: progress toward functional goals as expected  Outcome Summary/Follow up Plan: Pt completed bed mobility with S. Transfers with SBA and takes steps in room. Pt required set up only for LB dressing and toileting with urinal. Pt provided extra pads for aspen collar and provided instruction and education. Pt plans to dicharge home tomorrow with spouse. Continue OT POC         Outcome Measures       06/15/17 0900 06/14/17 1600 06/14/17 1541    How much help from  another person do you currently need...    Turning from your back to your side while in flat bed without using bedrails?   4  -MS (r) RZ (t) MS (c)    Moving from lying on back to sitting on the side of a flat bed without bedrails?   3  -MS (r) RZ (t) MS (c)    Moving to and from a bed to a chair (including a wheelchair)?   4  -MS (r) RZ (t) MS (c)    Standing up from a chair using your arms (e.g., wheelchair, bedside chair)?   3  -MS (r) RZ (t) MS (c)    Climbing 3-5 steps with a railing?   3  -MS (r) RZ (t) MS (c)    To walk in hospital room?   3  -MS (r) RZ (t) MS (c)    AM-PAC 6 Clicks Score   20  -MS (r) RZ (t)    How much help from another is currently needed...    Putting on and taking off regular lower body clothing? 3  -TS 3  -ND     Bathing (including washing, rinsing, and drying) 3  -TS 3  -ND     Toileting (which includes using toilet bed pan or urinal) 3  -TS 3  -ND     Putting on and taking off regular upper body clothing 4  -TS 3  -ND     Taking care of personal grooming (such as brushing teeth) 4  -TS 3  -ND     Eating meals 4  -TS 4  -ND     Score 21  -TS 19  -ND     Functional Assessment    Outcome Measure Options AM-PAC 6 Clicks Daily Activity (OT)  -TS AM-PAC 6 Clicks Daily Activity (OT)  -ND AM-PAC 6 Clicks Basic Mobility (PT)  -MS (r) RZ (t) MS (c)      User Key  (r) = Recorded By, (t) = Taken By, (c) = Cosigned By    Initials Name Provider Type    TS Randa Conde, DIAZ/L Occupational Therapy Assistant    MS Sanjana Espinoza, PT DPT Physical Therapist    RADHA Montesinos, OTR/L Occupational Therapist    BRIT Smith, PT Student PT Student           Time Calculation:         Time Calculation- OT       06/15/17 0924          Time Calculation- OT    OT Start Time 0729  -TS      OT Stop Time 0807  -TS      OT Time Calculation (min) 38 min  -TS      Total Timed Code Minutes- OT 38 minute(s)  -TS      OT Received On 06/15/17  -TS        User Key  (r) = Recorded By, (t) = Taken By, (c)  = Cosigned By    Initials Name Provider Type     EMILY Levin/L Occupational Therapy Assistant           Therapy Charges for Today     Code Description Service Date Service Provider Modifiers Qty    89119624644 HC OT SELF CARE/MGMT/TRAIN EA 15 MIN 6/15/2017 LUIS EDUARDO Levin GO, KX 3          OT G-codes  OT Functional Scales Options: AM-PAC 6 Clicks Daily Activity (OT)  Functional Limitation: Self care  Self Care Current Status (): At least 40 percent but less than 60 percent impaired, limited or restricted  Self Care Goal Status (): At least 20 percent but less than 40 percent impaired, limited or restricted    LUIS EDUARDO Arrington  6/15/2017

## 2017-06-15 NOTE — PROGRESS NOTES
Orthopedic Spine Progress Note    Chinedu Monaco  66 y.o.  male  Subjective     Post-Operative Day # 1  Systemic or Specific Complaints:     Patient c/o soreness when swallowing, and neck pain. No other new c/o.  Not eaten much yet, not able to swallow pain pills well secondary to discomfort.     Objective     Vital signs in last 24 hours:  Temp:  [97 °F (36.1 °C)-98.5 °F (36.9 °C)] 98.5 °F (36.9 °C)  Heart Rate:  [70-95] 95  Resp:  [10-20] 18  BP: (156-194)/(59-98) 176/98    Physical Exam:    Alert and oriented ×3, no acute distress, grossly neurovascularly intact, vital signs stable with noted hypertension, dressing clean dry and intact, moving all extremities without focal deficit    Diagnostic Data:  CBC:  Results from last 7 days  Lab Units 06/15/17  0443   WBC 10*3/mm3 13.38*   RBC 10*6/mm3 4.65*   HEMOGLOBIN g/dL 13.7*   HEMATOCRIT % 41.4   PLATELETS 10*3/mm3 231      BMP:@LABCNTIP(BMP)@    Assessment/Plan     1. Status post Corpectomy C6, Partial corpectomy C5, Anterior cervical discectomy with interbody fusion C3-4, C4-5, Anterior interbody fusion C5-6, C6-7, Anterior spinal instrumentation C3 to 7 - 6/14/17    Plan:  1. Iv steroids to assist with mild dysphagia   2. periops  3. Pt/ot   4. Add prn hydralazine for b/p trends - can consider medicine team consult prn  5. Probably home tomorrow      ANGUS Taylor    Date: 6/15/2017  Time: 7:30 AM

## 2017-06-15 NOTE — PLAN OF CARE
Problem: Patient Care Overview (Adult)  Goal: Plan of Care Review  Outcome: Ongoing (interventions implemented as appropriate)    06/15/17 0924   Coping/Psychosocial Response Interventions   Plan Of Care Reviewed With patient   Patient Care Overview   Progress progress toward functional goals as expected   Outcome Evaluation   Outcome Summary/Follow up Plan Pt completed bed mobility with S. Transfers with SBA and takes steps in room. Pt required set up only for LB dressing and toileting with urinal. Pt provided extra pads for aspen collar and provided instruction and education. Pt plans to dicharge home tomorrow with spouse. Continue OT POC

## 2017-06-15 NOTE — PLAN OF CARE
Problem: Patient Care Overview (Adult)  Goal: Plan of Care Review  Outcome: Ongoing (interventions implemented as appropriate)    06/15/17 3620   Coping/Psychosocial Response Interventions   Plan Of Care Reviewed With patient;spouse   Patient Care Overview   Progress improving   Outcome Evaluation   Outcome Summary/Follow up Plan Patient c/o moderate pain, medicated with PO and IV PRN pain medication as ordered. Cervical collar in place. DAGMAR drain removed, dressing changed to tegaderm and is clean dry and intact. Will continue to monitor.          Problem: Perioperative Period (Adult)  Goal: Signs and Symptoms of Listed Potential Problems Will be Absent or Manageable (Perioperative Period)  Outcome: Ongoing (interventions implemented as appropriate)    Problem: Fall Risk (Adult)  Goal: Absence of Falls  Outcome: Ongoing (interventions implemented as appropriate)

## 2017-06-15 NOTE — PROGRESS NOTES
Discharge Planning Assessment  [facility]     Patient Name: Chinedu Monaco  MRN: [id number]  Today's Date: 6/15/2017    Admit Date: 6/14/2017          Discharge Needs Assessment       06/15/17 1411    Living Environment    Lives With spouse    Living Arrangements house    Home Accessibility bed and bath on same level    Number of Stairs to Enter Home 2    Stair Railings at Home none    Type of Financial/Environmental Concern none    Transportation Available car;family or friend will provide    Living Environment    Provides Primary Care For no one, unable/limited ability to care for self    Primary Care Provided By spouse/significant other    Quality Of Family Relationships supportive;helpful;involved    Able to Return to Prior Living Arrangements yes    Discharge Needs Assessment    Concerns To Be Addressed no discharge needs identified    Readmission Within The Last 30 Days no previous admission in last 30 days    Anticipated Changes Related to Illness none    Equipment Currently Used at Home none    Equipment Needed After Discharge none    Discharge Planning Comments Plan to dc home with wife to assist. Hard cervical collar in place. Denies need for Home Health at this time.  Will follow MD orders.             Discharge Plan     None        Discharge Placement     No information found                Demographic Summary     None            Functional Status     None            Psychosocial     None            Abuse/Neglect     None            Legal     None            Substance Abuse     None            Patient Forms     None          Polina Carey RN

## 2017-06-15 NOTE — THERAPY TREATMENT NOTE
Acute Care - Physical Therapy Treatment Note  [facility]     Patient Name: Chinedu Monaco  : 1950  MRN: [id number]  Today's Date: 6/15/2017  Onset of Illness/Injury or Date of Surgery Date: 17  Date of Referral to PT: 17  Referring Physician: ANGUS Aguilar    Admit Date: 2017    Visit Dx:    ICD-10-CM ICD-9-CM   1. Decreased activities of daily living (ADL) Z78.9 V49.89   2. Mobility impaired Z74.09 799.89     Patient Active Problem List   Diagnosis   • Stenosis, cervical spine               Adult Rehabilitation Note       06/15/17 1525 06/15/17 0930 06/15/17 0729    Rehab Assessment/Intervention    Discipline physical therapy assistant  -TR physical therapy assistant  -TR occupational therapy assistant  -TS    Document Type therapy note (daily note)  -TR therapy note (daily note)  -TR therapy note (daily note)  -TS    Subjective Information agree to therapy;complains of;pain  -TR agree to therapy;complains of;pain  -TR agree to therapy;no complaints  -TS    Patient Effort, Rehab Treatment good  -TR good  -TR     Precautions/Limitations fall precautions;spinal precautions  -TR fall precautions;spinal precautions  -TR fall precautions;spinal precautions   aspen collar at all time  -TS    Equipment Issued to Patient   --   second set of pads for aspen collar  -TS    Recorded by [TR] Dorcas Quinteros, PTA [TR] Dorcas Quinteros, IRVIN [TS] Randa Conde, DIAZ/L    Pain Assessment    Pain Assessment 0-10  -TR 0-10  -TR No/denies pain  -TS    Pain Score 7  -TR 3  -TR     Post Pain Score 7  -TR 3  -TR     Pain Type Surgical pain  -TR Surgical pain  -TR     Pain Location Neck  -TR Neck  -TR     Pain Orientation Anterior  -TR Anterior  -TR     Pain Descriptors Aching  -TR Aching  -TR     Pain Frequency Constant/continuous  -TR Constant/continuous  -TR     Pain Intervention(s) Ambulation/increased activity   notified RN  -TR Medication (See MAR)  -TR     Response to Interventions  tolerated  -TR tolerated  -TR     Recorded by [TR] Dorcas Quinteros PTA [TR] Dorcas Quinteros, IRVIN [TS] Randa Conde, DIAZ/L    Cognitive Assessment/Intervention    Current Cognitive/Communication Assessment functional  -TR functional  -TR     Orientation Status oriented x 4  -TR oriented x 4  -TR     Follows Commands/Answers Questions able to follow multi-step instructions;100% of the time  -TR able to follow multi-step instructions;100% of the time  -TR     Personal Safety Interventions fall prevention program maintained;nonskid shoes/slippers when out of bed  -TR fall prevention program maintained;gait belt;nonskid shoes/slippers when out of bed  -TR fall prevention program maintained;gait belt;nonskid shoes/slippers when out of bed  -TS    Recorded by [TR] Dorcas Quinteros PTA [TR] Dorcas Quinteros, IRVIN [TS] Randa Conde, DIAZ/L    Bed Mobility, Assessment/Treatment    Bed Mobility, Assistive Device   head of bed elevated  -TS    Bed Mobility, Scoot/Bridge, [place]   supervision required  -TS    Bed Mob, Supine to Sit, [place]   supervision required  -TS    Bed Mobility, Comment sitting EOB  -TR up in chair  -TR     Recorded by [TR] Dorcas Quinteros PTA [TR] Dorcas Quinteros, IRVIN [TS] Randa Conde, DIAZ/L    Transfer Assessment/Treatment    Transfers, Sit-Stand [place] stand by assist  -TR stand by assist  -TR stand by assist  -TS    Transfers, Stand-Sit [place] stand by assist  -TR stand by assist  -TR stand by assist  -TS    Toilet Transfer, [place] supervision required  -TR supervision required  -TR     Transfer, Impairments strength decreased  -TR strength decreased  -TR     Recorded by [TR] Dorcas Quinteros, IRVIN [TR] Dorcas Quinteros, IRVIN [TS] Randa Conde, DIAZ/L    Gait Assessment/Treatment    Gait, [place] Level stand by assist;verbal cues required  -TR stand by assist;verbal cues required  -TR     Gait, Distance (Feet)  500  -  -TR     Gait, Gait Pattern Analysis swing-through gait  -TR swing-through gait  -TR     Gait, Gait Deviations narrow base  -TR narrow base  -TR     Gait, Impairments strength decreased  -TR strength decreased  -TR     Gait, Comment  initally pt with increased carmen requiring V/C's to slowdown   -TR     Recorded by [TR] Dorcas Quinteros PTA [TR] Dorcas Quinteros PTA     Stairs Assessment/Treatment    Number of Stairs 5  -TR      Stairs, Handrail Location none  -TR      Stairs, [place] Level supervision required  -TR      Stairs, Technique Used step over step (ascending);step over step (descending)  -TR      Stairs, Impairments impaired balance  -TR      Recorded by [TR] Dorcas Quinteros PTA      Functional Mobility    Functional Mobility- Ind. Level   --   SBA  -TS    Functional Mobility- Comment   in room  -TS    Recorded by   [TS] RAMON LevinA/L    Lower Body Bathing Assessment/Training    LB Bathing Assess/Train, Position   sitting;standing  -TS    LB Bathing Assess/Train, [place] Level   set up required  -TS    Recorded by   [TS] LUIS EDUARDO Levin    Lower Body Dressing Assessment/Training    LB Dressing Assess/Train, Clothing Type   doffing:;donning:;socks  -TS    LB Dressing Assess/Train, Position   sitting  -TS    LB Dressing Assess/Train, [place]   set up required  -TS    LB Dressing Assess/Train, Comment   pt able to cross BLE over opposite knee to get to foot  -TS    Recorded by   [TS] RAMON LevinA/L    Toileting Assessment/Training    Toileting Assess/Train, Assistive Device   urinal  -TS    Toileting Assess/Train, Position   standing  -TS    Toileting Assess/Train, Indepen Level   set up required  -TS    Recorded by   [TS] LUIS EDUARDO Levin    Grooming Assessment/Training    Grooming Assess/Train, Position   sitting  -TS    Grooming Assess/Train, Indepen Level   set up required  -TS    Recorded by   [TS] Randa FONTANEZ  EMILY Conde/L    Orthotics Prosthetics    Additional Documentation Orthosis Location (Group)  -TR Orthosis Location (Group);Orthosis Management/Training (Group)  -TR     Recorded by [TR] Dorcas Quinteros PTA [TR] Dorcas Quinteros PTA     Orthosis Location    Orthosis Location/Type neck/back  -TR neck/back  -TR     Orthosis, Neck/Back --   ASPEN  -TR --   ASPEN  -TR     Recorded by [TR] Dorcas Quinteros PTA [TR] Dorcas Quinteros PTA     Orthosis Management/Training    Orthosis Indications immobilize, protect/position healing structures  -TR immobilize, protect/position healing structures;rest, reduce pain;restrict motion  -TR     Orthosis Skills Training doffing orthosis;donning orthosis  -TR doffing orthosis;donning orthosis;activity limitations;orthosis maintenance  -TR orthosis cleaning;orthosis maintenance;restrictions/precautions  -TS    Orthosis Wear Schedule wear full time  -TR wear full time  -TR     Recorded by [TR] Dorcas Quinteros PTA [TR] Dorcas Quinteros PTA [TS] EMILY Levin/L    Positioning and Restraints    Pre-Treatment Position in bed  -TR sitting in chair/recliner  -TR in bed  -TS    Post Treatment Position bed  -TR chair  -TR chair  -TS    In Bed sitting EOB;call light within reach;with family/caregiver;side rails up x2;notified nsg  -TR      In Chair  reclined;call light within reach;encouraged to call for assist;with family/caregiver  -TR sitting;call light within reach;encouraged to call for assist;with brace  -TS    Recorded by [TR] Dorcas Quinteros PTA [TR] Dorcas Quinteros PTA [TS] EMILY Levin/L      User Key  (r) = Recorded By, (t) = Taken By, (c) = Cosigned By    Initials Name Effective Dates    TS LUIS EDUARDO Levin 08/02/16 -     TR Dorcas Quinteros PTA 08/02/16 -                 IP PT Goals       06/14/17 1631          Bed Mobility PT LTG    Bed Mobility PT LTG, Date Established 06/14/17  -MS (r) RZ (t) MS (c)       Bed Mobility PT LTG, Time to Achieve by discharge  -MS (r) RZ (t) MS (c)      Bed Mobility PT LTG, Activity Type all bed mobility  -MS (r) RZ (t) MS (c)      Bed Mobility PT LTG, [place] Level independent  -MS (r) RZ (t) MS (c)      Transfer Training PT LTG    Transfer Training PT LTG, Date Established 06/14/17  -MS (r) RZ (t) MS (c)      Transfer Training PT LTG, Time to Achieve by discharge  -MS (r) RZ (t) MS (c)      Transfer Training PT LTG, Activity Type all transfers  -MS (r) RZ (t) MS (c)      Transfer Training PT LTG, [place] Level independent  -MS (r) RZ (t) MS (c)      Gait Training PT LTG    Gait Training Goal PT LTG, Date Established 06/14/17  -MS (r) RZ (t) MS (c)      Gait Training Goal PT LTG, Time to Achieve by discharge  -MS (r) RZ (t) MS (c)      Gait Training Goal PT LTG, [place] Level independent  -MS (r) RZ (t) MS (c)      Gait Training Goal PT LTG, Distance to Achieve 350 ft  -MS (r) RZ (t) MS (c)      Stair Training PT LTG    Stair Training Goal PT LTG, Date Established 06/14/17  -MS (r) RZ (t) MS (c)      Stair Training Goal PT LTG, Time to Achieve by discharge  -MS (r) RZ (t) MS (c)      Stair Training Goal PT LTG, Number of Steps 4  -MS (r) RZ (t) MS (c)      Stair Training Goal PT LTG, [place] Level independent  -MS (r) RZ (t) MS (c)        User Key  (r) = Recorded By, (t) = Taken By, (c) = Cosigned By    Initials Name Provider Type    MS Sanjana Espinoza, PT DPT Physical Therapist    BRIT Smith, PT Student PT Student          Physical Therapy Education     Title: PT OT SLP Therapies (Active)     Topic: Physical Therapy (Active)     Point: Mobility training (Done)    Learning Progress Summary    Learner Readiness Method Response Comment Documented by Status   Patient Acceptance E VU Pt was educated on spinal precautions, orthosis wear schedule, gait and transfer training. Pt educated on therapy's role in their care. BRIT 06/14/17 9200 Done               Point:  Precautions (Done)    Learning Progress Summary    Learner Readiness Method Response Comment Documented by Status   Patient Acceptance E,D,TB VU,DU Neck precautions, bracde use/care, connie doff brace and additional pads. TR 06/15/17 0930 Done    Acceptance E VU Pt was educated on spinal precautions, orthosis wear schedule, gait and transfer training. Pt educated on therapy's role in their care. RZ 06/14/17 1635 Done   Significant Other Acceptance E,D,TB VU,DU Neck precautions, bracde use/care, connie doff brace and additional pads. TR 06/15/17 0930 Done                      User Key     Initials Effective Dates Name Provider Type Discipline    TR 08/02/16 -  Dorcas Quinteros, IRVIN Physical Therapy Assistant PT    R 05/08/17 -  Jessika Smith PT Student PT Student PT                    PT Recommendation and Plan  Anticipated Discharge Disposition: home with assist  Planned Therapy Interventions: balance training, gait training, bed mobility training, orthotic fitting/training, patient/family education, stair training, strengthening, transfer training, home exercise program  PT Frequency: 2 times/day  Plan of Care Review  Plan Of Care Reviewed With: patient  Progress: improving  Outcome Summary/Follow up Plan: Pt improvingn well. Pt requires supervision for transfers and SBA for gait training. Pt ambualted 400 feet with no AD. Educayed pt and wife on proper use/care of brace, don/doff brace and extra pads, as well as safety withmobility. Pt's O2 stated above 93 on room air throughout treatment session, so doffed O2 and notified RN.           Outcome Measures       06/15/17 0930 06/15/17 0900 06/14/17 1600    How much help from another person do you currently need...    Turning from your back to your side while in flat bed without using bedrails? 4  -TR      Moving from lying on back to sitting on the side of a flat bed without bedrails? 3  -TR      Moving to and from a bed to a chair (including a wheelchair)? 4   -TR      Standing up from a chair using your arms (e.g., wheelchair, bedside chair)? 4  -TR      Climbing 3-5 steps with a railing? 3  -TR      To walk in hospital room? 3  -TR      AM-PAC 6 Clicks Score 21  -TR      How much help from another is currently needed...    Putting on and taking off regular lower body clothing?  3  -TS 3  -ND    Bathing (including washing, rinsing, and drying)  3  -TS 3  -ND    Toileting (which includes using toilet bed pan or urinal)  3  -TS 3  -ND    Putting on and taking off regular upper body clothing  4  -TS 3  -ND    Taking care of personal grooming (such as brushing teeth)  4  -TS 3  -ND    Eating meals  4  -TS 4  -ND    Score  21  -TS 19  -ND    Functional Assessment    Outcome Measure Options AM-PAC 6 Clicks Basic Mobility (PT)  -TR AM-PAC 6 Clicks Daily Activity (OT)  -TS AM-PAC 6 Clicks Daily Activity (OT)  -ND      06/14/17 1541          How much help from another person do you currently need...    Turning from your back to your side while in flat bed without using bedrails? 4  -MS (r) RZ (t) MS (c)      Moving from lying on back to sitting on the side of a flat bed without bedrails? 3  -MS (r) RZ (t) MS (c)      Moving to and from a bed to a chair (including a wheelchair)? 4  -MS (r) RZ (t) MS (c)      Standing up from a chair using your arms (e.g., wheelchair, bedside chair)? 3  -MS (r) RZ (t) MS (c)      Climbing 3-5 steps with a railing? 3  -MS (r) RZ (t) MS (c)      To walk in hospital room? 3  -MS (r) RZ (t) MS (c)      AM-PAC 6 Clicks Score 20  -MS (r) RZ (t)      Functional Assessment    Outcome Measure Options AM-PAC 6 Clicks Basic Mobility (PT)  -MS (r) RZ (t) MS (c)        User Key  (r) = Recorded By, (t) = Taken By, (c) = Cosigned By    Initials Name Provider Type    TS Randa N Elton, DIAZ/L Occupational Therapy Assistant    MS Sanjana Espinoza, PT DPT Physical Therapist    SARAH Quinteros, PTA Physical Therapy Assistant    RADHA Montesinos, OTR/L  Occupational Therapist    BRIT Smith, PT Student PT Student           Time Calculation:         PT Charges       06/15/17 1525 06/15/17 0930       Time Calculation    Start Time 1525  -TR 0930  -TR     Stop Time 1548  -TR 0955  -TR     Time Calculation (min) 23 min  -TR 25 min  -TR     PT Received On 06/15/17  -TR 06/15/17  -TR     PT Goal Re-Cert Due Date 06/24/17  -TR 06/24/17  -TR     Time Calculation- PT    Total Timed Code Minutes- PT 23 minute(s)  -TR 25 minute(s)  -TR       User Key  (r) = Recorded By, (t) = Taken By, (c) = Cosigned By    Initials Name Provider Type    TR Dorcas Quinteros PTA Physical Therapy Assistant          Therapy Charges for Today     Code Description Service Date Service Provider Modifiers Qty    10569270184 HC GAIT TRAINING EA 15 MIN 6/15/2017 Dorcas Quinteros PTA GP, KX 2    58165789737 HC GAIT TRAINING EA 15 MIN 6/15/2017 Dorcas Quinteros PTA GP, KX 2          PT G-Codes  PT Professional Judgement Used?: Yes  Outcome Measure Options: AM-PAC 6 Clicks Basic Mobility (PT)  Score: 20  Functional Limitation: Mobility: Walking and moving around  Mobility: Walking and Moving Around Current Status (): At least 20 percent but less than 40 percent impaired, limited or restricted  Mobility: Walking and Moving Around Goal Status (): At least 1 percent but less than 20 percent impaired, limited or restricted    Dorcas Quinteros PTA  6/15/2017

## 2017-06-15 NOTE — PLAN OF CARE
Problem: Patient Care Overview (Adult)  Goal: Plan of Care Review  Outcome: Ongoing (interventions implemented as appropriate)    06/15/17 0427   Coping/Psychosocial Response Interventions   Plan Of Care Reviewed With patient   Patient Care Overview   Progress no change   Outcome Evaluation   Outcome Summary/Follow up Plan BP's elevated; md aware; no new orders; alert and oriented x 4; c/o neck pain; prn pain med offered relief; neck dressing c/d/i; safety maintained       Goal: Adult Individualization and Mutuality  Outcome: Ongoing (interventions implemented as appropriate)  Goal: Discharge Needs Assessment  Outcome: Ongoing (interventions implemented as appropriate)    Problem: Perioperative Period (Adult)  Goal: Signs and Symptoms of Listed Potential Problems Will be Absent or Manageable (Perioperative Period)  Outcome: Ongoing (interventions implemented as appropriate)    Problem: Fall Risk (Adult)  Goal: Absence of Falls  Outcome: Ongoing (interventions implemented as appropriate)

## 2017-06-16 VITALS
DIASTOLIC BLOOD PRESSURE: 69 MMHG | RESPIRATION RATE: 18 BRPM | HEART RATE: 80 BPM | OXYGEN SATURATION: 94 % | HEIGHT: 68 IN | WEIGHT: 261 LBS | SYSTOLIC BLOOD PRESSURE: 144 MMHG | BODY MASS INDEX: 39.56 KG/M2 | TEMPERATURE: 97.9 F

## 2017-06-16 PROCEDURE — 97535 SELF CARE MNGMENT TRAINING: CPT

## 2017-06-16 PROCEDURE — 94799 UNLISTED PULMONARY SVC/PX: CPT

## 2017-06-16 PROCEDURE — 97116 GAIT TRAINING THERAPY: CPT

## 2017-06-16 PROCEDURE — 25010000002 DEXAMETHASONE PER 1 MG: Performed by: PHYSICIAN ASSISTANT

## 2017-06-16 RX ORDER — OXYCODONE AND ACETAMINOPHEN 10; 325 MG/1; MG/1
2 TABLET ORAL EVERY 4 HOURS PRN
Refills: 0
Start: 2017-06-16 | End: 2017-06-24

## 2017-06-16 RX ADMIN — ESCITALOPRAM 10 MG: 10 TABLET, FILM COATED ORAL at 09:10

## 2017-06-16 RX ADMIN — OXYCODONE HYDROCHLORIDE AND ACETAMINOPHEN 2 TABLET: 10; 325 TABLET ORAL at 00:50

## 2017-06-16 RX ADMIN — FAMOTIDINE 20 MG: 20 TABLET, FILM COATED ORAL at 09:09

## 2017-06-16 RX ADMIN — DEXAMETHASONE SODIUM PHOSPHATE 10 MG: 10 INJECTION, SOLUTION INTRAMUSCULAR; INTRAVENOUS at 00:43

## 2017-06-16 RX ADMIN — AMLODIPINE BESYLATE 5 MG: 5 TABLET ORAL at 09:09

## 2017-06-16 RX ADMIN — CLONIDINE HYDROCHLORIDE 0.1 MG: 0.1 TABLET ORAL at 02:20

## 2017-06-16 RX ADMIN — MAGNESIUM HYDROXIDE 10 ML: 2400 SUSPENSION ORAL at 09:47

## 2017-06-16 RX ADMIN — LISINOPRIL 10 MG: 10 TABLET ORAL at 09:10

## 2017-06-16 RX ADMIN — OXYCODONE HYDROCHLORIDE AND ACETAMINOPHEN 2 TABLET: 10; 325 TABLET ORAL at 10:23

## 2017-06-16 RX ADMIN — OXYCODONE HYDROCHLORIDE AND ACETAMINOPHEN 2 TABLET: 10; 325 TABLET ORAL at 06:23

## 2017-06-16 RX ADMIN — BUDESONIDE AND FORMOTEROL FUMARATE DIHYDRATE 2 PUFF: 160; 4.5 AEROSOL RESPIRATORY (INHALATION) at 07:38

## 2017-06-16 NOTE — CONSULTS
Consults   This consult was done and performed on 6/15 2017 somehow it was not placed in the computer therefore this is a repeat from the dictation of 6/15/17      Referring Provider: Strenge  Reason for Consultation: Accelerated hypertension    Patient Care Team:  Herb Vanegas MD as PCP - General (Family Medicine)    Chief complaint neck pain    Subjective .     History of present illness:  Patient is a 66-year-old white gentleman who is followed by Dr. Vanegas on an outpatient basis.  He said long-standing hypertension that has been well controlled.  In the postoperative state he had significant neck pain and his systolic blood pressure was greater than 200 thus I was consult to for medical management due to the fact that Dr. Vanegas does not come to [facility].  Main complaint was neck pain no other precipitating or relieving factors.  Pain was rated 6-7 out of 10.      REVIEW OF SYSTEMS:    CONSTITUTIONAL:  Negative for anorexia, chills, fevers, night sweats and weight loss  EYES:  negative for eye dryness, icterus and redness  HEENT:   negative for dental problems, epistaxis, facial trauma and thrush  RESPIRATORY:  negative for chest tightness, cough, dyspnea on exertion, pneumonia and sputum  CARDIOVASCULAR: negative for chest pain, dyspnea, exertional chest pressure/discomfort, irregular heart beat, palpitations, paroxysmal nocturnal dyspnea and syncope  GASTROINTESTINAL:  negative for abdominal pain, hematemesis, jaundice, melena and rectal bleeding  MUSCULOSKELETAL:  negative for muscle weakness, myalgias and neck pain  NEUROLOGICAL:   negative for dizziness, headaches, seizures, speech problems, tremors and vertigo  INTEGUMENT: negative for pruritus, rash, skin color change and skin lesion(s)         History    Past Medical History:   Diagnosis Date   • Anxiety    • Chronic neck pain    • COPD (chronic obstructive pulmonary disease)    • Degenerative cervical disc    • GERD  (gastroesophageal reflux disease)    • Hypertension    • Irregular heart beat     sss, had pacemaker implanted april 2017   • Neck pain    • Osteoarthritis    • Radiculopathy of cervical region    • Wears glasses      Past Surgical History:   Procedure Laterality Date   • ANTERIOR CERVICAL DISCECTOMY W/ FUSION N/A 6/14/2017    Procedure: CERVICAL DISCECTOMY C 3-5, ANTERIOR FUSION WITH INSTRUMENTATION C 3-7;  Surgeon: MARLON Marc MD;  Location:  [facility] OR;  Service:    • CERVICAL CORPECTOMY N/A 6/14/2017    Procedure: CORPECTOMY C6, PARTIAL CORPECTOMY C5, ANTERIOR CERVICAL DISCECTOMY C3-5, ANTERIOR FUSION WITH INSTRUMENTATION C3-7;  Surgeon: MARLON Macr MD;  Location:  [facility] OR;  Service:    • PACEM[facility]  [facility]st [facility]   • TOTAL KNEE ARTHROPLASTY Bilateral     2010     History reviewed. No pertinent family history.  Social History   Substance Use Topics   • Smoking status: Never Smoker   • Smokeless tobacco: Never Used   • Alcohol use Yes      Comment: 6-8 DRINKS PER WEEK     Prescriptions Prior to Admission   Medication Sig Dispense Refill Last Dose   • amLODIPine (NORVASC) 5 MG tablet Take 5 mg by mouth Daily.   6/13/2017 at 1030   • celecoxib (CeleBREX) 200 MG capsule Take 200 mg by mouth Daily.   6/13/2017 at 1030   • escitalopram (LEXAPRO) 10 MG tablet Take 10 mg by mouth Daily.   6/13/2017 at 1030   • ibuprofen (ADVIL,MOTRIN) 200 MG tablet Take 200 mg by mouth Every 6 (Six) Hours As Needed for Mild Pain (1-3) (takes 2 tablets prn for pain).   Past Week at Unknown time   • lisinopril (PRINIVIL,ZESTRIL) 10 MG tablet Take 10 mg by mouth Daily.   6/13/2017 at 1030   • oxyCODONE-acetaminophen (PERCOCET)  MG per tablet Take 1 tablet by mouth Every 6 (Six) Hours As Needed for Moderate Pain (4-6).   6/13/2017 at 2130   • pantoprazole (PROTONIX) 20 MG EC tablet Take 20 mg by mouth Daily.   6/13/2017 at 1030   • albuterol (PROVENTIL HFA;VENTOLIN HFA) 108 (90  BASE) MCG/ACT inhaler Inhale 2 puffs Every 4 (Four) Hours As Needed for Wheezing.   6/12/2017   • mometasone-formoterol (DULERA 200) 200-5 MCG/ACT inhaler Inhale 2 puffs 2 (Two) Times a Day.   6/12/2017     Review of patient's allergies indicates no known allergies.  Objective     Vital Signs   Temp:  [97.9 °F (36.6 °C)-98.8 °F (37.1 °C)] 97.9 °F (36.6 °C)  Heart Rate:  [] 83  Resp:  [16-22] 20  BP: (132-194)/(64-92) 132/66          Physical Exam:  Constitutional: oriented to person, place, and time. appears well-developed.   HEENT:   Head: Normocephalic and atraumatic.   Eyes: Pupils are equal, round, and reactive to light.   Neck: Neck supple.   Cardiovascular: Regular rhythm and normal heart sounds.    Pulmonary/Chest: Effort normal and breath sounds normal. CTAB  Abdominal: Soft. Bowel sounds are normal. He exhibits no distension. There is no tenderness. There is no rebound and no guarding.   Musculoskeletal: Normal range of motion. He exhibits no edema or tenderness.   Neurological: He is alert and oriented to person, place, and time. He has normal reflexes.   Skin: Skin is warm and dry.     Results Review:   I reviewed the patient's new imaging results and agree with the interpretation.      Assessment/Plan     Active Problems:    Stenosis, cervical spine    Essential hypertension    Gastroesophageal reflux disease without esophagitis    Panlobular emphysema    Anxiety      Hypertension-review pre and post BP's,will restart home BP meds when BP allows. Add Clonidine 0.1 mg q 4 hours prn if bp> 140/90,monitor BP and adjust meds as necessary.  Constipation-start with Miralax 1 capful BID until BM, then decrease to 1 x a day,then can step up to Amitizia 24 mcg po BID which can be used for opiod induced constipation,and ultimately end up with Relistor 12 mcg sub Q q 48 hours to block the effect of narcotics on the gut.  GERD-exacerbated by pain meds and anesthesia, will add PPI as needed and can step up to  Carafate 1 gm po q AC and qhs.        I discussed the patients findings and my recommendations with patient, nursing staff and consulting provider    Enrrique Patricia MD  06/16/17  6:56 AM

## 2017-06-16 NOTE — PLAN OF CARE
Problem: Patient Care Overview (Adult)  Goal: Plan of Care Review  Outcome: Ongoing (interventions implemented as appropriate)    06/16/17 0957   Coping/Psychosocial Response Interventions   Plan Of Care Reviewed With patient   Patient Care Overview   Progress progress toward functional goals as expected   Outcome Evaluation   Outcome Summary/Follow up Plan Pt I with bed mobility, transfers and ambulation in room. Pt educated and demonstrated how to don/doff aspen collar and how to clean and replace pads. Pt educated on how to perform ADL's while maintaining neck precautions. Continue OT POC          Problem: Inpatient Occupational Therapy  Goal: Transfer Training Goal 1 LTG- OT  Outcome: Outcome(s) achieved Date Met:  06/16/17 06/14/17 1622 06/16/17 0957   Transfer Training OT LTG   Transfer Training OT LTG, Date Established 06/14/17 --    Transfer Training OT LTG, Time to Achieve by discharge --    Transfer Training OT LTG, Activity Type all transfers --    Transfer Training OT LTG, [place] Level independent --    Transfer Training OT LTG, Date Goal Reviewed --  06/16/17   Transfer Training OT LTG, Outcome --  goal met       Goal: ADL Goal LTG- OT  Outcome: Outcome(s) achieved Date Met:  06/16/17 06/14/17 1622 06/16/17 0957   ADL OT LTG   ADL OT LTG, Date Established 06/14/17 --    ADL OT LTG, Time to Achieve by discharge --    ADL OT LTG, Activity Type ADL skills --    ADL OT LTG, [place] Level standby assist --    ADL OT LTG, Date Goal Reviewed --  06/16/17   ADL OT LTG, Outcome --  goal met

## 2017-06-16 NOTE — PLAN OF CARE
Problem: Patient Care Overview (Adult)  Goal: Plan of Care Review  Outcome: Ongoing (interventions implemented as appropriate)    06/16/17 0850   Coping/Psychosocial Response Interventions   Plan Of Care Reviewed With patient   Patient Care Overview   Progress improving   Outcome Evaluation   Outcome Summary/Follow up Plan Pt independent with all mobility. Demonstrated proper precautions, brace use care, and home safety. Ready for D/C home today.

## 2017-06-16 NOTE — THERAPY DISCHARGE NOTE
Acute Care - Physical Therapy Discharge Summary  [facility]       Patient Name: Chinedu Monaco  : 1950  MRN: [id number]    Today's Date: 2017  Onset of Illness/Injury or Date of Surgery Date: 17    Date of Referral to PT: 17  Referring Physician: ANGUS Aguilar      Admit Date: 2017      PT Recommendation and Plan    Visit Dx:    ICD-10-CM ICD-9-CM   1. Decreased activities of daily living (ADL) Z78.9 V49.89   2. Mobility impaired Z74.09 799.89             Outcome Measures       17 0900 17 0850 06/15/17 0930    How much help from another person do you currently need...    Turning from your back to your side while in flat bed without using bedrails?  4  -TR 4  -TR    Moving from lying on back to sitting on the side of a flat bed without bedrails?  4  -TR 3  -TR    Moving to and from a bed to a chair (including a wheelchair)?  4  -TR 4  -TR    Standing up from a chair using your arms (e.g., wheelchair, bedside chair)?  4  -TR 4  -TR    Climbing 3-5 steps with a railing?  4  -TR 3  -TR    To walk in hospital room?  4  -TR 3  -TR    AM-PAC 6 Clicks Score  24  -TR 21  -TR    How much help from another is currently needed...    Putting on and taking off regular lower body clothing? 4  -TS      Bathing (including washing, rinsing, and drying) 3  -TS      Toileting (which includes using toilet bed pan or urinal) 4  -TS      Putting on and taking off regular upper body clothing 4  -TS      Taking care of personal grooming (such as brushing teeth) 4  -TS      Eating meals 4  -TS      Score 23  -TS      Functional Assessment    Outcome Measure Options AM-PAC 6 Clicks Daily Activity (OT)  -TS AM-PAC 6 Clicks Basic Mobility (PT)  -TR AM-PAC 6 Clicks Basic Mobility (PT)  -TR      06/15/17 0900 17 1600 17 1541    How much help from another person do you currently need...    Turning from your back to your side while in flat bed without using bedrails?   4  -MS (r) RZ (t) MS  (c)    Moving from lying on back to sitting on the side of a flat bed without bedrails?   3  -MS (r) RZ (t) MS (c)    Moving to and from a bed to a chair (including a wheelchair)?   4  -MS (r) RZ (t) MS (c)    Standing up from a chair using your arms (e.g., wheelchair, bedside chair)?   3  -MS (r) RZ (t) MS (c)    Climbing 3-5 steps with a railing?   3  -MS (r) RZ (t) MS (c)    To walk in hospital room?   3  -MS (r) RZ (t) MS (c)    AM-PAC 6 Clicks Score   20  -MS (r) RZ (t)    How much help from another is currently needed...    Putting on and taking off regular lower body clothing? 3  -TS 3  -ND     Bathing (including washing, rinsing, and drying) 3  -TS 3  -ND     Toileting (which includes using toilet bed pan or urinal) 3  -TS 3  -ND     Putting on and taking off regular upper body clothing 4  -TS 3  -ND     Taking care of personal grooming (such as brushing teeth) 4  -TS 3  -ND     Eating meals 4  -TS 4  -ND     Score 21  -TS 19  -ND     Functional Assessment    Outcome Measure Options AM-PAC 6 Clicks Daily Activity (OT)  -TS AM-PAC 6 Clicks Daily Activity (OT)  -ND AM-PAC 6 Clicks Basic Mobility (PT)  -MS (r) RZ (t) MS (c)      User Key  (r) = Recorded By, (t) = Taken By, (c) = Cosigned By    Initials Name Provider Type    DERICK Conde, DIAZ/L Occupational Therapy Assistant    MS Sanjana Espinoza, PT DPT Physical Therapist    SARAH Quinteros, IRVIN Physical Therapy Assistant    RADHA Montesinos, OTR/L Occupational Therapist    BRIT Smith, PT Student PT Student                PT Charges       06/16/17 0850          Time Calculation    Start Time 0850  -TR      Stop Time 0907  -TR      Time Calculation (min) 17 min  -TR      PT Received On 06/16/17  -TR      PT Goal Re-Cert Due Date 06/24/17  -TR      Time Calculation- PT    Total Timed Code Minutes- PT 17 minute(s)  -TR        User Key  (r) = Recorded By, (t) = Taken By, (c) = Cosigned By    Initials Name Provider Type    TR Dorcas  Maude Quinteros, PTA Physical Therapy Assistant                  IP PT Goals       06/16/17 1316 06/14/17 1631       Bed Mobility PT LTG    Bed Mobility PT LTG, Date Established  06/14/17  -MS (r) RZ (t) MS (c)     Bed Mobility PT LTG, Time to Achieve  by discharge  -MS (r) RZ (t) MS (c)     Bed Mobility PT LTG, Activity Type  all bed mobility  -MS (r) RZ (t) MS (c)     Bed Mobility PT LTG, [place] Level  independent  -MS (r) RZ (t) MS (c)     Bed Mobility PT LTG, Date Goal Reviewed 06/16/17  -CW      Bed Mobility PT LTG, Outcome goal met  -CW      Transfer Training PT LTG    Transfer Training PT LTG, Date Established  06/14/17  -MS (r) RZ (t) MS (c)     Transfer Training PT LTG, Time to Achieve  by discharge  -MS (r) RZ (t) MS (c)     Transfer Training PT LTG, Activity Type  all transfers  -MS (r) RZ (t) MS (c)     Transfer Training PT LTG, [place] Level  independent  -MS (r) RZ (t) MS (c)     Transfer Training PT  LTG, Date Goal Reviewed 06/16/17  -CW      Transfer Training PT LTG, Outcome goal met  -CW      Gait Training PT LTG    Gait Training Goal PT LTG, Date Established  06/14/17  -MS (r) RZ (t) MS (c)     Gait Training Goal PT LTG, Time to Achieve  by discharge  -MS (r) RZ (t) MS (c)     Gait Training Goal PT LTG, [place] Level  independent  -MS (r) RZ (t) MS (c)     Gait Training Goal PT LTG, Distance to Achieve  350 ft  -MS (r) RZ (t) MS (c)     Gait Training Goal PT LTG, Date Goal Reviewed 06/16/17  -CW      Gait Training Goal PT LTG, Outcome goal met  -CW      Stair Training PT LTG    Stair Training Goal PT LTG, Date Established  06/14/17  -MS (r) RZ (t) MS (c)     Stair Training Goal PT LTG, Time to Achieve  by discharge  -MS (r) RZ (t) MS (c)     Stair Training Goal PT LTG, Number of Steps  4  -MS (r) RZ (t) MS (c)     Stair Training Goal PT LTG, [place] Level  independent  -MS (r) RZ (t) MS (c)     Stair Training Goal PT LTG, Date Goal Reviewed 06/16/17  -CW      Stair Training Goal  PT LTG, Outcome goal met  -CW        User Key  (r) = Recorded By, (t) = Taken By, (c) = Cosigned By    Initials Name Provider Type    MS Sanjana Espinoza, PT DPT Physical Therapist    CW Miryam Montoya, PTA Physical Therapy Assistant    BRIT Smith, PT Student PT Student              PT Discharge Summary  Reason for Discharge: Discharge from facility  Outcomes Achieved: Refer to plan of care for updates on goals achieved  Discharge Destination: Home      Miryam Montoya PTA   6/16/2017

## 2017-06-16 NOTE — PLAN OF CARE
Problem: Patient Care Overview (Adult)  Goal: Plan of Care Review  Outcome: Outcome(s) achieved Date Met:  06/16/17 06/16/17 1114   Coping/Psychosocial Response Interventions   Plan Of Care Reviewed With patient   Patient Care Overview   Progress improving   Outcome Evaluation   Outcome Summary/Follow up Plan Pt discharged to home with wife.        Goal: Adult Individualization and Mutuality  Outcome: Outcome(s) achieved Date Met:  06/16/17  Goal: Discharge Needs Assessment  Outcome: Outcome(s) achieved Date Met:  06/16/17    Problem: Perioperative Period (Adult)  Goal: Signs and Symptoms of Listed Potential Problems Will be Absent or Manageable (Perioperative Period)  Outcome: Outcome(s) achieved Date Met:  06/16/17    Problem: Fall Risk (Adult)  Goal: Absence of Falls  Outcome: Outcome(s) achieved Date Met:  06/16/17

## 2017-06-16 NOTE — PROGRESS NOTES
Chinedu Monaco is a 66 y.o. male patient.  Pain and blood pressure control much better overnight.  Slept well with oral pain medication.  Blood pressure 132/66 this morning.      Current Facility-Administered Medications   Medication Dose Route Frequency Provider Last Rate Last Dose   • albuterol (PROVENTIL) nebulizer solution 0.083% 2.5 mg/3mL  2.5 mg Nebulization Q4H PRN K Aguila Marc MD       • amLODIPine (NORVASC) tablet 5 mg  5 mg Oral Daily K Aguila Marc MD   5 mg at 06/15/17 0823   • budesonide-formoterol (SYMBICORT) 160-4.5 MCG/ACT inhaler 2 puff  2 puff Inhalation BID - RT K Aguila Marc MD   2 puff at 06/15/17 2057   • CloNIDine (CATAPRES) tablet 0.1 mg  0.1 mg Oral 4x Daily PRN Enrrique Patricia MD   0.1 mg at 06/16/17 0220   • diphenhydrAMINE (BENADRYL) capsule 25 mg  25 mg Oral Nightly PRN K Agiula Marc MD   25 mg at 06/15/17 0502   • escitalopram (LEXAPRO) tablet 10 mg  10 mg Oral Daily K Aguila Marc MD   10 mg at 06/15/17 0953   • famotidine (PEPCID) injection 20 mg  20 mg Intravenous BID K Aguila Marc MD        Or   • famotidine (PEPCID) tablet 20 mg  20 mg Oral BID K Aguila Marc MD   20 mg at 06/15/17 1701   • hydrALAZINE (APRESOLINE) injection 10 mg  10 mg Intravenous Q6H PRN ANGUS Taylor   10 mg at 06/15/17 1459   • HYDROmorphone (DILAUDID) injection 1 mg  1 mg Intravenous Q3H PRN MARLON Marc MD   1 mg at 06/15/17 1302   • lactated ringers infusion  100 mL/hr Intravenous Continuous Brady Keys CRNA       • lisinopril (PRINIVIL,ZESTRIL) tablet 10 mg  10 mg Oral Daily MARLON Marc MD   10 mg at 06/15/17 0823   • ondansetron (ZOFRAN) tablet 4 mg  4 mg Oral Q6H PRN K Aguila Marc MD        Or   • ondansetron ODT (ZOFRAN-ODT) disintegrating tablet 4 mg  4 mg Oral Q6H PRN K Aguila Marc MD        Or   • ondansetron (ZOFRAN) injection 4 mg  4 mg Intravenous Q6H PRN K Aguila Marc MD       • oxyCODONE-acetaminophen (PERCOCET)  " MG per tablet 2 tablet  2 tablet Oral Q4H PRN MARLON Marc MD   2 tablet at 06/16/17 0623   • sodium chloride 0.9 % flush 1-10 mL  1-10 mL Intravenous PRN MARLON Marc MD       • sodium chloride 0.9 % infusion  75 mL/hr Intravenous Continuous K Aguila Marc MD 75 mL/hr at 06/14/17 1337 75 mL/hr at 06/14/17 1337     No Known Allergies  Active Problems:    Stenosis, cervical spine    Essential hypertension    Gastroesophageal reflux disease without esophagitis    Panlobular emphysema    Anxiety    Blood pressure 132/66, pulse 83, temperature 97.9 °F (36.6 °C), temperature source Oral, resp. rate 20, height 68\" (172.7 cm), weight 261 lb (118 kg), SpO2 94 %.      Subjective:  Symptoms:  Resolved.  He reports anxiety.  No shortness of breath or chest pain.    Diet:  Adequate intake.  No nausea or vomiting.    Activity level: Returning to normal.    Pain:  He complains of pain that is moderate.  He reports pain is improving.  Pain is well controlled.      Review of Systems   Constitutional: Negative for activity change, appetite change, chills and fever.   HENT: Negative for congestion, ear pain, trouble swallowing and voice change.    Eyes: Negative for pain, discharge and visual disturbance.   Respiratory: Negative for apnea, chest tightness, shortness of breath and wheezing.    Cardiovascular: Negative for chest pain and palpitations.   Gastrointestinal: Negative for abdominal distention, blood in stool, nausea and vomiting.   Endocrine: Negative for cold intolerance, heat intolerance, polydipsia, polyphagia and polyuria.   Genitourinary: Negative for difficulty urinating, frequency and hematuria.   Musculoskeletal: Negative for joint swelling and myalgias.   Skin: Negative for color change, pallor and rash.   Neurological: Negative for tremors, seizures, syncope, speech difficulty and headaches.   Hematological: Negative for adenopathy. Does not bruise/bleed easily.   Psychiatric/Behavioral: " "Negative for behavioral problems, confusion and hallucinations.     Objective:  General Appearance:  Comfortable and well-appearing.    Vital signs: (most recent): Blood pressure 132/66, pulse 83, temperature 97.9 °F (36.6 °C), temperature source Oral, resp. rate 20, height 68\" (172.7 cm), weight 261 lb (118 kg), SpO2 94 %.  Vital signs are normal.  No fever.    Output: Producing urine and minimal stool output.    HEENT: Normal HEENT exam.    Lungs:  Normal respiratory rate and normal effort.    Heart: Normal rate.  Regular rhythm.    Abdomen: Abdomen is soft.    Extremities: Normal range of motion.    Neurological: Patient is alert and oriented to person, place and time.    Skin:  Warm and dry.              Labs:  Lab Results (last 72 hours)     Procedure Component Value Units Date/Time    CBC & Differential [[id number]] Collected:  06/15/17 0443    Specimen:  Blood Updated:  06/15/17 0610    Narrative:       The following orders were created for panel order CBC & Differential.  Procedure                               Abnormality         Status                     ---------                               -----------         ------                     CBC Auto Differential[[id number]]        Abnormal            Final result                 Please view results for these tests on the individual orders.    CBC Auto Differential [[id number]]  (Abnormal) Collected:  06/15/17 0443    Specimen:  Blood Updated:  06/15/17 0610     WBC 13.38 (H) 10*3/mm3      RBC 4.65 (L) 10*6/mm3      Hemoglobin 13.7 (L) g/dL      Hematocrit 41.4 %      MCV 89.0 fL      MCH 29.5 pg      MCHC 33.1 g/dL      RDW 14.4 %      RDW-SD 46.2 fl      MPV 10.3 fL      Platelets 231 10*3/mm3      Neutrophil % 84.1 (H) %      Lymphocyte % 6.4 (L) %      Monocyte % 8.8 %      Eosinophil % 0.3 %      Basophil % 0.1 %      Immature Grans % 0.3 %      Neutrophils, Absolute 11.25 (H) 10*3/mm3      Lymphocytes, Absolute 0.85 10*3/mm3      Monocytes, Absolute 1.18 " 10*3/mm3      Eosinophils, Absolute 0.04 10*3/mm3      Basophils, Absolute 0.02 10*3/mm3      Immature Grans, Absolute 0.04 (H) 10*3/mm3     Basic Metabolic Panel [[id number]]  (Abnormal) Collected:  06/15/17 0443    Specimen:  Blood Updated:  06/15/17 0616     Glucose 138 (H) mg/dL      BUN 10 mg/dL      Creatinine 0.79 mg/dL      Sodium 142 mmol/L      Potassium 3.9 mmol/L      Chloride 101 mmol/L      CO2 27.0 mmol/L      Calcium 8.9 mg/dL      eGFR Non African Amer 98 mL/min/1.73      BUN/Creatinine Ratio 12.7     Anion Gap 14.0 (H) mmol/L     Narrative:       GFR Normal >60  Chronic Kidney Disease <60  Kidney Failure <15          Imaging Results (last 72 hours)     Procedure Component Value Units Date/Time    FL C Arm During Surgery [[id number]] Collected:  06/14/17 1226     Updated:  06/14/17 1302    Narrative:       EXAMINATION: XR SPINE CERVICAL 2 VW-, FL C ARM DURING SURGERY- 6/14/2017  12:25 PM CDT     HISTORY: Anterior cervical fusion.     Fluoroscopy time: 11 seconds     Number of images: 5     Findings:  Multiple fluoroscopic images are submitted from the OR demonstrating  multiple images of the cervical spine. An instrument is initially noted  at the C3-C4 intervertebral disc space. Subsequently, anterior cervical  fusion is demonstrated from C3 through at least C6. Bony detail is  limited by fluoroscopic technique. Please see operative report for  details pertaining to this exam. These images will be stored on PACS for  future reference.  This report was finalized on 06/14/2017 12:59 by Dr. Cuong Palacios MD.    XR Spine Cervical 2 View [[id number]] Collected:  06/14/17 1226     Updated:  06/14/17 1302    Narrative:       EXAMINATION: XR SPINE CERVICAL 2 VW-, FL C ARM DURING SURGERY- 6/14/2017  12:25 PM CDT     HISTORY: Anterior cervical fusion.     Fluoroscopy time: 11 seconds     Number of images: 5     Findings:  Multiple fluoroscopic images are submitted from the OR demonstrating  multiple images of  the cervical spine. An instrument is initially noted  at the C3-C4 intervertebral disc space. Subsequently, anterior cervical  fusion is demonstrated from C3 through at least C6. Bony detail is  limited by fluoroscopic technique. Please see operative report for  details pertaining to this exam. These images will be stored on PACS for  future reference.  This report was finalized on 06/14/2017 12:59 by Dr. Cuong Palacios MD.                Assessment:    Condition: In stable condition.  Improving.   (Patient Active Problem List:     Stenosis, cervical spine     Essential hypertension     Gastroesophageal reflux disease without esophagitis     Panlobular emphysema     Anxiety  ).     Plan:   Discharge home.  Encourage ambulation.  Regular diet.  (Add PRN Clonidine  Resume other home meds  F/u Dr Vanegas 1-2 weeks    Explained to patient and staff that we were consulted for medical management during their acute care hospitalization. They need to f/u with their regular primary care provider concerning any further treatment and review of abnormalities found during their hospitalization at [facility] and they agree with that treatment plan.   ).     Patient Active Problem List   Diagnosis   • Stenosis, cervical spine   • Essential hypertension   • Gastroesophageal reflux disease without esophagitis   • Panlobular emphysema   • Anxiety     Enrrique Patricia MD  6/16/2017

## 2017-06-16 NOTE — THERAPY TREATMENT NOTE
Acute Care - Physical Therapy Treatment Note  [facility]     Patient Name: Chinedu Monaco  : 1950  MRN: [id number]  Today's Date: 2017  Onset of Illness/Injury or Date of Surgery Date: 17  Date of Referral to PT: 17  Referring Physician: ANGUS Aguilar    Admit Date: 2017    Visit Dx:    ICD-10-CM ICD-9-CM   1. Decreased activities of daily living (ADL) Z78.9 V49.89   2. Mobility impaired Z74.09 799.89     Patient Active Problem List   Diagnosis   • Stenosis, cervical spine   • Essential hypertension   • Gastroesophageal reflux disease without esophagitis   • Panlobular emphysema   • Anxiety               Adult Rehabilitation Note       17 0850 06/15/17 1525 06/15/17 0930    Rehab Assessment/Intervention    Discipline physical therapy assistant  -TR physical therapy assistant  -TR physical therapy assistant  -TR    Document Type therapy note (daily note)  -TR therapy note (daily note)  -TR therapy note (daily note)  -TR    Subjective Information agree to therapy;complains of;pain  -TR agree to therapy;complains of;pain  -TR agree to therapy;complains of;pain  -TR    Patient Effort, Rehab Treatment good  -TR good  -TR good  -TR    Precautions/Limitations fall precautions;spinal precautions  -TR fall precautions;spinal precautions  -TR fall precautions;spinal precautions  -TR    Recorded by [TR] Dorcas Quinteros, PTA [TR] Dorcas Quinteros, PTA [TR] Dorcas Quinteros, PTA    Pain Assessment    Pain Assessment 0-10  -TR 0-10  -TR 0-10  -TR    Pain Score 3  -TR 7  -TR 3  -TR    Post Pain Score 3  -TR 7  -TR 3  -TR    Pain Type Surgical pain  -TR Surgical pain  -TR Surgical pain  -TR    Pain Location Neck  -TR Neck  -TR Neck  -TR    Pain Orientation Anterior  -TR Anterior  -TR Anterior  -TR    Pain Descriptors Aching  -TR Aching  -TR Aching  -TR    Pain Frequency Constant/continuous  -TR Constant/continuous  -TR Constant/continuous  -TR    Pain Intervention(s)  Ambulation/increased activity  -TR Ambulation/increased activity   notified RN  -TR Medication (See MAR)  -TR    Response to Interventions tolerated  -TR tolerated  -TR tolerated  -TR    Recorded by [TR] Dorcas Quinteros PTA [TR] Dorcas Quinteros PTA [TR] Dorcas Quinteros PTA    Cognitive Assessment/Intervention    Current Cognitive/Communication Assessment functional  -TR functional  -TR functional  -TR    Orientation Status oriented x 4  -TR oriented x 4  -TR oriented x 4  -TR    Follows Commands/Answers Questions able to follow multi-step instructions;100% of the time  -TR able to follow multi-step instructions;100% of the time  -TR able to follow multi-step instructions;100% of the time  -TR    Personal Safety Interventions fall prevention program maintained;gait belt;nonskid shoes/slippers when out of bed  -TR fall prevention program maintained;nonskid shoes/slippers when out of bed  -TR fall prevention program maintained;gait belt;nonskid shoes/slippers when out of bed  -TR    Recorded by [TR] Dorcas Quinteros PTA [TR] Dorcas Quinteros PTA [TR] Dorcas Quinteros PTA    Bed Mobility, Assessment/Treatment    Bed Mobility, Scoot/Bridge, [place] independent  -TR      Bed Mob, Supine to Sit, [place] independent  -TR      Bed Mobility, Comment  sitting EOB  -TR up in chair  -TR    Recorded by [TR] Dorcas Quinteros PTA [TR] Dorcas Quinteros PTA [TR] Dorcas Quinteros PTA    Transfer Assessment/Treatment    Transfers, Sit-Stand [place] independent  -TR stand by assist  -TR stand by assist  -TR    Transfers, Stand-Sit [place] independent  -TR stand by assist  -TR stand by assist  -TR    Toilet Transfer, [place] independent  -TR supervision required  -TR supervision required  -TR    Transfer, Impairments --  -TR strength decreased  -TR strength decreased  -TR    Recorded by [TR] Dorcsa Quinteros, IRVIN [TR] Dorcas Quinteros PTA [TR] Dorcas Quinteros PTA     Gait Assessment/Treatment    Gait, [place] Level conditional independence  -TR stand by assist;verbal cues required  -TR stand by assist;verbal cues required  -TR    Gait, Distance (Feet) 500  -  -  -TR    Gait, Gait Pattern Analysis swing-through gait  -TR swing-through gait  -TR swing-through gait  -TR    Gait, Gait Deviations  narrow base  -TR narrow base  -TR    Gait, Impairments strength decreased  -TR strength decreased  -TR strength decreased  -TR    Gait, Comment   initally pt with increased carmen requiring V/C's to slowdown   -TR    Recorded by [TR] Dorcas Quinteros, IRVIN [TR] Dorcas Quinteros, IRVIN [TR] Dorcas Quinteros, PTA    Stairs Assessment/Treatment    Number of Stairs 5  -TR 5  -TR     Stairs, Handrail Location none  -TR none  -TR     Stairs, [place] Level independent  -TR supervision required  -TR     Stairs, Technique Used step over step (ascending);step over step (descending)  -TR step over step (ascending);step over step (descending)  -TR     Stairs, Impairments --  -TR impaired balance  -TR     Recorded by [TR] Dorcas Quinteros, IRVIN [TR] Dorcas Quinteros, IRVIN     Orthotics Prosthetics    Additional Documentation Orthosis Location (Group)  -TR Orthosis Location (Group)  -TR Orthosis Location (Group);Orthosis Management/Training (Group)  -TR    Recorded by [TR] Dorcas Quinteros, IRVIN [TR] Dorcas Quinteros, IRVIN [TR] Dorcas Quinteros, IRVIN    Orthosis Location    Orthosis Location/Type neck/back  -TR neck/back  -TR neck/back  -TR    Orthosis, Neck/Back --   ASPEN  -TR --   ASPEN  -TR --   ASPEN  -TR    Recorded by [TR] Dorcas Quinteros, IRVIN [TR] Dorcas Quinteros, PTA [TR] Dorcas Quinteros, PTA    Orthosis Management/Training    Orthosis Indications immobilize, protect/position healing structures  -TR immobilize, protect/position healing structures  -TR immobilize, protect/position healing structures;rest, reduce pain;restrict motion  -TR     Orthosis Skills Training doffing orthosis;donning orthosis  -TR doffing orthosis;donning orthosis  -TR doffing orthosis;donning orthosis;activity limitations;orthosis maintenance  -TR    Orthosis Wear Schedule wear full time  -TR wear full time  -TR wear full time  -TR    Recorded by [TR] Dorcas Quinteros PTA [TR] Dorcas Quinteros PTA [TR] Dorcas Quinteros PTA    Positioning and Restraints    Pre-Treatment Position in bed  -TR in bed  -TR sitting in chair/recliner  -TR    Post Treatment Position bed  -TR bed  -TR chair  -TR    In Bed fowlers;call light within reach;side rails up x2  -TR sitting EOB;call light within reach;with family/caregiver;side rails up x2;notified nsg  -TR     In Chair   reclined;call light within reach;encouraged to call for assist;with family/caregiver  -TR    Recorded by [TR] Dorcas Quinteros PTA [TR] Dorcas Quinteros PTA [TR] Dorcas Quinteros PTA      06/15/17 0729          Rehab Assessment/Intervention    Discipline occupational therapy assistant  -TS      Document Type therapy note (daily note)  -TS      Subjective Information agree to therapy;no complaints  -TS      Precautions/Limitations fall precautions;spinal precautions   aspen collar at all time  -TS      Equipment Issued to Patient --   second set of pads for aspen collar  -TS      Recorded by [TS] EMILY Levin/L      Pain Assessment    Pain Assessment No/denies pain  -TS      Recorded by [TS] LUIS EDUARDO Levin      Cognitive Assessment/Intervention    Personal Safety Interventions fall prevention program maintained;gait belt;nonskid shoes/slippers when out of bed  -TS      Recorded by [TS] LUIS EDUARDO Levin      Bed Mobility, Assessment/Treatment    Bed Mobility, Assistive Device head of bed elevated  -TS      Bed Mobility, Scoot/Bridge, [place] supervision required  -TS      Bed Mob, Supine to Sit, [place] supervision required  -TS      Recorded by [TS] Randa FONTANEZ  Elton, DIAZ/L      Transfer Assessment/Treatment    Transfers, Sit-Stand [place] stand by assist  -TS      Transfers, Stand-Sit [place] stand by assist  -TS      Recorded by [TS] LUIS EDUARDO Levin      Functional Mobility    Functional Mobility- Ind. Level --   SBA  -TS      Functional Mobility- Comment in room  -TS      Recorded by [TS] LUIS EDUARDO Levin      Lower Body Bathing Assessment/Training    LB Bathing Assess/Train, Position sitting;standing  -TS      LB Bathing Assess/Train, [place] Level set up required  -TS      Recorded by [TS] LUIS EDUARDO Levin      Lower Body Dressing Assessment/Training    LB Dressing Assess/Train, Clothing Type doffing:;donning:;socks  -TS      LB Dressing Assess/Train, Position sitting  -TS      LB Dressing Assess/Train, [place] set up required  -TS      LB Dressing Assess/Train, Comment pt able to cross BLE over opposite knee to get to foot  -TS      Recorded by [TS] LUIS EDUARDO Levin      Toileting Assessment/Training    Toileting Assess/Train, Assistive Device urinal  -TS      Toileting Assess/Train, Position standing  -TS      Toileting Assess/Train, Indepen Level set up required  -TS      Recorded by [TS] LUIS EDUARDO Levin      Grooming Assessment/Training    Grooming Assess/Train, Position sitting  -TS      Grooming Assess/Train, Indepen Level set up required  -TS      Recorded by [TS] LUIS EDUARDO Levin      Orthosis Management/Training    Orthosis Skills Training orthosis cleaning;orthosis maintenance;restrictions/precautions  -TS      Recorded by [TS] LUIS EDUARDO Levin      Positioning and Restraints    Pre-Treatment Position in bed  -TS      Post Treatment Position chair  -TS      In Chair sitting;call light within reach;encouraged to call for assist;with brace  -TS      Recorded by [TS] LUIS EDUARDO Levin        User Key  (r) = Recorded By, (t) = Taken By, (c) =  Cosigned By    Initials Name Effective Dates    TS Randa HUSEYIN Conde, DIAZ/L 08/02/16 -     TR Dorcas Quinteros, PTA 08/02/16 -                 IP PT Goals       06/14/17 1631          Bed Mobility PT LTG    Bed Mobility PT LTG, Date Established 06/14/17  -MS (r) RZ (t) MS (c)      Bed Mobility PT LTG, Time to Achieve by discharge  -MS (r) RZ (t) MS (c)      Bed Mobility PT LTG, Activity Type all bed mobility  -MS (r) RZ (t) MS (c)      Bed Mobility PT LTG, [place] Level independent  -MS (r) RZ (t) MS (c)      Transfer Training PT LTG    Transfer Training PT LTG, Date Established 06/14/17  -MS (r) RZ (t) MS (c)      Transfer Training PT LTG, Time to Achieve by discharge  -MS (r) RZ (t) MS (c)      Transfer Training PT LTG, Activity Type all transfers  -MS (r) RZ (t) MS (c)      Transfer Training PT LTG, [place] Level independent  -MS (r) RZ (t) MS (c)      Gait Training PT LTG    Gait Training Goal PT LTG, Date Established 06/14/17  -MS (r) RZ (t) MS (c)      Gait Training Goal PT LTG, Time to Achieve by discharge  -MS (r) RZ (t) MS (c)      Gait Training Goal PT LTG, [place] Level independent  -MS (r) RZ (t) MS (c)      Gait Training Goal PT LTG, Distance to Achieve 350 ft  -MS (r) RZ (t) MS (c)      Stair Training PT LTG    Stair Training Goal PT LTG, Date Established 06/14/17  -MS (r) RZ (t) MS (c)      Stair Training Goal PT LTG, Time to Achieve by discharge  -MS (r) RZ (t) MS (c)      Stair Training Goal PT LTG, Number of Steps 4  -MS (r) RZ (t) MS (c)      Stair Training Goal PT LTG, [place] Level independent  -MS (r) RZ (t) MS (c)        User Key  (r) = Recorded By, (t) = Taken By, (c) = Cosigned By    Initials Name Provider Type    MS Sanjana SIMONA Espinoza, PT DPT Physical Therapist    BRIT Smith, PT Student PT Student          Physical Therapy Education     Title: PT OT SLP Therapies (Active)     Topic: Physical Therapy (Active)     Point: Mobility training (Done)    Learning  Progress Summary    Learner Readiness Method Response Comment Documented by Status   Patient Acceptance E,D DU,VU home safety, stair training, brace use/care, don/doff brace TR 06/16/17 0908 Done    Acceptance E VU Pt was educated on spinal precautions, orthosis wear schedule, gait and transfer training. Pt educated on therapy's role in their care. R 06/14/17 1635 Done               Point: Precautions (Done)    Learning Progress Summary    Learner Readiness Method Response Comment Documented by Status   Patient Acceptance E,D,TB VU,DU Neck precautions, bracde use/care, connei doff brace and additional pads. TR 06/15/17 0930 Done    Acceptance E VU Pt was educated on spinal precautions, orthosis wear schedule, gait and transfer training. Pt educated on therapy's role in their care.  06/14/17 1635 Done   Significant Other Acceptance E,D,TB VU,DU Neck precautions, bracde use/care, connie doff brace and additional pads.  06/15/17 0930 Done                      User Key     Initials Effective Dates Name Provider Type Discipline     08/02/16 -  Dorcas Quinteros, IRVIN Physical Therapy Assistant PT     05/08/17 -  Jessika Smith, PT Student PT Student PT                    PT Recommendation and Plan  Anticipated Discharge Disposition: home with assist  Planned Therapy Interventions: balance training, gait training, bed mobility training, orthotic fitting/training, patient/family education, stair training, strengthening, transfer training, home exercise program  PT Frequency: 2 times/day  Plan of Care Review  Plan Of Care Reviewed With: patient  Progress: improving  Outcome Summary/Follow up Plan: Pt independent with all mobility. Demonstrated propeer precautions, brace use care, and home safety. Ready for D/C home todayu.           Outcome Measures       06/16/17 0850 06/15/17 0930 06/15/17 0900    How much help from another person do you currently need...    Turning from your back to your side while in flat bed  without using bedrails? 4  -TR 4  -TR     Moving from lying on back to sitting on the side of a flat bed without bedrails? 4  -TR 3  -TR     Moving to and from a bed to a chair (including a wheelchair)? 4  -TR 4  -TR     Standing up from a chair using your arms (e.g., wheelchair, bedside chair)? 4  -TR 4  -TR     Climbing 3-5 steps with a railing? 4  -TR 3  -TR     To walk in hospital room? 4  -TR 3  -TR     AM-PAC 6 Clicks Score 24  -TR 21  -TR     How much help from another is currently needed...    Putting on and taking off regular lower body clothing?   3  -TS    Bathing (including washing, rinsing, and drying)   3  -TS    Toileting (which includes using toilet bed pan or urinal)   3  -TS    Putting on and taking off regular upper body clothing   4  -TS    Taking care of personal grooming (such as brushing teeth)   4  -TS    Eating meals   4  -TS    Score   21  -TS    Functional Assessment    Outcome Measure Options AM-PAC 6 Clicks Basic Mobility (PT)  -TR AM-PAC 6 Clicks Basic Mobility (PT)  -TR AM-PAC 6 Clicks Daily Activity (OT)  -TS      06/14/17 1600 06/14/17 1541       How much help from another person do you currently need...    Turning from your back to your side while in flat bed without using bedrails?  4  -MS (r) RZ (t) MS (c)     Moving from lying on back to sitting on the side of a flat bed without bedrails?  3  -MS (r) RZ (t) MS (c)     Moving to and from a bed to a chair (including a wheelchair)?  4  -MS (r) RZ (t) MS (c)     Standing up from a chair using your arms (e.g., wheelchair, bedside chair)?  3  -MS (r) RZ (t) MS (c)     Climbing 3-5 steps with a railing?  3  -MS (r) RZ (t) MS (c)     To walk in hospital room?  3  -MS (r) RZ (t) MS (c)     AM-PAC 6 Clicks Score  20  -MS (r) RZ (t)     How much help from another is currently needed...    Putting on and taking off regular lower body clothing? 3  -ND      Bathing (including washing, rinsing, and drying) 3  -ND      Toileting (which includes  using toilet bed pan or urinal) 3  -ND      Putting on and taking off regular upper body clothing 3  -ND      Taking care of personal grooming (such as brushing teeth) 3  -ND      Eating meals 4  -ND      Score 19  -ND      Functional Assessment    Outcome Measure Options AM-PAC 6 Clicks Daily Activity (OT)  -ND AM-PAC 6 Clicks Basic Mobility (PT)  -MS (r) RZ (t) MS (c)       User Key  (r) = Recorded By, (t) = Taken By, (c) = Cosigned By    Initials Name Provider Type    TS Randa Conde, DIAZ/L Occupational Therapy Assistant    MS Sanjana Espinoza, PT DPT Physical Therapist    SARAH Quinteros PTA Physical Therapy Assistant    ND Johnna Montesinos, OTR/L Occupational Therapist    BRIT Smith, PT Student PT Student           Time Calculation:         PT Charges       06/16/17 0850          Time Calculation    Start Time 0850  -TR      Stop Time 0907  -TR      Time Calculation (min) 17 min  -TR      PT Received On 06/16/17  -TR      PT Goal Re-Cert Due Date 06/24/17  -TR      Time Calculation- PT    Total Timed Code Minutes- PT 17 minute(s)  -TR        User Key  (r) = Recorded By, (t) = Taken By, (c) = Cosigned By    Initials Name Provider Type    SARAH Quinteros PTA Physical Therapy Assistant          Therapy Charges for Today     Code Description Service Date Service Provider Modifiers Qty    72112437673 HC GAIT TRAINING EA 15 MIN 6/15/2017 Dorcas Quinteros PTA GP, KX 2    13699716574 HC GAIT TRAINING EA 15 MIN 6/15/2017 Dorcas Quinteros PTA GP, KX 2    08995076096 HC GAIT TRAINING EA 15 MIN 6/16/2017 Dorcas Quinteros PTA GP, KX 1          PT G-Codes  PT Professional Judgement Used?: Yes  Outcome Measure Options: AM-PAC 6 Clicks Basic Mobility (PT)  Score: 20  Functional Limitation: Mobility: Walking and moving around  Mobility: Walking and Moving Around Current Status (): At least 20 percent but less than 40 percent impaired, limited or restricted  Mobility: Walking and  Moving Around Goal Status (): At least 1 percent but less than 20 percent impaired, limited or restricted    Dorcas Quinteros, PTA  6/16/2017

## 2017-06-16 NOTE — PLAN OF CARE
Problem: Inpatient Physical Therapy  Goal: Bed Mobility Goal LTG- PT  Outcome: Outcome(s) achieved Date Met:  06/16/17 06/16/17 1316   Bed Mobility PT LTG   Bed Mobility PT LTG, Date Goal Reviewed 06/16/17   Bed Mobility PT LTG, Outcome goal met       Goal: Transfer Training Goal 1 LTG- PT  Outcome: Outcome(s) achieved Date Met:  06/16/17 06/16/17 1316   Transfer Training PT LTG   Transfer Training PT LTG, Date Goal Reviewed 06/16/17   Transfer Training PT LTG, Outcome goal met       Goal: Gait Training Goal LTG- PT  Outcome: Outcome(s) achieved Date Met:  06/16/17 06/16/17 1316   Gait Training PT LTG   Gait Training Goal PT LTG, Date Goal Reviewed 06/16/17   Gait Training Goal PT LTG, Outcome goal met       Goal: Stair Training Goal LTG- PT  Outcome: Outcome(s) achieved Date Met:  06/16/17 06/16/17 1316   Stair Training PT LTG   Stair Training Goal PT LTG, Date Goal Reviewed 06/16/17   Stair Training Goal PT LTG, Outcome goal met

## 2017-06-16 NOTE — PLAN OF CARE
Problem: Patient Care Overview (Adult)  Goal: Plan of Care Review  Outcome: Ongoing (interventions implemented as appropriate)    06/16/17 0438   Coping/Psychosocial Response Interventions   Plan Of Care Reviewed With patient   Patient Care Overview   Progress improving   Outcome Evaluation   Outcome Summary/Follow up Plan Pt c/o of neck pain from incision. Medicated with percocet x2 every 4 hrs. Drsing changed x 2 . Maurizio pulled on days. Cervical collar in place. Continue to monitor vs , tele, and labs. No acute distress noted this shift.        Goal: Adult Individualization and Mutuality  Outcome: Ongoing (interventions implemented as appropriate)  Goal: Discharge Needs Assessment  Outcome: Ongoing (interventions implemented as appropriate)    Problem: Perioperative Period (Adult)  Goal: Signs and Symptoms of Listed Potential Problems Will be Absent or Manageable (Perioperative Period)  Outcome: Ongoing (interventions implemented as appropriate)    Problem: Fall Risk (Adult)  Goal: Absence of Falls  Outcome: Ongoing (interventions implemented as appropriate)

## 2017-06-16 NOTE — DISCHARGE SUMMARY
Date of Discharge:  6/16/2017    Admission Diagnosis: M54.12    Discharge Diagnosis:   1. Increasing chronic neck pain  2. Bilateral shoulder and arm radiculopathy, right worse than left  3. Mild cervical spondylotic myelopathy  4. Multilevel cervical degenerative disc disease C3 to C7  5. Multilevel cervical facet arthropathy  6. Central and severe bilateral foraminal stenosis C3 7  7. Occipital headaches  8. Status post Corpectomy C6, Partial corpectomy C5, Anterior cervical discectomy with interbody fusion C3-4, C4-5, Anterior interbody fusion C5-6, C6-7, Anterior spinal instrumentation C3 to 7 - 6/14/17    Consults During Admission: dr. yancey  Hospital Course  Patient is a 66 y.o. male known to our practice admitted for the above corpectomy/ACDF.  He has tolerated this well and was discharged home in good stable condition instructions for brace at all times.    Condition on Discharge:  STABLE    Vital Signs  Temp:  [97.9 °F (36.6 °C)-98.1 °F (36.7 °C)] 97.9 °F (36.6 °C)  Heart Rate:  [] 83  Resp:  [16-22] 20  BP: (132-194)/(64-92) 132/66    Physical Exam:   Alert and oriented ×3, no acute distress, grossly neurovascularly intact, vital signs stable, dressing clean dry and intact, moving all extremities without focal deficit    Discharge Disposition  Home or Self Care    Discharge Medications   Chinedu Monaco   Home Medication Instructions ALEC:[id number]    Printed on:06/16/17 9187   Medication Information                      albuterol (PROVENTIL HFA;VENTOLIN HFA) 108 (90 BASE) MCG/ACT inhaler  Inhale 2 puffs Every 4 (Four) Hours As Needed for Wheezing.             amLODIPine (NORVASC) 5 MG tablet  Take 5 mg by mouth Daily.             escitalopram (LEXAPRO) 10 MG tablet  Take 10 mg by mouth Daily.             lisinopril (PRINIVIL,ZESTRIL) 10 MG tablet  Take 10 mg by mouth Daily.             mometasone-formoterol (DULERA 200) 200-5 MCG/ACT inhaler  Inhale 2 puffs 2 (Two) Times a Day.              oxyCODONE-acetaminophen (PERCOCET)  MG per tablet  Take 2 tablets by mouth Every 4 (Four) Hours As Needed for Severe Pain (7-10) for up to 8 days.             pantoprazole (PROTONIX) 20 MG EC tablet  Take 20 mg by mouth Daily.                 Discharge Diet: Resume Home diet, advance as tolerated    Activity at Discharge: Resume home activity advace as tolerated, no lifting, no twisting, no bending, brace as directed, no driving until directed.     Follow-up Appointments  Followup with PCP within one week  Followup [facility] at 2weeks post-op         ANGUS Taylor  06/16/17  7:51 AM    Time: 30min

## 2017-06-17 NOTE — THERAPY DISCHARGE NOTE
Acute Care - Occupational Therapy Discharge Summary  [facility]     Patient Name: Chinedu Monaco  : 1950  MRN: [id number]    Today's Date: 2017  Onset of Illness/Injury or Date of Surgery Date: 17    Date of Referral to OT: 17  Referring Physician: ANGUS Aguilar      Admit Date: 2017        OT Recommendation and Plan    Visit Dx:    ICD-10-CM ICD-9-CM   1. Decreased activities of daily living (ADL) Z78.9 V49.89   2. Mobility impaired Z74.09 799.89                     OT Goals       17 0709 17 0957 17 1622    Transfer Training OT LTG    Transfer Training OT LTG, Date Established   17  -ND    Transfer Training OT LTG, Time to Achieve   by discharge  -ND    Transfer Training OT LTG, Activity Type   all transfers  -ND    Transfer Training OT LTG, Banner Level   independent  -ND    Transfer Training OT LTG, Date Goal Reviewed  17  -TS     Transfer Training OT LTG, Outcome  goal met  -TS     Patient Education OT LTG    Patient Education OT LTG, Date Established   17  -ND    Patient Education OT LTG, Time to Achieve   by discharge  -ND    Patient Education OT LTG, Education Type   precautions per surgeon;brace use/care;positioning;posture/body mechanics;home safety;energy conservation;work simplification  -ND    Patient Education OT LTG, Education Understanding   independent;demonstrates adequately;verbalizes understanding  -ND    Patient Education OT LTG, Date Goal Reviewed 17  -TS      Patient Education OT LTG Outcome goal not met  -TS      Patient Education OT LTG, Reason Goal Not Met discharged from facility  -TS      ADL OT LTG    ADL OT LTG, Date Established   17  -ND    ADL OT LTG, Time to Achieve   by discharge  -ND    ADL OT LTG, Activity Type   ADL skills  -ND    ADL OT LTG, Banner Level   standby assist  -ND    ADL OT LTG, Date Goal Reviewed  17  -TS     ADL OT LTG, Outcome  goal met  -TS       User Key  (r) =  Recorded By, (t) = Taken By, (c) = Cosigned By    Initials Name Provider Type    TS EMILY Levin/L Occupational Therapy Assistant    ATTILA Varela/L Occupational Therapist                Outcome Measures       06/16/17 0900 06/16/17 0850 06/15/17 0930    How much help from another person do you currently need...    Turning from your back to your side while in flat bed without using bedrails?  4  -TR 4  -TR    Moving from lying on back to sitting on the side of a flat bed without bedrails?  4  -TR 3  -TR    Moving to and from a bed to a chair (including a wheelchair)?  4  -TR 4  -TR    Standing up from a chair using your arms (e.g., wheelchair, bedside chair)?  4  -TR 4  -TR    Climbing 3-5 steps with a railing?  4  -TR 3  -TR    To walk in hospital room?  4  -TR 3  -TR    AM-PAC 6 Clicks Score  24  -TR 21  -TR    How much help from another is currently needed...    Putting on and taking off regular lower body clothing? 4  -TS      Bathing (including washing, rinsing, and drying) 3  -TS      Toileting (which includes using toilet bed pan or urinal) 4  -TS      Putting on and taking off regular upper body clothing 4  -TS      Taking care of personal grooming (such as brushing teeth) 4  -TS      Eating meals 4  -TS      Score 23  -TS      Functional Assessment    Outcome Measure Options AM-PAC 6 Clicks Daily Activity (OT)  -TS AM-PAC 6 Clicks Basic Mobility (PT)  -TR AM-PAC 6 Clicks Basic Mobility (PT)  -TR      06/15/17 0900 06/14/17 1600 06/14/17 1541    How much help from another person do you currently need...    Turning from your back to your side while in flat bed without using bedrails?   4  -MS (r) RZ (t) MS (c)    Moving from lying on back to sitting on the side of a flat bed without bedrails?   3  -MS (r) RZ (t) MS (c)    Moving to and from a bed to a chair (including a wheelchair)?   4  -MS (r) RZ (t) MS (c)    Standing up from a chair using your arms (e.g., wheelchair, bedside  chair)?   3  -MS (r) RZ (t) MS (c)    Climbing 3-5 steps with a railing?   3  -MS (r) RZ (t) MS (c)    To walk in hospital room?   3  -MS (r) RZ (t) MS (c)    AM-PAC 6 Clicks Score   20  -MS (r) RZ (t)    How much help from another is currently needed...    Putting on and taking off regular lower body clothing? 3  -TS 3  -ND     Bathing (including washing, rinsing, and drying) 3  -TS 3  -ND     Toileting (which includes using toilet bed pan or urinal) 3  -TS 3  -ND     Putting on and taking off regular upper body clothing 4  -TS 3  -ND     Taking care of personal grooming (such as brushing teeth) 4  -TS 3  -ND     Eating meals 4  -TS 4  -ND     Score 21  -TS 19  -ND     Functional Assessment    Outcome Measure Options AM-PAC 6 Clicks Daily Activity (OT)  -TS AM-PAC 6 Clicks Daily Activity (OT)  -ND AM-PAC 6 Clicks Basic Mobility (PT)  -MS (r) RZ (t) MS (c)      User Key  (r) = Recorded By, (t) = Taken By, (c) = Cosigned By    Initials Name Provider Type    TS EMILY Levin/L Occupational Therapy Assistant    MS Sanjana Espinoza, PT DPT Physical Therapist    SARAH Quinteros, PTA Physical Therapy Assistant    RADHA Montesinos, OTR/L Occupational Therapist    BRIT Smith, PT Student PT Student          Therapy Charges for Today     Code Description Service Date Service Provider Modifiers Qty    [id number] HC OT SELF CARE/MGMT/TRAIN EA 15 MIN 6/16/2017 EMILY Levin/L GO, KX 2          OT Discharge Summary  Reason for Discharge: Discharge from facility  Outcomes Achieved: Refer to plan of care for updates on goals achieved  Discharge Destination: Home with assist      LUIS EDUARDO Arrington  6/17/2017

## 2017-06-17 NOTE — PLAN OF CARE
Problem: Inpatient Occupational Therapy  Goal: Patient Education Goal LTG- OT  Outcome: Unable to achieve outcome(s) by discharge Date Met:  06/17/17 06/14/17 1622 06/17/17 0709   Patient Education OT LTG   Patient Education OT LTG, Date Established 06/14/17 --    Patient Education OT LTG, Time to Achieve by discharge --    Patient Education OT LTG, Education Type precautions per surgeon;brace use/care;positioning;posture/body mechanics;home safety;energy conservation;work simplification --    Patient Education OT LTG, Education Understanding independent;demonstrates adequately;verbalizes understanding --    Patient Education OT LTG, Date Goal Reviewed --  06/17/17   Patient Education OT LTG Outcome --  goal not met   Patient Education OT LTG, Reason Goal Not Met --  discharged from facility

## 2017-06-20 ENCOUNTER — TELEPHONE (OUTPATIENT)
Dept: PRIMARY CARE CLINIC | Age: 67
End: 2017-06-20

## 2017-06-20 RX ORDER — AMOXICILLIN AND CLAVULANATE POTASSIUM 875; 125 MG/1; MG/1
1 TABLET, FILM COATED ORAL 2 TIMES DAILY
Qty: 20 TABLET | Refills: 0 | Status: SHIPPED | OUTPATIENT
Start: 2017-06-20 | End: 2017-06-30

## 2017-06-26 ENCOUNTER — OFFICE VISIT (OUTPATIENT)
Dept: PRIMARY CARE CLINIC | Age: 67
End: 2017-06-26
Payer: MEDICARE

## 2017-06-26 VITALS
HEART RATE: 72 BPM | BODY MASS INDEX: 39.4 KG/M2 | DIASTOLIC BLOOD PRESSURE: 88 MMHG | OXYGEN SATURATION: 96 % | TEMPERATURE: 98.6 F | SYSTOLIC BLOOD PRESSURE: 140 MMHG | HEIGHT: 68 IN | WEIGHT: 260 LBS

## 2017-06-26 DIAGNOSIS — M54.12 CERVICAL RADICULOPATHY: ICD-10-CM

## 2017-06-26 DIAGNOSIS — M48.02 SPINAL STENOSIS IN CERVICAL REGION: ICD-10-CM

## 2017-06-26 DIAGNOSIS — I10 ESSENTIAL HYPERTENSION: Primary | ICD-10-CM

## 2017-06-26 PROCEDURE — 3017F COLORECTAL CA SCREEN DOC REV: CPT | Performed by: FAMILY MEDICINE

## 2017-06-26 PROCEDURE — G8417 CALC BMI ABV UP PARAM F/U: HCPCS | Performed by: FAMILY MEDICINE

## 2017-06-26 PROCEDURE — 1036F TOBACCO NON-USER: CPT | Performed by: FAMILY MEDICINE

## 2017-06-26 PROCEDURE — 4040F PNEUMOC VAC/ADMIN/RCVD: CPT | Performed by: FAMILY MEDICINE

## 2017-06-26 PROCEDURE — 1123F ACP DISCUSS/DSCN MKR DOCD: CPT | Performed by: FAMILY MEDICINE

## 2017-06-26 PROCEDURE — 99214 OFFICE O/P EST MOD 30 MIN: CPT | Performed by: FAMILY MEDICINE

## 2017-06-26 PROCEDURE — G8427 DOCREV CUR MEDS BY ELIG CLIN: HCPCS | Performed by: FAMILY MEDICINE

## 2017-06-26 RX ORDER — CLONIDINE HYDROCHLORIDE 0.1 MG/1
0.1 TABLET ORAL 2 TIMES DAILY PRN
Qty: 60 TABLET | Refills: 0 | Status: SHIPPED | OUTPATIENT
Start: 2017-06-26 | End: 2017-10-11 | Stop reason: SDUPTHER

## 2017-06-26 ASSESSMENT — ENCOUNTER SYMPTOMS
SHORTNESS OF BREATH: 0
COUGH: 0

## 2017-06-27 ENCOUNTER — HOSPITAL ENCOUNTER (EMERGENCY)
Facility: HOSPITAL | Age: 67
Discharge: HOME | End: 2017-06-28
Attending: FAMILY MEDICINE | Admitting: FAMILY MEDICINE

## 2017-06-27 DIAGNOSIS — M54.2 NECK PAIN: Primary | ICD-10-CM

## 2017-06-27 PROCEDURE — 99283 EMERGENCY DEPT VISIT LOW MDM: CPT

## 2017-06-27 PROCEDURE — 96372 THER/PROPH/DIAG INJ SC/IM: CPT

## 2017-06-27 RX ORDER — OXYCODONE AND ACETAMINOPHEN 10; 325 MG/1; MG/1
1 TABLET ORAL EVERY 4 HOURS PRN
COMMUNITY
End: 2019-05-21

## 2017-06-28 VITALS
SYSTOLIC BLOOD PRESSURE: 153 MMHG | BODY MASS INDEX: 38.65 KG/M2 | TEMPERATURE: 98.8 F | HEIGHT: 68 IN | DIASTOLIC BLOOD PRESSURE: 80 MMHG | OXYGEN SATURATION: 95 % | RESPIRATION RATE: 17 BRPM | HEART RATE: 85 BPM | WEIGHT: 255 LBS

## 2017-06-28 PROCEDURE — 25010000002 HYDROMORPHONE PER 4 MG: Performed by: FAMILY MEDICINE

## 2017-06-28 RX ORDER — HYDROMORPHONE HCL 110MG/55ML
2 PATIENT CONTROLLED ANALGESIA SYRINGE INTRAVENOUS ONCE
Status: COMPLETED | OUTPATIENT
Start: 2017-06-28 | End: 2017-06-28

## 2017-06-28 RX ORDER — OXYCODONE AND ACETAMINOPHEN 7.5; 325 MG/1; MG/1
1 TABLET ORAL ONCE
Status: COMPLETED | OUTPATIENT
Start: 2017-06-28 | End: 2017-06-28

## 2017-06-28 RX ADMIN — OXYCODONE HYDROCHLORIDE AND ACETAMINOPHEN 1 TABLET: 7.5; 325 TABLET ORAL at 01:26

## 2017-06-28 RX ADMIN — HYDROMORPHONE HYDROCHLORIDE 2 MG: 2 INJECTION, SOLUTION INTRAMUSCULAR; INTRAVENOUS; SUBCUTANEOUS at 01:24

## 2017-06-30 ENCOUNTER — TELEPHONE (OUTPATIENT)
Dept: CARDIOLOGY | Age: 67
End: 2017-06-30

## 2017-07-07 ENCOUNTER — TELEPHONE (OUTPATIENT)
Dept: CARDIOLOGY | Age: 67
End: 2017-07-07

## 2017-07-12 RX ORDER — CELECOXIB 200 MG/1
CAPSULE ORAL
Qty: 90 CAPSULE | Refills: 3 | Status: SHIPPED | OUTPATIENT
Start: 2017-07-12 | End: 2017-08-08

## 2017-07-12 RX ORDER — PANTOPRAZOLE SODIUM 40 MG/1
TABLET, DELAYED RELEASE ORAL
Qty: 90 TABLET | Refills: 3 | Status: SHIPPED | OUTPATIENT
Start: 2017-07-12 | End: 2017-08-08

## 2017-08-08 ENCOUNTER — OFFICE VISIT (OUTPATIENT)
Dept: PRIMARY CARE CLINIC | Age: 67
End: 2017-08-08
Payer: MEDICARE

## 2017-08-08 VITALS
DIASTOLIC BLOOD PRESSURE: 82 MMHG | HEIGHT: 68 IN | BODY MASS INDEX: 38.34 KG/M2 | WEIGHT: 253 LBS | OXYGEN SATURATION: 94 % | HEART RATE: 72 BPM | SYSTOLIC BLOOD PRESSURE: 130 MMHG | TEMPERATURE: 97.7 F

## 2017-08-08 DIAGNOSIS — J01.00 ACUTE NON-RECURRENT MAXILLARY SINUSITIS: ICD-10-CM

## 2017-08-08 DIAGNOSIS — Z23 NEED FOR PNEUMOCOCCAL VACCINATION: ICD-10-CM

## 2017-08-08 DIAGNOSIS — I10 ESSENTIAL HYPERTENSION: Primary | ICD-10-CM

## 2017-08-08 DIAGNOSIS — M48.02 SPINAL STENOSIS IN CERVICAL REGION: ICD-10-CM

## 2017-08-08 DIAGNOSIS — M54.12 CERVICAL RADICULOPATHY: ICD-10-CM

## 2017-08-08 PROCEDURE — 3017F COLORECTAL CA SCREEN DOC REV: CPT | Performed by: FAMILY MEDICINE

## 2017-08-08 PROCEDURE — 90670 PCV13 VACCINE IM: CPT | Performed by: FAMILY MEDICINE

## 2017-08-08 PROCEDURE — G0009 ADMIN PNEUMOCOCCAL VACCINE: HCPCS | Performed by: FAMILY MEDICINE

## 2017-08-08 PROCEDURE — 1036F TOBACCO NON-USER: CPT | Performed by: FAMILY MEDICINE

## 2017-08-08 PROCEDURE — G8417 CALC BMI ABV UP PARAM F/U: HCPCS | Performed by: FAMILY MEDICINE

## 2017-08-08 PROCEDURE — 4040F PNEUMOC VAC/ADMIN/RCVD: CPT | Performed by: FAMILY MEDICINE

## 2017-08-08 PROCEDURE — 1123F ACP DISCUSS/DSCN MKR DOCD: CPT | Performed by: FAMILY MEDICINE

## 2017-08-08 PROCEDURE — G8427 DOCREV CUR MEDS BY ELIG CLIN: HCPCS | Performed by: FAMILY MEDICINE

## 2017-08-08 PROCEDURE — 99214 OFFICE O/P EST MOD 30 MIN: CPT | Performed by: FAMILY MEDICINE

## 2017-08-08 RX ORDER — AMOXICILLIN AND CLAVULANATE POTASSIUM 875; 125 MG/1; MG/1
1 TABLET, FILM COATED ORAL 2 TIMES DAILY
Qty: 20 TABLET | Refills: 0 | Status: SHIPPED | OUTPATIENT
Start: 2017-08-08 | End: 2018-12-31 | Stop reason: SDUPTHER

## 2017-08-08 ASSESSMENT — ENCOUNTER SYMPTOMS
SHORTNESS OF BREATH: 0
COUGH: 0
COLOR CHANGE: 0

## 2017-09-11 ENCOUNTER — OFFICE VISIT (OUTPATIENT)
Dept: CARDIOLOGY | Age: 67
End: 2017-09-11
Payer: MEDICARE

## 2017-09-11 VITALS
SYSTOLIC BLOOD PRESSURE: 130 MMHG | DIASTOLIC BLOOD PRESSURE: 86 MMHG | WEIGHT: 262 LBS | HEART RATE: 67 BPM | BODY MASS INDEX: 39.71 KG/M2 | HEIGHT: 68 IN

## 2017-09-11 DIAGNOSIS — I49.5 SICK SINUS SYNDROME DUE TO SA NODE DYSFUNCTION (HCC): ICD-10-CM

## 2017-09-11 DIAGNOSIS — I47.1 SVT (SUPRAVENTRICULAR TACHYCARDIA) (HCC): ICD-10-CM

## 2017-09-11 DIAGNOSIS — Z95.0 PACEMAKER: ICD-10-CM

## 2017-09-11 PROBLEM — I47.10 SVT (SUPRAVENTRICULAR TACHYCARDIA): Status: ACTIVE | Noted: 2017-09-10

## 2017-09-11 PROCEDURE — 99214 OFFICE O/P EST MOD 30 MIN: CPT | Performed by: INTERNAL MEDICINE

## 2017-09-11 PROCEDURE — G8427 DOCREV CUR MEDS BY ELIG CLIN: HCPCS | Performed by: INTERNAL MEDICINE

## 2017-09-11 PROCEDURE — G8417 CALC BMI ABV UP PARAM F/U: HCPCS | Performed by: INTERNAL MEDICINE

## 2017-09-11 PROCEDURE — 93280 PM DEVICE PROGR EVAL DUAL: CPT | Performed by: INTERNAL MEDICINE

## 2017-09-11 PROCEDURE — 3017F COLORECTAL CA SCREEN DOC REV: CPT | Performed by: INTERNAL MEDICINE

## 2017-09-11 PROCEDURE — 1036F TOBACCO NON-USER: CPT | Performed by: INTERNAL MEDICINE

## 2017-09-11 PROCEDURE — 1123F ACP DISCUSS/DSCN MKR DOCD: CPT | Performed by: INTERNAL MEDICINE

## 2017-09-11 PROCEDURE — 4040F PNEUMOC VAC/ADMIN/RCVD: CPT | Performed by: INTERNAL MEDICINE

## 2017-09-19 ENCOUNTER — APPOINTMENT (OUTPATIENT)
Dept: CT IMAGING | Age: 67
End: 2017-09-19
Payer: MEDICARE

## 2017-09-19 ENCOUNTER — APPOINTMENT (OUTPATIENT)
Dept: GENERAL RADIOLOGY | Age: 67
End: 2017-09-19
Payer: MEDICARE

## 2017-09-19 ENCOUNTER — HOSPITAL ENCOUNTER (EMERGENCY)
Age: 67
Discharge: HOME | End: 2017-09-19
Attending: EMERGENCY MEDICINE
Payer: MEDICARE

## 2017-09-19 VITALS
BODY MASS INDEX: 39.71 KG/M2 | DIASTOLIC BLOOD PRESSURE: 93 MMHG | RESPIRATION RATE: 18 BRPM | OXYGEN SATURATION: 95 % | WEIGHT: 262 LBS | HEIGHT: 68 IN | TEMPERATURE: 99 F | SYSTOLIC BLOOD PRESSURE: 171 MMHG | HEART RATE: 69 BPM

## 2017-09-19 DIAGNOSIS — R07.9 ACUTE CHEST PAIN: Primary | ICD-10-CM

## 2017-09-19 LAB
ALBUMIN SERPL-MCNC: 4.2 G/DL (ref 3.5–5.2)
ALP BLD-CCNC: 98 U/L (ref 40–130)
ALT SERPL-CCNC: 14 U/L (ref 5–41)
ANION GAP SERPL CALCULATED.3IONS-SCNC: 11 MMOL/L (ref 7–19)
APTT: 34 SEC (ref 26–36.2)
AST SERPL-CCNC: 26 U/L (ref 5–40)
BASOPHILS ABSOLUTE: 0 K/UL (ref 0–0.2)
BASOPHILS RELATIVE PERCENT: 0.6 % (ref 0–1)
BILIRUB SERPL-MCNC: 0.4 MG/DL (ref 0.2–1.2)
BUN BLDV-MCNC: 16 MG/DL (ref 8–23)
CALCIUM SERPL-MCNC: 9.2 MG/DL (ref 8.8–10.2)
CHLORIDE BLD-SCNC: 100 MMOL/L (ref 98–111)
CO2: 30 MMOL/L (ref 22–29)
CREAT SERPL-MCNC: 0.8 MG/DL (ref 0.5–1.2)
EOSINOPHILS ABSOLUTE: 0.4 K/UL (ref 0–0.6)
EOSINOPHILS RELATIVE PERCENT: 6.1 % (ref 0–5)
GFR NON-AFRICAN AMERICAN: >60
GLUCOSE BLD-MCNC: 107 MG/DL (ref 74–109)
HCT VFR BLD CALC: 44.6 % (ref 42–52)
HEMOGLOBIN: 14.8 G/DL (ref 14–18)
INR BLD: 0.97 (ref 0.88–1.18)
LYMPHOCYTES ABSOLUTE: 1.4 K/UL (ref 1.1–4.5)
LYMPHOCYTES RELATIVE PERCENT: 22.3 % (ref 20–40)
MCH RBC QN AUTO: 30.3 PG (ref 27–31)
MCHC RBC AUTO-ENTMCNC: 33.2 G/DL (ref 33–37)
MCV RBC AUTO: 91.2 FL (ref 80–94)
MONOCYTES ABSOLUTE: 0.5 K/UL (ref 0–0.9)
MONOCYTES RELATIVE PERCENT: 8.5 % (ref 0–10)
NEUTROPHILS ABSOLUTE: 4 K/UL (ref 1.5–7.5)
NEUTROPHILS RELATIVE PERCENT: 62.2 % (ref 50–65)
PDW BLD-RTO: 14.1 % (ref 11.5–14.5)
PERFORMED ON: NORMAL
PERFORMED ON: NORMAL
PLATELET # BLD: 239 K/UL (ref 130–400)
PMV BLD AUTO: 9.1 FL (ref 9.4–12.4)
POC TROPONIN I: 0 NG/ML (ref 0–0.08)
POC TROPONIN I: 0 NG/ML (ref 0–0.08)
POTASSIUM SERPL-SCNC: 4.2 MMOL/L (ref 3.5–5)
PROTHROMBIN TIME: 12.8 SEC (ref 12–14.6)
RBC # BLD: 4.89 M/UL (ref 4.7–6.1)
SODIUM BLD-SCNC: 141 MMOL/L (ref 136–145)
TOTAL PROTEIN: 7.4 G/DL (ref 6.6–8.7)
WBC # BLD: 6.4 K/UL (ref 4.8–10.8)

## 2017-09-19 PROCEDURE — 93005 ELECTROCARDIOGRAM TRACING: CPT

## 2017-09-19 PROCEDURE — 99285 EMERGENCY DEPT VISIT HI MDM: CPT

## 2017-09-19 PROCEDURE — 80053 COMPREHEN METABOLIC PANEL: CPT

## 2017-09-19 PROCEDURE — 84484 ASSAY OF TROPONIN QUANT: CPT

## 2017-09-19 PROCEDURE — 6370000000 HC RX 637 (ALT 250 FOR IP): Performed by: EMERGENCY MEDICINE

## 2017-09-19 PROCEDURE — 99284 EMERGENCY DEPT VISIT MOD MDM: CPT | Performed by: EMERGENCY MEDICINE

## 2017-09-19 PROCEDURE — 2580000003 HC RX 258: Performed by: EMERGENCY MEDICINE

## 2017-09-19 PROCEDURE — 70450 CT HEAD/BRAIN W/O DYE: CPT

## 2017-09-19 PROCEDURE — 85730 THROMBOPLASTIN TIME PARTIAL: CPT

## 2017-09-19 PROCEDURE — 36415 COLL VENOUS BLD VENIPUNCTURE: CPT

## 2017-09-19 PROCEDURE — 85025 COMPLETE CBC W/AUTO DIFF WBC: CPT

## 2017-09-19 PROCEDURE — 71010 XR CHEST PORTABLE: CPT

## 2017-09-19 PROCEDURE — 85610 PROTHROMBIN TIME: CPT

## 2017-09-19 RX ORDER — NITROGLYCERIN 0.4 MG/1
0.4 TABLET SUBLINGUAL ONCE
Status: COMPLETED | OUTPATIENT
Start: 2017-09-19 | End: 2017-09-19

## 2017-09-19 RX ORDER — NITROGLYCERIN 0.3 MG/1
TABLET SUBLINGUAL
Qty: 30 TABLET | Refills: 3 | Status: SHIPPED | OUTPATIENT
Start: 2017-09-19 | End: 2019-01-01 | Stop reason: SDUPTHER

## 2017-09-19 RX ORDER — SODIUM CHLORIDE 9 MG/ML
INJECTION, SOLUTION INTRAVENOUS CONTINUOUS
Status: DISCONTINUED | OUTPATIENT
Start: 2017-09-19 | End: 2017-09-19 | Stop reason: HOSPADM

## 2017-09-19 RX ADMIN — NITROGLYCERIN 0.4 MG: 0.4 TABLET SUBLINGUAL at 09:20

## 2017-09-19 RX ADMIN — SODIUM CHLORIDE: 9 INJECTION, SOLUTION INTRAVENOUS at 09:20

## 2017-09-19 ASSESSMENT — PAIN SCALES - GENERAL: PAINLEVEL_OUTOF10: 3

## 2017-09-19 ASSESSMENT — PAIN DESCRIPTION - PAIN TYPE: TYPE: ACUTE PAIN

## 2017-09-19 ASSESSMENT — ENCOUNTER SYMPTOMS
NAUSEA: 0
RHINORRHEA: 0
BACK PAIN: 0
SORE THROAT: 0
SHORTNESS OF BREATH: 1
VOMITING: 0
COUGH: 0

## 2017-09-19 ASSESSMENT — PAIN DESCRIPTION - LOCATION: LOCATION: CHEST

## 2017-09-19 ASSESSMENT — PAIN DESCRIPTION - DESCRIPTORS: DESCRIPTORS: TIGHTNESS

## 2017-09-20 LAB
EKG P AXIS: NORMAL DEGREES
EKG P-R INTERVAL: NORMAL MS
EKG Q-T INTERVAL: 360 MS
EKG QRS DURATION: 50 MS
EKG QTC CALCULATION (BAZETT): 380 MS
EKG T AXIS: NORMAL DEGREES

## 2017-09-21 ENCOUNTER — CARE COORDINATION (OUTPATIENT)
Dept: PRIMARY CARE CLINIC | Age: 67
End: 2017-09-21

## 2017-10-11 ENCOUNTER — OFFICE VISIT (OUTPATIENT)
Dept: PRIMARY CARE CLINIC | Age: 67
End: 2017-10-11
Payer: MEDICARE

## 2017-10-11 VITALS
HEART RATE: 90 BPM | DIASTOLIC BLOOD PRESSURE: 96 MMHG | WEIGHT: 256.4 LBS | SYSTOLIC BLOOD PRESSURE: 184 MMHG | OXYGEN SATURATION: 94 % | TEMPERATURE: 98.6 F | BODY MASS INDEX: 38.86 KG/M2 | HEIGHT: 68 IN

## 2017-10-11 DIAGNOSIS — F41.9 ANXIETY: ICD-10-CM

## 2017-10-11 DIAGNOSIS — E78.5 HYPERLIPIDEMIA, UNSPECIFIED HYPERLIPIDEMIA TYPE: ICD-10-CM

## 2017-10-11 DIAGNOSIS — I10 ESSENTIAL HYPERTENSION: Primary | ICD-10-CM

## 2017-10-11 DIAGNOSIS — Z23 NEEDS FLU SHOT: ICD-10-CM

## 2017-10-11 PROCEDURE — 90688 IIV4 VACCINE SPLT 0.5 ML IM: CPT | Performed by: FAMILY MEDICINE

## 2017-10-11 PROCEDURE — 1036F TOBACCO NON-USER: CPT | Performed by: FAMILY MEDICINE

## 2017-10-11 PROCEDURE — 3017F COLORECTAL CA SCREEN DOC REV: CPT | Performed by: FAMILY MEDICINE

## 2017-10-11 PROCEDURE — G8484 FLU IMMUNIZE NO ADMIN: HCPCS | Performed by: FAMILY MEDICINE

## 2017-10-11 PROCEDURE — 4040F PNEUMOC VAC/ADMIN/RCVD: CPT | Performed by: FAMILY MEDICINE

## 2017-10-11 PROCEDURE — G0008 ADMIN INFLUENZA VIRUS VAC: HCPCS | Performed by: FAMILY MEDICINE

## 2017-10-11 PROCEDURE — G8427 DOCREV CUR MEDS BY ELIG CLIN: HCPCS | Performed by: FAMILY MEDICINE

## 2017-10-11 PROCEDURE — 1123F ACP DISCUSS/DSCN MKR DOCD: CPT | Performed by: FAMILY MEDICINE

## 2017-10-11 PROCEDURE — G8417 CALC BMI ABV UP PARAM F/U: HCPCS | Performed by: FAMILY MEDICINE

## 2017-10-11 PROCEDURE — 99214 OFFICE O/P EST MOD 30 MIN: CPT | Performed by: FAMILY MEDICINE

## 2017-10-11 RX ORDER — CLONIDINE HYDROCHLORIDE 0.1 MG/1
0.1 TABLET ORAL 2 TIMES DAILY PRN
Qty: 60 TABLET | Refills: 0 | Status: SHIPPED | OUTPATIENT
Start: 2017-10-11 | End: 2017-11-14 | Stop reason: SDUPTHER

## 2017-10-11 RX ORDER — AMLODIPINE BESYLATE 5 MG/1
TABLET ORAL
Qty: 30 TABLET | Refills: 11 | Status: SHIPPED | OUTPATIENT
Start: 2017-10-11 | End: 2017-11-08 | Stop reason: DRUGHIGH

## 2017-10-11 RX ORDER — CLONIDINE HYDROCHLORIDE 0.1 MG/1
0.1 TABLET ORAL ONCE
Status: COMPLETED | OUTPATIENT
Start: 2017-10-11 | End: 2017-10-11

## 2017-10-11 RX ADMIN — CLONIDINE HYDROCHLORIDE 0.1 MG: 0.1 TABLET ORAL at 14:55

## 2017-10-11 ASSESSMENT — ENCOUNTER SYMPTOMS
COUGH: 0
SHORTNESS OF BREATH: 0

## 2017-10-11 NOTE — PROGRESS NOTES
Felix White is a 77 y.o. male    HPI  Felix White presents to the office for follow-up of hypertension and recent chest pain. Patient was recently seen at the emergency room about 2 weeks ago for acute onset of chest pain. Patient had a normal heart cath in April 2017. Patient had normal troponins at that time. Patient thinks this was partially related to stress and anxiety. Patient also had extremely high blood pressure at that time. Patient was started on clonidine 0.1 mg when necessary blood pressure 180/100 but he never picked up the medication at his last office visit. Patient was also started on amlodipine 5 mg. Patient states he currently does not have that in his medication box. Review of Systems   Constitutional: Negative for chills and fever. Respiratory: Negative for cough and shortness of breath. Cardiovascular: Negative for chest pain and leg swelling. Prior to Admission medications    Medication Sig Start Date End Date Taking? Authorizing Provider   amLODIPine (NORVASC) 5 MG tablet TAKE 1 TABLET BY MOUTH DAILY 10/11/17  Yes Abraham Smith MD   cloNIDine (CATAPRES) 0.1 MG tablet Take 1 tablet by mouth 2 times daily as needed for High Blood Pressure (180/100) 10/11/17  Yes Abraham Smith MD   nitroGLYCERIN (NITROSTAT) 0.3 MG SL tablet up to max of 3 total doses.  If no relief after 1 dose, call 911. 9/19/17  Yes Corina Sheets MD   escitalopram (LEXAPRO) 20 MG tablet Take 0.5 tablets by mouth daily 6/7/17  Yes Abraham Smith MD   carbidopa-levodopa (SINEMET)  MG per tablet Take 1 tablet by mouth nightly as needed (restless legs)   Yes Historical Provider, MD   mometasone-formoterol (DULERA) 200-5 MCG/ACT inhaler INHALE 2 PUFFS INTO THE LUNGS EVERY 12 HOURS 12/28/16  Yes Abraham Smith MD   oxyCODONE HCl (OXY-IR) 10 MG immediate release tablet TK 1 T PO Q 6 H PRN 11/21/16  Yes Historical Provider, MD   pantoprazole (PROTONIX) 40 MG tablet TAKE 1 TABLET BY

## 2017-10-19 ENCOUNTER — OFFICE VISIT (OUTPATIENT)
Dept: PRIMARY CARE CLINIC | Age: 67
End: 2017-10-19
Payer: MEDICARE

## 2017-10-19 VITALS
SYSTOLIC BLOOD PRESSURE: 132 MMHG | BODY MASS INDEX: 38.43 KG/M2 | WEIGHT: 253.6 LBS | HEART RATE: 85 BPM | HEIGHT: 68 IN | OXYGEN SATURATION: 96 % | DIASTOLIC BLOOD PRESSURE: 82 MMHG | TEMPERATURE: 98.6 F

## 2017-10-19 DIAGNOSIS — F41.9 ANXIETY: ICD-10-CM

## 2017-10-19 DIAGNOSIS — G25.81 RLS (RESTLESS LEGS SYNDROME): ICD-10-CM

## 2017-10-19 DIAGNOSIS — I10 ESSENTIAL HYPERTENSION: Primary | ICD-10-CM

## 2017-10-19 PROCEDURE — G8417 CALC BMI ABV UP PARAM F/U: HCPCS | Performed by: FAMILY MEDICINE

## 2017-10-19 PROCEDURE — 3017F COLORECTAL CA SCREEN DOC REV: CPT | Performed by: FAMILY MEDICINE

## 2017-10-19 PROCEDURE — G8484 FLU IMMUNIZE NO ADMIN: HCPCS | Performed by: FAMILY MEDICINE

## 2017-10-19 PROCEDURE — 4040F PNEUMOC VAC/ADMIN/RCVD: CPT | Performed by: FAMILY MEDICINE

## 2017-10-19 PROCEDURE — G8427 DOCREV CUR MEDS BY ELIG CLIN: HCPCS | Performed by: FAMILY MEDICINE

## 2017-10-19 PROCEDURE — 1123F ACP DISCUSS/DSCN MKR DOCD: CPT | Performed by: FAMILY MEDICINE

## 2017-10-19 PROCEDURE — 1036F TOBACCO NON-USER: CPT | Performed by: FAMILY MEDICINE

## 2017-10-19 PROCEDURE — 99213 OFFICE O/P EST LOW 20 MIN: CPT | Performed by: FAMILY MEDICINE

## 2017-10-19 RX ORDER — ROPINIROLE 0.25 MG/1
0.25 TABLET, FILM COATED ORAL NIGHTLY
Qty: 30 TABLET | Refills: 3 | Status: SHIPPED | OUTPATIENT
Start: 2017-10-19 | End: 2017-12-14 | Stop reason: SDUPTHER

## 2017-10-19 ASSESSMENT — ENCOUNTER SYMPTOMS
SHORTNESS OF BREATH: 0
COUGH: 0

## 2017-10-19 NOTE — PROGRESS NOTES
Rick Hendrickson is a 77 y.o. male    HPI  Rick Hendrickson presents to the office for follow-up of hypertension. Patient blood pressure was extremely elevated at last office visit. Patient's blood pressure was around 180/100. Patient states that he feels like his blood pressure elevation was related to him withdrawing from opiate pain medication. Patient hasn't been taking opiates for the last 9 years. Patient's last dose was the Saturday before his last appointment. Patient states his anxiety was high because of also stopping his medication. Patient has been without any upper pain medication for the last 2 weeks. Patient states is starting to feel much better. Review of Systems   Constitutional: Negative for chills and fever. Respiratory: Negative for cough and shortness of breath. Cardiovascular: Negative for chest pain and leg swelling. Psychiatric/Behavioral: The patient is not nervous/anxious. Prior to Admission medications    Medication Sig Start Date End Date Taking? Authorizing Provider   rOPINIRole (REQUIP) 0.25 MG tablet Take 1 tablet by mouth nightly 10/19/17  Yes Anastasia Johnson MD   amLODIPine (NORVASC) 5 MG tablet TAKE 1 TABLET BY MOUTH DAILY 10/11/17  Yes Anastasia Johnson MD   cloNIDine (CATAPRES) 0.1 MG tablet Take 1 tablet by mouth 2 times daily as needed for High Blood Pressure (180/100) 10/11/17  Yes Anastasia Johnson MD   nitroGLYCERIN (NITROSTAT) 0.3 MG SL tablet up to max of 3 total doses.  If no relief after 1 dose, call 911. 9/19/17  Yes Tyshawn Butts MD   escitalopram (LEXAPRO) 20 MG tablet Take 0.5 tablets by mouth daily 6/7/17  Yes Anastasia Johnson MD   carbidopa-levodopa (SINEMET)  MG per tablet Take 1 tablet by mouth nightly as needed (restless legs)   Yes Historical Provider, MD   mometasone-formoterol (Hayley Early) 200-5 MCG/ACT inhaler INHALE 2 PUFFS INTO THE LUNGS EVERY 12 HOURS 12/28/16  Yes Anastasia Johnson MD   pantoprazole (PROTONIX) 40 MG

## 2017-11-06 RX ORDER — CARBIDOPA/LEVODOPA 25MG-250MG
TABLET ORAL
Qty: 90 TABLET | Refills: 0 | Status: SHIPPED | OUTPATIENT
Start: 2017-11-06 | End: 2017-11-08

## 2017-11-08 ENCOUNTER — OFFICE VISIT (OUTPATIENT)
Dept: PRIMARY CARE CLINIC | Age: 67
End: 2017-11-08
Payer: MEDICARE

## 2017-11-08 ENCOUNTER — CARE COORDINATOR VISIT (OUTPATIENT)
Dept: CARE COORDINATION | Age: 67
End: 2017-11-08

## 2017-11-08 VITALS
SYSTOLIC BLOOD PRESSURE: 170 MMHG | WEIGHT: 258.4 LBS | HEART RATE: 79 BPM | OXYGEN SATURATION: 95 % | TEMPERATURE: 98.4 F | HEIGHT: 68 IN | DIASTOLIC BLOOD PRESSURE: 90 MMHG | BODY MASS INDEX: 39.16 KG/M2

## 2017-11-08 DIAGNOSIS — J44.9 CHRONIC OBSTRUCTIVE PULMONARY DISEASE, UNSPECIFIED COPD TYPE (HCC): ICD-10-CM

## 2017-11-08 DIAGNOSIS — F32.A DEPRESSION, UNSPECIFIED DEPRESSION TYPE: ICD-10-CM

## 2017-11-08 DIAGNOSIS — I10 ESSENTIAL HYPERTENSION: ICD-10-CM

## 2017-11-08 PROCEDURE — G8427 DOCREV CUR MEDS BY ELIG CLIN: HCPCS | Performed by: FAMILY MEDICINE

## 2017-11-08 PROCEDURE — G8417 CALC BMI ABV UP PARAM F/U: HCPCS | Performed by: FAMILY MEDICINE

## 2017-11-08 PROCEDURE — G8484 FLU IMMUNIZE NO ADMIN: HCPCS | Performed by: FAMILY MEDICINE

## 2017-11-08 PROCEDURE — G8926 SPIRO NO PERF OR DOC: HCPCS | Performed by: FAMILY MEDICINE

## 2017-11-08 PROCEDURE — 99213 OFFICE O/P EST LOW 20 MIN: CPT | Performed by: FAMILY MEDICINE

## 2017-11-08 PROCEDURE — 4040F PNEUMOC VAC/ADMIN/RCVD: CPT | Performed by: FAMILY MEDICINE

## 2017-11-08 PROCEDURE — 1123F ACP DISCUSS/DSCN MKR DOCD: CPT | Performed by: FAMILY MEDICINE

## 2017-11-08 PROCEDURE — 3017F COLORECTAL CA SCREEN DOC REV: CPT | Performed by: FAMILY MEDICINE

## 2017-11-08 PROCEDURE — 1036F TOBACCO NON-USER: CPT | Performed by: FAMILY MEDICINE

## 2017-11-08 PROCEDURE — 3023F SPIROM DOC REV: CPT | Performed by: FAMILY MEDICINE

## 2017-11-08 RX ORDER — ALBUTEROL SULFATE 90 UG/1
2 AEROSOL, METERED RESPIRATORY (INHALATION) EVERY 6 HOURS PRN
Qty: 1 INHALER | Refills: 3 | Status: SHIPPED | OUTPATIENT
Start: 2017-11-08 | End: 2017-12-14 | Stop reason: SDUPTHER

## 2017-11-08 RX ORDER — ESCITALOPRAM OXALATE 20 MG/1
20 TABLET ORAL DAILY
Qty: 30 TABLET | Refills: 11 | Status: SHIPPED
Start: 2017-11-08 | End: 2017-12-14 | Stop reason: SDUPTHER

## 2017-11-08 RX ORDER — AMLODIPINE BESYLATE 10 MG/1
TABLET ORAL
Qty: 30 TABLET | Refills: 11 | Status: SHIPPED | OUTPATIENT
Start: 2017-11-08 | End: 2017-12-14 | Stop reason: SDUPTHER

## 2017-11-08 ASSESSMENT — ENCOUNTER SYMPTOMS
COUGH: 0
SHORTNESS OF BREATH: 0

## 2017-11-08 NOTE — CARE COORDINATION
Other Services:  Completed   Zone Management Tools: In Process                  Prior to Admission medications    Medication Sig Start Date End Date Taking? Authorizing Provider   albuterol sulfate  (90 Base) MCG/ACT inhaler Inhale 2 puffs into the lungs every 6 hours as needed for Wheezing 11/8/17   Mukund Piña MD   mometasone-formoterol [facility]) 200-5 MCG/ACT inhaler INHALE 2 PUFFS INTO THE LUNGS EVERY 12 HOURS 11/8/17   Mukund Piña MD   amLODIPine (NORVASC) 10 MG tablet TAKE 1 TABLET BY MOUTH DAILY 11/8/17   Mukund Piña MD   escitalopram (LEXAPRO) 20 MG tablet Take 1 tablet by mouth daily 11/8/17   Mukund Piña MD   rOPINIRole (REQUIP) 0.25 MG tablet Take 1 tablet by mouth nightly 10/19/17   Mukund Piña MD   cloNIDine (CATAPRES) 0.1 MG tablet Take 1 tablet by mouth 2 times daily as needed for High Blood Pressure (180/100) 10/11/17   Mukund Piña MD   nitroGLYCERIN (NITROSTAT) 0.3 MG SL tablet up to max of 3 total doses.  If no relief after 1 dose, call 911. 9/19/17   Samreen Vora MD   carbidopa-levodopa (SINEMET)  MG per tablet Take 1 tablet by mouth nightly as needed (restless legs)    Historical Provider, MD   pantoprazole (PROTONIX) 40 MG tablet TAKE 1 TABLET BY MOUTH EVERY MORNING BEFORE BREAKFAST 12/14/16   Mukund Piña MD   lisinopril (PRINIVIL;ZESTRIL) 40 MG tablet Take 1 tablet by mouth daily 12/14/16   Mukund Piña MD   celecoxib (CELEBREX) 200 MG capsule Take 1 capsule by mouth daily  Patient taking differently: Take 200 mg by mouth daily Takes in evening after supper 12/14/16   Mukund Piña MD       Future Appointments  Date Time Provider Susan Garcia   12/14/2017 10:45 AM Mukund Piña MD [facility]   3/13/2018 10:30 AM ANDERS Guajardo [facility]

## 2017-11-08 NOTE — PROGRESS NOTES
Other Topics Concern    None     Social History Narrative    None     BP (!) 162/108   Pulse 79   Temp 98.4 °F (36.9 °C)   Ht 5' 8\" (1.727 m)   Wt 258 lb 6.4 oz (117.2 kg)   SpO2 95%   BMI 39.29 kg/m²     Physical Exam   Constitutional: He is oriented to person, place, and time. He appears well-developed and well-nourished. No distress. HENT:   Head: Normocephalic and atraumatic. Eyes: Conjunctivae are normal. No scleral icterus. Neck: Normal range of motion. Neck supple. No tracheal deviation present. No thyromegaly present. Cardiovascular: Normal rate, regular rhythm and normal heart sounds. Pulmonary/Chest: Effort normal and breath sounds normal. No respiratory distress. He has no wheezes. Abdominal: Soft. He exhibits no mass. There is no guarding. Obese     Musculoskeletal: He exhibits no edema. Neurological: He is alert and oriented to person, place, and time. Skin: Skin is warm. No rash noted. Psychiatric: He has a normal mood and affect. His behavior is normal.   Nursing note and vitals reviewed. Assessment    ICD-10-CM ICD-9-CM    1. Chronic obstructive pulmonary disease, unspecified COPD type ([facility]) J44.9 496 albuterol sulfate  (90 Base) MCG/ACT inhaler      mometasone-formoterol (DULERA) 200-5 MCG/ACT inhaler   2. Essential hypertension I10 401.9 Increase amLODIPine (NORVASC) 10 MG tablet 1 po daily. 3. Depression, unspecified depression type F32.9 311 escitalopram (LEXAPRO) 20 MG tablet 1 po daily. Plan      Orders Placed This Encounter   Medications    amLODIPine (NORVASC) 10 MG tablet     Sig: TAKE 1 TABLET BY MOUTH DAILY     Dispense:  30 tablet     Refill:  11    escitalopram (LEXAPRO) 20 MG tablet     Sig: Take 1 tablet by mouth daily     Dispense:  30 tablet     Refill:  11       No orders of the defined types were placed in this encounter. Return in about 1 month (around 12/8/2017) for HTN.      Patient Instructions   Patient

## 2017-11-08 NOTE — PATIENT INSTRUCTIONS
Patient Self-Management Goal for Chronic Condition  Goal: I will check my blood pressure at home at least 2x/week, and bring these readings to my next office visit.   Barriers to success: none  Plan for overcoming my barriers: N/A     Confidence: 10/10  Date goal set: 11/8/17  Date goal attained:

## 2017-11-14 DIAGNOSIS — I10 ESSENTIAL HYPERTENSION: ICD-10-CM

## 2017-11-14 RX ORDER — CLONIDINE HYDROCHLORIDE 0.1 MG/1
TABLET ORAL
Qty: 60 TABLET | Refills: 0 | Status: SHIPPED | OUTPATIENT
Start: 2017-11-14 | End: 2017-12-14 | Stop reason: SDUPTHER

## 2017-11-16 ENCOUNTER — CARE COORDINATION (OUTPATIENT)
Dept: CARE COORDINATION | Age: 67
End: 2017-11-16

## 2017-11-17 NOTE — CARE COORDINATION
Ambulatory Care Coordination Note  11/16/2017  CM Risk Score: 9  Lobo Mortality Risk Score: 4.02    ACC: Darnell Cardona RN    Summary Note: ACC called pt, review of Blood pressure, pt taking all prescribed medications        Care Coordination Interventions    Program Enrollment:  Rising Risk  Referral from Primary Care Provider:  Yes  Suggested Interventions and Community Resources  Other Services:  Completed (Comment: 11/8/17-Pt is [employer] and has a nurse that visits him twice a week M/W-)  Zone Management Tools: In Process (Comment: 11/16/17-Review of BP readings, pt states he is feeling good, however blood pressures continue to remain a little high, he continues to take current medication )         Goals Addressed     None          Prior to Admission medications    Medication Sig Start Date End Date Taking? Authorizing Provider   cloNIDine (CATAPRES) 0.1 MG tablet TAKE 1 TABLET BY MOUTH TWICE DAILY AS NEEDED FOR BLOOD PRESSURE 180/100 11/14/17   Michael Shaffer MD   albuterol sulfate  (90 Base) MCG/ACT inhaler Inhale 2 puffs into the lungs every 6 hours as needed for Wheezing 11/8/17   Michael Shaffer MD   mometasone-formoterol [facility]) 200-5 MCG/ACT inhaler INHALE 2 PUFFS INTO THE LUNGS EVERY 12 HOURS 11/8/17   Michael Shaffer MD   amLODIPine (NORVASC) 10 MG tablet TAKE 1 TABLET BY MOUTH DAILY 11/8/17   Michael Shaffer MD   escitalopram (LEXAPRO) 20 MG tablet Take 1 tablet by mouth daily 11/8/17   Michael Shaffer MD   rOPINIRole (REQUIP) 0.25 MG tablet Take 1 tablet by mouth nightly 10/19/17   Michael Shaffer MD   nitroGLYCERIN (NITROSTAT) 0.3 MG SL tablet up to max of 3 total doses.  If no relief after 1 dose, call 911. 9/19/17   Angeles Guerrero MD   carbidopa-levodopa (SINEMET)  MG per tablet Take 1 tablet by mouth nightly as needed (restless legs)    Historical Provider, MD   pantoprazole (PROTONIX) 40 MG tablet TAKE 1 TABLET BY MOUTH EVERY MORNING BEFORE BREAKFAST 12/14/16   Ian Johnson MD   lisinopril (PRINIVIL;ZESTRIL) 40 MG tablet Take 1 tablet by mouth daily 12/14/16   Ian Johnson MD   celecoxib (CELEBREX) 200 MG capsule Take 1 capsule by mouth daily  Patient taking differently: Take 200 mg by mouth daily Takes in evening after supper 12/14/16   Ian Johnson MD       Future Appointments  Date Time Provider Susan Radha   12/14/2017 10:45 AM [facility]   3/13/2018 10:30 AM ANDERS Gonzalez [facility] [place]

## 2017-11-20 ENCOUNTER — CARE COORDINATION (OUTPATIENT)
Dept: CARE COORDINATION | Age: 67
End: 2017-11-20

## 2017-11-22 RX ORDER — HYDROCHLOROTHIAZIDE 25 MG/1
25 TABLET ORAL DAILY
Qty: 30 TABLET | Refills: 3 | Status: SHIPPED | OUTPATIENT
Start: 2017-11-22 | End: 2017-12-14 | Stop reason: SDUPTHER

## 2017-11-30 ENCOUNTER — CARE COORDINATION (OUTPATIENT)
Dept: CARE COORDINATION | Age: 67
End: 2017-11-30

## 2017-12-04 DIAGNOSIS — I49.5 SICK SINUS SYNDROME DUE TO SA NODE DYSFUNCTION (HCC): ICD-10-CM

## 2017-12-04 DIAGNOSIS — I47.1 SVT (SUPRAVENTRICULAR TACHYCARDIA) (HCC): ICD-10-CM

## 2017-12-04 DIAGNOSIS — I48.0 PAF (PAROXYSMAL ATRIAL FIBRILLATION) (HCC): ICD-10-CM

## 2017-12-04 DIAGNOSIS — Z95.0 PACEMAKER: Primary | ICD-10-CM

## 2017-12-04 PROCEDURE — 93296 REM INTERROG EVL PM/IDS: CPT | Performed by: NURSE PRACTITIONER

## 2017-12-04 PROCEDURE — 93294 REM INTERROG EVL PM/LDLS PM: CPT | Performed by: NURSE PRACTITIONER

## 2017-12-13 ENCOUNTER — CARE COORDINATION (OUTPATIENT)
Dept: CARE COORDINATION | Age: 67
End: 2017-12-13

## 2017-12-14 ENCOUNTER — CARE COORDINATOR VISIT (OUTPATIENT)
Dept: CARE COORDINATION | Age: 67
End: 2017-12-14

## 2017-12-14 ENCOUNTER — OFFICE VISIT (OUTPATIENT)
Dept: PRIMARY CARE CLINIC | Age: 67
End: 2017-12-14
Payer: MEDICARE

## 2017-12-14 VITALS
BODY MASS INDEX: 39.13 KG/M2 | HEART RATE: 91 BPM | SYSTOLIC BLOOD PRESSURE: 132 MMHG | HEIGHT: 68 IN | OXYGEN SATURATION: 98 % | WEIGHT: 258.2 LBS | DIASTOLIC BLOOD PRESSURE: 80 MMHG | TEMPERATURE: 98.9 F

## 2017-12-14 DIAGNOSIS — G25.81 RLS (RESTLESS LEGS SYNDROME): ICD-10-CM

## 2017-12-14 DIAGNOSIS — J44.9 CHRONIC OBSTRUCTIVE PULMONARY DISEASE, UNSPECIFIED COPD TYPE (HCC): ICD-10-CM

## 2017-12-14 DIAGNOSIS — F32.A DEPRESSION, UNSPECIFIED DEPRESSION TYPE: ICD-10-CM

## 2017-12-14 DIAGNOSIS — I10 ESSENTIAL HYPERTENSION: ICD-10-CM

## 2017-12-14 PROCEDURE — G8484 FLU IMMUNIZE NO ADMIN: HCPCS | Performed by: FAMILY MEDICINE

## 2017-12-14 PROCEDURE — 99213 OFFICE O/P EST LOW 20 MIN: CPT | Performed by: FAMILY MEDICINE

## 2017-12-14 PROCEDURE — G8417 CALC BMI ABV UP PARAM F/U: HCPCS | Performed by: FAMILY MEDICINE

## 2017-12-14 PROCEDURE — 4040F PNEUMOC VAC/ADMIN/RCVD: CPT | Performed by: FAMILY MEDICINE

## 2017-12-14 PROCEDURE — 3017F COLORECTAL CA SCREEN DOC REV: CPT | Performed by: FAMILY MEDICINE

## 2017-12-14 PROCEDURE — 3023F SPIROM DOC REV: CPT | Performed by: FAMILY MEDICINE

## 2017-12-14 PROCEDURE — G8427 DOCREV CUR MEDS BY ELIG CLIN: HCPCS | Performed by: FAMILY MEDICINE

## 2017-12-14 PROCEDURE — 1036F TOBACCO NON-USER: CPT | Performed by: FAMILY MEDICINE

## 2017-12-14 PROCEDURE — 1123F ACP DISCUSS/DSCN MKR DOCD: CPT | Performed by: FAMILY MEDICINE

## 2017-12-14 PROCEDURE — G8926 SPIRO NO PERF OR DOC: HCPCS | Performed by: FAMILY MEDICINE

## 2017-12-14 RX ORDER — ROPINIROLE 0.25 MG/1
0.25 TABLET, FILM COATED ORAL NIGHTLY
Qty: 30 TABLET | Refills: 11 | Status: SHIPPED | OUTPATIENT
Start: 2017-12-14 | End: 2018-10-16 | Stop reason: SDUPTHER

## 2017-12-14 RX ORDER — ALBUTEROL SULFATE 90 UG/1
2 AEROSOL, METERED RESPIRATORY (INHALATION) EVERY 6 HOURS PRN
Qty: 1 INHALER | Refills: 3 | Status: SHIPPED | OUTPATIENT
Start: 2017-12-14 | End: 2018-04-17 | Stop reason: SDUPTHER

## 2017-12-14 RX ORDER — CLONIDINE HYDROCHLORIDE 0.1 MG/1
TABLET ORAL
Qty: 60 TABLET | Refills: 3 | Status: SHIPPED | OUTPATIENT
Start: 2017-12-14 | End: 2018-04-09 | Stop reason: SDUPTHER

## 2017-12-14 RX ORDER — ESCITALOPRAM OXALATE 20 MG/1
20 TABLET ORAL DAILY
Qty: 30 TABLET | Refills: 11 | Status: SHIPPED | OUTPATIENT
Start: 2017-12-14 | End: 2018-04-17 | Stop reason: SDUPTHER

## 2017-12-14 RX ORDER — AMLODIPINE BESYLATE 10 MG/1
TABLET ORAL
Qty: 30 TABLET | Refills: 11 | Status: SHIPPED | OUTPATIENT
Start: 2017-12-14 | End: 2018-08-16

## 2017-12-14 RX ORDER — LOSARTAN POTASSIUM 100 MG/1
100 TABLET ORAL DAILY
Qty: 30 TABLET | Refills: 5 | Status: SHIPPED | OUTPATIENT
Start: 2017-12-14 | End: 2018-03-14 | Stop reason: SDUPTHER

## 2017-12-14 RX ORDER — HYDROCHLOROTHIAZIDE 25 MG/1
25 TABLET ORAL DAILY
Qty: 30 TABLET | Refills: 11 | Status: SHIPPED | OUTPATIENT
Start: 2017-12-14 | End: 2018-06-28

## 2017-12-14 ASSESSMENT — ENCOUNTER SYMPTOMS
COUGH: 0
COLOR CHANGE: 0
SHORTNESS OF BREATH: 0

## 2017-12-14 NOTE — PROGRESS NOTES
Destini Tian is a 79 y.o. male    Chief Complaint   Patient presents with    Follow-up     1 month    Hypertension       HPI  Hypertension:  Home blood pressure monitoring: Yes - 130/70. He is adherent to a low sodium diet. Patient denies chest pain and shortness of breath. Antihypertensive medication side effects: dry cough. Use of agents associated with hypertension: none. Sodium (mmol/L)   Date Value   09/19/2017 141    BUN (mg/dL)   Date Value   09/19/2017 16    Glucose (mg/dL)   Date Value   09/19/2017 107      Potassium (mmol/L)   Date Value   09/19/2017 4.2    CREATININE (mg/dL)   Date Value   09/19/2017 0.8           Review of Systems   Constitutional: Negative for chills and fever. Respiratory: Negative for cough and shortness of breath. Cardiovascular: Negative for chest pain and leg swelling. Skin: Negative for color change and rash. Prior to Admission medications    Medication Sig Start Date End Date Taking?  Authorizing Provider   cloNIDine (CATAPRES) 0.1 MG tablet TAKE 1 TABLET BY MOUTH TWICE DAILY AS NEEDED FOR BLOOD PRESSURE 180/100 12/14/17  Yes Charley Bolton MD   losartan (COZAAR) 100 MG tablet Take 1 tablet by mouth daily 12/14/17  Yes Charley Bolton MD   amLODIPine (NORVASC) 10 MG tablet TAKE 1 TABLET BY MOUTH DAILY 12/14/17  Yes Charley Bolton MD   escitalopram (LEXAPRO) 20 MG tablet Take 1 tablet by mouth daily 12/14/17  Yes Charley Bolton MD   rOPINIRole (REQUIP) 0.25 MG tablet Take 1 tablet by mouth nightly 12/14/17  Yes Charley Bolton MD   albuterol sulfate  (90 Base) MCG/ACT inhaler Inhale 2 puffs into the lungs every 6 hours as needed for Wheezing 12/14/17  Yes Charley Bolton MD   mometasone-formoterol [facility]) 200-5 MCG/ACT inhaler INHALE 2 PUFFS INTO THE LUNGS EVERY 12 HOURS 12/14/17  Yes Charley Bolton MD   hydrochlorothiazide (HYDRODIURIL) 25 MG tablet Take 1 tablet by mouth daily 12/14/17  Yes Maggie Zee MD   nitroGLYCERIN (NITROSTAT) 0.3 MG SL tablet up to max of 3 total doses. If no relief after 1 dose, call 911. 9/19/17  Yes Svitlana De La Paz MD   carbidopa-levodopa (SINEMET)  MG per tablet Take 1 tablet by mouth nightly as needed (restless legs)   Yes Historical Provider, MD   pantoprazole (PROTONIX) 40 MG tablet TAKE 1 TABLET BY MOUTH EVERY MORNING BEFORE BREAKFAST 12/14/16  Yes Maggie Zee MD   celecoxib (CELEBREX) 200 MG capsule Take 1 capsule by mouth daily  Patient taking differently: Take 200 mg by mouth daily Takes in evening after supper 12/14/16  Yes Maggie Zee MD       Past Medical History:   Diagnosis Date    Anemia     Back pain     Cellulitis     Elevated triglycerides with high cholesterol     GERD (gastroesophageal reflux disease)     Osteoarthritis     Pacemaker 04/21/2017       Past Surgical History:   Procedure Laterality Date    COLONOSCOPY  11/18/11        JOINT REPLACEMENT      Bilateral total knees    NECK SURGERY      PACEMAKER PLACEMENT      TOTAL KNEE ARTHROPLASTY      RIGHT AND LEFT KNEE    UPPER GASTROINTESTINAL ENDOSCOPY  2/2012           Social History     Social History    Marital status:      Spouse name: N/A    Number of children: N/A    Years of education: N/A     Social History Main Topics    Smoking status: Never Smoker    Smokeless tobacco: Never Used    Alcohol use Yes      Comment: Daily    Drug use: No    Sexual activity: Not Asked     Other Topics Concern    None     Social History Narrative    None     /80   Pulse 91   Temp 98.9 °F (37.2 °C)   Ht 5' 8\" (1.727 m)   Wt 258 lb 3.2 oz (117.1 kg)   SpO2 98%   BMI 39.26 kg/m²     Physical Exam   Constitutional: He is oriented to person, place, and time. He appears well-developed and well-nourished. No distress. HENT:   Head: Normocephalic and atraumatic. Neck: Normal range of motion.  Neck Inhale 2 puffs into the lungs every 6 hours as needed for Wheezing     Dispense:  1 Inhaler     Refill:  3    mometasone-formoterol (DULERA) 200-5 MCG/ACT inhaler     Sig: INHALE 2 PUFFS INTO THE LUNGS EVERY 12 HOURS     Dispense:  13 g     Refill:  11    hydrochlorothiazide (HYDRODIURIL) 25 MG tablet     Sig: Take 1 tablet by mouth daily     Dispense:  30 tablet     Refill:  11       No orders of the defined types were placed in this encounter. Return in about 3 months (around 3/14/2018) for HTN. Patient Instructions   Patient Self-Management Goal for Chronic Condition  Goal: I will check my blood pressure at home at least 2x/week, and bring these readings to my next office visit.   Barriers to success: none  Plan for overcoming my barriers: N/A     Confidence: 10/10  Date goal set: 12/14/17  Date goal attained:

## 2017-12-19 NOTE — CARE COORDINATION
Ambulatory Care Coordination Note  12/14/2017  CM Risk Score: 13  Lobo Mortality Risk Score: 4.36    ACC: Cornelius Meier, RN    Summary Note: ACC met with pt during visit with PCP, pt is doing well, he will be out of town Dec 25-Mo 3        Care Coordination Interventions    Program Enrollment:  Rising Risk  Referral from Primary Care Provider:  Yes  Suggested Interventions and [employer]  Other Services:  Completed (Comment: 11/30/17-Pt is [employer] and has a nurse that visits him twice a week M/W-)  Zone Management Tools: In Process (Comment: 12/13/17- reviewed blood pressure and medications, pt states he is feeling good with the lower BP readings, pt is out of clonodine today, will be picking up later this afternoon or tomorrow)  Other Services or Interventions:  12/13/17-pt has follow up tomorrow with Dr Krysten Burton     None          Prior to Admission medications    Medication Sig Start Date End Date Taking?  Authorizing Provider   cloNIDine (CATAPRES) 0.1 MG tablet TAKE 1 TABLET BY MOUTH TWICE DAILY AS NEEDED FOR BLOOD PRESSURE 180/100 12/14/17   Mukund Piña MD   losartan (COZAAR) 100 MG tablet Take 1 tablet by mouth daily 12/14/17   Mukund Piña MD   amLODIPine (NORVASC) 10 MG tablet TAKE 1 TABLET BY MOUTH DAILY 12/14/17   Mukund Piña MD   escitalopram (LEXAPRO) 20 MG tablet Take 1 tablet by mouth daily 12/14/17   Mukund Piña MD   rOPINIRole (REQUIP) 0.25 MG tablet Take 1 tablet by mouth nightly 12/14/17   Mukund Piña MD   albuterol sulfate  (90 Base) MCG/ACT inhaler Inhale 2 puffs into the lungs every 6 hours as needed for Wheezing 12/14/17   Mukund Piña MD   mometasone-formoterol [facility]) 200-5 MCG/ACT inhaler INHALE 2 PUFFS INTO THE LUNGS EVERY 12 HOURS 12/14/17   Mukund Piña MD   hydrochlorothiazide (HYDRODIURIL) 25 MG tablet Take 1 tablet by mouth daily 12/14/17   Mukund Piña MD nitroGLYCERIN (NITROSTAT) 0.3 MG SL tablet up to max of 3 total doses.  If no relief after 1 dose, call 911. 9/19/17   Ras Carroll MD   carbidopa-levodopa (SINEMET)  MG per tablet Take 1 tablet by mouth nightly as needed (restless legs)    Historical Provider, MD   pantoprazole (PROTONIX) 40 MG tablet TAKE 1 TABLET BY MOUTH EVERY MORNING BEFORE BREAKFAST 12/14/16   Arin Atkins MD   celecoxib (CELEBREX) 200 MG capsule Take 1 capsule by mouth daily  Patient taking differently: Take 200 mg by mouth daily Takes in evening after supper 12/14/16   Arin Atkins MD       Future Appointments  Date Time Provider Susan Garcia   3/13/2018 10:30 AM ANDERS Bell [facility] [place]   3/14/2018 10:45 AM Arin Atkins MD [address]

## 2017-12-21 ENCOUNTER — CARE COORDINATION (OUTPATIENT)
Dept: CARE COORDINATION | Age: 67
End: 2017-12-21

## 2018-01-04 ENCOUNTER — TELEPHONE (OUTPATIENT)
Dept: CARDIOLOGY | Age: 68
End: 2018-01-04

## 2018-01-05 DIAGNOSIS — I49.5 SICK SINUS SYNDROME DUE TO SA NODE DYSFUNCTION (HCC): ICD-10-CM

## 2018-01-05 DIAGNOSIS — Z95.0 PACEMAKER: Primary | ICD-10-CM

## 2018-01-22 ENCOUNTER — CARE COORDINATION (OUTPATIENT)
Dept: CARE COORDINATION | Age: 68
End: 2018-01-22

## 2018-01-30 ENCOUNTER — CARE COORDINATION (OUTPATIENT)
Dept: CARE COORDINATION | Age: 68
End: 2018-01-30

## 2018-02-21 ENCOUNTER — TELEPHONE (OUTPATIENT)
Dept: PRIMARY CARE CLINIC | Age: 68
End: 2018-02-21

## 2018-02-21 NOTE — TELEPHONE ENCOUNTER
Patient called stating he has nasal and head congestion, would like medication, does not want to come in if possible. I explained to him he could do an e-visit. I sent him an e-mail and explained the directions. Patient will call back if he has any problems.

## 2018-03-12 ENCOUNTER — OFFICE VISIT (OUTPATIENT)
Dept: PRIMARY CARE CLINIC | Age: 68
End: 2018-03-12
Payer: COMMERCIAL

## 2018-03-12 ENCOUNTER — HOSPITAL ENCOUNTER (OUTPATIENT)
Dept: GENERAL RADIOLOGY | Age: 68
Discharge: HOME | End: 2018-03-12
Payer: COMMERCIAL

## 2018-03-12 VITALS
HEART RATE: 66 BPM | RESPIRATION RATE: 20 BRPM | HEIGHT: 68 IN | OXYGEN SATURATION: 94 % | TEMPERATURE: 98.1 F | WEIGHT: 261.8 LBS | BODY MASS INDEX: 39.68 KG/M2 | SYSTOLIC BLOOD PRESSURE: 140 MMHG | DIASTOLIC BLOOD PRESSURE: 90 MMHG

## 2018-03-12 DIAGNOSIS — J40 SINOBRONCHITIS: ICD-10-CM

## 2018-03-12 DIAGNOSIS — J98.01 BRONCHOSPASM: ICD-10-CM

## 2018-03-12 DIAGNOSIS — R06.2 WHEEZING: ICD-10-CM

## 2018-03-12 DIAGNOSIS — J32.9 SINOBRONCHITIS: ICD-10-CM

## 2018-03-12 DIAGNOSIS — J98.01 BRONCHOSPASM: Primary | ICD-10-CM

## 2018-03-12 DIAGNOSIS — I15.9 SECONDARY HYPERTENSION: ICD-10-CM

## 2018-03-12 LAB
INFLUENZA A ANTIBODY: NORMAL
INFLUENZA B ANTIBODY: NORMAL

## 2018-03-12 PROCEDURE — 3017F COLORECTAL CA SCREEN DOC REV: CPT | Performed by: NURSE PRACTITIONER

## 2018-03-12 PROCEDURE — 4040F PNEUMOC VAC/ADMIN/RCVD: CPT | Performed by: NURSE PRACTITIONER

## 2018-03-12 PROCEDURE — 1123F ACP DISCUSS/DSCN MKR DOCD: CPT | Performed by: NURSE PRACTITIONER

## 2018-03-12 PROCEDURE — 94640 AIRWAY INHALATION TREATMENT: CPT | Performed by: NURSE PRACTITIONER

## 2018-03-12 PROCEDURE — 87804 INFLUENZA ASSAY W/OPTIC: CPT | Performed by: NURSE PRACTITIONER

## 2018-03-12 PROCEDURE — 99214 OFFICE O/P EST MOD 30 MIN: CPT | Performed by: NURSE PRACTITIONER

## 2018-03-12 PROCEDURE — G8417 CALC BMI ABV UP PARAM F/U: HCPCS | Performed by: NURSE PRACTITIONER

## 2018-03-12 PROCEDURE — G8482 FLU IMMUNIZE ORDER/ADMIN: HCPCS | Performed by: NURSE PRACTITIONER

## 2018-03-12 PROCEDURE — G8427 DOCREV CUR MEDS BY ELIG CLIN: HCPCS | Performed by: NURSE PRACTITIONER

## 2018-03-12 PROCEDURE — 71046 X-RAY EXAM CHEST 2 VIEWS: CPT

## 2018-03-12 PROCEDURE — 1036F TOBACCO NON-USER: CPT | Performed by: NURSE PRACTITIONER

## 2018-03-12 PROCEDURE — 96372 THER/PROPH/DIAG INJ SC/IM: CPT | Performed by: NURSE PRACTITIONER

## 2018-03-12 RX ORDER — IPRATROPIUM BROMIDE AND ALBUTEROL SULFATE 2.5; .5 MG/3ML; MG/3ML
1 SOLUTION RESPIRATORY (INHALATION) ONCE
Status: COMPLETED | OUTPATIENT
Start: 2018-03-12 | End: 2018-03-12

## 2018-03-12 RX ORDER — IPRATROPIUM BROMIDE AND ALBUTEROL SULFATE 2.5; .5 MG/3ML; MG/3ML
1 SOLUTION RESPIRATORY (INHALATION) EVERY 4 HOURS
Qty: 360 ML | Refills: 1 | Status: SHIPPED | OUTPATIENT
Start: 2018-03-12 | End: 2018-04-09 | Stop reason: SDUPTHER

## 2018-03-12 RX ORDER — DEXTROMETHORPHAN HYDROBROMIDE AND PROMETHAZINE HYDROCHLORIDE 15; 6.25 MG/5ML; MG/5ML
5 SYRUP ORAL 4 TIMES DAILY PRN
Qty: 120 ML | Refills: 0 | Status: SHIPPED | OUTPATIENT
Start: 2018-03-12 | End: 2018-03-14

## 2018-03-12 RX ORDER — METHYLPREDNISOLONE ACETATE 40 MG/ML
40 INJECTION, SUSPENSION INTRA-ARTICULAR; INTRALESIONAL; INTRAMUSCULAR; SOFT TISSUE ONCE
Status: COMPLETED | OUTPATIENT
Start: 2018-03-12 | End: 2018-03-12

## 2018-03-12 RX ADMIN — IPRATROPIUM BROMIDE AND ALBUTEROL SULFATE 1 AMPULE: 2.5; .5 SOLUTION RESPIRATORY (INHALATION) at 16:35

## 2018-03-12 RX ADMIN — METHYLPREDNISOLONE ACETATE 40 MG: 40 INJECTION, SUSPENSION INTRA-ARTICULAR; INTRALESIONAL; INTRAMUSCULAR; SOFT TISSUE at 16:37

## 2018-03-12 NOTE — PROGRESS NOTES
status: Never Smoker    Smokeless tobacco: Never Used    Alcohol use 1.8 oz/week     3 Cans of beer per week      Comment: Daily      Current Outpatient Prescriptions   Medication Sig Dispense Refill    ipratropium-albuterol (DUONEB) 0.5-2.5 (3) MG/3ML SOLN nebulizer solution Inhale 3 mLs into the lungs every 4 hours 360 mL 1    promethazine-dextromethorphan (PROMETHAZINE-DM) 6.25-15 MG/5ML syrup Take 5 mLs by mouth 4 times daily as needed for Cough 120 mL 0    carbidopa-levodopa (SINEMET)  MG per tablet TAKE 1 TABLET BY MOUTH AT BEDTIME 90 tablet 2    cloNIDine (CATAPRES) 0.1 MG tablet TAKE 1 TABLET BY MOUTH TWICE DAILY AS NEEDED FOR BLOOD PRESSURE 180/100 60 tablet 3    losartan (COZAAR) 100 MG tablet Take 1 tablet by mouth daily 30 tablet 5    amLODIPine (NORVASC) 10 MG tablet TAKE 1 TABLET BY MOUTH DAILY 30 tablet 11    escitalopram (LEXAPRO) 20 MG tablet Take 1 tablet by mouth daily 30 tablet 11    rOPINIRole (REQUIP) 0.25 MG tablet Take 1 tablet by mouth nightly 30 tablet 11    albuterol sulfate  (90 Base) MCG/ACT inhaler Inhale 2 puffs into the lungs every 6 hours as needed for Wheezing 1 Inhaler 3    mometasone-formoterol (DULERA) 200-5 MCG/ACT inhaler INHALE 2 PUFFS INTO THE LUNGS EVERY 12 HOURS 13 g 11    hydrochlorothiazide (HYDRODIURIL) 25 MG tablet Take 1 tablet by mouth daily 30 tablet 11    nitroGLYCERIN (NITROSTAT) 0.3 MG SL tablet up to max of 3 total doses. If no relief after 1 dose, call 911. 30 tablet 3    pantoprazole (PROTONIX) 40 MG tablet TAKE 1 TABLET BY MOUTH EVERY MORNING BEFORE BREAKFAST 90 tablet 3    celecoxib (CELEBREX) 200 MG capsule Take 1 capsule by mouth daily (Patient taking differently: Take 200 mg by mouth daily Takes in evening after supper) 90 capsule 3     No current facility-administered medications for this visit. Allergies   Allergen Reactions    Codeine Swelling     Had as a baby--not sure of reaction.        Health Maintenance   Topic He has wheezes. Diffuse wheezing throughout   Abdominal: Soft. Bowel sounds are normal.   Musculoskeletal: Normal range of motion. Lymphadenopathy:     He has no cervical adenopathy. Neurological: He is alert and oriented to person, place, and time. Skin: Skin is warm and dry. Nursing note and vitals reviewed. BP (!) 140/90   Pulse 66   Temp 98.1 °F (36.7 °C) (Temporal)   Resp 20   Ht 5' 8\" (1.727 m)   Wt 261 lb 12.8 oz (118.8 kg)   SpO2 94%   BMI 39.81 kg/m²     Assessment:      1. Bronchospasm Worsening ipratropium-albuterol (DUONEB) nebulizer solution 1 ampule    methylPREDNISolone acetate (DEPO-MEDROL) injection 40 mg    ipratropium-albuterol (DUONEB) 0.5-2.5 (3) MG/3ML SOLN nebulizer solution    XR CHEST STANDARD (2 VW)    POCT Influenza A/B    CXR, DUO-NEB in office improved symptoms. Will oder out patient. 2. Sinobronchitis Worsening ipratropium-albuterol (DUONEB) nebulizer solution 1 ampule    methylPREDNISolone acetate (DEPO-MEDROL) injection 40 mg    ipratropium-albuterol (DUONEB) 0.5-2.5 (3) MG/3ML SOLN nebulizer solution    XR CHEST STANDARD (2 VW)    promethazine-dextromethorphan (PROMETHAZINE-DM) 6.25-15 MG/5ML syrup    POCT Influenza A/B    Treat with low dose IM steriod, phenergan DM,DUO-NEB PRN   3. Wheezing Stable ipratropium-albuterol (DUONEB) nebulizer solution 1 ampule    methylPREDNISolone acetate (DEPO-MEDROL) injection 40 mg    ipratropium-albuterol (DUONEB) 0.5-2.5 (3) MG/3ML SOLN nebulizer solution    Improved after DUO-NEB in tthe office   4. Secondary hypertension Stable     Likely due to bronchospasm. Has appointment in 2 days. Monitor B/P report readings greater than 150/100       Plan:     Has a scheduled medical management visit in 2 days with Dr Jessica Woody. Teresa Robert was seen today for cough, fever and congestion. Diagnoses and all orders for this visit:    Bronchospasm  Comments:  CXR, Anell Grams in office improved symptoms. Will oder out patient.   Orders:  - Procedures    XR CHEST STANDARD (2 VW)     Standing Status:   Future     Number of Occurrences:   1     Standing Expiration Date:   3/12/2019     Order Specific Question:   Reason for exam:     Answer:   SOA,Wheezing,productive cough    POCT Influenza A/B     Orders Placed This Encounter   Medications    ipratropium-albuterol (DUONEB) nebulizer solution 1 ampule    methylPREDNISolone acetate (DEPO-MEDROL) injection 40 mg    ipratropium-albuterol (DUONEB) 0.5-2.5 (3) MG/3ML SOLN nebulizer solution     Sig: Inhale 3 mLs into the lungs every 4 hours     Dispense:  360 mL     Refill:  1    promethazine-dextromethorphan (PROMETHAZINE-DM) 6.25-15 MG/5ML syrup     Sig: Take 5 mLs by mouth 4 times daily as needed for Cough     Dispense:  120 mL     Refill:  0       Patient given educational materials - see patient instructions. Discussed use, benefit, and side effects of prescribed medications. All patient questions answered. Pt voiced understanding. Reviewed health maintenance. Instructed to continue current medications, diet and exercise. Patient agreed with treatment plan. Follow up as directed.          Electronically signed by ANDERS Josue on 3/13/2018 at 10:56 PM

## 2018-03-13 ENCOUNTER — OFFICE VISIT (OUTPATIENT)
Dept: CARDIOLOGY | Age: 68
End: 2018-03-13
Payer: COMMERCIAL

## 2018-03-13 VITALS
DIASTOLIC BLOOD PRESSURE: 60 MMHG | HEART RATE: 66 BPM | WEIGHT: 259 LBS | HEIGHT: 68 IN | SYSTOLIC BLOOD PRESSURE: 116 MMHG | BODY MASS INDEX: 39.25 KG/M2

## 2018-03-13 DIAGNOSIS — Z95.0 PACEMAKER: ICD-10-CM

## 2018-03-13 DIAGNOSIS — I49.5 SICK SINUS SYNDROME DUE TO SA NODE DYSFUNCTION (HCC): Primary | ICD-10-CM

## 2018-03-13 DIAGNOSIS — I48.91 ATRIAL FIBRILLATION WITH SLOW VENTRICULAR RESPONSE (HCC): ICD-10-CM

## 2018-03-13 DIAGNOSIS — I10 ESSENTIAL HYPERTENSION: ICD-10-CM

## 2018-03-13 PROCEDURE — G8417 CALC BMI ABV UP PARAM F/U: HCPCS | Performed by: CLINICAL NURSE SPECIALIST

## 2018-03-13 PROCEDURE — G8482 FLU IMMUNIZE ORDER/ADMIN: HCPCS | Performed by: CLINICAL NURSE SPECIALIST

## 2018-03-13 PROCEDURE — 93288 INTERROG EVL PM/LDLS PM IP: CPT | Performed by: CLINICAL NURSE SPECIALIST

## 2018-03-13 PROCEDURE — 99213 OFFICE O/P EST LOW 20 MIN: CPT | Performed by: CLINICAL NURSE SPECIALIST

## 2018-03-13 PROCEDURE — 4040F PNEUMOC VAC/ADMIN/RCVD: CPT | Performed by: CLINICAL NURSE SPECIALIST

## 2018-03-13 PROCEDURE — 1036F TOBACCO NON-USER: CPT | Performed by: CLINICAL NURSE SPECIALIST

## 2018-03-13 PROCEDURE — 3017F COLORECTAL CA SCREEN DOC REV: CPT | Performed by: CLINICAL NURSE SPECIALIST

## 2018-03-13 PROCEDURE — 1123F ACP DISCUSS/DSCN MKR DOCD: CPT | Performed by: CLINICAL NURSE SPECIALIST

## 2018-03-13 PROCEDURE — G8427 DOCREV CUR MEDS BY ELIG CLIN: HCPCS | Performed by: CLINICAL NURSE SPECIALIST

## 2018-03-13 ASSESSMENT — ENCOUNTER SYMPTOMS
WHEEZING: 0
SHORTNESS OF BREATH: 0
SINUS PRESSURE: 1
NAUSEA: 1
APNEA: 0
COUGH: 1
SORE THROAT: 0
SINUS PAIN: 1
RHINORRHEA: 1

## 2018-03-13 NOTE — PROGRESS NOTES
[facility], [address] [place] [address], [address] [address]  Phone: [phone]  Fax: [phone]    OFFICE VISIT:  3/13/2018    Shyam Shearer - : 1950    Reason For Visit:  Radha Monterroso is a 79 y.o. male who is here for Follow-up and Shortness of Breath (pt denies any cardiac symptoms today )  Pacemaker placed 2017  Has COPD and developed worsening shortness of breath wheezing and cough this past week. Went to see PCP yesterday and was given antibiotic shot and nebulizer. Artery feeling better    Subjective  Radha Monterroso denies exertional chest pain. Has ROTH. Improved resting shortness of breath, orthopnea, paroxysmal nocturnal dyspnea, syncope, presyncope, arrhythmia, edema and fatigue. The patient denies numbness or weakness to suggest cerebrovascular accident or transient ischemic attack. Paige Hendrix MD is PCP and follows labs .   [facility] the [facility]:    Patient Active Problem List    Diagnosis Date Noted    Abnormal nuclear stress test      Priority: High    Atrial fibrillation with slow ventricular response ([address]      Priority: High    Precordial pain      Priority: High    Sick sinus syndrome due to SA node dysfunction (HCC)      Priority: High    Hypertension     SVT (supraventricular tachycardia) ([address] 09/10/2017    Pacemaker 2017    Encounter for colonoscopy due to history of adenomatous colonic polyps 2014    NSAID long-term use 2014    GERD (gastroesophageal reflux disease) 2013    History of esophageal ulcer 2013    History of adenomatous polyp of colon 2013    Patulous lower esophageal sphincter 2013    Hiatal hernia 2013    Osteoarthritis 2013    Chronic pain 2013     Past Medical History:   Diagnosis Date    Anemia     Back pain     Cellulitis     Elevated triglycerides with high cholesterol     GERD (gastroesophageal reflux disease) up to max of 3 total doses. If no relief after 1 dose, call 911. 30 tablet 3    pantoprazole (PROTONIX) 40 MG tablet TAKE 1 TABLET BY MOUTH EVERY MORNING BEFORE BREAKFAST 90 tablet 3    celecoxib (CELEBREX) 200 MG capsule Take 1 capsule by mouth daily (Patient taking differently: Take 200 mg by mouth daily Takes in evening after supper) 90 capsule 3     No current facility-administered medications for this visit. Allergies: Codeine    Review of Systems  Constitutional  no significant activity change, appetite change, or unexpected weight change. No fever, chills or diaphoresis. No fatigue. HEENT  no significant rhinorrhea or epistaxis. No tinnitus or significant hearing loss. Eyes  no sudden vision change or amaurosis. Respiratory  + significant wheezing With productive cough  + dyspnea on exertion. .  Cardiovascular  no exertional chest pain, orthopnea or PND. No sensation of arrhythmia or slow heart rate. No claudication or leg edema. Gastrointestinal  no abdominal swelling or pain. No blood in stool. No severe constipation, diarrhea, nausea, or vomiting. Genitourinary  no difficulty urinating, dysuria, frequency, or urgency. No flank pain or hematuria. Musculoskeletal  no back pain, gait disturbance, or myalgia. Skin  no color change or rash. No pallor. No new surgical incision. Neurologic  no speech difficulty, facial asymmetry or lateralizing weakness. No seizures, presyncope, syncope, or significant dizziness. Hematologic  no easy bruising or excessive bleeding. Psychiatric  no severe anxiety or insomnia. No confusion. All other review of systems are negative. Objective  Vital Signs - /60   Pulse 66   Ht 5' 8\" (1.727 m)   Wt 259 lb (117.5 kg)   BMI 39.38 kg/m²   General - Mani is alert, cooperative, and pleasant. Well groomed. No acute distress. Body habitus is Obese. HEENT  The head is normocephalic. No circumoral cyanosis.   Dentition is normal.   EYES -  No Xanthelasma, no arcus senilis, no conjunctival hemorrhages or discharge. Neck - Supple, without increased jugular venous pressures. No carotid bruits. No mass. Respiratory - Lungs are clear bilaterally. No wheezes or rales. Normal effort without use of accessory muscles. Cardiovascular  Heart has regular rhythm and rate. No murmurs, rubs or gallops. + pedal pulses and no varicosities. Abdominal -  Soft, nontender, nondistended. Bowel sounds are intact. Extremities - No clubbing, cyanosis, or  edema. Musculoskeletal -  No clubbing . No Osler's nodes. Gait normal .  No kyphosis or scoliosis. Skin -  no statis ulcers or dermatitis. Neurological - No focal signs are identified. Oriented to person, place and time. Psychiatric -  Appropriate affect and mood. Assessment:    1. Sick sinus syndrome due to SA node dysfunction (HCC)     2. Pacemaker     3. Essential hypertension     4. Atrial fibrillation with slow ventricular response (HCC)       Data:  BP Readings from Last 3 Encounters:   03/13/18 116/60   03/12/18 (!) 140/90   12/14/17 132/80    Pulse Readings from Last 3 Encounters:   03/13/18 66   03/12/18 66   12/14/17 91        Pacemaker check showed adequate battery status- approximately 9 years   and  appropriate diagnostics without any sustained arrhythmias. Mode AAIRDDDR  AS-VS 73%  AP- VS 27%  Next check in 3 months via [employer] home monitoring system    Blood pressure normal today. On CCB and ARB  Recent URI treated with antibodies. RV feeling better  Follow with PCP tomorrow   States taking medications as prescribed  Stable cardiovascular status. No evidence of overt heart failure, angina or dysrhythmia.      Plan    Send [employer] home pacemaker reading on 6-13-18   Follow up in 6 mos  Call with any questions or concerns  Follow up with Daryle Men, MD for non cardiac problems  Report any new problems  Cardiovascular [employer] as

## 2018-03-14 ENCOUNTER — OFFICE VISIT (OUTPATIENT)
Dept: PRIMARY CARE CLINIC | Age: 68
End: 2018-03-14
Payer: MEDICARE

## 2018-03-14 ENCOUNTER — CARE COORDINATION (OUTPATIENT)
Dept: CARE COORDINATION | Age: 68
End: 2018-03-14

## 2018-03-14 VITALS
BODY MASS INDEX: 39.16 KG/M2 | TEMPERATURE: 98.9 F | WEIGHT: 258.4 LBS | DIASTOLIC BLOOD PRESSURE: 92 MMHG | HEIGHT: 68 IN | SYSTOLIC BLOOD PRESSURE: 132 MMHG | OXYGEN SATURATION: 96 % | HEART RATE: 78 BPM

## 2018-03-14 DIAGNOSIS — M54.12 CERVICAL RADICULOPATHY: ICD-10-CM

## 2018-03-14 DIAGNOSIS — J44.9 CHRONIC OBSTRUCTIVE PULMONARY DISEASE, UNSPECIFIED COPD TYPE (HCC): ICD-10-CM

## 2018-03-14 DIAGNOSIS — Z77.018 BERYLLIUM EXPOSURE: ICD-10-CM

## 2018-03-14 DIAGNOSIS — I10 ESSENTIAL HYPERTENSION: Primary | ICD-10-CM

## 2018-03-14 PROCEDURE — 4040F PNEUMOC VAC/ADMIN/RCVD: CPT | Performed by: FAMILY MEDICINE

## 2018-03-14 PROCEDURE — 99214 OFFICE O/P EST MOD 30 MIN: CPT | Performed by: FAMILY MEDICINE

## 2018-03-14 PROCEDURE — 1123F ACP DISCUSS/DSCN MKR DOCD: CPT | Performed by: FAMILY MEDICINE

## 2018-03-14 PROCEDURE — 3017F COLORECTAL CA SCREEN DOC REV: CPT | Performed by: FAMILY MEDICINE

## 2018-03-14 PROCEDURE — 1036F TOBACCO NON-USER: CPT | Performed by: FAMILY MEDICINE

## 2018-03-14 PROCEDURE — G8482 FLU IMMUNIZE ORDER/ADMIN: HCPCS | Performed by: FAMILY MEDICINE

## 2018-03-14 PROCEDURE — G8427 DOCREV CUR MEDS BY ELIG CLIN: HCPCS | Performed by: FAMILY MEDICINE

## 2018-03-14 PROCEDURE — 3023F SPIROM DOC REV: CPT | Performed by: FAMILY MEDICINE

## 2018-03-14 PROCEDURE — G8926 SPIRO NO PERF OR DOC: HCPCS | Performed by: FAMILY MEDICINE

## 2018-03-14 PROCEDURE — G8417 CALC BMI ABV UP PARAM F/U: HCPCS | Performed by: FAMILY MEDICINE

## 2018-03-14 RX ORDER — LOSARTAN POTASSIUM 100 MG/1
100 TABLET ORAL DAILY
Qty: 30 TABLET | Refills: 5 | Status: SHIPPED | OUTPATIENT
Start: 2018-03-14 | End: 2018-08-16

## 2018-03-14 RX ORDER — PANTOPRAZOLE SODIUM 40 MG/1
TABLET, DELAYED RELEASE ORAL
Qty: 90 TABLET | Refills: 3 | Status: SHIPPED | OUTPATIENT
Start: 2018-03-14 | End: 2019-02-15 | Stop reason: SDUPTHER

## 2018-03-14 ASSESSMENT — PATIENT HEALTH QUESTIONNAIRE - PHQ9
SUM OF ALL RESPONSES TO PHQ9 QUESTIONS 1 & 2: 0
1. LITTLE INTEREST OR PLEASURE IN DOING THINGS: 0
2. FEELING DOWN, DEPRESSED OR HOPELESS: 0
SUM OF ALL RESPONSES TO PHQ QUESTIONS 1-9: 0

## 2018-03-14 ASSESSMENT — ENCOUNTER SYMPTOMS
COUGH: 0
BACK PAIN: 1
SHORTNESS OF BREATH: 0

## 2018-03-14 NOTE — PATIENT INSTRUCTIONS
Patient Self-Management Goal for Chronic Condition  Goal: I will check my blood pressure at home at least 2x/week, and bring these readings to my next office visit.   Barriers to success: none  Plan for overcoming my barriers: N/A     Confidence: 10/10  Date goal set: 3/14/18  Date goal attained:

## 2018-03-14 NOTE — PROGRESS NOTES
Celia Price is a 79 y.o. male    Chief Complaint   Patient presents with    Follow-up     3 months    Hypertension       HPI   Celia Price presents to the office for follow up of multiple medical problems. Patient has longstanding history of HTN, chronic neck pain, and hld. Treatment Adherence:   Medication compliance:  compliant all of the time  Diet compliance:  compliant most of the time  Weight trend: stable  Current exercise: no regular exercise  Barriers: impairment:  physical: chronic neck pain, COPD from possible beryllium exposure. Lab Results   Component Value Date    CHOL 201 (H) 04/20/2017    TRIG 286 (H) 04/20/2017    HDL 66 04/20/2017    LDLCALC 78 04/20/2017    LDLDIRECT 93 (L) 10/30/2015     Lab Results   Component Value Date    ALT 14 09/19/2017    AST 26 09/19/2017          Hypertension:  Home blood pressure monitoring: Yes - 110-160/58-86. He is adherent to a low sodium diet. Patient denies chest pain and shortness of breath. Antihypertensive medication side effects: no medication side effects noted. Use of agents associated with hypertension: none. Sodium (mmol/L)   Date Value   09/19/2017 141    BUN (mg/dL)   Date Value   09/19/2017 16    Glucose (mg/dL)   Date Value   09/19/2017 107      Potassium (mmol/L)   Date Value   09/19/2017 4.2    CREATININE (mg/dL)   Date Value   09/19/2017 0.8          Patient has longstanding history of COPD related to berrylium exposure. Patient has been seeing [address] [place] [address] for management of multiple medical problems related to his work at [employer]. Review of Systems   Constitutional: Negative for chills and fever. Respiratory: Negative for cough and shortness of breath. Cardiovascular: Negative for chest pain and leg swelling. Musculoskeletal: Positive for back pain and neck pain. Prior to Admission medications    Medication Sig Start Date End Date Taking?  Authorizing Provider M54.12 723.4 Stable. Continue to see    3. Chronic obstructive pulmonary disease, unspecified COPD type ([facility] J44.9 496 Stable. Continue Home health   4. Beryllium exposure Z77.018 V87.09 Continue Home health. Plan    No orders of the defined types were placed in this encounter. No orders of the defined types were placed in this encounter. Return in about 3 months (around 6/14/2018) for HTN. Patient Instructions   Patient Self-Management Goal for Chronic Condition  Goal: I will check my blood pressure at home at least 2x/week, and bring these readings to my next office visit.   Barriers to success: none  Plan for overcoming my barriers: N/A     Confidence: 10/10  Date goal set: 3/14/18  Date goal attained:

## 2018-04-09 DIAGNOSIS — R06.2 WHEEZING: ICD-10-CM

## 2018-04-09 DIAGNOSIS — J40 SINOBRONCHITIS: ICD-10-CM

## 2018-04-09 DIAGNOSIS — J32.9 SINOBRONCHITIS: ICD-10-CM

## 2018-04-09 DIAGNOSIS — I10 ESSENTIAL HYPERTENSION: ICD-10-CM

## 2018-04-09 DIAGNOSIS — J98.01 BRONCHOSPASM: ICD-10-CM

## 2018-04-09 RX ORDER — CELECOXIB 200 MG/1
200 CAPSULE ORAL DAILY
Qty: 90 CAPSULE | Refills: 3 | Status: SHIPPED | OUTPATIENT
Start: 2018-04-09 | End: 2018-06-23 | Stop reason: SDUPTHER

## 2018-04-09 RX ORDER — CLONIDINE HYDROCHLORIDE 0.1 MG/1
TABLET ORAL
Qty: 60 TABLET | Refills: 3 | Status: SHIPPED | OUTPATIENT
Start: 2018-04-09 | End: 2018-05-11 | Stop reason: SDUPTHER

## 2018-04-09 RX ORDER — IPRATROPIUM BROMIDE AND ALBUTEROL SULFATE 2.5; .5 MG/3ML; MG/3ML
1 SOLUTION RESPIRATORY (INHALATION) EVERY 4 HOURS
Qty: 360 ML | Refills: 1 | Status: SHIPPED | OUTPATIENT
Start: 2018-04-09 | End: 2018-06-06 | Stop reason: SDUPTHER

## 2018-04-17 ENCOUNTER — CARE COORDINATION (OUTPATIENT)
Dept: CARE COORDINATION | Age: 68
End: 2018-04-17

## 2018-04-17 DIAGNOSIS — J44.9 CHRONIC OBSTRUCTIVE PULMONARY DISEASE, UNSPECIFIED COPD TYPE (HCC): ICD-10-CM

## 2018-04-17 DIAGNOSIS — F32.A DEPRESSION, UNSPECIFIED DEPRESSION TYPE: ICD-10-CM

## 2018-04-17 RX ORDER — ESCITALOPRAM OXALATE 20 MG/1
20 TABLET ORAL DAILY
Qty: 30 TABLET | Refills: 11 | Status: SHIPPED | OUTPATIENT
Start: 2018-04-17 | End: 2018-10-16 | Stop reason: ALTCHOICE

## 2018-04-17 RX ORDER — ALBUTEROL SULFATE 90 UG/1
2 AEROSOL, METERED RESPIRATORY (INHALATION) EVERY 6 HOURS PRN
Qty: 1 INHALER | Refills: 3 | Status: ON HOLD | OUTPATIENT
Start: 2018-04-17 | End: 2020-01-01 | Stop reason: HOSPADM

## 2018-05-07 ENCOUNTER — CARE COORDINATION (OUTPATIENT)
Dept: CARE COORDINATION | Age: 68
End: 2018-05-07

## 2018-05-11 ENCOUNTER — CARE COORDINATION (OUTPATIENT)
Dept: CARE COORDINATION | Age: 68
End: 2018-05-11

## 2018-05-11 DIAGNOSIS — I10 ESSENTIAL HYPERTENSION: ICD-10-CM

## 2018-05-11 RX ORDER — CLONIDINE HYDROCHLORIDE 0.1 MG/1
TABLET ORAL
Qty: 60 TABLET | Refills: 2 | Status: SHIPPED | OUTPATIENT
Start: 2018-05-11 | End: 2018-08-16 | Stop reason: SDUPTHER

## 2018-06-06 DIAGNOSIS — R06.2 WHEEZING: ICD-10-CM

## 2018-06-06 DIAGNOSIS — J98.01 BRONCHOSPASM: ICD-10-CM

## 2018-06-06 DIAGNOSIS — J32.9 SINOBRONCHITIS: ICD-10-CM

## 2018-06-06 DIAGNOSIS — J40 SINOBRONCHITIS: ICD-10-CM

## 2018-06-06 RX ORDER — IPRATROPIUM BROMIDE AND ALBUTEROL SULFATE 2.5; .5 MG/3ML; MG/3ML
1 SOLUTION RESPIRATORY (INHALATION) EVERY 4 HOURS
Qty: 360 ML | Refills: 3 | Status: SHIPPED | OUTPATIENT
Start: 2018-06-06 | End: 2019-01-01 | Stop reason: DRUGHIGH

## 2018-06-14 ENCOUNTER — TELEPHONE (OUTPATIENT)
Dept: CARDIOLOGY | Age: 68
End: 2018-06-14

## 2018-06-15 DIAGNOSIS — Z95.0 PACEMAKER: Primary | ICD-10-CM

## 2018-06-15 DIAGNOSIS — I49.5 SICK SINUS SYNDROME DUE TO SA NODE DYSFUNCTION (HCC): ICD-10-CM

## 2018-06-15 PROCEDURE — 93294 REM INTERROG EVL PM/LDLS PM: CPT | Performed by: CLINICAL NURSE SPECIALIST

## 2018-06-15 PROCEDURE — 93296 REM INTERROG EVL PM/IDS: CPT | Performed by: CLINICAL NURSE SPECIALIST

## 2018-06-25 RX ORDER — CELECOXIB 200 MG/1
CAPSULE ORAL
Qty: 90 CAPSULE | Refills: 1 | Status: SHIPPED | OUTPATIENT
Start: 2018-06-25 | End: 2018-08-27 | Stop reason: SDUPTHER

## 2018-06-28 ENCOUNTER — OFFICE VISIT (OUTPATIENT)
Dept: PRIMARY CARE CLINIC | Age: 68
End: 2018-06-28
Payer: COMMERCIAL

## 2018-06-28 ENCOUNTER — CARE COORDINATION (OUTPATIENT)
Dept: CARE COORDINATION | Age: 68
End: 2018-06-28

## 2018-06-28 VITALS
HEIGHT: 68 IN | HEART RATE: 64 BPM | SYSTOLIC BLOOD PRESSURE: 138 MMHG | OXYGEN SATURATION: 98 % | DIASTOLIC BLOOD PRESSURE: 80 MMHG | TEMPERATURE: 98.1 F | BODY MASS INDEX: 40.92 KG/M2 | RESPIRATION RATE: 12 BRPM | WEIGHT: 270 LBS

## 2018-06-28 DIAGNOSIS — J44.9 CHRONIC OBSTRUCTIVE PULMONARY DISEASE, UNSPECIFIED COPD TYPE (HCC): Primary | ICD-10-CM

## 2018-06-28 DIAGNOSIS — J63.2 BERYLLIUM DISEASE (HCC): ICD-10-CM

## 2018-06-28 PROCEDURE — 1036F TOBACCO NON-USER: CPT | Performed by: FAMILY MEDICINE

## 2018-06-28 PROCEDURE — G8417 CALC BMI ABV UP PARAM F/U: HCPCS | Performed by: FAMILY MEDICINE

## 2018-06-28 PROCEDURE — 4040F PNEUMOC VAC/ADMIN/RCVD: CPT | Performed by: FAMILY MEDICINE

## 2018-06-28 PROCEDURE — 3017F COLORECTAL CA SCREEN DOC REV: CPT | Performed by: FAMILY MEDICINE

## 2018-06-28 PROCEDURE — G8926 SPIRO NO PERF OR DOC: HCPCS | Performed by: FAMILY MEDICINE

## 2018-06-28 PROCEDURE — G8427 DOCREV CUR MEDS BY ELIG CLIN: HCPCS | Performed by: FAMILY MEDICINE

## 2018-06-28 PROCEDURE — 1123F ACP DISCUSS/DSCN MKR DOCD: CPT | Performed by: FAMILY MEDICINE

## 2018-06-28 PROCEDURE — 99214 OFFICE O/P EST MOD 30 MIN: CPT | Performed by: FAMILY MEDICINE

## 2018-06-28 PROCEDURE — 3023F SPIROM DOC REV: CPT | Performed by: FAMILY MEDICINE

## 2018-06-28 ASSESSMENT — ENCOUNTER SYMPTOMS
COUGH: 0
SHORTNESS OF BREATH: 1
WHEEZING: 0

## 2018-07-05 ENCOUNTER — CARE COORDINATION (OUTPATIENT)
Dept: CARE COORDINATION | Age: 68
End: 2018-07-05

## 2018-07-05 NOTE — CARE COORDINATION
needed for Wheezing 4/17/18   Swapna Roldan MD   pantoprazole (PROTONIX) 40 MG tablet TAKE 1 TABLET BY MOUTH EVERY MORNING BEFORE BREAKFAST 3/14/18   Swapna Roldan MD   losartan (COZAAR) 100 MG tablet Take 1 tablet by mouth daily 3/14/18   Swapna Roldan MD   carbidopa-levodopa (SINEMET)  MG per tablet TAKE 1 TABLET BY MOUTH AT BEDTIME 2/18/18   Swapna Roldan MD   amLODIPine (NORVASC) 10 MG tablet TAKE 1 TABLET BY MOUTH DAILY 12/14/17   Swapna Roldan MD   rOPINIRole (REQUIP) 0.25 MG tablet Take 1 tablet by mouth nightly 12/14/17   Swapna Roldan MD   mometasone-formoterol [facility]) 200-5 MCG/ACT inhaler INHALE 2 PUFFS INTO THE LUNGS EVERY 12 HOURS 12/14/17   Swapna Roldan MD   nitroGLYCERIN (NITROSTAT) 0.3 MG SL tablet up to max of 3 total doses.  If no relief after 1 dose, call 911. 9/19/17   Marielena Elam MD       Future Appointments  Date Time Provider Susan Garcia   8/2/2018 1:45 PM [facility]   9/13/2018 10:30 AM ANDERS Samayoa [facility]

## 2018-07-11 RX ORDER — HYDROCHLOROTHIAZIDE 25 MG/1
25 TABLET ORAL DAILY
COMMUNITY
End: 2018-12-31

## 2018-07-14 DIAGNOSIS — I10 ESSENTIAL HYPERTENSION: ICD-10-CM

## 2018-07-15 RX ORDER — LOSARTAN POTASSIUM 100 MG/1
100 TABLET ORAL DAILY
Qty: 30 TABLET | Refills: 5 | Status: SHIPPED | OUTPATIENT
Start: 2018-07-15 | End: 2019-03-06 | Stop reason: SDUPTHER

## 2018-07-17 ENCOUNTER — CARE COORDINATION (OUTPATIENT)
Dept: CARE COORDINATION | Age: 68
End: 2018-07-17

## 2018-07-17 NOTE — CARE COORDINATION
Ambulatory Care Coordination Note  7/17/2018  CM Risk Score: 2  Lobo Mortality Risk Score: 4.36    ACC: Opal Rodarte RN    Summary Note:  Pt called stating that his nurse was at his home today, and has checked his BP several times and is concerned    178/98  137/100  180/137  167/103  133/81 at 2pm    Pt reports he took his BP medications this morning but has not seen any improvement, told the pt to take a Clonidine 0.1mg as that medication is prescribed on an as needed basis, and to call me back in a couple of hours with the next reading. Pt voiced understanding. Care Coordination Interventions    Program Enrollment:  Rising Risk  Referral from Primary Care Provider:  Yes  Suggested Interventions and Community Resources  Other Services:  Completed (Comment: 11/30/17-Pt is [employer] and has a nurse that visits him twice a week M/W-)  Zone Management Tools: In Process (Comment: 7/5/18 Review of blood pressures)  Other Services or Interventions:  7/17/18-Issues with BP today          Goals Addressed     None          Prior to Admission medications    Medication Sig Start Date End Date Taking?  Authorizing Provider   losartan (COZAAR) 100 MG tablet TAKE 1 TABLET BY MOUTH DAILY 7/15/18   Mina Foster MD   hydrochlorothiazide (HYDRODIURIL) 25 MG tablet Take 25 mg by mouth daily    Historical Provider, MD   celecoxib (CELEBREX) 200 MG capsule TAKE ONE CAPSULE BY MOUTH EVERY DAY 6/25/18   Mina Foster MD   ipratropium-albuterol (DUONEB) 0.5-2.5 (3) MG/3ML SOLN nebulizer solution Inhale 3 mLs into the lungs every 4 hours 6/6/18   Mina Foster MD   cloNIDine (CATAPRES) 0.1 MG tablet TAKE 1 TABLET BY MOUTH TWICE DAILY AS NEEDED FOR BLOOD PRESSURE 180/100 5/11/18   Mina Foster MD   escitalopram (LEXAPRO) 20 MG tablet Take 1 tablet by mouth daily 4/17/18   Mina Foster MD   albuterol sulfate  (90 Base) MCG/ACT inhaler Inhale 2 puffs into the lungs every 6 hours as needed for Wheezing 4/17/18   Geremias Montes MD   pantoprazole (PROTONIX) 40 MG tablet TAKE 1 TABLET BY MOUTH EVERY MORNING BEFORE BREAKFAST 3/14/18   Geremias Montes MD   losartan (COZAAR) 100 MG tablet Take 1 tablet by mouth daily 3/14/18   Geremias Montes MD   carbidopa-levodopa (SINEMET)  MG per tablet TAKE 1 TABLET BY MOUTH AT BEDTIME 2/18/18   Geremias Montes MD   amLODIPine (NORVASC) 10 MG tablet TAKE 1 TABLET BY MOUTH DAILY 12/14/17   Geremias Montes MD   rOPINIRole (REQUIP) 0.25 MG tablet Take 1 tablet by mouth nightly 12/14/17   Geremias Montes MD   mometasone-formoterol [facility]) 200-5 MCG/ACT inhaler INHALE 2 PUFFS INTO THE LUNGS EVERY 12 HOURS 12/14/17   Geremias Montes MD   nitroGLYCERIN (NITROSTAT) 0.3 MG SL tablet up to max of 3 total doses.  If no relief after 1 dose, call 911. 9/19/17   Alysa Brunson MD       Future Appointments  Date Time Provider Susan Garcia   8/2/2018 1:45 PM [facility]   9/13/2018 10:30 AM ANDERS Kuo [facility]

## 2018-07-18 RX ORDER — NIFEDIPINE 30 MG/1
30 TABLET, EXTENDED RELEASE ORAL DAILY
Qty: 30 TABLET | Refills: 3 | Status: SHIPPED | OUTPATIENT
Start: 2018-07-18 | End: 2018-08-16 | Stop reason: DRUGHIGH

## 2018-08-16 ENCOUNTER — OFFICE VISIT (OUTPATIENT)
Dept: PRIMARY CARE CLINIC | Age: 68
End: 2018-08-16
Payer: MEDICARE

## 2018-08-16 ENCOUNTER — CARE COORDINATION (OUTPATIENT)
Dept: CARE COORDINATION | Age: 68
End: 2018-08-16

## 2018-08-16 VITALS
OXYGEN SATURATION: 97 % | DIASTOLIC BLOOD PRESSURE: 82 MMHG | HEART RATE: 76 BPM | SYSTOLIC BLOOD PRESSURE: 130 MMHG | TEMPERATURE: 98.2 F

## 2018-08-16 DIAGNOSIS — I10 ESSENTIAL HYPERTENSION: ICD-10-CM

## 2018-08-16 DIAGNOSIS — E66.01 MORBID OBESITY WITH BMI OF 40.0-44.9, ADULT (HCC): ICD-10-CM

## 2018-08-16 PROCEDURE — 4040F PNEUMOC VAC/ADMIN/RCVD: CPT | Performed by: FAMILY MEDICINE

## 2018-08-16 PROCEDURE — 1101F PT FALLS ASSESS-DOCD LE1/YR: CPT | Performed by: FAMILY MEDICINE

## 2018-08-16 PROCEDURE — G8427 DOCREV CUR MEDS BY ELIG CLIN: HCPCS | Performed by: FAMILY MEDICINE

## 2018-08-16 PROCEDURE — G8417 CALC BMI ABV UP PARAM F/U: HCPCS | Performed by: FAMILY MEDICINE

## 2018-08-16 PROCEDURE — 1036F TOBACCO NON-USER: CPT | Performed by: FAMILY MEDICINE

## 2018-08-16 PROCEDURE — 1123F ACP DISCUSS/DSCN MKR DOCD: CPT | Performed by: FAMILY MEDICINE

## 2018-08-16 PROCEDURE — 3017F COLORECTAL CA SCREEN DOC REV: CPT | Performed by: FAMILY MEDICINE

## 2018-08-16 PROCEDURE — 99213 OFFICE O/P EST LOW 20 MIN: CPT | Performed by: FAMILY MEDICINE

## 2018-08-16 RX ORDER — CLONIDINE HYDROCHLORIDE 0.1 MG/1
TABLET ORAL
Qty: 60 TABLET | Refills: 2 | Status: SHIPPED | OUTPATIENT
Start: 2018-08-16 | End: 2018-11-01 | Stop reason: SDUPTHER

## 2018-08-16 RX ORDER — NIFEDIPINE 60 MG/1
60 TABLET, EXTENDED RELEASE ORAL DAILY
Qty: 30 TABLET | Refills: 1 | Status: SHIPPED | OUTPATIENT
Start: 2018-08-16 | End: 2018-08-16 | Stop reason: ALTCHOICE

## 2018-08-16 RX ORDER — NIFEDIPINE 30 MG/1
30 TABLET, EXTENDED RELEASE ORAL DAILY
Qty: 30 TABLET | Refills: 3 | Status: ON HOLD | OUTPATIENT
Start: 2018-08-16 | End: 2020-01-01 | Stop reason: HOSPADM

## 2018-08-16 ASSESSMENT — ENCOUNTER SYMPTOMS
SHORTNESS OF BREATH: 0
COLOR CHANGE: 0
COUGH: 0

## 2018-08-16 NOTE — PROGRESS NOTES
Felipe Donohue is a 79 y.o. male    Chief Complaint   Patient presents with    Follow-up     BP       HPI  Felipe Donohue presents to the office for follow-up of hypertension. Patient states that his blood pressures have been fluctuating. Patient states her last several days they have been improving. Patient is only taking nifedipine 30 mg daily. Patient did stop his hydrochlorothiazide 25mg and losartan 100 mg after recent switch to nifedipine. Patient does have clonidine as needed for blood pressures greater than 180/100. Patient's blood pressure has ranged from 445-396 systolic and 91-80 diastolic. He denies any chest pain or shortness of breath. Review of Systems   Constitutional: Negative for chills and fever. Respiratory: Negative for cough and shortness of breath. Cardiovascular: Negative for chest pain and leg swelling. Skin: Negative for color change and rash. Prior to Admission medications    Medication Sig Start Date End Date Taking?  Authorizing Provider   cloNIDine (CATAPRES) 0.1 MG tablet TAKE 1 TABLET BY MOUTH TWICE DAILY AS NEEDED FOR BLOOD PRESSURE 180/100 8/16/18  Yes Vishal Porras MD   NIFEdipine (PROCARDIA XL) 30 MG extended release tablet Take 1 tablet by mouth daily 8/16/18  Yes Vishal Porras MD   losartan (COZAAR) 100 MG tablet TAKE 1 TABLET BY MOUTH DAILY 7/15/18  Yes Vishal Porras MD   hydrochlorothiazide (HYDRODIURIL) 25 MG tablet Take 25 mg by mouth daily   Yes Historical Provider, MD   celecoxib (CELEBREX) 200 MG capsule TAKE ONE CAPSULE BY MOUTH EVERY DAY 6/25/18  Yes Vishal Porras MD   ipratropium-albuterol (DUONEB) 0.5-2.5 (3) MG/3ML SOLN nebulizer solution Inhale 3 mLs into the lungs every 4 hours 6/6/18  Yes Vishal Porras MD   escitalopram (LEXAPRO) 20 MG tablet Take 1 tablet by mouth daily 4/17/18  Yes Vishal Porras MD   albuterol sulfate  (90 Base) MCG/ACT inhaler Inhale 2 puffs into the lungs every 6 hours as needed for Wheezing 4/17/18  Yes Jacek Pardo MD   pantoprazole (PROTONIX) 40 MG tablet TAKE 1 TABLET BY MOUTH EVERY MORNING BEFORE BREAKFAST 3/14/18  Yes Jacek Pardo MD   carbidopa-levodopa (SINEMET)  MG per tablet TAKE 1 TABLET BY MOUTH AT BEDTIME 2/18/18  Yes Jacek Pardo MD   rOPINIRole (REQUIP) 0.25 MG tablet Take 1 tablet by mouth nightly 12/14/17  Yes Jacek Pardo MD   mometasone-formoterol [facility]) 200-5 MCG/ACT inhaler INHALE 2 PUFFS INTO THE LUNGS EVERY 12 HOURS 12/14/17  Yes Jacek Pardo MD   nitroGLYCERIN (NITROSTAT) 0.3 MG SL tablet up to max of 3 total doses. If no relief after 1 dose, call 911. 9/19/17  Yes Jeannene Schilder, MD       Past Medical History:   Diagnosis Date    Anemia     Back pain     Cellulitis     Elevated triglycerides with high cholesterol     GERD (gastroesophageal reflux disease)     Hypertension     Osteoarthritis     Pacemaker 04/21/2017       Past Surgical History:   Procedure Laterality Date    COLONOSCOPY  11/18/11        JOINT REPLACEMENT      Bilateral total knees    NECK SURGERY      PACEMAKER PLACEMENT      TOTAL KNEE ARTHROPLASTY      RIGHT AND LEFT KNEE    UPPER GASTROINTESTINAL ENDOSCOPY  2/2012           Social History     Social History    Marital status:      Spouse name: N/A    Number of children: N/A    Years of education: N/A     Social History Main Topics    Smoking status: Never Smoker    Smokeless tobacco: Never Used    Alcohol use 1.8 oz/week     3 Cans of beer per week      Comment: Daily    Drug use: No    Sexual activity: Not Asked     Other Topics Concern    None     Social History Narrative    None       Physical Exam   Constitutional: He is oriented to person, place, and time. He appears well-developed and well-nourished. No distress. HENT:   Head: Normocephalic and atraumatic. Eyes: Conjunctivae are normal. No scleral icterus.    Neck: Normal range of motion. Neck supple. No thyromegaly present. Cardiovascular: Normal rate, regular rhythm and normal heart sounds. No murmur heard. Pulmonary/Chest: Effort normal and breath sounds normal. No respiratory distress. He has no wheezes. Abdominal:   Obese     Musculoskeletal: He exhibits no edema. Neurological: He is alert and oriented to person, place, and time. No cranial nerve deficit. Skin: Skin is warm. No rash noted. Psychiatric: He has a normal mood and affect. His behavior is normal.   Nursing note and vitals reviewed. Assessment    ICD-10-CM ICD-9-CM    1. Essential hypertension I10 401.9 Continue Nifedipine 30 mg po dialy. Restart Losartan and HCTZ. Will refill cloNIDine (CATAPRES) 0.1 MG tablet to have as needed. Quality & Risk Score Accuracy    Last edited 08/16/18 10:44 CDT by Naseem Abdi MD           Plan      Orders Placed This Encounter   Medications    DISCONTD: NIFEdipine (PROCARDIA XL) 60 MG extended release tablet     Sig: Take 1 tablet by mouth daily     Dispense:  30 tablet     Refill:  1    cloNIDine (CATAPRES) 0.1 MG tablet     Sig: TAKE 1 TABLET BY MOUTH TWICE DAILY AS NEEDED FOR BLOOD PRESSURE 180/100     Dispense:  60 tablet     Refill:  2    NIFEdipine (PROCARDIA XL) 30 MG extended release tablet     Sig: Take 1 tablet by mouth daily     Dispense:  30 tablet     Refill:  3       No orders of the defined types were placed in this encounter. Return in about 2 months (around 10/16/2018) for htn. Patient Instructions   Take losartan 100 mg daily, Hydrochlorothiazide 25 mg daily, and nifedipine 30 mg daily. May use Clonidine as needed for extremely high blood pressures  180/100.

## 2018-08-16 NOTE — CARE COORDINATION
Ambulatory Care Coordination Note  8/16/2018  CM Risk Score: 2  Lobo Mortality Risk Score: 4.36    ACC: Veronica Alston RN    Summary Note: ACC met with the pt while in the office to see PCP, review of blood pressures completed   162/87  158/94  154/85  156/88  139/77  127/79  132/80  139/80  132/79  179/98  139/100  180/137  167/103  133/81      Pt has not been taking his bp meds correctly, reviewed medications with the pt, and how to take. Pt voiced understanding       Care Coordination Interventions    Program Enrollment:  Rising Risk  Referral from Primary Care Provider:  Yes  Suggested Interventions and Community Resources  Other Services:  Completed (Comment: 11/30/17-Pt is [employer] and has a nurse that visits him twice a week M/W-)  Zone Management Tools: In Process (Comment: 8/16/18 Review of blood pressures)  Other Services or Interventions:  8/16/18 REVIEW OF BLOOD PRESSURES         Goals Addressed     None          Prior to Admission medications    Medication Sig Start Date End Date Taking?  Authorizing Provider   cloNIDine (CATAPRES) 0.1 MG tablet TAKE 1 TABLET BY MOUTH TWICE DAILY AS NEEDED FOR BLOOD PRESSURE 180/100 8/16/18   Dariusz Smith MD   NIFEdipine (PROCARDIA XL) 30 MG extended release tablet Take 1 tablet by mouth daily 8/16/18   Dariusz Smith MD   losartan (COZAAR) 100 MG tablet TAKE 1 TABLET BY MOUTH DAILY 7/15/18   Dariusz Smith MD   hydrochlorothiazide (HYDRODIURIL) 25 MG tablet Take 25 mg by mouth daily    Historical Provider, MD   celecoxib (CELEBREX) 200 MG capsule TAKE ONE CAPSULE BY MOUTH EVERY DAY 6/25/18   Dariusz Smith MD   ipratropium-albuterol (DUONEB) 0.5-2.5 (3) MG/3ML SOLN nebulizer solution Inhale 3 mLs into the lungs every 4 hours 6/6/18   Dariusz Smith MD   escitalopram (LEXAPRO) 20 MG tablet Take 1 tablet by mouth daily 4/17/18   Dariusz Smith MD   albuterol sulfate  (90 Base) MCG/ACT inhaler Inhale 2 puffs into the lungs every 6 hours as needed for Wheezing 4/17/18   Poli Wise MD   pantoprazole (PROTONIX) 40 MG tablet TAKE 1 TABLET BY MOUTH EVERY MORNING BEFORE BREAKFAST 3/14/18   Poli Wise MD   carbidopa-levodopa (SINEMET)  MG per tablet TAKE 1 TABLET BY MOUTH AT BEDTIME 2/18/18   Poli Wise MD   rOPINIRole (REQUIP) 0.25 MG tablet Take 1 tablet by mouth nightly 12/14/17   Poli Wise MD   mometasone-formoterol [facility]) 200-5 MCG/ACT inhaler INHALE 2 PUFFS INTO THE LUNGS EVERY 12 HOURS 12/14/17   Poli Wise MD   nitroGLYCERIN (NITROSTAT) 0.3 MG SL tablet up to max of 3 total doses.  If no relief after 1 dose, call 911. 9/19/17   Yamileth Peng MD       Future Appointments  Date Time Provider Susan Garcia   9/13/2018 10:30 AM ANDERS Jauregui [facility]   10/16/2018 11:00 AM Poli Wise MD [address] [facility]

## 2018-08-22 ENCOUNTER — CARE COORDINATION (OUTPATIENT)
Dept: CARE COORDINATION | Age: 68
End: 2018-08-22

## 2018-08-22 NOTE — CARE COORDINATION
Ambulatory Care Coordination Note  8/22/2018  CM Risk Score: 2  Lobo Mortality Risk Score: 4.36    ACC: Isabel Bansal RN    Summary Note: ACC spoke with pt, pt reports that he is doing good, he has seen improvement in BP  Todays /73 & 124/81  Reminded pt to keep ongoing BP log, If BP starts to go up, to please give this ACC a call, pt voiced understanding       Care Coordination Interventions    Program Enrollment:  Rising Risk  Referral from Primary Care Provider:  Yes  Suggested Interventions and Community Resources  Other Services:  Completed (Comment: 11/30/17-Pt is [employer] and has a nurse that visits him twice a week M/W-)  Zone Management Tools: In Process (Comment: 8/16/18 Review of blood pressures)  Other Services or Interventions:  8/16/18 REVIEW OF BLOOD PRESSURES         Goals Addressed     None          Prior to Admission medications    Medication Sig Start Date End Date Taking?  Authorizing Provider   cloNIDine (CATAPRES) 0.1 MG tablet TAKE 1 TABLET BY MOUTH TWICE DAILY AS NEEDED FOR BLOOD PRESSURE 180/100 8/16/18   Raenell Fothergill, MD   NIFEdipine (PROCARDIA XL) 30 MG extended release tablet Take 1 tablet by mouth daily 8/16/18   Raenell Fothergill, MD   losartan (COZAAR) 100 MG tablet TAKE 1 TABLET BY MOUTH DAILY 7/15/18   Raenell Fothergill, MD   hydrochlorothiazide (HYDRODIURIL) 25 MG tablet Take 25 mg by mouth daily    Historical Provider, MD   celecoxib (CELEBREX) 200 MG capsule TAKE ONE CAPSULE BY MOUTH EVERY DAY 6/25/18   Raenell Fothergill, MD   ipratropium-albuterol (DUONEB) 0.5-2.5 (3) MG/3ML SOLN nebulizer solution Inhale 3 mLs into the lungs every 4 hours 6/6/18   Raenell Fothergill, MD   escitalopram (LEXAPRO) 20 MG tablet Take 1 tablet by mouth daily 4/17/18   Raenell Fothergill, MD   albuterol sulfate  (90 Base) MCG/ACT inhaler Inhale 2 puffs into the lungs every 6 hours as needed for Wheezing 4/17/18   Raenell Fothergill, MD   pantoprazole

## 2018-08-27 ENCOUNTER — CARE COORDINATION (OUTPATIENT)
Dept: CARE COORDINATION | Age: 68
End: 2018-08-27

## 2018-08-27 RX ORDER — CELECOXIB 200 MG/1
CAPSULE ORAL
Qty: 90 CAPSULE | Refills: 1 | Status: SHIPPED | OUTPATIENT
Start: 2018-08-27 | End: 2019-02-19 | Stop reason: SDUPTHER

## 2018-08-28 NOTE — CARE COORDINATION
Ambulatory Care Coordination Note  8/27/2018  CM Risk Score: 2  Lobo Mortality Risk Score: 4.36    ACC: Catina Young, RN    Summary Note: Pt called ACC, todays /86, pt reports that he is needing Celebrex refill, will work on getting this sent into pharmacy         Care Coordination Interventions    Program Enrollment:  Rising Risk  Referral from Primary Care Provider:  Yes  Suggested Interventions and Community Resources  Other Services:  Completed (Comment: 11/30/17-Pt is [employer] and has a nurse that visits him twice a week M/W-)  Zone Management Tools: In Process (Comment: 8/27/18 Review of blood pressures)  Other Services or Interventions:  8/27/18 REVIEW OF BLOOD PRESSURES, NEED FOR REFILL          Goals Addressed     None          Prior to Admission medications    Medication Sig Start Date End Date Taking?  Authorizing Provider   celecoxib (CELEBREX) 200 MG capsule TAKE ONE CAPSULE BY MOUTH EVERY DAY 8/27/18   Naseem Abdi MD   cloNIDine (CATAPRES) 0.1 MG tablet TAKE 1 TABLET BY MOUTH TWICE DAILY AS NEEDED FOR BLOOD PRESSURE 180/100 8/16/18   Naseem Abdi MD   NIFEdipine (PROCARDIA XL) 30 MG extended release tablet Take 1 tablet by mouth daily 8/16/18   Naseem Abdi MD   losartan (COZAAR) 100 MG tablet TAKE 1 TABLET BY MOUTH DAILY 7/15/18   Naseem Abdi MD   hydrochlorothiazide (HYDRODIURIL) 25 MG tablet Take 25 mg by mouth daily    Historical Provider, MD   ipratropium-albuterol (DUONEB) 0.5-2.5 (3) MG/3ML SOLN nebulizer solution Inhale 3 mLs into the lungs every 4 hours 6/6/18   Naseem Abdi MD   escitalopram (LEXAPRO) 20 MG tablet Take 1 tablet by mouth daily 4/17/18   Naseem Abdi MD   albuterol sulfate  (90 Base) MCG/ACT inhaler Inhale 2 puffs into the lungs every 6 hours as needed for Wheezing 4/17/18   Naseem Abdi MD   pantoprazole (PROTONIX) 40 MG tablet TAKE 1 TABLET BY MOUTH EVERY MORNING BEFORE BREAKFAST 3/14/18   Rose Gloria MD   carbidopa-levodopa (SINEMET)  MG per tablet TAKE 1 TABLET BY MOUTH AT BEDTIME 2/18/18   Rose Gloria MD   rOPINIRole (REQUIP) 0.25 MG tablet Take 1 tablet by mouth nightly 12/14/17   Rose Gloria MD   mometasone-formoterol [facility]) 200-5 MCG/ACT inhaler INHALE 2 PUFFS INTO THE LUNGS EVERY 12 HOURS 12/14/17   Rose Gloria MD   nitroGLYCERIN (NITROSTAT) 0.3 MG SL tablet up to max of 3 total doses.  If no relief after 1 dose, call 911. 9/19/17   Roselyn Mccartney MD       Future Appointments  Date Time Provider Susan Garcia   9/13/2018 10:30 AM ANDERS Giron [facility]   10/16/2018 11:00 AM Rose Gloria MD [address] [facility]

## 2018-09-13 ENCOUNTER — OFFICE VISIT (OUTPATIENT)
Dept: CARDIOLOGY | Age: 68
End: 2018-09-13
Payer: MEDICARE

## 2018-09-13 VITALS
BODY MASS INDEX: 41.22 KG/M2 | WEIGHT: 272 LBS | DIASTOLIC BLOOD PRESSURE: 80 MMHG | HEART RATE: 82 BPM | HEIGHT: 68 IN | SYSTOLIC BLOOD PRESSURE: 138 MMHG

## 2018-09-13 DIAGNOSIS — I10 ESSENTIAL HYPERTENSION: ICD-10-CM

## 2018-09-13 DIAGNOSIS — Z95.0 PACEMAKER: ICD-10-CM

## 2018-09-13 DIAGNOSIS — I49.5 SICK SINUS SYNDROME DUE TO SA NODE DYSFUNCTION (HCC): Primary | ICD-10-CM

## 2018-09-13 PROCEDURE — 93288 INTERROG EVL PM/LDLS PM IP: CPT | Performed by: CLINICAL NURSE SPECIALIST

## 2018-09-13 PROCEDURE — 1036F TOBACCO NON-USER: CPT | Performed by: CLINICAL NURSE SPECIALIST

## 2018-09-13 PROCEDURE — 3017F COLORECTAL CA SCREEN DOC REV: CPT | Performed by: CLINICAL NURSE SPECIALIST

## 2018-09-13 PROCEDURE — G8417 CALC BMI ABV UP PARAM F/U: HCPCS | Performed by: CLINICAL NURSE SPECIALIST

## 2018-09-13 PROCEDURE — 1123F ACP DISCUSS/DSCN MKR DOCD: CPT | Performed by: CLINICAL NURSE SPECIALIST

## 2018-09-13 PROCEDURE — 4040F PNEUMOC VAC/ADMIN/RCVD: CPT | Performed by: CLINICAL NURSE SPECIALIST

## 2018-09-13 PROCEDURE — G8427 DOCREV CUR MEDS BY ELIG CLIN: HCPCS | Performed by: CLINICAL NURSE SPECIALIST

## 2018-09-13 PROCEDURE — 1101F PT FALLS ASSESS-DOCD LE1/YR: CPT | Performed by: CLINICAL NURSE SPECIALIST

## 2018-09-13 PROCEDURE — 99213 OFFICE O/P EST LOW 20 MIN: CPT | Performed by: CLINICAL NURSE SPECIALIST

## 2018-09-13 RX ORDER — AMLODIPINE BESYLATE 10 MG/1
10 TABLET ORAL DAILY
COMMUNITY
End: 2019-01-01 | Stop reason: ALTCHOICE

## 2018-09-21 RX ORDER — HYDROCHLOROTHIAZIDE 25 MG/1
25 TABLET ORAL DAILY
Qty: 30 TABLET | Refills: 5 | Status: SHIPPED | OUTPATIENT
Start: 2018-09-21 | End: 2019-01-16 | Stop reason: SDUPTHER

## 2018-09-25 ENCOUNTER — CARE COORDINATION (OUTPATIENT)
Dept: CARE COORDINATION | Age: 68
End: 2018-09-25

## 2018-09-25 NOTE — CARE COORDINATION
I hope this letter finds you doing well. Rubia Holliday just wanted to take a moment and reach out to you as a quick reminder that I care about your overall health and I am here to support you.  Please don't hesitate to reach out to me for any type of assistance you may need. As a reminder here are the different ways you can contact me:     Irene Moy RN [address] [place] [address] Office   [phone] [email or web address]   Dea

## 2018-10-16 ENCOUNTER — OFFICE VISIT (OUTPATIENT)
Dept: PRIMARY CARE CLINIC | Age: 68
End: 2018-10-16
Payer: MEDICARE

## 2018-10-16 ENCOUNTER — TELEPHONE (OUTPATIENT)
Dept: PRIMARY CARE CLINIC | Age: 68
End: 2018-10-16

## 2018-10-16 VITALS
WEIGHT: 269 LBS | HEART RATE: 83 BPM | HEIGHT: 68 IN | TEMPERATURE: 98.2 F | SYSTOLIC BLOOD PRESSURE: 138 MMHG | RESPIRATION RATE: 18 BRPM | BODY MASS INDEX: 40.77 KG/M2 | DIASTOLIC BLOOD PRESSURE: 84 MMHG | OXYGEN SATURATION: 95 %

## 2018-10-16 DIAGNOSIS — I10 ESSENTIAL HYPERTENSION: ICD-10-CM

## 2018-10-16 DIAGNOSIS — F32.A DEPRESSION, UNSPECIFIED DEPRESSION TYPE: ICD-10-CM

## 2018-10-16 DIAGNOSIS — G25.81 RLS (RESTLESS LEGS SYNDROME): ICD-10-CM

## 2018-10-16 DIAGNOSIS — Z23 NEED FOR INFLUENZA VACCINATION: ICD-10-CM

## 2018-10-16 DIAGNOSIS — I49.5 SICK SINUS SYNDROME DUE TO SA NODE DYSFUNCTION (HCC): ICD-10-CM

## 2018-10-16 DIAGNOSIS — Z00.00 ROUTINE GENERAL MEDICAL EXAMINATION AT A HEALTH CARE FACILITY: Primary | ICD-10-CM

## 2018-10-16 LAB
ALBUMIN SERPL-MCNC: 4.3 G/DL (ref 3.5–5.2)
ALP BLD-CCNC: 97 U/L (ref 40–130)
ALT SERPL-CCNC: 21 U/L (ref 5–41)
ANION GAP SERPL CALCULATED.3IONS-SCNC: 13 MMOL/L (ref 7–19)
AST SERPL-CCNC: 27 U/L (ref 5–40)
BILIRUB SERPL-MCNC: 0.4 MG/DL (ref 0.2–1.2)
BUN BLDV-MCNC: 17 MG/DL (ref 8–23)
CALCIUM SERPL-MCNC: 9.7 MG/DL (ref 8.8–10.2)
CHLORIDE BLD-SCNC: 102 MMOL/L (ref 98–111)
CHOLESTEROL, TOTAL: 200 MG/DL (ref 160–199)
CO2: 29 MMOL/L (ref 22–29)
CREAT SERPL-MCNC: 0.9 MG/DL (ref 0.5–1.2)
GFR NON-AFRICAN AMERICAN: >60
GLUCOSE BLD-MCNC: 121 MG/DL (ref 74–109)
HDLC SERPL-MCNC: 67 MG/DL (ref 55–121)
LDL CHOLESTEROL CALCULATED: 78 MG/DL
POTASSIUM SERPL-SCNC: 4.5 MMOL/L (ref 3.5–5)
SODIUM BLD-SCNC: 144 MMOL/L (ref 136–145)
TOTAL PROTEIN: 7.8 G/DL (ref 6.6–8.7)
TRIGL SERPL-MCNC: 276 MG/DL (ref 0–149)
TSH SERPL DL<=0.05 MIU/L-ACNC: 3.37 UIU/ML (ref 0.27–4.2)

## 2018-10-16 PROCEDURE — G0438 PPPS, INITIAL VISIT: HCPCS | Performed by: FAMILY MEDICINE

## 2018-10-16 PROCEDURE — G0008 ADMIN INFLUENZA VIRUS VAC: HCPCS | Performed by: FAMILY MEDICINE

## 2018-10-16 PROCEDURE — 4040F PNEUMOC VAC/ADMIN/RCVD: CPT | Performed by: FAMILY MEDICINE

## 2018-10-16 PROCEDURE — G8482 FLU IMMUNIZE ORDER/ADMIN: HCPCS | Performed by: FAMILY MEDICINE

## 2018-10-16 PROCEDURE — 90662 IIV NO PRSV INCREASED AG IM: CPT | Performed by: FAMILY MEDICINE

## 2018-10-16 PROCEDURE — 99213 OFFICE O/P EST LOW 20 MIN: CPT | Performed by: FAMILY MEDICINE

## 2018-10-16 RX ORDER — ROPINIROLE 0.25 MG/1
0.25 TABLET, FILM COATED ORAL NIGHTLY
Qty: 90 TABLET | Refills: 3 | Status: SHIPPED | OUTPATIENT
Start: 2018-10-16 | End: 2019-01-01 | Stop reason: SDUPTHER

## 2018-10-16 RX ORDER — CETIRIZINE HYDROCHLORIDE 10 MG/1
10 TABLET ORAL DAILY
Status: ON HOLD | COMMUNITY
End: 2020-01-01 | Stop reason: HOSPADM

## 2018-10-16 RX ORDER — FLUOXETINE HYDROCHLORIDE 20 MG/1
20 CAPSULE ORAL DAILY
Qty: 30 CAPSULE | Refills: 3 | Status: SHIPPED | OUTPATIENT
Start: 2018-10-16 | End: 2019-02-05 | Stop reason: SDUPTHER

## 2018-10-16 ASSESSMENT — ENCOUNTER SYMPTOMS
COLOR CHANGE: 0
SHORTNESS OF BREATH: 0
COUGH: 0

## 2018-10-16 ASSESSMENT — LIFESTYLE VARIABLES
HAVE YOU OR SOMEONE ELSE BEEN INJURED AS A RESULT OF YOUR DRINKING: 0
HOW OFTEN DURING THE LAST YEAR HAVE YOU FOUND THAT YOU WERE NOT ABLE TO STOP DRINKING ONCE YOU HAD STARTED: 0
HOW OFTEN DURING THE LAST YEAR HAVE YOU HAD A FEELING OF GUILT OR REMORSE AFTER DRINKING: 0
HAS A RELATIVE, FRIEND, DOCTOR, OR ANOTHER HEALTH PROFESSIONAL EXPRESSED CONCERN ABOUT YOUR DRINKING OR SUGGESTED YOU CUT DOWN: 0
AUDIT-C TOTAL SCORE: 3
HOW OFTEN DO YOU HAVE SIX OR MORE DRINKS ON ONE OCCASION: 0
HOW OFTEN DO YOU HAVE A DRINK CONTAINING ALCOHOL: 3
HOW MANY STANDARD DRINKS CONTAINING ALCOHOL DO YOU HAVE ON A TYPICAL DAY: 0
HOW OFTEN DURING THE LAST YEAR HAVE YOU BEEN UNABLE TO REMEMBER WHAT HAPPENED THE NIGHT BEFORE BECAUSE YOU HAD BEEN DRINKING: 0
HOW OFTEN DURING THE LAST YEAR HAVE YOU NEEDED AN ALCOHOLIC DRINK FIRST THING IN THE MORNING TO GET YOURSELF GOING AFTER A NIGHT OF HEAVY DRINKING: 0

## 2018-10-16 ASSESSMENT — PATIENT HEALTH QUESTIONNAIRE - PHQ9
SUM OF ALL RESPONSES TO PHQ QUESTIONS 1-9: 2
SUM OF ALL RESPONSES TO PHQ QUESTIONS 1-9: 2

## 2018-10-16 ASSESSMENT — ANXIETY QUESTIONNAIRES: GAD7 TOTAL SCORE: 0

## 2018-10-16 NOTE — PATIENT INSTRUCTIONS
Personalized Preventive Plan for Kavita Armenta - 10/16/2018  Medicare offers a range of preventive health benefits. Some of the tests and screenings are paid in full while other may be subject to a deductible, co-insurance, and/or copay. Some of these benefits include a comprehensive review of your medical history including lifestyle, illnesses that may run in your family, and various assessments and screenings as appropriate. After reviewing your medical record and screening and assessments performed today your provider may have ordered immunizations, labs, imaging, and/or referrals for you. A list of these orders (if applicable) as well as your Preventive Care list are included within your After Visit Summary for your review. Other Preventive Recommendations:    · A preventive eye exam performed by an eye specialist is recommended every 1-2 years to screen for glaucoma; cataracts, macular degeneration, and other eye disorders. · A preventive dental visit is recommended every 6 months. · Try to get at least 150 minutes of exercise per week or 10,000 steps per day on a pedometer . · Order or download the FREE \"Exercise & Physical Activity: Your Everyday Guide\" from [employer]. Call 4-821.896.4451 or search [employer] online. · You need 0107-2498 mg of calcium and 9315-8410 IU of vitamin D per day. It is possible to meet your calcium requirement with diet alone, but a vitamin D supplement is usually necessary to meet this goal.  · When exposed to the sun, use a sunscreen that protects against both UVA and UVB radiation with an SPF of 30 or greater. Reapply every 2 to 3 hours or after sweating, drying off with a towel, or swimming. · Always wear a seat belt when traveling in a car. Always wear a helmet when riding a bicycle or motorcycle.   Patient information: Weight loss treatments    INTRODUCTION -- Obesity is a major international problem, and Americans are yourself for good eating behaviors can help you to develop better habits. This is not a reward for weight loss; instead, it is a reward for changing unhealthy behaviors. Do not use food as a reward. Some people find money, clothing, or personal care (eg, a hair cut, manicure, or massage) to be effective rewards. Treat yourself immediately after making better eating choices to reinforce the value of the good behavior. You need to have clear behavior goals, and you must have a time frame for reaching your goals. Reward small changes along the way to your final goal.  Other factors that contribute to successful weight loss -- Changing your behavior involves more than just changing unhealthy eating habits; it also involves finding people around you to support your weight loss, reducing stress, and learning to be strong when tempted by food. Establish a \"rojelio\" system -- Having a friend or family member available to provide support and reinforce good behavior is very helpful. The support person needs to understand your goals. Learn to be strong -- Learning to be strong when tempted by food is an important part of losing weight. As an example, you will need to learn how to say \"no\" and continue to say no when urged to eat at parties and social gatherings. Develop strategies for events before you go, such as eating before you go or taking low-calorie snacks and drinks with you. Develop a support system -- Having a support system is helpful when losing weight. This is why many 4th aspect groups are successful. Family support is also essential; if your family does not support your efforts to lose weight, this can slow your progress or even keep you from losing weight. Positive thinking -- People often have conversations with themselves in their head; these conversations can be positive or negative.  If you eat a piece of cake that was not planned, you may respond by thinking, \"Oh, you stupid idiot, you've blown your

## 2018-10-16 NOTE — PROGRESS NOTES
and well-nourished. No distress. HENT:   Head: Normocephalic and atraumatic. Eyes: Conjunctivae are normal. No scleral icterus. Neck: Normal range of motion. Neck supple. No thyromegaly present. Cardiovascular: Normal rate, regular rhythm and normal heart sounds. No murmur heard. Pulmonary/Chest: Effort normal and breath sounds normal. No respiratory distress. He has no wheezes. Abdominal:   Obese     Musculoskeletal: He exhibits no edema. Neurological: He is alert and oriented to person, place, and time. No cranial nerve deficit. Skin: Skin is warm. No rash noted. Psychiatric: He has a normal mood and affect. His behavior is normal.   Nursing note and vitals reviewed. Assessment    ICD-10-CM    1. Routine general medical examination at a health care facility Z00.00 See AWV. 2. RLS (restless legs syndrome) G25.81 Will continue rOPINIRole (REQUIP) 0.25 MG tablet 1 po nightly. 3. Need for influenza vaccination Z23 INFLUENZA, HIGH DOSE, 65 YRS +, IM, PF, PREFILL SYR, 0.5ML (FLUZONE HD)   4. Sick sinus syndrome due to SA node dysfunction (HCC) I49.5 Stable. 5. Depression, unspecified depression type F32.9 Will continue FLUoxetine (PROZAC) 20 MG capsule 1 po daily. TSH without Reflex   6. Essential hypertension I10 Comprehensive Metabolic Panel     Lipid Panel     TSH without Reflex           Plan    Orders Placed This Encounter   Medications    rOPINIRole (REQUIP) 0.25 MG tablet     Sig: Take 1 tablet by mouth nightly     Dispense:  90 tablet     Refill:  3    FLUoxetine (PROZAC) 20 MG capsule     Sig: Take 1 capsule by mouth daily     Dispense:  30 capsule     Refill:  3       Orders Placed This Encounter   Procedures    INFLUENZA, HIGH DOSE, 65 YRS +, IM, PF, PREFILL SYR, 0.5ML (FLUZONE HD)    Comprehensive Metabolic Panel    Lipid Panel    TSH without Reflex        Return in about 6 weeks (around 11/27/2018) for Anxiety/depression.      Patient Instructions     Personalized Preventive Plan for Geovanni Cadena - 10/16/2018  Medicare offers a range of preventive health benefits. Some of the tests and screenings are paid in full while other may be subject to a deductible, co-insurance, and/or copay. Some of these benefits include a comprehensive review of your medical history including lifestyle, illnesses that may run in your family, and various assessments and screenings as appropriate. After reviewing your medical record and screening and assessments performed today your provider may have ordered immunizations, labs, imaging, and/or referrals for you. A list of these orders (if applicable) as well as your Preventive Care list are included within your After Visit Summary for your review. Other Preventive Recommendations:    · A preventive eye exam performed by an eye specialist is recommended every 1-2 years to screen for glaucoma; cataracts, macular degeneration, and other eye disorders. · A preventive dental visit is recommended every 6 months. · Try to get at least 150 minutes of exercise per week or 10,000 steps per day on a pedometer . · Order or download the FREE \"Exercise & Physical Activity: Your Everyday Guide\" from The "[employer]. Call 3-799.881.3597 or search The "[employer] online. · You need 8550-0617 mg of calcium and 3628-9697 IU of vitamin D per day. It is possible to meet your calcium requirement with diet alone, but a vitamin D supplement is usually necessary to meet this goal.  · When exposed to the sun, use a sunscreen that protects against both UVA and UVB radiation with an SPF of 30 or greater. Reapply every 2 to 3 hours or after sweating, drying off with a towel, or swimming. · Always wear a seat belt when traveling in a car. Always wear a helmet when riding a bicycle or motorcycle.   Patient information: Weight loss treatments    INTRODUCTION -- Obesity is a major international problem, and Americans are among the and as a result, you may eat more cake. A positive thought for the same event could be, \"Well, I ate cake when it was not on my plan. Now I should do something to get back on track. \" A positive approach is much more likely to be successful than a negative one. Reduce stress -- Although stress is a part of everyday life, it can trigger uncontrolled eating in some people. It is important to find a way to get through these difficult times without eating or by eating low-calorie food, like raw vegetables. It may be helpful to imagine a relaxing place that allows you to temporarily escape from stress. With deep breaths and closed eyes, you can imagine this relaxing place for a few minutes. Self-help programs -- Self-help programs like [employer] Watchers®, Overeaters Anonymous®, and Take Off [employer] (TOPS)© work for some people. As with all weight loss programs, you are most likely to be successful with these plans if you make long-term changes in how you eat. CHOOSING A DIET -- A calorie is a unit of energy found in food. Your body needs calories to function. The goal of any diet is to burn up more calories than you eat. How quickly you lose weight depends upon several factors, such as your age, gender, and starting weight. Older people have a slower metabolism than young people, so they lose weight more slowly. Men lose more weight than women of similar height and weight when dieting because they use more energy. People who are extremely overweight lose weight more quickly than those who are only mildly overweight. Try not to drink alcohol or drinks with added sugar, and most sweets (candy, cakes, cookies), since they rarely contain important nutrients. Portion-controlled diets -- One simple way to diet is to buy packaged foods, like frozen low-calorie meals or meal-replacement canned drinks.  A typical meal plan for one day may include:  A meal-replacement drink or breakfast bar for breakfast   A meal-replacement drink or a frozen low-calorie (250 to 350 calories) meal for lunch   A frozen low-calorie meal or other prepackaged, calorie-controlled meal, along with extra vegetables for dinner  This would give you 1000 to 1500 calories per day. Low-fat diet -- To reduce the amount of fat in your diet, you can:  Eat low-fat foods. Low-fat foods are those that contain less than 30 percent of calories from fat. Fat is listed on the food facts label (figure 1). Count fat grams. For a 1500 calorie diet, this would mean about 45 g or fewer of fat per day. Low-carbohydrate diet -- Low- and very-low-carbohydrate diets (eg, Atkins diet, [employer]) have become popular ways to lose weight quickly. With a very-low-carbohydrate diet, you eat between 0 and 60 grams of carbohydrates per day (a standard diet contains 200 to 300 grams of carbohydrates)   With a low-carbohydrate diet, you eat between 60 and 130 grams of carbohydrates per day  Carbohydrates are found in fruits, vegetables, and grains (including breads, rice, pasta, and cereal), alcoholic beverages, and in dairy products. Meat and fish do not contain carbohydrates. Side effects of very-low-carbohydrate diets can include constipation, headache, bad breath, muscle cramps, diarrhea, and weakness. Mediterranean diet -- The term \"Mediterranean diet\" refers to a way of eating that is common in olive-growing regions around the [facility]. Although there is some variation in Mediterranean diets, there are some similarities. Most Mediterranean diets include:  A high level of monounsaturated fats (from olive or canola oil, walnuts, pecans, almonds) and a low level of saturated fats (from butter)   A high amount of vegetables, fruits, legumes, and grains (7 to 10 servings of fruits and vegetables per day)   A moderate amount of milk and dairy products, mostly in the form of cheese. Use low-fat dairy products (skim milk, fat-free yogurt, low-fat cheese). have other medical problems, such as diabetes, high cholesterol, or high blood pressure  Two weight loss medicines are approved in the [place] for long-term use. These are sibutramine and orlistat. Other weight loss medicines (phentermine, diethylpropion) are available but are only approved for short-term use (up to 12 weeks). Sibutramine -- Sibutramine (Meridia®, Reductil®) is a medicine that reduces your appetite. In people who take the medicine for one year, the average weight loss is 10 percent of the initial body weight (5 percent more than those who took a placebo treatment). Side effects of sibutramine include insomnia, dry mouth, and constipation. Increases in blood pressure can occur. Therefore, blood pressure is usually monitored during treatment. There is no evidence that sibutramine causes heart or lung problems (like dexfenfluramine and fenfluramine (Phen/Fen)). However, experts agree that sibutramine should not used by people with coronary heart disease, heart failure, uncontrolled hypertension, stroke, irregular heart rhythms, or peripheral vascular disease (poor circulation in the legs). Orlistat -- Orlistat (Xenical® 120 mg capsules) is a medicine that reduces the amount of fat your body absorbs from the foods you eat. A lower-dose version is now available without a prescription (Farshad® 60 mg capsules) in many countries, including the [place]. The medicine is recommended three times per day, taken with a meal; you can skip a dose if you skip a meal or if the meal contains no fat. After one year of treatment with orlistat, the average weight loss is approximately 8 to 10 percent of initial body weight (4 percent more than in those who took a placebo). Cholesterol levels often improve, and blood pressure sometimes falls. In people with diabetes, orlistat may help control blood sugar levels.   Side effects occur in 15 to 10 percent of people and may include stomach cramps, gas, diarrhea, banding (LAGB), or lap banding, is a surgery that uses an adjustable band around the opening to the stomach (figure 1). This reduces the amount of food that you can eat at one time. Lap banding is done through small incisions, with a laparoscope. The band can be adjusted after surgery, allowing you to eat more or less food. Adjustments to the size and tightness of the band are made by using a needle to add or remove fluid from a port (a small container under the skin that is connected to the band). Adding fluid to the band makes it tighter which restricts the amount of food you can eat and may help you to lose more weight. Lap banding is a popular choice because it is relatively simple to perform, can be adjusted or removed, and has a low risk of serious complications immediately after surgery. However, weight loss with the lap band depends on your ability to follow the program closely. You will need to prepare nutritious meals that [facility] with\" the band, not against it. For example, the lap band will not work well if you eat or drink a large amount of liquid calories (like ice cream). The band will not help you to feel full when you eat/drink liquid calories. Weight loss ranges from 45 to 75 percent after two years. As an example, a person who is 120 pounds overweight could expect to lose approximately 54 to 90 pounds in the two years after lap banding. Gastric bypass -- Valentina-en-Y gastric bypass, also called gastric bypass, helps you to lose weight by reducing the amount of food you can eat and reducing the number of calories and nutrients you absorb from the food you eat. To perform gastric bypass, a surgeon creates a small stomach pouch by dividing the stomach and attaching it to the small intestine. This helps you to lose weight in two ways: The smaller stomach can hold less food than before surgery. This causes you to feel full after eating a very small amount of food or liquid.  Over time, the pouch might your doctor, nurse, and dietitian on a regular basis after surgery to monitor your health, diet, and weight loss. You will be able to slowly increase how much you eat over time, although it will always be important to:  Eat small, frequent meals and not skip meals   Chew your food slowly and completely   Avoid eating while \"distracted\" (such as eating while watching TV)   Stop eating when you feel full   Drink liquids at least 30 minutes before or after eating   Avoid foods high in fat or sugar   Take vitamin supplements, as recommended  It can take several months to learn to listen to your body so that you know when you are hungry and when you are full. You may dislike foods you previously loved, and you may begin to prefer new foods. This can be a frustrating process for some people, so talk to your dietitian if you are having trouble. It usually takes between one and two years to lose weight after surgery. After reaching their goal weight, some people have plastic surgery (called \"body contouring\") to remove excess skin from the body, particularly in the abdominal area. Before you decide to have weight loss surgery, you must commit to staying healthy for life. This includes following up with your healthcare team, exercising most days of the week, and eating a sensible diet every day. It can be difficult to develop new eating and exercise habits after weight loss surgery, and you will have to work hard to stick to your goals. Recovering from surgery and losing weight can be stressful and emotional, and it is important to have the support of family and friends. Working with a , therapist, or support group can help you through the ups and downs. WHERE TO GET MORE INFORMATION -- Your healthcare provider is the best source of information for questions and concerns related to your medical problem.   This article will be updated as needed every four months on our Web site in the [facility] box to learn more about \"Learning About Living Antoinette Davis. \"     If you do not have an account, please click on the \"Sign Up Now\" link. Current as of: October 6, 2017  Content Version: 11.7  © 4349-5772 [employer]. Care instructions adapted under license by [facility] ([facility]). If you have questions about a medical condition or this instruction, always ask your healthcare professional. [address] any warranty or liability for your use of this information.   ·

## 2018-10-16 NOTE — PROGRESS NOTES
(REQUIP) 0.25 MG tablet Take 1 tablet by mouth nightly Yes Ronel Leung MD   mometasone-formoterol [facility]) 200-5 MCG/ACT inhaler INHALE 2 PUFFS INTO THE LUNGS EVERY 12 HOURS Yes Ronel Leung MD   nitroGLYCERIN (NITROSTAT) 0.3 MG SL tablet up to max of 3 total doses. If no relief after 1 dose, call 911. Yes Zahra Garcia MD   hydrochlorothiazide (HYDRODIURIL) 25 MG tablet Take 25 mg by mouth daily  Historical Provider, MD       Past Medical History:   Diagnosis Date    Anemia     Back pain     Cellulitis     Elevated triglycerides with high cholesterol     GERD (gastroesophageal reflux disease)     Hypertension     Osteoarthritis     Pacemaker 04/21/2017     Past Surgical History:   Procedure Laterality Date    COLONOSCOPY  11/18/11        JOINT REPLACEMENT      Bilateral total knees    NECK SURGERY      PACEMAKER PLACEMENT      TOTAL KNEE ARTHROPLASTY      RIGHT AND LEFT KNEE    UPPER GASTROINTESTINAL ENDOSCOPY  2/2012           Family History   Problem Relation Age of Onset    Breast Cancer Mother     Colon Cancer Father     Colon Polyps Father        CareTeam (Including outside providers/suppliers regularly involved in providing care):   Patient Care Team:  Ronel Leung MD as PCP - General (Family Medicine)  Ronel Leung MD as PCP - S Attributed Provider  Benigno Garcia DO as Consulting Physician (Gastroenterology)  CICI Caballero MD as Consulting Physician (Cardiology)  Jeremy Wells RN as Care Coordinator    Wt Readings from Last 3 Encounters:   10/16/18 269 lb (122 kg)   09/13/18 272 lb (123.4 kg)   06/28/18 270 lb (122.5 kg)     Vitals:    10/16/18 1034   BP: 138/84   Pulse: 83   Resp: 18   Temp: 98.2 °F (36.8 °C)   SpO2: 95%   Weight: 269 lb (122 kg)   Height: 5' 8\" (1.727 m)     Body mass index is 40.9 kg/m².     Based upon direct observation of the patient, evaluation of cognition reveals recent and remote memory

## 2018-10-22 DIAGNOSIS — I10 ESSENTIAL HYPERTENSION: ICD-10-CM

## 2018-10-22 RX ORDER — AMLODIPINE BESYLATE 10 MG/1
TABLET ORAL
Qty: 30 TABLET | Refills: 11 | Status: SHIPPED | OUTPATIENT
Start: 2018-10-22 | End: 2019-03-14 | Stop reason: SDUPTHER

## 2018-10-30 ENCOUNTER — CARE COORDINATION (OUTPATIENT)
Dept: CARE COORDINATION | Age: 68
End: 2018-10-30

## 2018-11-01 DIAGNOSIS — I10 ESSENTIAL HYPERTENSION: ICD-10-CM

## 2018-11-01 RX ORDER — CLONIDINE HYDROCHLORIDE 0.1 MG/1
TABLET ORAL
Qty: 60 TABLET | Refills: 1 | Status: SHIPPED | OUTPATIENT
Start: 2018-11-01 | End: 2019-01-11 | Stop reason: SDUPTHER

## 2018-12-14 ENCOUNTER — TELEPHONE (OUTPATIENT)
Dept: CARDIOLOGY | Age: 68
End: 2018-12-14

## 2018-12-17 DIAGNOSIS — I49.5 SICK SINUS SYNDROME DUE TO SA NODE DYSFUNCTION (HCC): ICD-10-CM

## 2018-12-17 DIAGNOSIS — Z95.0 PACEMAKER: Primary | ICD-10-CM

## 2018-12-17 PROCEDURE — 93294 REM INTERROG EVL PM/LDLS PM: CPT | Performed by: CLINICAL NURSE SPECIALIST

## 2018-12-17 PROCEDURE — 93296 REM INTERROG EVL PM/IDS: CPT | Performed by: CLINICAL NURSE SPECIALIST

## 2018-12-31 ENCOUNTER — CARE COORDINATION (OUTPATIENT)
Dept: CARE COORDINATION | Age: 68
End: 2018-12-31

## 2018-12-31 ENCOUNTER — OFFICE VISIT (OUTPATIENT)
Dept: PRIMARY CARE CLINIC | Age: 68
End: 2018-12-31
Payer: MEDICARE

## 2018-12-31 VITALS
TEMPERATURE: 98.5 F | DIASTOLIC BLOOD PRESSURE: 82 MMHG | HEIGHT: 68 IN | SYSTOLIC BLOOD PRESSURE: 134 MMHG | OXYGEN SATURATION: 97 % | BODY MASS INDEX: 41.37 KG/M2 | WEIGHT: 273 LBS | HEART RATE: 92 BPM

## 2018-12-31 DIAGNOSIS — J01.00 ACUTE NON-RECURRENT MAXILLARY SINUSITIS: ICD-10-CM

## 2018-12-31 DIAGNOSIS — F32.A DEPRESSION, UNSPECIFIED DEPRESSION TYPE: ICD-10-CM

## 2018-12-31 DIAGNOSIS — F41.9 ANXIETY: Primary | ICD-10-CM

## 2018-12-31 DIAGNOSIS — Z23 NEED FOR SHINGLES VACCINE: ICD-10-CM

## 2018-12-31 DIAGNOSIS — I10 ESSENTIAL HYPERTENSION: ICD-10-CM

## 2018-12-31 PROCEDURE — G8417 CALC BMI ABV UP PARAM F/U: HCPCS | Performed by: FAMILY MEDICINE

## 2018-12-31 PROCEDURE — G8427 DOCREV CUR MEDS BY ELIG CLIN: HCPCS | Performed by: FAMILY MEDICINE

## 2018-12-31 PROCEDURE — 99213 OFFICE O/P EST LOW 20 MIN: CPT | Performed by: FAMILY MEDICINE

## 2018-12-31 PROCEDURE — G8482 FLU IMMUNIZE ORDER/ADMIN: HCPCS | Performed by: FAMILY MEDICINE

## 2018-12-31 PROCEDURE — 1123F ACP DISCUSS/DSCN MKR DOCD: CPT | Performed by: FAMILY MEDICINE

## 2018-12-31 PROCEDURE — 3017F COLORECTAL CA SCREEN DOC REV: CPT | Performed by: FAMILY MEDICINE

## 2018-12-31 PROCEDURE — 1101F PT FALLS ASSESS-DOCD LE1/YR: CPT | Performed by: FAMILY MEDICINE

## 2018-12-31 PROCEDURE — 4040F PNEUMOC VAC/ADMIN/RCVD: CPT | Performed by: FAMILY MEDICINE

## 2018-12-31 PROCEDURE — 1036F TOBACCO NON-USER: CPT | Performed by: FAMILY MEDICINE

## 2018-12-31 RX ORDER — AMOXICILLIN AND CLAVULANATE POTASSIUM 875; 125 MG/1; MG/1
1 TABLET, FILM COATED ORAL 2 TIMES DAILY
Qty: 20 TABLET | Refills: 0 | Status: SHIPPED | OUTPATIENT
Start: 2018-12-31 | End: 2019-01-10

## 2018-12-31 NOTE — PROGRESS NOTES
1 capsule by mouth daily 10/16/18  Yes Zaida Pan MD   hydrochlorothiazide (HYDRODIURIL) 25 MG tablet TAKE 1 TABLET BY MOUTH DAILY 9/21/18  Yes Zaida Pan MD   amLODIPine (NORVASC) 10 MG tablet Take 10 mg by mouth daily   Yes Adina Godoy MD   celecoxib (CELEBREX) 200 MG capsule TAKE ONE CAPSULE BY MOUTH EVERY DAY 8/27/18  Yes Zaida Pan MD   NIFEdipine (PROCARDIA XL) 30 MG extended release tablet Take 1 tablet by mouth daily 8/16/18  Yes Zaida Pan MD   losartan (COZAAR) 100 MG tablet TAKE 1 TABLET BY MOUTH DAILY 7/15/18  Yes Zaida Pan MD   ipratropium-albuterol (DUONEB) 0.5-2.5 (3) MG/3ML SOLN nebulizer solution Inhale 3 mLs into the lungs every 4 hours 6/6/18  Yes Zaida Pan MD   albuterol sulfate  (90 Base) MCG/ACT inhaler Inhale 2 puffs into the lungs every 6 hours as needed for Wheezing 4/17/18  Yes Zaida Pan MD   pantoprazole (PROTONIX) 40 MG tablet TAKE 1 TABLET BY MOUTH EVERY MORNING BEFORE BREAKFAST 3/14/18  Yes Zaida Pan MD   mometasone-formoterol [facility]) 200-5 MCG/ACT inhaler INHALE 2 PUFFS INTO THE LUNGS EVERY 12 HOURS 12/14/17  Yes Zaida Pan MD   nitroGLYCERIN (NITROSTAT) 0.3 MG SL tablet up to max of 3 total doses.  If no relief after 1 dose, call 911. 9/19/17  Yes Robson Hastings MD       Past Medical History:   Diagnosis Date    Anemia     Back pain     Cellulitis     Elevated triglycerides with high cholesterol     GERD (gastroesophageal reflux disease)     Hypertension     Osteoarthritis     Pacemaker 04/21/2017       Past Surgical History:   Procedure Laterality Date    COLONOSCOPY  11/18/11        JOINT REPLACEMENT      Bilateral total knees    NECK SURGERY      PACEMAKER PLACEMENT      TOTAL KNEE ARTHROPLASTY      RIGHT AND LEFT KNEE    UPPER GASTROINTESTINAL ENDOSCOPY  2/2012           Social History     Social History    Marital status:

## 2019-01-01 ENCOUNTER — CARE COORDINATION (OUTPATIENT)
Dept: CARE COORDINATION | Age: 69
End: 2019-01-01

## 2019-01-01 ENCOUNTER — CARE COORDINATION (OUTPATIENT)
Dept: CASE MANAGEMENT | Age: 69
End: 2019-01-01

## 2019-01-01 ENCOUNTER — HOSPITAL ENCOUNTER (INPATIENT)
Age: 69
LOS: 5 days | Discharge: HOME HEALTH | DRG: 189 | End: 2019-09-19
Attending: EMERGENCY MEDICINE | Admitting: INTERNAL MEDICINE
Payer: COMMERCIAL

## 2019-01-01 ENCOUNTER — TELEPHONE (OUTPATIENT)
Dept: PRIMARY CARE CLINIC | Age: 69
End: 2019-01-01

## 2019-01-01 ENCOUNTER — HOSPITAL ENCOUNTER (OUTPATIENT)
Dept: NUCLEAR MEDICINE | Age: 69
Discharge: HOME | End: 2019-12-13
Payer: COMMERCIAL

## 2019-01-01 ENCOUNTER — APPOINTMENT (OUTPATIENT)
Dept: GENERAL RADIOLOGY | Age: 69
DRG: 189 | End: 2019-01-01
Payer: COMMERCIAL

## 2019-01-01 ENCOUNTER — TELEPHONE (OUTPATIENT)
Dept: CARDIOLOGY | Age: 69
End: 2019-01-01

## 2019-01-01 ENCOUNTER — OFFICE VISIT (OUTPATIENT)
Dept: CARDIOLOGY | Age: 69
End: 2019-01-01
Payer: MEDICARE

## 2019-01-01 ENCOUNTER — OFFICE VISIT (OUTPATIENT)
Dept: PRIMARY CARE CLINIC | Age: 69
End: 2019-01-01
Payer: MEDICARE

## 2019-01-01 ENCOUNTER — OFFICE VISIT (OUTPATIENT)
Dept: PRIMARY CARE CLINIC | Age: 69
End: 2019-01-01
Payer: COMMERCIAL

## 2019-01-01 ENCOUNTER — APPOINTMENT (OUTPATIENT)
Dept: CT IMAGING | Age: 69
DRG: 189 | End: 2019-01-01
Payer: COMMERCIAL

## 2019-01-01 ENCOUNTER — TELEPHONE (OUTPATIENT)
Dept: INTERNAL MEDICINE | Age: 69
End: 2019-01-01

## 2019-01-01 ENCOUNTER — OFFICE VISIT (OUTPATIENT)
Dept: INTERNAL MEDICINE | Age: 69
End: 2019-01-01
Payer: COMMERCIAL

## 2019-01-01 ENCOUNTER — HOSPITAL ENCOUNTER (OUTPATIENT)
Dept: PULMONOLOGY | Age: 69
Discharge: HOME | End: 2019-06-28
Payer: COMMERCIAL

## 2019-01-01 ENCOUNTER — HOSPITAL ENCOUNTER (OUTPATIENT)
Dept: PULMONOLOGY | Age: 69
Discharge: HOME | End: 2019-07-08
Payer: COMMERCIAL

## 2019-01-01 ENCOUNTER — OFFICE VISIT (OUTPATIENT)
Dept: INTERNAL MEDICINE | Age: 69
End: 2019-01-01
Payer: MEDICARE

## 2019-01-01 ENCOUNTER — HOSPITAL ENCOUNTER (OUTPATIENT)
Dept: NON INVASIVE DIAGNOSTICS | Age: 69
Discharge: HOME | End: 2019-12-11
Payer: COMMERCIAL

## 2019-01-01 ENCOUNTER — HOSPITAL ENCOUNTER (OUTPATIENT)
Dept: SLEEP CENTER | Age: 69
Discharge: HOME | End: 2019-12-15
Payer: MEDICARE

## 2019-01-01 VITALS
DIASTOLIC BLOOD PRESSURE: 80 MMHG | HEART RATE: 84 BPM | OXYGEN SATURATION: 95 % | SYSTOLIC BLOOD PRESSURE: 148 MMHG | BODY MASS INDEX: 40.16 KG/M2 | HEIGHT: 68 IN | WEIGHT: 265 LBS

## 2019-01-01 VITALS
RESPIRATION RATE: 18 BRPM | HEIGHT: 68 IN | OXYGEN SATURATION: 91 % | BODY MASS INDEX: 39.15 KG/M2 | WEIGHT: 258.3 LBS | TEMPERATURE: 98 F | DIASTOLIC BLOOD PRESSURE: 90 MMHG | HEART RATE: 97 BPM | SYSTOLIC BLOOD PRESSURE: 135 MMHG

## 2019-01-01 VITALS
SYSTOLIC BLOOD PRESSURE: 138 MMHG | WEIGHT: 263 LBS | BODY MASS INDEX: 39.86 KG/M2 | DIASTOLIC BLOOD PRESSURE: 80 MMHG | HEIGHT: 68 IN | HEART RATE: 74 BPM

## 2019-01-01 VITALS
SYSTOLIC BLOOD PRESSURE: 117 MMHG | DIASTOLIC BLOOD PRESSURE: 74 MMHG | HEART RATE: 82 BPM | TEMPERATURE: 99.1 F | OXYGEN SATURATION: 98 %

## 2019-01-01 VITALS
DIASTOLIC BLOOD PRESSURE: 82 MMHG | HEART RATE: 78 BPM | SYSTOLIC BLOOD PRESSURE: 132 MMHG | HEIGHT: 68 IN | WEIGHT: 273 LBS | BODY MASS INDEX: 41.37 KG/M2

## 2019-01-01 VITALS
DIASTOLIC BLOOD PRESSURE: 84 MMHG | OXYGEN SATURATION: 97 % | HEIGHT: 68 IN | HEART RATE: 68 BPM | SYSTOLIC BLOOD PRESSURE: 132 MMHG | WEIGHT: 270.4 LBS | TEMPERATURE: 96.9 F | BODY MASS INDEX: 40.98 KG/M2

## 2019-01-01 VITALS
SYSTOLIC BLOOD PRESSURE: 122 MMHG | HEART RATE: 72 BPM | WEIGHT: 273 LBS | HEIGHT: 68 IN | DIASTOLIC BLOOD PRESSURE: 70 MMHG | BODY MASS INDEX: 41.37 KG/M2

## 2019-01-01 VITALS
WEIGHT: 268.8 LBS | HEART RATE: 80 BPM | SYSTOLIC BLOOD PRESSURE: 140 MMHG | BODY MASS INDEX: 40.74 KG/M2 | HEIGHT: 68 IN | TEMPERATURE: 97.4 F | OXYGEN SATURATION: 95 % | DIASTOLIC BLOOD PRESSURE: 78 MMHG

## 2019-01-01 VITALS
SYSTOLIC BLOOD PRESSURE: 118 MMHG | BODY MASS INDEX: 41.59 KG/M2 | OXYGEN SATURATION: 97 % | TEMPERATURE: 98.1 F | WEIGHT: 274.4 LBS | HEART RATE: 68 BPM | HEIGHT: 68 IN | DIASTOLIC BLOOD PRESSURE: 74 MMHG

## 2019-01-01 DIAGNOSIS — R09.02 HYPOXIA: ICD-10-CM

## 2019-01-01 DIAGNOSIS — I47.1 SVT (SUPRAVENTRICULAR TACHYCARDIA) (HCC): ICD-10-CM

## 2019-01-01 DIAGNOSIS — I49.5 SICK SINUS SYNDROME DUE TO SA NODE DYSFUNCTION (HCC): ICD-10-CM

## 2019-01-01 DIAGNOSIS — J63.2 BERYLLIUM DISEASE (HCC): ICD-10-CM

## 2019-01-01 DIAGNOSIS — Z12.5 SCREENING FOR PROSTATE CANCER: ICD-10-CM

## 2019-01-01 DIAGNOSIS — I51.89 DIASTOLIC DYSFUNCTION: ICD-10-CM

## 2019-01-01 DIAGNOSIS — R06.2 WHEEZING: ICD-10-CM

## 2019-01-01 DIAGNOSIS — I10 ESSENTIAL HYPERTENSION: ICD-10-CM

## 2019-01-01 DIAGNOSIS — F32.A DEPRESSION, UNSPECIFIED DEPRESSION TYPE: ICD-10-CM

## 2019-01-01 DIAGNOSIS — G25.81 RLS (RESTLESS LEGS SYNDROME): ICD-10-CM

## 2019-01-01 DIAGNOSIS — M19.90 OSTEOARTHRITIS, UNSPECIFIED OSTEOARTHRITIS TYPE, UNSPECIFIED SITE: ICD-10-CM

## 2019-01-01 DIAGNOSIS — R06.09 DYSPNEA ON EXERTION: ICD-10-CM

## 2019-01-01 DIAGNOSIS — R79.89 ELEVATED SERUM CREATININE: ICD-10-CM

## 2019-01-01 DIAGNOSIS — E78.2 MIXED HYPERLIPIDEMIA: ICD-10-CM

## 2019-01-01 DIAGNOSIS — Z12.5 ENCOUNTER FOR SCREENING FOR MALIGNANT NEOPLASM OF PROSTATE: ICD-10-CM

## 2019-01-01 DIAGNOSIS — G47.30 SLEEP APNEA, UNSPECIFIED TYPE: Primary | ICD-10-CM

## 2019-01-01 DIAGNOSIS — F41.9 ANXIETY: Primary | ICD-10-CM

## 2019-01-01 DIAGNOSIS — Z95.0 PACEMAKER: ICD-10-CM

## 2019-01-01 DIAGNOSIS — R73.9 HYPERGLYCEMIA: ICD-10-CM

## 2019-01-01 DIAGNOSIS — I10 ESSENTIAL HYPERTENSION: Primary | ICD-10-CM

## 2019-01-01 DIAGNOSIS — N39.41 URGE INCONTINENCE: Primary | ICD-10-CM

## 2019-01-01 DIAGNOSIS — I48.91 ATRIAL FIBRILLATION WITH SLOW VENTRICULAR RESPONSE (HCC): Primary | ICD-10-CM

## 2019-01-01 DIAGNOSIS — J81.0 FLASH PULMONARY EDEMA (HCC): Primary | ICD-10-CM

## 2019-01-01 DIAGNOSIS — G47.33 SLEEP APNEA, OBSTRUCTIVE: Primary | ICD-10-CM

## 2019-01-01 DIAGNOSIS — K21.9 GASTROESOPHAGEAL REFLUX DISEASE WITHOUT ESOPHAGITIS: ICD-10-CM

## 2019-01-01 DIAGNOSIS — J44.9 OBSTRUCTIVE CHRONIC BRONCHITIS WITHOUT EXACERBATION (HCC): ICD-10-CM

## 2019-01-01 DIAGNOSIS — E66.01 MORBID OBESITY WITH BMI OF 40.0-44.9, ADULT (HCC): ICD-10-CM

## 2019-01-01 DIAGNOSIS — R07.9 CHEST PAIN, UNSPECIFIED TYPE: ICD-10-CM

## 2019-01-01 DIAGNOSIS — G47.9 SLEEP DISTURBANCE: ICD-10-CM

## 2019-01-01 DIAGNOSIS — G47.30 SLEEP APNEA, UNSPECIFIED TYPE: ICD-10-CM

## 2019-01-01 DIAGNOSIS — R94.39 ABNORMAL NUCLEAR STRESS TEST: Primary | ICD-10-CM

## 2019-01-01 DIAGNOSIS — F41.9 ANXIETY: ICD-10-CM

## 2019-01-01 DIAGNOSIS — Z95.0 PACEMAKER: Primary | ICD-10-CM

## 2019-01-01 DIAGNOSIS — J44.9 OBSTRUCTIVE CHRONIC BRONCHITIS WITHOUT EXACERBATION (HCC): Primary | ICD-10-CM

## 2019-01-01 DIAGNOSIS — I47.10 SVT (SUPRAVENTRICULAR TACHYCARDIA): ICD-10-CM

## 2019-01-01 LAB
ALBUMIN SERPL-MCNC: 3.7 G/DL (ref 3.5–5.2)
ALBUMIN SERPL-MCNC: 4 G/DL (ref 3.5–5.2)
ALBUMIN SERPL-MCNC: 4.3 G/DL (ref 3.5–5.2)
ALP BLD-CCNC: 70 U/L (ref 40–130)
ALP BLD-CCNC: 80 U/L (ref 40–130)
ALP BLD-CCNC: 90 U/L (ref 40–130)
ALT SERPL-CCNC: 15 U/L (ref 5–41)
ALT SERPL-CCNC: 18 U/L (ref 5–41)
ALT SERPL-CCNC: 38 U/L (ref 5–41)
ANION GAP SERPL CALCULATED.3IONS-SCNC: 11 MMOL/L (ref 7–19)
ANION GAP SERPL CALCULATED.3IONS-SCNC: 11 MMOL/L (ref 7–19)
ANION GAP SERPL CALCULATED.3IONS-SCNC: 13 MMOL/L (ref 7–19)
ANION GAP SERPL CALCULATED.3IONS-SCNC: 14 MMOL/L (ref 7–19)
ANION GAP SERPL CALCULATED.3IONS-SCNC: 15 MMOL/L (ref 7–19)
ANION GAP SERPL CALCULATED.3IONS-SCNC: 16 MMOL/L (ref 7–19)
ANION GAP SERPL CALCULATED.3IONS-SCNC: 17 MMOL/L (ref 7–19)
ANION GAP SERPL CALCULATED.3IONS-SCNC: 17 MMOL/L (ref 7–19)
APPEARANCE FLUID: NORMAL
AST SERPL-CCNC: 23 U/L (ref 5–40)
AST SERPL-CCNC: 28 U/L (ref 5–40)
AST SERPL-CCNC: 38 U/L (ref 5–40)
BASE EXCESS ARTERIAL: -3.6 MMOL/L (ref -2–2)
BASOPHILS ABSOLUTE: 0 K/UL (ref 0–0.2)
BASOPHILS ABSOLUTE: 0 K/UL (ref 0–0.2)
BASOPHILS ABSOLUTE: 0.1 K/UL (ref 0–0.2)
BASOPHILS RELATIVE PERCENT: 0.1 % (ref 0–1)
BASOPHILS RELATIVE PERCENT: 0.4 % (ref 0–1)
BASOPHILS RELATIVE PERCENT: 0.5 % (ref 0–1)
BILIRUB SERPL-MCNC: 0.3 MG/DL (ref 0.2–1.2)
BILIRUBIN, POC: NORMAL
BLOOD CULTURE, ROUTINE: NORMAL
BLOOD URINE, POC: NORMAL
BUN BLDV-MCNC: 17 MG/DL (ref 8–23)
BUN BLDV-MCNC: 17 MG/DL (ref 8–23)
BUN BLDV-MCNC: 21 MG/DL (ref 8–23)
BUN BLDV-MCNC: 24 MG/DL (ref 8–23)
BUN BLDV-MCNC: 30 MG/DL (ref 8–23)
BUN BLDV-MCNC: 33 MG/DL (ref 8–23)
BUN BLDV-MCNC: 37 MG/DL (ref 8–23)
BUN BLDV-MCNC: 48 MG/DL (ref 8–23)
CALCIUM SERPL-MCNC: 8.7 MG/DL (ref 8.8–10.2)
CALCIUM SERPL-MCNC: 8.7 MG/DL (ref 8.8–10.2)
CALCIUM SERPL-MCNC: 8.8 MG/DL (ref 8.8–10.2)
CALCIUM SERPL-MCNC: 8.9 MG/DL (ref 8.8–10.2)
CALCIUM SERPL-MCNC: 9.3 MG/DL (ref 8.8–10.2)
CALCIUM SERPL-MCNC: 9.5 MG/DL (ref 8.8–10.2)
CALCIUM SERPL-MCNC: 9.5 MG/DL (ref 8.8–10.2)
CALCIUM SERPL-MCNC: 9.6 MG/DL (ref 8.8–10.2)
CARBOXYHEMOGLOBIN ARTERIAL: 1.1 % (ref 0–5)
CHLORIDE BLD-SCNC: 101 MMOL/L (ref 98–111)
CHLORIDE BLD-SCNC: 101 MMOL/L (ref 98–111)
CHLORIDE BLD-SCNC: 102 MMOL/L (ref 98–111)
CHLORIDE BLD-SCNC: 103 MMOL/L (ref 98–111)
CHLORIDE BLD-SCNC: 97 MMOL/L (ref 98–111)
CHLORIDE BLD-SCNC: 98 MMOL/L (ref 98–111)
CHOLESTEROL, TOTAL: 230 MG/DL (ref 160–199)
CLARITY, POC: CLEAR
CO2: 20 MMOL/L (ref 22–29)
CO2: 23 MMOL/L (ref 22–29)
CO2: 24 MMOL/L (ref 22–29)
CO2: 25 MMOL/L (ref 22–29)
CO2: 25 MMOL/L (ref 22–29)
CO2: 26 MMOL/L (ref 22–29)
CO2: 29 MMOL/L (ref 22–29)
CO2: 29 MMOL/L (ref 22–29)
COLOR, POC: YELLOW
CREAT SERPL-MCNC: 0.9 MG/DL (ref 0.5–1.2)
CREAT SERPL-MCNC: 1.2 MG/DL (ref 0.5–1.2)
CREAT SERPL-MCNC: 1.5 MG/DL (ref 0.5–1.2)
CREAT SERPL-MCNC: 1.6 MG/DL (ref 0.5–1.2)
CULTURE, BLOOD 2: NORMAL
EKG P AXIS: 43 DEGREES
EKG P AXIS: 56 DEGREES
EKG P-R INTERVAL: 106 MS
EKG P-R INTERVAL: 154 MS
EKG Q-T INTERVAL: 396 MS
EKG Q-T INTERVAL: 416 MS
EKG QRS DURATION: 102 MS
EKG QRS DURATION: 98 MS
EKG QTC CALCULATION (BAZETT): 444 MS
EKG QTC CALCULATION (BAZETT): 455 MS
EKG T AXIS: 56 DEGREES
EKG T AXIS: 64 DEGREES
EOSINOPHILS ABSOLUTE: 0 K/UL (ref 0–0.6)
EOSINOPHILS ABSOLUTE: 0 K/UL (ref 0–0.6)
EOSINOPHILS ABSOLUTE: 0.8 K/UL (ref 0–0.6)
EOSINOPHILS RELATIVE PERCENT: 0.1 % (ref 0–5)
EOSINOPHILS RELATIVE PERCENT: 0.4 % (ref 0–5)
EOSINOPHILS RELATIVE PERCENT: 5.6 % (ref 0–5)
GFR NON-AFRICAN AMERICAN: 43
GFR NON-AFRICAN AMERICAN: 46
GFR NON-AFRICAN AMERICAN: >60
GLUCOSE BLD-MCNC: 112 MG/DL (ref 74–109)
GLUCOSE BLD-MCNC: 115 MG/DL (ref 74–109)
GLUCOSE BLD-MCNC: 126 MG/DL (ref 74–109)
GLUCOSE BLD-MCNC: 138 MG/DL (ref 74–109)
GLUCOSE BLD-MCNC: 169 MG/DL (ref 74–109)
GLUCOSE BLD-MCNC: 197 MG/DL (ref 74–109)
GLUCOSE BLD-MCNC: 223 MG/DL (ref 74–109)
GLUCOSE BLD-MCNC: 96 MG/DL (ref 74–109)
GLUCOSE URINE, POC: NORMAL
HBA1C MFR BLD: 6.3 % (ref 4–6)
HCO3 ARTERIAL: 19.5 MMOL/L (ref 22–26)
HCT VFR BLD CALC: 44 % (ref 42–52)
HCT VFR BLD CALC: 45.1 % (ref 42–52)
HCT VFR BLD CALC: 49.8 % (ref 42–52)
HCT VFR BLD CALC: 51.5 % (ref 42–52)
HDLC SERPL-MCNC: 65 MG/DL (ref 55–121)
HEMOGLOBIN, ART, EXTENDED: 16.9 G/DL (ref 14–18)
HEMOGLOBIN: 14.4 G/DL (ref 14–18)
HEMOGLOBIN: 14.9 G/DL (ref 14–18)
HEMOGLOBIN: 16 G/DL (ref 14–18)
HEMOGLOBIN: 16.4 G/DL (ref 14–18)
IMMATURE GRANULOCYTES #: 0 K/UL
IMMATURE GRANULOCYTES #: 0.2 K/UL
IMMATURE GRANULOCYTES #: 0.2 K/UL
KETONES, POC: NORMAL
LDL CHOLESTEROL CALCULATED: 100 MG/DL
LEUKOCYTE EST, POC: NORMAL
LV EF: 58 %
LV EF: 67 %
LVEF MODALITY: NORMAL
LVEF MODALITY: NORMAL
LYMPHOCYTES ABSOLUTE: 0.4 K/UL (ref 1.1–4.5)
LYMPHOCYTES ABSOLUTE: 0.6 K/UL (ref 1.1–4.5)
LYMPHOCYTES ABSOLUTE: 2.7 K/UL (ref 1.1–4.5)
LYMPHOCYTES RELATIVE PERCENT: 19.1 % (ref 20–40)
LYMPHOCYTES RELATIVE PERCENT: 3.3 % (ref 20–40)
LYMPHOCYTES RELATIVE PERCENT: 7.7 % (ref 20–40)
MCH RBC QN AUTO: 29.7 PG (ref 27–31)
MCH RBC QN AUTO: 29.9 PG (ref 27–31)
MCH RBC QN AUTO: 30.2 PG (ref 27–31)
MCH RBC QN AUTO: 30.3 PG (ref 27–31)
MCHC RBC AUTO-ENTMCNC: 31.8 G/DL (ref 33–37)
MCHC RBC AUTO-ENTMCNC: 32.1 G/DL (ref 33–37)
MCHC RBC AUTO-ENTMCNC: 32.7 G/DL (ref 33–37)
MCHC RBC AUTO-ENTMCNC: 33 G/DL (ref 33–37)
MCV RBC AUTO: 91.3 FL (ref 80–94)
MCV RBC AUTO: 91.3 FL (ref 80–94)
MCV RBC AUTO: 93.3 FL (ref 80–94)
MCV RBC AUTO: 94.3 FL (ref 80–94)
METHEMOGLOBIN ARTERIAL: 0.3 %
MONOCYTES ABSOLUTE: 0 K/UL (ref 0–0.9)
MONOCYTES ABSOLUTE: 1 K/UL (ref 0–0.9)
MONOCYTES ABSOLUTE: 1.1 K/UL (ref 0–0.9)
MONOCYTES RELATIVE PERCENT: 0.6 % (ref 0–10)
MONOCYTES RELATIVE PERCENT: 5.8 % (ref 0–10)
MONOCYTES RELATIVE PERCENT: 7.8 % (ref 0–10)
NEUTROPHILS ABSOLUTE: 15.9 K/UL (ref 1.5–7.5)
NEUTROPHILS ABSOLUTE: 4.3 K/UL (ref 1.5–7.5)
NEUTROPHILS ABSOLUTE: 9.2 K/UL (ref 1.5–7.5)
NEUTROPHILS RELATIVE PERCENT: 65.8 % (ref 50–65)
NEUTROPHILS RELATIVE PERCENT: 89.9 % (ref 50–65)
NEUTROPHILS RELATIVE PERCENT: 90.3 % (ref 50–65)
NITRITE, POC: NORMAL
O2 CONTENT ARTERIAL: 20.9 ML/DL
O2 SAT, ARTERIAL: 88.3 %
O2 THERAPY: ABNORMAL
PCO2 ARTERIAL: 30 MMHG (ref 35–45)
PDW BLD-RTO: 13.2 % (ref 11.5–14.5)
PDW BLD-RTO: 13.7 % (ref 11.5–14.5)
PDW BLD-RTO: 14.3 % (ref 11.5–14.5)
PDW BLD-RTO: 14.4 % (ref 11.5–14.5)
PH ARTERIAL: 7.42 (ref 7.35–7.45)
PH, POC: 5.5
PLATELET # BLD: 248 K/UL (ref 130–400)
PLATELET # BLD: 260 K/UL (ref 130–400)
PLATELET # BLD: 271 K/UL (ref 130–400)
PLATELET # BLD: 296 K/UL (ref 130–400)
PMV BLD AUTO: 9.1 FL (ref 9.4–12.4)
PMV BLD AUTO: 9.5 FL (ref 9.4–12.4)
PMV BLD AUTO: 9.7 FL (ref 9.4–12.4)
PMV BLD AUTO: 9.8 FL (ref 9.4–12.4)
PO2 ARTERIAL: 51 MMHG (ref 80–100)
POTASSIUM REFLEX MAGNESIUM: 4.5 MMOL/L (ref 3.5–5)
POTASSIUM REFLEX MAGNESIUM: 4.7 MMOL/L (ref 3.5–5)
POTASSIUM SERPL-SCNC: 3.7 MMOL/L (ref 3.5–5)
POTASSIUM SERPL-SCNC: 3.8 MMOL/L (ref 3.5–5)
POTASSIUM SERPL-SCNC: 3.8 MMOL/L (ref 3.5–5)
POTASSIUM SERPL-SCNC: 3.9 MMOL/L (ref 3.5–5)
POTASSIUM SERPL-SCNC: 4.3 MMOL/L (ref 3.5–5)
POTASSIUM SERPL-SCNC: 4.5 MMOL/L (ref 3.5–5)
POTASSIUM, WHOLE BLOOD: 3.5
PRO-BNP: 148 PG/ML (ref 0–900)
PRO-BNP: 149 PG/ML (ref 0–900)
PRO-BNP: 93 PG/ML (ref 0–900)
PROCALCITONIN: 7.09 NG/ML
PROTEIN, POC: NORMAL
RBC # BLD: 4.82 M/UL (ref 4.7–6.1)
RBC # BLD: 4.94 M/UL (ref 4.7–6.1)
RBC # BLD: 5.28 M/UL (ref 4.7–6.1)
RBC # BLD: 5.52 M/UL (ref 4.7–6.1)
SODIUM BLD-SCNC: 137 MMOL/L (ref 136–145)
SODIUM BLD-SCNC: 138 MMOL/L (ref 136–145)
SODIUM BLD-SCNC: 139 MMOL/L (ref 136–145)
SODIUM BLD-SCNC: 140 MMOL/L (ref 136–145)
SODIUM BLD-SCNC: 141 MMOL/L (ref 136–145)
SODIUM BLD-SCNC: 141 MMOL/L (ref 136–145)
SODIUM BLD-SCNC: 142 MMOL/L (ref 136–145)
SODIUM BLD-SCNC: 143 MMOL/L (ref 136–145)
SPECIFIC GRAVITY, POC: 1.03
TOTAL PROTEIN: 7.1 G/DL (ref 6.6–8.7)
TOTAL PROTEIN: 7.2 G/DL (ref 6.6–8.7)
TOTAL PROTEIN: 8 G/DL (ref 6.6–8.7)
TRIGL SERPL-MCNC: 323 MG/DL (ref 0–149)
TROPONIN: <0.01 NG/ML (ref 0–0.03)
UROBILINOGEN, POC: 1
WBC # BLD: 14 K/UL (ref 4.8–10.8)
WBC # BLD: 16.1 K/UL (ref 4.8–10.8)
WBC # BLD: 17.7 K/UL (ref 4.8–10.8)
WBC # BLD: 4.8 K/UL (ref 4.8–10.8)

## 2019-01-01 PROCEDURE — 1123F ACP DISCUSS/DSCN MKR DOCD: CPT | Performed by: INTERNAL MEDICINE

## 2019-01-01 PROCEDURE — 3023F SPIROM DOC REV: CPT | Performed by: INTERNAL MEDICINE

## 2019-01-01 PROCEDURE — 80053 COMPREHEN METABOLIC PANEL: CPT

## 2019-01-01 PROCEDURE — 82803 BLOOD GASES ANY COMBINATION: CPT

## 2019-01-01 PROCEDURE — 6360000002 HC RX W HCPCS: Performed by: INTERNAL MEDICINE

## 2019-01-01 PROCEDURE — 6370000000 HC RX 637 (ALT 250 FOR IP)

## 2019-01-01 PROCEDURE — 94640 AIRWAY INHALATION TREATMENT: CPT

## 2019-01-01 PROCEDURE — G8427 DOCREV CUR MEDS BY ELIG CLIN: HCPCS | Performed by: FAMILY MEDICINE

## 2019-01-01 PROCEDURE — 2500000003 HC RX 250 WO HCPCS: Performed by: HOSPITALIST

## 2019-01-01 PROCEDURE — 80048 BASIC METABOLIC PNL TOTAL CA: CPT

## 2019-01-01 PROCEDURE — 6370000000 HC RX 637 (ALT 250 FOR IP): Performed by: HOSPITALIST

## 2019-01-01 PROCEDURE — G8417 CALC BMI ABV UP PARAM F/U: HCPCS | Performed by: FAMILY MEDICINE

## 2019-01-01 PROCEDURE — 6370000000 HC RX 637 (ALT 250 FOR IP): Performed by: INTERNAL MEDICINE

## 2019-01-01 PROCEDURE — 94727 GAS DIL/WSHOT DETER LNG VOL: CPT

## 2019-01-01 PROCEDURE — 90653 IIV ADJUVANT VACCINE IM: CPT | Performed by: INTERNAL MEDICINE

## 2019-01-01 PROCEDURE — 1210000000 HC MED SURG R&B

## 2019-01-01 PROCEDURE — 71045 X-RAY EXAM CHEST 1 VIEW: CPT

## 2019-01-01 PROCEDURE — 51798 US URINE CAPACITY MEASURE: CPT

## 2019-01-01 PROCEDURE — 6360000004 HC RX CONTRAST MEDICATION: Performed by: INTERNAL MEDICINE

## 2019-01-01 PROCEDURE — 6360000004 HC RX CONTRAST MEDICATION: Performed by: EMERGENCY MEDICINE

## 2019-01-01 PROCEDURE — 6360000002 HC RX W HCPCS

## 2019-01-01 PROCEDURE — 2580000003 HC RX 258: Performed by: HOSPITALIST

## 2019-01-01 PROCEDURE — 1036F TOBACCO NON-USER: CPT | Performed by: INTERNAL MEDICINE

## 2019-01-01 PROCEDURE — G8482 FLU IMMUNIZE ORDER/ADMIN: HCPCS | Performed by: INTERNAL MEDICINE

## 2019-01-01 PROCEDURE — 6360000002 HC RX W HCPCS: Performed by: HOSPITALIST

## 2019-01-01 PROCEDURE — 3017F COLORECTAL CA SCREEN DOC REV: CPT | Performed by: NURSE PRACTITIONER

## 2019-01-01 PROCEDURE — 85027 COMPLETE CBC AUTOMATED: CPT

## 2019-01-01 PROCEDURE — 84145 PROCALCITONIN (PCT): CPT

## 2019-01-01 PROCEDURE — 2700000000 HC OXYGEN THERAPY PER DAY

## 2019-01-01 PROCEDURE — 36415 COLL VENOUS BLD VENIPUNCTURE: CPT

## 2019-01-01 PROCEDURE — 1036F TOBACCO NON-USER: CPT | Performed by: NURSE PRACTITIONER

## 2019-01-01 PROCEDURE — 87040 BLOOD CULTURE FOR BACTERIA: CPT

## 2019-01-01 PROCEDURE — 93280 PM DEVICE PROGR EVAL DUAL: CPT | Performed by: NURSE PRACTITIONER

## 2019-01-01 PROCEDURE — G8427 DOCREV CUR MEDS BY ELIG CLIN: HCPCS | Performed by: INTERNAL MEDICINE

## 2019-01-01 PROCEDURE — G8926 SPIRO NO PERF OR DOC: HCPCS | Performed by: INTERNAL MEDICINE

## 2019-01-01 PROCEDURE — 3017F COLORECTAL CA SCREEN DOC REV: CPT | Performed by: FAMILY MEDICINE

## 2019-01-01 PROCEDURE — 3430000000 HC RX DIAGNOSTIC RADIOPHARMACEUTICAL: Performed by: INTERNAL MEDICINE

## 2019-01-01 PROCEDURE — 99232 SBSQ HOSP IP/OBS MODERATE 35: CPT | Performed by: INTERNAL MEDICINE

## 2019-01-01 PROCEDURE — 94729 DIFFUSING CAPACITY: CPT

## 2019-01-01 PROCEDURE — 95811 POLYSOM 6/>YRS CPAP 4/> PARM: CPT | Performed by: PSYCHIATRY & NEUROLOGY

## 2019-01-01 PROCEDURE — 78452 HT MUSCLE IMAGE SPECT MULT: CPT

## 2019-01-01 PROCEDURE — G8417 CALC BMI ABV UP PARAM F/U: HCPCS | Performed by: NURSE PRACTITIONER

## 2019-01-01 PROCEDURE — 1111F DSCHRG MED/CURRENT MED MERGE: CPT | Performed by: INTERNAL MEDICINE

## 2019-01-01 PROCEDURE — A9500 TC99M SESTAMIBI: HCPCS | Performed by: INTERNAL MEDICINE

## 2019-01-01 PROCEDURE — 83880 ASSAY OF NATRIURETIC PEPTIDE: CPT

## 2019-01-01 PROCEDURE — C8929 TTE W OR WO FOL WCON,DOPPLER: HCPCS

## 2019-01-01 PROCEDURE — 93294 REM INTERROG EVL PM/LDLS PM: CPT | Performed by: CLINICAL NURSE SPECIALIST

## 2019-01-01 PROCEDURE — 1123F ACP DISCUSS/DSCN MKR DOCD: CPT | Performed by: NURSE PRACTITIONER

## 2019-01-01 PROCEDURE — 3017F COLORECTAL CA SCREEN DOC REV: CPT | Performed by: INTERNAL MEDICINE

## 2019-01-01 PROCEDURE — 71275 CT ANGIOGRAPHY CHEST: CPT

## 2019-01-01 PROCEDURE — 1036F TOBACCO NON-USER: CPT | Performed by: FAMILY MEDICINE

## 2019-01-01 PROCEDURE — 2000000000 HC ICU R&B

## 2019-01-01 PROCEDURE — G0008 ADMIN INFLUENZA VIRUS VAC: HCPCS | Performed by: INTERNAL MEDICINE

## 2019-01-01 PROCEDURE — 4040F PNEUMOC VAC/ADMIN/RCVD: CPT | Performed by: INTERNAL MEDICINE

## 2019-01-01 PROCEDURE — 2500000003 HC RX 250 WO HCPCS

## 2019-01-01 PROCEDURE — 4040F PNEUMOC VAC/ADMIN/RCVD: CPT | Performed by: FAMILY MEDICINE

## 2019-01-01 PROCEDURE — 1123F ACP DISCUSS/DSCN MKR DOCD: CPT | Performed by: FAMILY MEDICINE

## 2019-01-01 PROCEDURE — 95811 POLYSOM 6/>YRS CPAP 4/> PARM: CPT

## 2019-01-01 PROCEDURE — 93280 PM DEVICE PROGR EVAL DUAL: CPT | Performed by: INTERNAL MEDICINE

## 2019-01-01 PROCEDURE — 93005 ELECTROCARDIOGRAM TRACING: CPT | Performed by: HOSPITALIST

## 2019-01-01 PROCEDURE — G8417 CALC BMI ABV UP PARAM F/U: HCPCS | Performed by: INTERNAL MEDICINE

## 2019-01-01 PROCEDURE — 99214 OFFICE O/P EST MOD 30 MIN: CPT | Performed by: INTERNAL MEDICINE

## 2019-01-01 PROCEDURE — 36600 WITHDRAWAL OF ARTERIAL BLOOD: CPT

## 2019-01-01 PROCEDURE — 85025 COMPLETE CBC W/AUTO DIFF WBC: CPT

## 2019-01-01 PROCEDURE — 93288 INTERROG EVL PM/LDLS PM IP: CPT | Performed by: INTERNAL MEDICINE

## 2019-01-01 PROCEDURE — 99213 OFFICE O/P EST LOW 20 MIN: CPT | Performed by: NURSE PRACTITIONER

## 2019-01-01 PROCEDURE — 94060 EVALUATION OF WHEEZING: CPT

## 2019-01-01 PROCEDURE — 84132 ASSAY OF SERUM POTASSIUM: CPT

## 2019-01-01 PROCEDURE — 6360000002 HC RX W HCPCS: Performed by: EMERGENCY MEDICINE

## 2019-01-01 PROCEDURE — 81002 URINALYSIS NONAUTO W/O SCOPE: CPT | Performed by: NURSE PRACTITIONER

## 2019-01-01 PROCEDURE — 2580000003 HC RX 258: Performed by: EMERGENCY MEDICINE

## 2019-01-01 PROCEDURE — 84484 ASSAY OF TROPONIN QUANT: CPT

## 2019-01-01 PROCEDURE — 4040F PNEUMOC VAC/ADMIN/RCVD: CPT | Performed by: NURSE PRACTITIONER

## 2019-01-01 PROCEDURE — 99285 EMERGENCY DEPT VISIT HI MDM: CPT

## 2019-01-01 PROCEDURE — 99213 OFFICE O/P EST LOW 20 MIN: CPT | Performed by: INTERNAL MEDICINE

## 2019-01-01 PROCEDURE — 99214 OFFICE O/P EST MOD 30 MIN: CPT | Performed by: NURSE PRACTITIONER

## 2019-01-01 PROCEDURE — 99204 OFFICE O/P NEW MOD 45 MIN: CPT | Performed by: INTERNAL MEDICINE

## 2019-01-01 PROCEDURE — 93296 REM INTERROG EVL PM/IDS: CPT | Performed by: CLINICAL NURSE SPECIALIST

## 2019-01-01 PROCEDURE — 1111F DSCHRG MED/CURRENT MED MERGE: CPT | Performed by: NURSE PRACTITIONER

## 2019-01-01 PROCEDURE — 96375 TX/PRO/DX INJ NEW DRUG ADDON: CPT

## 2019-01-01 PROCEDURE — 93005 ELECTROCARDIOGRAM TRACING: CPT | Performed by: EMERGENCY MEDICINE

## 2019-01-01 PROCEDURE — 99214 OFFICE O/P EST MOD 30 MIN: CPT | Performed by: FAMILY MEDICINE

## 2019-01-01 PROCEDURE — 94760 N-INVAS EAR/PLS OXIMETRY 1: CPT

## 2019-01-01 PROCEDURE — 96365 THER/PROPH/DIAG IV INF INIT: CPT

## 2019-01-01 PROCEDURE — G8427 DOCREV CUR MEDS BY ELIG CLIN: HCPCS | Performed by: NURSE PRACTITIONER

## 2019-01-01 PROCEDURE — G8428 CUR MEDS NOT DOCUMENT: HCPCS | Performed by: INTERNAL MEDICINE

## 2019-01-01 PROCEDURE — 94761 N-INVAS EAR/PLS OXIMETRY MLT: CPT

## 2019-01-01 RX ORDER — FLUOXETINE HYDROCHLORIDE 40 MG/1
40 CAPSULE ORAL DAILY
Qty: 30 CAPSULE | Refills: 3 | Status: SHIPPED | OUTPATIENT
Start: 2019-01-01 | End: 2019-01-01 | Stop reason: SDUPTHER

## 2019-01-01 RX ORDER — DOXYCYCLINE HYCLATE 100 MG/1
100 CAPSULE ORAL EVERY 12 HOURS SCHEDULED
Qty: 10 CAPSULE | Refills: 0 | Status: SHIPPED | OUTPATIENT
Start: 2019-01-01 | End: 2019-01-01

## 2019-01-01 RX ORDER — IPRATROPIUM BROMIDE AND ALBUTEROL SULFATE 2.5; .5 MG/3ML; MG/3ML
SOLUTION RESPIRATORY (INHALATION)
Status: COMPLETED
Start: 2019-01-01 | End: 2019-01-01

## 2019-01-01 RX ORDER — IPRATROPIUM BROMIDE AND ALBUTEROL SULFATE 2.5; .5 MG/3ML; MG/3ML
1 SOLUTION RESPIRATORY (INHALATION) NIGHTLY
COMMUNITY

## 2019-01-01 RX ORDER — HYDROCHLOROTHIAZIDE 25 MG/1
TABLET ORAL
Qty: 90 TABLET | Refills: 2 | Status: SHIPPED | OUTPATIENT
Start: 2019-01-01 | End: 2020-01-01

## 2019-01-01 RX ORDER — IPRATROPIUM BROMIDE AND ALBUTEROL SULFATE 2.5; .5 MG/3ML; MG/3ML
1 SOLUTION RESPIRATORY (INHALATION) EVERY 4 HOURS
Status: DISCONTINUED | OUTPATIENT
Start: 2019-01-01 | End: 2019-01-01

## 2019-01-01 RX ORDER — FLUOXETINE HYDROCHLORIDE 40 MG/1
40 CAPSULE ORAL DAILY
Qty: 90 CAPSULE | Refills: 2 | Status: CANCELLED | OUTPATIENT
Start: 2019-01-01

## 2019-01-01 RX ORDER — HYDRALAZINE HYDROCHLORIDE 20 MG/ML
20 INJECTION INTRAMUSCULAR; INTRAVENOUS EVERY 4 HOURS PRN
Status: DISCONTINUED | OUTPATIENT
Start: 2019-01-01 | End: 2019-01-01 | Stop reason: HOSPADM

## 2019-01-01 RX ORDER — LOSARTAN POTASSIUM 100 MG/1
100 TABLET ORAL NIGHTLY
Qty: 30 TABLET | Refills: 5 | Status: SHIPPED | OUTPATIENT
Start: 2019-01-01 | End: 2020-01-01

## 2019-01-01 RX ORDER — ONDANSETRON 2 MG/ML
4 INJECTION INTRAMUSCULAR; INTRAVENOUS ONCE
Status: COMPLETED | OUTPATIENT
Start: 2019-01-01 | End: 2019-01-01

## 2019-01-01 RX ORDER — METHYLPREDNISOLONE SODIUM SUCCINATE 125 MG/2ML
60 INJECTION, POWDER, LYOPHILIZED, FOR SOLUTION INTRAMUSCULAR; INTRAVENOUS EVERY 6 HOURS
Status: DISCONTINUED | OUTPATIENT
Start: 2019-01-01 | End: 2019-01-01

## 2019-01-01 RX ORDER — SODIUM CHLORIDE 0.9 % (FLUSH) 0.9 %
10 SYRINGE (ML) INJECTION PRN
Status: DISCONTINUED | OUTPATIENT
Start: 2019-01-01 | End: 2019-01-01 | Stop reason: HOSPADM

## 2019-01-01 RX ORDER — NITROGLYCERIN 0.3 MG/1
TABLET SUBLINGUAL
Qty: 30 TABLET | Refills: 3 | Status: ON HOLD | OUTPATIENT
Start: 2019-01-01 | End: 2020-01-01 | Stop reason: HOSPADM

## 2019-01-01 RX ORDER — FUROSEMIDE 40 MG/1
40 TABLET ORAL DAILY
Qty: 30 TABLET | Refills: 0 | Status: SHIPPED | OUTPATIENT
Start: 2019-01-01 | End: 2020-01-01

## 2019-01-01 RX ORDER — HYDROCHLOROTHIAZIDE 25 MG/1
TABLET ORAL
Qty: 90 TABLET | Refills: 1 | Status: SHIPPED | OUTPATIENT
Start: 2019-01-01 | End: 2019-01-01 | Stop reason: SDUPTHER

## 2019-01-01 RX ORDER — ONDANSETRON 2 MG/ML
4 INJECTION INTRAMUSCULAR; INTRAVENOUS EVERY 6 HOURS PRN
Status: DISCONTINUED | OUTPATIENT
Start: 2019-01-01 | End: 2019-01-01 | Stop reason: HOSPADM

## 2019-01-01 RX ORDER — CLONIDINE HYDROCHLORIDE 0.2 MG/1
0.2 TABLET ORAL 2 TIMES DAILY PRN
Qty: 60 TABLET | Refills: 5 | Status: ON HOLD | OUTPATIENT
Start: 2019-01-01 | End: 2019-01-01 | Stop reason: HOSPADM

## 2019-01-01 RX ORDER — PREDNISONE 20 MG/1
20 TABLET ORAL 2 TIMES DAILY
Status: DISCONTINUED | OUTPATIENT
Start: 2019-01-01 | End: 2019-01-01

## 2019-01-01 RX ORDER — ALBUTEROL SULFATE 90 UG/1
2 AEROSOL, METERED RESPIRATORY (INHALATION) EVERY 6 HOURS PRN
Status: DISCONTINUED | OUTPATIENT
Start: 2019-01-01 | End: 2019-01-01

## 2019-01-01 RX ORDER — CLONIDINE HYDROCHLORIDE 0.1 MG/1
TABLET ORAL
Qty: 180 TABLET | Refills: 1 | Status: SHIPPED | OUTPATIENT
Start: 2019-01-01 | End: 2019-01-01 | Stop reason: DRUGHIGH

## 2019-01-01 RX ORDER — DIPHENHYDRAMINE HCL 25 MG
TABLET ORAL
Status: DISPENSED
Start: 2019-01-01 | End: 2019-01-01

## 2019-01-01 RX ORDER — CETIRIZINE HYDROCHLORIDE 5 MG/1
5 TABLET ORAL DAILY
Status: DISCONTINUED | OUTPATIENT
Start: 2019-01-01 | End: 2019-01-01 | Stop reason: HOSPADM

## 2019-01-01 RX ORDER — CELECOXIB 200 MG/1
CAPSULE ORAL
Qty: 90 CAPSULE | Refills: 2 | Status: ON HOLD | OUTPATIENT
Start: 2019-01-01 | End: 2020-01-01 | Stop reason: HOSPADM

## 2019-01-01 RX ORDER — BUSPIRONE HYDROCHLORIDE 10 MG/1
10 TABLET ORAL 3 TIMES DAILY
COMMUNITY
End: 2020-01-01

## 2019-01-01 RX ORDER — ROPINIROLE 0.25 MG/1
TABLET, FILM COATED ORAL
Qty: 90 TABLET | Refills: 3 | OUTPATIENT
Start: 2019-01-01

## 2019-01-01 RX ORDER — ONDANSETRON 2 MG/ML
INJECTION INTRAMUSCULAR; INTRAVENOUS
Status: COMPLETED
Start: 2019-01-01 | End: 2019-01-01

## 2019-01-01 RX ORDER — DOXYCYCLINE HYCLATE 100 MG/1
100 CAPSULE ORAL EVERY 12 HOURS SCHEDULED
Status: DISCONTINUED | OUTPATIENT
Start: 2019-01-01 | End: 2019-01-01 | Stop reason: HOSPADM

## 2019-01-01 RX ORDER — PREDNISONE 20 MG/1
20 TABLET ORAL DAILY
Status: COMPLETED | OUTPATIENT
Start: 2019-01-01 | End: 2019-01-01

## 2019-01-01 RX ORDER — FUROSEMIDE 40 MG/1
40 TABLET ORAL PRN
Qty: 30 TABLET | Refills: 5 | Status: ON HOLD | OUTPATIENT
Start: 2019-01-01 | End: 2020-01-01 | Stop reason: HOSPADM

## 2019-01-01 RX ORDER — LOSARTAN POTASSIUM 100 MG/1
100 TABLET ORAL DAILY
Qty: 90 TABLET | Refills: 2 | Status: ON HOLD | OUTPATIENT
Start: 2019-01-01 | End: 2020-01-01 | Stop reason: HOSPADM

## 2019-01-01 RX ORDER — ROPINIROLE 1 MG/1
1 TABLET, FILM COATED ORAL NIGHTLY
Qty: 90 TABLET | Refills: 1 | Status: ON HOLD | OUTPATIENT
Start: 2019-01-01 | End: 2020-01-01 | Stop reason: HOSPADM

## 2019-01-01 RX ORDER — LOSARTAN POTASSIUM 100 MG/1
100 TABLET ORAL DAILY
Status: DISCONTINUED | OUTPATIENT
Start: 2019-01-01 | End: 2019-01-01 | Stop reason: HOSPADM

## 2019-01-01 RX ORDER — BUSPIRONE HYDROCHLORIDE 10 MG/1
10 TABLET ORAL 3 TIMES DAILY
Status: DISCONTINUED | OUTPATIENT
Start: 2019-01-01 | End: 2019-01-01 | Stop reason: HOSPADM

## 2019-01-01 RX ORDER — IPRATROPIUM BROMIDE AND ALBUTEROL SULFATE 2.5; .5 MG/3ML; MG/3ML
3 SOLUTION RESPIRATORY (INHALATION) EVERY 4 HOURS PRN
Status: DISCONTINUED | OUTPATIENT
Start: 2019-01-01 | End: 2019-01-01 | Stop reason: HOSPADM

## 2019-01-01 RX ORDER — IPRATROPIUM BROMIDE AND ALBUTEROL SULFATE 2.5; .5 MG/3ML; MG/3ML
3 SOLUTION RESPIRATORY (INHALATION)
Status: DISCONTINUED | OUTPATIENT
Start: 2019-01-01 | End: 2019-01-01

## 2019-01-01 RX ORDER — ROPINIROLE 1 MG/1
1 TABLET, FILM COATED ORAL NIGHTLY
Qty: 90 TABLET | Refills: 2 | Status: SHIPPED | OUTPATIENT
Start: 2019-01-01 | End: 2019-01-01 | Stop reason: SDUPTHER

## 2019-01-01 RX ORDER — FUROSEMIDE 10 MG/ML
40 INJECTION INTRAMUSCULAR; INTRAVENOUS 2 TIMES DAILY
Status: DISCONTINUED | OUTPATIENT
Start: 2019-01-01 | End: 2019-01-01 | Stop reason: HOSPADM

## 2019-01-01 RX ORDER — FLUOXETINE HYDROCHLORIDE 20 MG/1
20 CAPSULE ORAL DAILY
Qty: 90 CAPSULE | Refills: 2 | Status: ON HOLD | OUTPATIENT
Start: 2019-01-01 | End: 2020-01-01 | Stop reason: HOSPADM

## 2019-01-01 RX ORDER — ROPINIROLE 1 MG/1
1 TABLET, FILM COATED ORAL NIGHTLY
Qty: 90 TABLET | Refills: 1 | Status: SHIPPED | OUTPATIENT
Start: 2019-01-01 | End: 2019-01-01 | Stop reason: SDUPTHER

## 2019-01-01 RX ORDER — CLONIDINE HYDROCHLORIDE 0.1 MG/1
0.2 TABLET ORAL PRN
COMMUNITY
End: 2020-01-01

## 2019-01-01 RX ORDER — METOPROLOL TARTRATE 5 MG/5ML
5 INJECTION INTRAVENOUS EVERY 6 HOURS
Status: DISCONTINUED | OUTPATIENT
Start: 2019-01-01 | End: 2019-01-01 | Stop reason: HOSPADM

## 2019-01-01 RX ORDER — BUSPIRONE HYDROCHLORIDE 10 MG/1
10 TABLET ORAL 3 TIMES DAILY
Qty: 270 TABLET | Refills: 0 | Status: SHIPPED | OUTPATIENT
Start: 2019-01-01 | End: 2019-01-01

## 2019-01-01 RX ORDER — IPRATROPIUM BROMIDE AND ALBUTEROL SULFATE 2.5; .5 MG/3ML; MG/3ML
1 SOLUTION RESPIRATORY (INHALATION) EVERY 4 HOURS PRN
Status: DISCONTINUED | OUTPATIENT
Start: 2019-01-01 | End: 2019-01-01 | Stop reason: SDUPTHER

## 2019-01-01 RX ORDER — FLUOXETINE HYDROCHLORIDE 20 MG/1
40 CAPSULE ORAL DAILY
Status: DISCONTINUED | OUTPATIENT
Start: 2019-01-01 | End: 2019-01-01 | Stop reason: HOSPADM

## 2019-01-01 RX ORDER — FUROSEMIDE 10 MG/ML
20 INJECTION INTRAMUSCULAR; INTRAVENOUS DAILY
Status: DISCONTINUED | OUTPATIENT
Start: 2019-01-01 | End: 2019-01-01

## 2019-01-01 RX ORDER — CELECOXIB 200 MG/1
200 CAPSULE ORAL DAILY
Status: DISCONTINUED | OUTPATIENT
Start: 2019-01-01 | End: 2019-01-01 | Stop reason: HOSPADM

## 2019-01-01 RX ORDER — ROPINIROLE 1 MG/1
1 TABLET, FILM COATED ORAL NIGHTLY
Status: DISCONTINUED | OUTPATIENT
Start: 2019-01-01 | End: 2019-01-01 | Stop reason: HOSPADM

## 2019-01-01 RX ORDER — BUDESONIDE 0.5 MG/2ML
0.5 INHALANT ORAL 2 TIMES DAILY
Status: DISCONTINUED | OUTPATIENT
Start: 2019-01-01 | End: 2019-01-01 | Stop reason: SDUPTHER

## 2019-01-01 RX ORDER — SODIUM CHLORIDE 0.9 % (FLUSH) 0.9 %
10 SYRINGE (ML) INJECTION EVERY 12 HOURS SCHEDULED
Status: DISCONTINUED | OUTPATIENT
Start: 2019-01-01 | End: 2019-01-01 | Stop reason: HOSPADM

## 2019-01-01 RX ORDER — CLONIDINE HYDROCHLORIDE 0.2 MG/1
0.2 TABLET ORAL 2 TIMES DAILY PRN
Qty: 60 TABLET | Refills: 5 | Status: SHIPPED | OUTPATIENT
Start: 2019-01-01 | End: 2019-01-01 | Stop reason: SDUPTHER

## 2019-01-01 RX ORDER — BUSPIRONE HYDROCHLORIDE 10 MG/1
10 TABLET ORAL 3 TIMES DAILY
Qty: 90 TABLET | Refills: 0 | Status: SHIPPED | OUTPATIENT
Start: 2019-01-01 | End: 2019-01-01 | Stop reason: SDUPTHER

## 2019-01-01 RX ORDER — AMOXICILLIN AND CLAVULANATE POTASSIUM 875; 125 MG/1; MG/1
1 TABLET, FILM COATED ORAL 2 TIMES DAILY
Qty: 20 TABLET | Refills: 0 | Status: SHIPPED | OUTPATIENT
Start: 2019-01-01 | End: 2019-01-01

## 2019-01-01 RX ORDER — ACETAMINOPHEN 325 MG/1
650 TABLET ORAL EVERY 4 HOURS PRN
Status: DISCONTINUED | OUTPATIENT
Start: 2019-01-01 | End: 2019-01-01 | Stop reason: HOSPADM

## 2019-01-01 RX ORDER — NITROGLYCERIN 20 MG/100ML
INJECTION INTRAVENOUS
Status: COMPLETED
Start: 2019-01-01 | End: 2019-01-01

## 2019-01-01 RX ORDER — METHYLPREDNISOLONE SODIUM SUCCINATE 125 MG/2ML
125 INJECTION, POWDER, LYOPHILIZED, FOR SOLUTION INTRAMUSCULAR; INTRAVENOUS ONCE
Status: COMPLETED | OUTPATIENT
Start: 2019-01-01 | End: 2019-01-01

## 2019-01-01 RX ORDER — FUROSEMIDE 40 MG/1
40 TABLET ORAL DAILY
Status: DISPENSED | OUTPATIENT
Start: 2019-01-01 | End: 2019-01-01

## 2019-01-01 RX ORDER — ALBUTEROL SULFATE 2.5 MG/3ML
2.5 SOLUTION RESPIRATORY (INHALATION) 4 TIMES DAILY
Status: DISCONTINUED | OUTPATIENT
Start: 2019-01-01 | End: 2019-01-01 | Stop reason: HOSPADM

## 2019-01-01 RX ORDER — HYDRALAZINE HYDROCHLORIDE 20 MG/ML
10 INJECTION INTRAMUSCULAR; INTRAVENOUS EVERY 4 HOURS PRN
Status: DISCONTINUED | OUTPATIENT
Start: 2019-01-01 | End: 2019-01-01

## 2019-01-01 RX ORDER — PANTOPRAZOLE SODIUM 40 MG/1
40 TABLET, DELAYED RELEASE ORAL
Status: DISCONTINUED | OUTPATIENT
Start: 2019-01-01 | End: 2019-01-01 | Stop reason: HOSPADM

## 2019-01-01 RX ORDER — METHYLPREDNISOLONE SODIUM SUCCINATE 40 MG/ML
40 INJECTION, POWDER, LYOPHILIZED, FOR SOLUTION INTRAMUSCULAR; INTRAVENOUS EVERY 12 HOURS
Status: DISCONTINUED | OUTPATIENT
Start: 2019-01-01 | End: 2019-01-01

## 2019-01-01 RX ORDER — FUROSEMIDE 10 MG/ML
40 INJECTION INTRAMUSCULAR; INTRAVENOUS 2 TIMES DAILY
Status: DISCONTINUED | OUTPATIENT
Start: 2019-01-01 | End: 2019-01-01

## 2019-01-01 RX ORDER — ALBUTEROL SULFATE 2.5 MG/3ML
2.5 SOLUTION RESPIRATORY (INHALATION) EVERY 6 HOURS PRN
Status: DISCONTINUED | OUTPATIENT
Start: 2019-01-01 | End: 2019-01-01 | Stop reason: HOSPADM

## 2019-01-01 RX ORDER — NIFEDIPINE 30 MG/1
30 TABLET, EXTENDED RELEASE ORAL DAILY
Status: DISCONTINUED | OUTPATIENT
Start: 2019-01-01 | End: 2019-01-01 | Stop reason: HOSPADM

## 2019-01-01 RX ORDER — LANOLIN ALCOHOL/MO/W.PET/CERES
3 CREAM (GRAM) TOPICAL NIGHTLY PRN
Status: DISCONTINUED | OUTPATIENT
Start: 2019-01-01 | End: 2019-01-01 | Stop reason: HOSPADM

## 2019-01-01 RX ORDER — BUDESONIDE 0.5 MG/2ML
0.5 INHALANT ORAL 2 TIMES DAILY
Status: DISCONTINUED | OUTPATIENT
Start: 2019-01-01 | End: 2019-01-01 | Stop reason: HOSPADM

## 2019-01-01 RX ORDER — CLONIDINE HYDROCHLORIDE 0.2 MG/1
0.2 TABLET ORAL PRN
Status: ON HOLD | COMMUNITY
End: 2020-01-01 | Stop reason: HOSPADM

## 2019-01-01 RX ORDER — FLUOXETINE HYDROCHLORIDE 40 MG/1
40 CAPSULE ORAL DAILY
Qty: 90 CAPSULE | Refills: 2 | Status: ON HOLD | OUTPATIENT
Start: 2019-01-01 | End: 2020-01-01 | Stop reason: HOSPADM

## 2019-01-01 RX ORDER — FLUOXETINE HYDROCHLORIDE 40 MG/1
40 CAPSULE ORAL DAILY
Qty: 90 CAPSULE | Refills: 2 | Status: SHIPPED | OUTPATIENT
Start: 2019-01-01 | End: 2019-01-01 | Stop reason: SDUPTHER

## 2019-01-01 RX ORDER — NITROGLYCERIN 0.4 MG/1
0.4 TABLET SUBLINGUAL EVERY 5 MIN PRN
Status: DISCONTINUED | OUTPATIENT
Start: 2019-01-01 | End: 2019-01-01 | Stop reason: HOSPADM

## 2019-01-01 RX ORDER — LOSARTAN POTASSIUM 100 MG/1
100 TABLET ORAL DAILY
Qty: 90 TABLET | Refills: 1 | Status: SHIPPED | OUTPATIENT
Start: 2019-01-01 | End: 2019-01-01 | Stop reason: SDUPTHER

## 2019-01-01 RX ORDER — FUROSEMIDE 10 MG/ML
80 INJECTION INTRAMUSCULAR; INTRAVENOUS ONCE
Status: COMPLETED | OUTPATIENT
Start: 2019-01-01 | End: 2019-01-01

## 2019-01-01 RX ADMIN — METOPROLOL TARTRATE 5 MG: 5 INJECTION, SOLUTION INTRAVENOUS at 12:28

## 2019-01-01 RX ADMIN — ALBUTEROL SULFATE 2.5 MG: 2.5 SOLUTION RESPIRATORY (INHALATION) at 10:44

## 2019-01-01 RX ADMIN — ACETAMINOPHEN 650 MG: 325 TABLET ORAL at 08:36

## 2019-01-01 RX ADMIN — METOPROLOL TARTRATE 5 MG: 5 INJECTION, SOLUTION INTRAVENOUS at 10:41

## 2019-01-01 RX ADMIN — ENOXAPARIN SODIUM 40 MG: 40 INJECTION SUBCUTANEOUS at 17:05

## 2019-01-01 RX ADMIN — FUROSEMIDE 40 MG: 10 INJECTION, SOLUTION INTRAMUSCULAR; INTRAVENOUS at 17:41

## 2019-01-01 RX ADMIN — PERFLUTREN 1.65 MG: 6.52 INJECTION, SUSPENSION INTRAVENOUS at 18:30

## 2019-01-01 RX ADMIN — IPRATROPIUM BROMIDE AND ALBUTEROL SULFATE 3 AMPULE: 2.5; .5 SOLUTION RESPIRATORY (INHALATION) at 14:23

## 2019-01-01 RX ADMIN — BUSPIRONE HYDROCHLORIDE 10 MG: 10 TABLET ORAL at 20:41

## 2019-01-01 RX ADMIN — ALBUTEROL SULFATE 2.5 MG: 2.5 SOLUTION RESPIRATORY (INHALATION) at 07:05

## 2019-01-01 RX ADMIN — Medication 10 ML: at 21:28

## 2019-01-01 RX ADMIN — Medication 10 ML: at 09:32

## 2019-01-01 RX ADMIN — FUROSEMIDE 20 MG: 10 INJECTION, SOLUTION INTRAVENOUS at 09:51

## 2019-01-01 RX ADMIN — BUSPIRONE HYDROCHLORIDE 10 MG: 10 TABLET ORAL at 14:38

## 2019-01-01 RX ADMIN — BUSPIRONE HYDROCHLORIDE 10 MG: 10 TABLET ORAL at 20:03

## 2019-01-01 RX ADMIN — ALBUTEROL SULFATE 2.5 MG: 2.5 SOLUTION RESPIRATORY (INHALATION) at 14:50

## 2019-01-01 RX ADMIN — IPRATROPIUM BROMIDE AND ALBUTEROL SULFATE 3 ML: 2.5; .5 SOLUTION RESPIRATORY (INHALATION) at 11:02

## 2019-01-01 RX ADMIN — BUSPIRONE HYDROCHLORIDE 10 MG: 10 TABLET ORAL at 09:31

## 2019-01-01 RX ADMIN — LOSARTAN POTASSIUM 100 MG: 100 TABLET ORAL at 11:52

## 2019-01-01 RX ADMIN — BUSPIRONE HYDROCHLORIDE 10 MG: 10 TABLET ORAL at 14:37

## 2019-01-01 RX ADMIN — FLUOXETINE HYDROCHLORIDE 40 MG: 20 CAPSULE ORAL at 08:31

## 2019-01-01 RX ADMIN — PANTOPRAZOLE SODIUM 40 MG: 40 TABLET, DELAYED RELEASE ORAL at 08:36

## 2019-01-01 RX ADMIN — BUDESONIDE 500 MCG: 0.5 INHALANT RESPIRATORY (INHALATION) at 06:40

## 2019-01-01 RX ADMIN — DOXYCYCLINE HYCLATE 100 MG: 100 CAPSULE ORAL at 20:41

## 2019-01-01 RX ADMIN — TETRAKIS(2-METHOXYISOBUTYLISOCYANIDE)COPPER(I) TETRAFLUOROBORATE 10 MILLICURIE: 1 INJECTION, POWDER, LYOPHILIZED, FOR SOLUTION INTRAVENOUS at 11:36

## 2019-01-01 RX ADMIN — IPRATROPIUM BROMIDE AND ALBUTEROL SULFATE 3 AMPULE: .5; 3 SOLUTION RESPIRATORY (INHALATION) at 14:23

## 2019-01-01 RX ADMIN — TETRAKIS(2-METHOXYISOBUTYLISOCYANIDE)COPPER(I) TETRAFLUOROBORATE 30 MILLICURIE: 1 INJECTION, POWDER, LYOPHILIZED, FOR SOLUTION INTRAVENOUS at 11:37

## 2019-01-01 RX ADMIN — ROPINIROLE HYDROCHLORIDE 1 MG: 1 TABLET, FILM COATED ORAL at 20:06

## 2019-01-01 RX ADMIN — BUSPIRONE HYDROCHLORIDE 10 MG: 10 TABLET ORAL at 08:37

## 2019-01-01 RX ADMIN — FUROSEMIDE 40 MG: 10 INJECTION, SOLUTION INTRAMUSCULAR; INTRAVENOUS at 08:31

## 2019-01-01 RX ADMIN — IPRATROPIUM BROMIDE AND ALBUTEROL SULFATE 3 ML: 2.5; .5 SOLUTION RESPIRATORY (INHALATION) at 22:16

## 2019-01-01 RX ADMIN — BUSPIRONE HYDROCHLORIDE 10 MG: 10 TABLET ORAL at 20:26

## 2019-01-01 RX ADMIN — ALBUTEROL SULFATE 2.5 MG: 2.5 SOLUTION RESPIRATORY (INHALATION) at 06:39

## 2019-01-01 RX ADMIN — ALBUTEROL SULFATE 2.5 MG: 2.5 SOLUTION RESPIRATORY (INHALATION) at 07:12

## 2019-01-01 RX ADMIN — DOXYCYCLINE HYCLATE 100 MG: 100 CAPSULE ORAL at 09:31

## 2019-01-01 RX ADMIN — CETIRIZINE HYDROCHLORIDE 5 MG: 5 TABLET ORAL at 08:06

## 2019-01-01 RX ADMIN — CELECOXIB 200 MG: 200 CAPSULE ORAL at 08:31

## 2019-01-01 RX ADMIN — ALBUTEROL SULFATE 2.5 MG: 2.5 SOLUTION RESPIRATORY (INHALATION) at 18:25

## 2019-01-01 RX ADMIN — ROPINIROLE HYDROCHLORIDE 1 MG: 1 TABLET, FILM COATED ORAL at 20:41

## 2019-01-01 RX ADMIN — IPRATROPIUM BROMIDE AND ALBUTEROL SULFATE 3 ML: 2.5; .5 SOLUTION RESPIRATORY (INHALATION) at 02:23

## 2019-01-01 RX ADMIN — METHYLPREDNISOLONE SODIUM SUCCINATE 60 MG: 125 INJECTION, POWDER, FOR SOLUTION INTRAMUSCULAR; INTRAVENOUS at 16:56

## 2019-01-01 RX ADMIN — BUDESONIDE 500 MCG: 0.5 INHALANT RESPIRATORY (INHALATION) at 06:33

## 2019-01-01 RX ADMIN — METOPROLOL TARTRATE 5 MG: 5 INJECTION, SOLUTION INTRAVENOUS at 05:33

## 2019-01-01 RX ADMIN — FLUOXETINE HYDROCHLORIDE 40 MG: 20 CAPSULE ORAL at 09:50

## 2019-01-01 RX ADMIN — PREDNISONE 20 MG: 20 TABLET ORAL at 08:31

## 2019-01-01 RX ADMIN — PANTOPRAZOLE SODIUM 40 MG: 40 TABLET, DELAYED RELEASE ORAL at 05:49

## 2019-01-01 RX ADMIN — CELECOXIB 200 MG: 200 CAPSULE ORAL at 17:58

## 2019-01-01 RX ADMIN — Medication 1 G: at 10:44

## 2019-01-01 RX ADMIN — ENOXAPARIN SODIUM 40 MG: 40 INJECTION SUBCUTANEOUS at 15:58

## 2019-01-01 RX ADMIN — DOXYCYCLINE HYCLATE 100 MG: 100 CAPSULE ORAL at 20:02

## 2019-01-01 RX ADMIN — Medication 10 ML: at 09:51

## 2019-01-01 RX ADMIN — CETIRIZINE HYDROCHLORIDE 5 MG: 5 TABLET ORAL at 09:31

## 2019-01-01 RX ADMIN — Medication 10 ML: at 20:26

## 2019-01-01 RX ADMIN — MELATONIN 3 MG: at 22:18

## 2019-01-01 RX ADMIN — Medication 10 ML: at 08:07

## 2019-01-01 RX ADMIN — ROPINIROLE HYDROCHLORIDE 1 MG: 1 TABLET, FILM COATED ORAL at 20:03

## 2019-01-01 RX ADMIN — HYDRALAZINE HYDROCHLORIDE 20 MG: 20 INJECTION INTRAMUSCULAR; INTRAVENOUS at 04:15

## 2019-01-01 RX ADMIN — FUROSEMIDE 40 MG: 10 INJECTION, SOLUTION INTRAMUSCULAR; INTRAVENOUS at 18:20

## 2019-01-01 RX ADMIN — MELATONIN 3 MG: at 21:34

## 2019-01-01 RX ADMIN — FLUOXETINE HYDROCHLORIDE 40 MG: 20 CAPSULE ORAL at 08:06

## 2019-01-01 RX ADMIN — ALBUTEROL SULFATE 2.5 MG: 2.5 SOLUTION RESPIRATORY (INHALATION) at 14:55

## 2019-01-01 RX ADMIN — REGADENOSON 0.4 MG: 0.08 INJECTION, SOLUTION INTRAVENOUS at 11:35

## 2019-01-01 RX ADMIN — FUROSEMIDE 40 MG: 10 INJECTION, SOLUTION INTRAMUSCULAR; INTRAVENOUS at 09:31

## 2019-01-01 RX ADMIN — BUDESONIDE 500 MCG: 0.5 INHALANT RESPIRATORY (INHALATION) at 19:43

## 2019-01-01 RX ADMIN — BUDESONIDE 500 MCG: 0.5 INHALANT RESPIRATORY (INHALATION) at 07:12

## 2019-01-01 RX ADMIN — NIFEDIPINE 30 MG: 30 TABLET, FILM COATED, EXTENDED RELEASE ORAL at 08:06

## 2019-01-01 RX ADMIN — BUDESONIDE 500 MCG: 0.5 INHALANT RESPIRATORY (INHALATION) at 18:56

## 2019-01-01 RX ADMIN — LOSARTAN POTASSIUM 100 MG: 100 TABLET ORAL at 08:06

## 2019-01-01 RX ADMIN — CELECOXIB 200 MG: 200 CAPSULE ORAL at 09:31

## 2019-01-01 RX ADMIN — DOXYCYCLINE HYCLATE 100 MG: 100 CAPSULE ORAL at 08:06

## 2019-01-01 RX ADMIN — ROPINIROLE HYDROCHLORIDE 1 MG: 1 TABLET, FILM COATED ORAL at 20:22

## 2019-01-01 RX ADMIN — ONDANSETRON 4 MG: 2 INJECTION INTRAMUSCULAR; INTRAVENOUS at 14:50

## 2019-01-01 RX ADMIN — ALBUTEROL SULFATE 2.5 MG: 2.5 SOLUTION RESPIRATORY (INHALATION) at 10:57

## 2019-01-01 RX ADMIN — BUDESONIDE 500 MCG: 0.5 INHALANT RESPIRATORY (INHALATION) at 18:18

## 2019-01-01 RX ADMIN — ENOXAPARIN SODIUM 40 MG: 40 INJECTION SUBCUTANEOUS at 16:43

## 2019-01-01 RX ADMIN — PREDNISONE 20 MG: 20 TABLET ORAL at 20:06

## 2019-01-01 RX ADMIN — ALBUTEROL SULFATE 2.5 MG: 2.5 SOLUTION RESPIRATORY (INHALATION) at 14:36

## 2019-01-01 RX ADMIN — DOXYCYCLINE HYCLATE 100 MG: 100 CAPSULE ORAL at 08:36

## 2019-01-01 RX ADMIN — METOPROLOL TARTRATE 5 MG: 5 INJECTION, SOLUTION INTRAVENOUS at 01:03

## 2019-01-01 RX ADMIN — PREDNISONE 20 MG: 20 TABLET ORAL at 09:31

## 2019-01-01 RX ADMIN — CEFTRIAXONE 1 G: 1 INJECTION, POWDER, FOR SOLUTION INTRAMUSCULAR; INTRAVENOUS at 14:50

## 2019-01-01 RX ADMIN — FUROSEMIDE 40 MG: 40 TABLET ORAL at 08:37

## 2019-01-01 RX ADMIN — FUROSEMIDE 20 MG: 10 INJECTION, SOLUTION INTRAVENOUS at 16:55

## 2019-01-01 RX ADMIN — Medication 10 ML: at 08:38

## 2019-01-01 RX ADMIN — IPRATROPIUM BROMIDE AND ALBUTEROL SULFATE 3 ML: 2.5; .5 SOLUTION RESPIRATORY (INHALATION) at 18:15

## 2019-01-01 RX ADMIN — ROPINIROLE HYDROCHLORIDE 1 MG: 1 TABLET, FILM COATED ORAL at 20:27

## 2019-01-01 RX ADMIN — PANTOPRAZOLE SODIUM 40 MG: 40 TABLET, DELAYED RELEASE ORAL at 09:50

## 2019-01-01 RX ADMIN — IPRATROPIUM BROMIDE AND ALBUTEROL SULFATE 3 ML: 2.5; .5 SOLUTION RESPIRATORY (INHALATION) at 10:26

## 2019-01-01 RX ADMIN — IPRATROPIUM BROMIDE AND ALBUTEROL SULFATE 3 ML: 2.5; .5 SOLUTION RESPIRATORY (INHALATION) at 06:40

## 2019-01-01 RX ADMIN — PANTOPRAZOLE SODIUM 40 MG: 40 TABLET, DELAYED RELEASE ORAL at 05:35

## 2019-01-01 RX ADMIN — METHYLPREDNISOLONE SODIUM SUCCINATE 40 MG: 40 INJECTION, POWDER, FOR SOLUTION INTRAMUSCULAR; INTRAVENOUS at 09:51

## 2019-01-01 RX ADMIN — LOSARTAN POTASSIUM 100 MG: 100 TABLET ORAL at 08:36

## 2019-01-01 RX ADMIN — HYDRALAZINE HYDROCHLORIDE 10 MG: 20 INJECTION INTRAMUSCULAR; INTRAVENOUS at 04:17

## 2019-01-01 RX ADMIN — FLUOXETINE HYDROCHLORIDE 40 MG: 20 CAPSULE ORAL at 16:56

## 2019-01-01 RX ADMIN — NITROGLYCERIN 5 MCG/MIN: 20 INJECTION INTRAVENOUS at 14:03

## 2019-01-01 RX ADMIN — ALBUTEROL SULFATE 2.5 MG: 2.5 SOLUTION RESPIRATORY (INHALATION) at 19:45

## 2019-01-01 RX ADMIN — IPRATROPIUM BROMIDE AND ALBUTEROL SULFATE 3 ML: 2.5; .5 SOLUTION RESPIRATORY (INHALATION) at 18:18

## 2019-01-01 RX ADMIN — BUDESONIDE 500 MCG: 0.5 INHALANT RESPIRATORY (INHALATION) at 18:25

## 2019-01-01 RX ADMIN — BUDESONIDE 500 MCG: 0.5 INHALANT RESPIRATORY (INHALATION) at 18:15

## 2019-01-01 RX ADMIN — NIFEDIPINE 30 MG: 30 TABLET, FILM COATED, EXTENDED RELEASE ORAL at 08:36

## 2019-01-01 RX ADMIN — ACETAMINOPHEN 650 MG: 325 TABLET ORAL at 16:13

## 2019-01-01 RX ADMIN — BUSPIRONE HYDROCHLORIDE 10 MG: 10 TABLET ORAL at 08:06

## 2019-01-01 RX ADMIN — Medication 10 ML: at 08:37

## 2019-01-01 RX ADMIN — LOSARTAN POTASSIUM 100 MG: 100 TABLET ORAL at 09:31

## 2019-01-01 RX ADMIN — CELECOXIB 200 MG: 200 CAPSULE ORAL at 09:50

## 2019-01-01 RX ADMIN — METOPROLOL TARTRATE 5 MG: 5 INJECTION, SOLUTION INTRAVENOUS at 18:20

## 2019-01-01 RX ADMIN — ENOXAPARIN SODIUM 40 MG: 40 INJECTION SUBCUTANEOUS at 16:16

## 2019-01-01 RX ADMIN — BUDESONIDE 500 MCG: 0.5 INHALANT RESPIRATORY (INHALATION) at 07:05

## 2019-01-01 RX ADMIN — DOXYCYCLINE HYCLATE 100 MG: 100 CAPSULE ORAL at 20:27

## 2019-01-01 RX ADMIN — FLUOXETINE HYDROCHLORIDE 40 MG: 20 CAPSULE ORAL at 08:37

## 2019-01-01 RX ADMIN — IOPAMIDOL 90 ML: 755 INJECTION, SOLUTION INTRAVENOUS at 15:12

## 2019-01-01 RX ADMIN — IPRATROPIUM BROMIDE AND ALBUTEROL SULFATE 3 ML: 2.5; .5 SOLUTION RESPIRATORY (INHALATION) at 14:19

## 2019-01-01 RX ADMIN — CETIRIZINE HYDROCHLORIDE 5 MG: 5 TABLET ORAL at 14:37

## 2019-01-01 RX ADMIN — PREDNISONE 20 MG: 20 TABLET ORAL at 08:37

## 2019-01-01 RX ADMIN — ACETAMINOPHEN 650 MG: 325 TABLET ORAL at 21:30

## 2019-01-01 RX ADMIN — FUROSEMIDE 40 MG: 10 INJECTION, SOLUTION INTRAMUSCULAR; INTRAVENOUS at 10:41

## 2019-01-01 RX ADMIN — IPRATROPIUM BROMIDE AND ALBUTEROL SULFATE 3 ML: 2.5; .5 SOLUTION RESPIRATORY (INHALATION) at 22:25

## 2019-01-01 RX ADMIN — Medication 10 ML: at 20:05

## 2019-01-01 RX ADMIN — IPRATROPIUM BROMIDE AND ALBUTEROL SULFATE 3 ML: 2.5; .5 SOLUTION RESPIRATORY (INHALATION) at 06:33

## 2019-01-01 RX ADMIN — AZITHROMYCIN MONOHYDRATE 500 MG: 500 INJECTION, POWDER, LYOPHILIZED, FOR SOLUTION INTRAVENOUS at 15:58

## 2019-01-01 RX ADMIN — PREDNISONE 20 MG: 20 TABLET ORAL at 08:06

## 2019-01-01 RX ADMIN — NIFEDIPINE 30 MG: 30 TABLET, FILM COATED, EXTENDED RELEASE ORAL at 11:52

## 2019-01-01 RX ADMIN — FLUOXETINE HYDROCHLORIDE 40 MG: 20 CAPSULE ORAL at 09:31

## 2019-01-01 RX ADMIN — NIFEDIPINE 30 MG: 30 TABLET, FILM COATED, EXTENDED RELEASE ORAL at 09:31

## 2019-01-01 RX ADMIN — ENOXAPARIN SODIUM 40 MG: 40 INJECTION SUBCUTANEOUS at 16:56

## 2019-01-01 RX ADMIN — CELECOXIB 200 MG: 200 CAPSULE ORAL at 08:05

## 2019-01-01 RX ADMIN — BUSPIRONE HYDROCHLORIDE 10 MG: 10 TABLET ORAL at 13:40

## 2019-01-01 RX ADMIN — MELATONIN 3 MG: at 21:08

## 2019-01-01 RX ADMIN — ALBUTEROL SULFATE 2.5 MG: 2.5 SOLUTION RESPIRATORY (INHALATION) at 10:36

## 2019-01-01 RX ADMIN — IPRATROPIUM BROMIDE AND ALBUTEROL SULFATE 3 ML: 2.5; .5 SOLUTION RESPIRATORY (INHALATION) at 02:31

## 2019-01-01 RX ADMIN — ACETAMINOPHEN 650 MG: 325 TABLET ORAL at 03:08

## 2019-01-01 RX ADMIN — PANTOPRAZOLE SODIUM 40 MG: 40 TABLET, DELAYED RELEASE ORAL at 06:21

## 2019-01-01 RX ADMIN — Medication 500 MG: at 14:51

## 2019-01-01 RX ADMIN — Medication 10 ML: at 20:03

## 2019-01-01 RX ADMIN — METHYLPREDNISOLONE SODIUM SUCCINATE 125 MG: 125 INJECTION, POWDER, FOR SOLUTION INTRAMUSCULAR; INTRAVENOUS at 14:09

## 2019-01-01 RX ADMIN — FUROSEMIDE 80 MG: 10 INJECTION, SOLUTION INTRAMUSCULAR; INTRAVENOUS at 14:22

## 2019-01-01 RX ADMIN — CELECOXIB 200 MG: 200 CAPSULE ORAL at 08:36

## 2019-01-01 ASSESSMENT — ENCOUNTER SYMPTOMS
BACK PAIN: 0
BACK PAIN: 0
COUGH: 0
SHORTNESS OF BREATH: 0
SORE THROAT: 0
STRIDOR: 0
GASTROINTESTINAL NEGATIVE: 1
RESPIRATORY NEGATIVE: 1
ABDOMINAL PAIN: 0
ABDOMINAL PAIN: 0
BLOOD IN STOOL: 0
SHORTNESS OF BREATH: 1
SHORTNESS OF BREATH: 1
ABDOMINAL PAIN: 0
SHORTNESS OF BREATH: 1
BACK PAIN: 0
EYES NEGATIVE: 1
ABDOMINAL PAIN: 0
EYE PAIN: 0
DIARRHEA: 0
SORE THROAT: 0
GASTROINTESTINAL NEGATIVE: 1
GASTROINTESTINAL NEGATIVE: 1
SHORTNESS OF BREATH: 1
NAUSEA: 0
WHEEZING: 0
EYE ITCHING: 0
TROUBLE SWALLOWING: 0
NAUSEA: 0
EYE DISCHARGE: 0
DIARRHEA: 0
ABDOMINAL DISTENTION: 0
SHORTNESS OF BREATH: 1
EYES NEGATIVE: 1
TROUBLE SWALLOWING: 0
ABDOMINAL DISTENTION: 0
CHEST TIGHTNESS: 1
COUGH: 0
SORE THROAT: 0
ABDOMINAL PAIN: 0
VOMITING: 0
WHEEZING: 0
SHORTNESS OF BREATH: 0
COUGH: 0
ALLERGIC/IMMUNOLOGIC NEGATIVE: 1
EYES NEGATIVE: 1
DIARRHEA: 0
NAUSEA: 0
GASTROINTESTINAL NEGATIVE: 1
VOMITING: 0
COUGH: 0
COLOR CHANGE: 0
WHEEZING: 1
EYE DISCHARGE: 0
COUGH: 1
VOMITING: 0
DYSPNEA ASSOCIATED WITH: MINIMAL EXERTION
EYE ITCHING: 0
SHORTNESS OF BREATH: 1
SORE THROAT: 0
RESPIRATORY NEGATIVE: 1
SHORTNESS OF BREATH: 1
VOMITING: 0
NAUSEA: 0
BACK PAIN: 0
VOMITING: 0
BLOOD IN STOOL: 0
WHEEZING: 1
NAUSEA: 0
SINUS PRESSURE: 0
COUGH: 0
VOMITING: 0
WHEEZING: 0
ABDOMINAL DISTENTION: 0
SHORTNESS OF BREATH: 0
BACK PAIN: 0
SHORTNESS OF BREATH: 0
ABDOMINAL DISTENTION: 0
SINUS PRESSURE: 0

## 2019-01-01 ASSESSMENT — PATIENT HEALTH QUESTIONNAIRE - PHQ9
1. LITTLE INTEREST OR PLEASURE IN DOING THINGS: 1
2. FEELING DOWN, DEPRESSED OR HOPELESS: 1
SUM OF ALL RESPONSES TO PHQ QUESTIONS 1-9: 2
SUM OF ALL RESPONSES TO PHQ QUESTIONS 1-9: 2
SUM OF ALL RESPONSES TO PHQ9 QUESTIONS 1 & 2: 2

## 2019-01-01 ASSESSMENT — PAIN SCALES - GENERAL
PAINLEVEL_OUTOF10: 0
PAINLEVEL_OUTOF10: 5
PAINLEVEL_OUTOF10: 4
PAINLEVEL_OUTOF10: 5
PAINLEVEL_OUTOF10: 2
PAINLEVEL_OUTOF10: 3
PAINLEVEL_OUTOF10: 0
PAINLEVEL_OUTOF10: 0
PAINLEVEL_OUTOF10: 3
PAINLEVEL_OUTOF10: 0
PAINLEVEL_OUTOF10: 5
PAINLEVEL_OUTOF10: 0
PAINLEVEL_OUTOF10: 4
PAINLEVEL_OUTOF10: 0
PAINLEVEL_OUTOF10: 5
PAINLEVEL_OUTOF10: 0
PAINLEVEL_OUTOF10: 0

## 2019-01-01 ASSESSMENT — PAIN DESCRIPTION - PAIN TYPE: TYPE: ACUTE PAIN

## 2019-01-01 ASSESSMENT — PAIN DESCRIPTION - DESCRIPTORS: DESCRIPTORS: ACHING

## 2019-01-01 ASSESSMENT — PAIN DESCRIPTION - LOCATION: LOCATION: HEAD

## 2019-01-11 DIAGNOSIS — I10 ESSENTIAL HYPERTENSION: ICD-10-CM

## 2019-01-11 RX ORDER — CLONIDINE HYDROCHLORIDE 0.1 MG/1
TABLET ORAL
Qty: 60 TABLET | Refills: 1 | Status: SHIPPED | OUTPATIENT
Start: 2019-01-11 | End: 2019-03-10 | Stop reason: SDUPTHER

## 2019-01-16 RX ORDER — HYDROCHLOROTHIAZIDE 25 MG/1
25 TABLET ORAL DAILY
Qty: 90 TABLET | Refills: 1 | Status: SHIPPED | OUTPATIENT
Start: 2019-01-16 | End: 2019-01-01 | Stop reason: SDUPTHER

## 2019-01-30 ENCOUNTER — CARE COORDINATION (OUTPATIENT)
Dept: CARE COORDINATION | Age: 69
End: 2019-01-30

## 2019-02-05 DIAGNOSIS — F32.A DEPRESSION, UNSPECIFIED DEPRESSION TYPE: ICD-10-CM

## 2019-02-05 RX ORDER — FLUOXETINE HYDROCHLORIDE 20 MG/1
20 CAPSULE ORAL DAILY
Qty: 90 CAPSULE | Refills: 3 | Status: SHIPPED | OUTPATIENT
Start: 2019-02-05 | End: 2019-01-01

## 2019-02-19 RX ORDER — CELECOXIB 200 MG/1
CAPSULE ORAL
Qty: 90 CAPSULE | Refills: 1 | Status: SHIPPED | OUTPATIENT
Start: 2019-02-19 | End: 2019-01-01 | Stop reason: SDUPTHER

## 2019-02-27 ENCOUNTER — CARE COORDINATION (OUTPATIENT)
Dept: CARE COORDINATION | Age: 69
End: 2019-02-27

## 2019-03-06 DIAGNOSIS — I10 ESSENTIAL HYPERTENSION: ICD-10-CM

## 2019-03-06 RX ORDER — LOSARTAN POTASSIUM 100 MG/1
100 TABLET ORAL DAILY
Qty: 90 TABLET | Refills: 1 | Status: SHIPPED | OUTPATIENT
Start: 2019-03-06 | End: 2019-01-01 | Stop reason: SDUPTHER

## 2019-03-14 ENCOUNTER — CARE COORDINATION (OUTPATIENT)
Dept: CARE COORDINATION | Age: 69
End: 2019-03-14

## 2019-03-14 ENCOUNTER — OFFICE VISIT (OUTPATIENT)
Dept: CARDIOLOGY | Age: 69
End: 2019-03-14
Payer: MEDICARE

## 2019-03-14 VITALS
HEART RATE: 86 BPM | WEIGHT: 270 LBS | SYSTOLIC BLOOD PRESSURE: 140 MMHG | BODY MASS INDEX: 40.92 KG/M2 | DIASTOLIC BLOOD PRESSURE: 80 MMHG | HEIGHT: 68 IN

## 2019-03-14 DIAGNOSIS — I10 ESSENTIAL HYPERTENSION: ICD-10-CM

## 2019-03-14 DIAGNOSIS — I49.5 SICK SINUS SYNDROME DUE TO SA NODE DYSFUNCTION (HCC): ICD-10-CM

## 2019-03-14 DIAGNOSIS — Z95.0 PACEMAKER: Primary | ICD-10-CM

## 2019-03-14 DIAGNOSIS — I48.0 PAF (PAROXYSMAL ATRIAL FIBRILLATION) (HCC): ICD-10-CM

## 2019-03-14 PROCEDURE — 4040F PNEUMOC VAC/ADMIN/RCVD: CPT | Performed by: CLINICAL NURSE SPECIALIST

## 2019-03-14 PROCEDURE — G8427 DOCREV CUR MEDS BY ELIG CLIN: HCPCS | Performed by: CLINICAL NURSE SPECIALIST

## 2019-03-14 PROCEDURE — 1123F ACP DISCUSS/DSCN MKR DOCD: CPT | Performed by: CLINICAL NURSE SPECIALIST

## 2019-03-14 PROCEDURE — 3017F COLORECTAL CA SCREEN DOC REV: CPT | Performed by: CLINICAL NURSE SPECIALIST

## 2019-03-14 PROCEDURE — G8417 CALC BMI ABV UP PARAM F/U: HCPCS | Performed by: CLINICAL NURSE SPECIALIST

## 2019-03-14 PROCEDURE — 99213 OFFICE O/P EST LOW 20 MIN: CPT | Performed by: CLINICAL NURSE SPECIALIST

## 2019-03-14 PROCEDURE — 93288 INTERROG EVL PM/LDLS PM IP: CPT | Performed by: CLINICAL NURSE SPECIALIST

## 2019-03-14 PROCEDURE — G8482 FLU IMMUNIZE ORDER/ADMIN: HCPCS | Performed by: CLINICAL NURSE SPECIALIST

## 2019-03-14 PROCEDURE — 1101F PT FALLS ASSESS-DOCD LE1/YR: CPT | Performed by: CLINICAL NURSE SPECIALIST

## 2019-03-14 PROCEDURE — 1036F TOBACCO NON-USER: CPT | Performed by: CLINICAL NURSE SPECIALIST

## 2019-03-21 ENCOUNTER — CARE COORDINATION (OUTPATIENT)
Dept: CARE COORDINATION | Age: 69
End: 2019-03-21

## 2019-04-01 NOTE — PROGRESS NOTES
Arlet Rodarte is a 76 y.o. male    Chief Complaint   Patient presents with    Follow-up    Anxiety    Depression       HPI  Arlet Rodarte presents to the office for follow-up of hypertension and anxiety/depression. Patient states his anxiety has been doing very good. He is currently taking Prozac 20 mg daily. Patient denies any side effects to this medication. Patient has been doing relatively well. Treatment Adherence:   Medication compliance:  compliant all of the time  Diet compliance:  compliant most of the time  Weight trend: stable  Current exercise: no regular exercise  Barriers: none    Hypertension:  Home blood pressure monitoring: Yes - improving control. Patient denies chest pain and shortness of breath. Antihypertensive medication side effects: no medication side effects noted. Use of agents associated with hypertension: none. Lab Results   Component Value Date    CHOL 230 (H) 04/01/2019    CHOL 200 (H) 10/16/2018    CHOL 201 (H) 04/20/2017     Lab Results   Component Value Date    TRIG 323 (H) 04/01/2019    TRIG 276 (H) 10/16/2018    TRIG 286 (H) 04/20/2017     Lab Results   Component Value Date    HDL 65 04/01/2019    HDL 67 10/16/2018    HDL 66 04/20/2017     Lab Results   Component Value Date    LDLCALC 100 04/01/2019    LDLCALC 78 10/16/2018    LDLCALC 78 04/20/2017     No results found for: LABVLDL, VLDL  No results found for: [facility]  Lab Results   Component Value Date     04/01/2019    K 4.3 04/01/2019     04/01/2019    CO2 29 04/01/2019    BUN 17 04/01/2019    CREATININE 0.9 04/01/2019    GLUCOSE 138 (H) 04/01/2019    CALCIUM 8.8 04/01/2019    PROT 7.1 04/01/2019    LABALBU 4.0 04/01/2019    BILITOT 0.3 04/01/2019    ALKPHOS 80 04/01/2019    AST 23 04/01/2019    ALT 15 04/01/2019    LABGLOM >60 04/01/2019    GLOB 2.8 04/20/2017           Review of Systems   Constitutional: Negative for chills and fever.    Respiratory: Negative for cough and shortness of breath. Cardiovascular: Negative for chest pain and leg swelling. Skin: Negative for color change and rash. Psychiatric/Behavioral: Negative for dysphoric mood. The patient is not nervous/anxious. Prior to Admission medications    Medication Sig Start Date End Date Taking?  Authorizing Provider   losartan (COZAAR) 100 MG tablet Take 1 tablet by mouth daily 5/1/19  Yes Mary Kate Rajput MD   cloNIDine (CATAPRES) 0.1 MG tablet TAKE 1 TABLET BY MOUTH TWICE DAILY AS NEEDED FOR BLOOD PRESSURE 180/100 3/10/19 4/9/19 Yes Mary Kate Rajput MD   celecoxib (CELEBREX) 200 MG capsule TAKE 1 CAPSULE BY MOUTH EVERY DAY 2/19/19  Yes Mary Kate Rajput MD   pantoprazole (PROTONIX) 40 MG tablet TAKE 1 TABLET BY MOUTH EVERY MORNING BEFORE BREAKFAST 2/15/19  Yes Mary Kate Rajput MD   FLUoxetine (PROZAC) 20 MG capsule Take 1 capsule by mouth daily 2/5/19  Yes Mary Kate Rajput MD   hydrochlorothiazide (HYDRODIURIL) 25 MG tablet Take 1 tablet by mouth daily 1/16/19  Yes Mary Kate Rajput MD   zoster recombinant adjuvanted vaccine [facility]) 50 MCG/0.5ML SUSR injection Inject 0.5 mLs into the muscle See Admin Instructions 1 dose now and repeat in 2-6 months 12/31/18  Yes Mary Kate Rajput MD   cetirizine (ZYRTEC) 10 MG tablet Take 10 mg by mouth daily   Yes Historical Provider, MD   rOPINIRole (REQUIP) 0.25 MG tablet Take 1 tablet by mouth nightly 10/16/18  Yes Mary Kate Rajput MD   NIFEdipine (PROCARDIA XL) 30 MG extended release tablet Take 1 tablet by mouth daily 8/16/18  Yes Mary Kate Rajput MD   ipratropium-albuterol (DUONEB) 0.5-2.5 (3) MG/3ML SOLN nebulizer solution Inhale 3 mLs into the lungs every 4 hours 6/6/18  Yes Mary Kate Rajput MD   albuterol sulfate  (90 Base) MCG/ACT inhaler Inhale 2 puffs into the lungs every 6 hours as needed for Wheezing 4/17/18  Yes Mary Kate Rajput MD   mometasone-formoterol [facility]) 200-5 MCG/ACT inhaler INHALE 2 PUFFS INTO THE LUNGS EVERY 12 HOURS 12/14/17  Yes Richard Hernandes MD   nitroGLYCERIN (NITROSTAT) 0.3 MG SL tablet up to max of 3 total doses. If no relief after 1 dose, call 911. 9/19/17  Yes Brenna Diaz MD       Past Medical History:   Diagnosis Date    Anemia     Anxiety     Back pain     Cellulitis     Depression     Elevated triglycerides with high cholesterol     GERD (gastroesophageal reflux disease)     Hypertension     Osteoarthritis     Pacemaker 04/21/2017       Past Surgical History:   Procedure Laterality Date    COLONOSCOPY  11/18/11        JOINT REPLACEMENT      Bilateral total knees    NECK SURGERY      PACEMAKER PLACEMENT      TOTAL KNEE ARTHROPLASTY      RIGHT AND LEFT KNEE    UPPER GASTROINTESTINAL ENDOSCOPY  2/2012           Social History     Socioeconomic History    Marital status:      Spouse name: None    Number of children: None    Years of education: None    Highest education level: None   Occupational History    None   Social Needs    Financial resource strain: None    Food insecurity:     Worry: None     Inability: None    Transportation needs:     Medical: None     Non-medical: None   Tobacco Use    Smoking status: Never Smoker    Smokeless tobacco: Never Used   Substance and Sexual Activity    Alcohol use:  Yes     Alcohol/week: 1.8 oz     Types: 3 Cans of beer per week     Comment: Daily    Drug use: No    Sexual activity: None   Lifestyle    Physical activity:     Days per week: None     Minutes per session: None    Stress: None   Relationships    Social connections:     Talks on phone: None     Gets together: None     Attends [employer] service: None     Active member of club or organization: None     Attends meetings of clubs or organizations: None     Relationship status: None    Intimate partner violence:     Fear of current or ex partner: None     Emotionally abused: None     Physically abused: None Forced sexual activity: None   Other Topics Concern    None   Social History Narrative    None     /74   Pulse 68   Temp 98.1 °F (36.7 °C)   Ht 5' 8\" (1.727 m)   Wt 274 lb 6.4 oz (124.5 kg)   SpO2 97%   BMI 41.72 kg/m²     Physical Exam   Constitutional: He is oriented to person, place, and time. He appears well-developed and well-nourished. No distress. HENT:   Head: Normocephalic and atraumatic. Eyes: Conjunctivae are normal. No scleral icterus. Neck: Normal range of motion. Neck supple. No thyromegaly present. Cardiovascular: Normal rate, regular rhythm and normal heart sounds. No murmur heard. Pulmonary/Chest: Effort normal and breath sounds normal. No respiratory distress. He has no wheezes. Abdominal:   Obese     Musculoskeletal: He exhibits no edema. Neurological: He is alert and oriented to person, place, and time. No cranial nerve deficit. Skin: Skin is warm. No rash noted. Psychiatric: He has a normal mood and affect. His behavior is normal.   Nursing note and vitals reviewed. Assessment    ICD-10-CM    1. Anxiety F41.9 Stable. The current medical regimen is effective;  continue present plan and medications. 2. Essential hypertension I10 Improved control. Continue losartan (COZAAR) 100 MG tablet, hctz 25 mg po dailt, and nifedipine XL 30 mg po daily. May take clonidine when necessary as needed for elevated blood pressures. Comprehensive Metabolic Panel     Lipid Panel   3. Depression, unspecified depression type F32.9 Stable at this time. 4. Morbid obesity with BMI of 40.0-44.9, adult ([facility] [address] E66.01 Counseled on diet and exercise. Z68.41    5.  Hyperglycemia R73.9 Hemoglobin A1C     Quality & Risk Score Accuracy    Last edited 04/01/19 13:20 CDT by Yulissa Aguilar MD           Plan      Orders Placed This Encounter   Medications    losartan (COZAAR) 100 MG tablet     Sig: Take 1 tablet by mouth daily     Dispense:  90 tablet     Refill:  1

## 2019-04-24 NOTE — TELEPHONE ENCOUNTER
----- Message from Lady Vieyra MD sent at 4/19/2019  8:29 AM CDT -----  Please inform patient of abnormal test results. Hgb A1c controlled. Lipids reveals elevated triglycerides. CMP normal. The current medical regimen is effective;  continue present plan and medications.

## 2019-05-07 NOTE — CARE COORDINATION
Ambulatory Care Coordination Note  5/7/2019  CM Risk Score: 5  Lobo Mortality Risk Score: 5    ACC: Delmar Jarrell, CASSANDRA    Summary Note:  Pt called in stating that his med for depression isn't helping. He has a \"I don't care\" attitude. He states he cares about nothing right now. He states he feels like he is heading in a bad direction. He is in a [place] spot\". I told him that depression untreated can lead to bigger problems, and that the next time he starts to feel bad, he needs to call immediately and get an appt. This way he is protected against the depression making him much worse. Pt stated understanding. Discussed briefly how his COPD was at this time. He sounded a little SOB on the phone. He states that he feels fine. He does get SOB at times, but no problems stated. Will route this to PCP to see if he would like to do. Pt aware. Will f/u as indicated. Medication Sig Start Date End Date Taking?  Authorizing Provider   FLUoxetine (PROZAC) 20 MG capsule Take 1 capsule by mouth daily 2/5/19   Tisha Alvarez MD       COPD Assessment    Does the patient understand envrionmental exposure?:  Yes  Is the patient able to verbalize Rescue vs. Long Acting medications?:  Yes  Does the patient have a nebulizer?:  Yes  Does the patient use a space with inhaled medications?:  No    No patient-reported symptoms      Care Coordination Interventions    Program Enrollment:  Rising Risk  Referral from Primary Care Provider:  Yes  Suggested Interventions and Community Resources  Other Services:  Completed (Comment: Pt is [employer] and has a nurse that visits him once a week on Wednesday. )  Zone Management Tools:  Completed (Comment: COPD)         Future Appointments   Date Time Provider Susan Garcia   9/19/2019 10:30 AM ANDERS Vasquez [facility] [place]   10/7/2019 10:00 AM MD NE Lewis [place]   10/30/2019 10:30 AM SCHEDULE, [facility] LPN [address] [place]

## 2019-05-21 ENCOUNTER — HOSPITAL ENCOUNTER (OUTPATIENT)
Dept: GENERAL RADIOLOGY | Facility: HOSPITAL | Age: 69
Discharge: HOME | End: 2019-05-21

## 2019-05-21 PROCEDURE — 71046 X-RAY EXAM CHEST 2 VIEWS: CPT

## 2019-06-07 NOTE — CARE COORDINATION
Pt called and left me a message stating that the home care nurse was there and that his BP was high and it wasn't coming down. I called to see if there was any improvement since he left the message. Pt stated that it has come back down. He hadn't been sleeping well, but that he got a really good night's rest Wednesday night and felt much better yesterday. His readings were:    W: 180/99     172/93     174/84       Th: 158/73   148/74  Fri: 172/96       Pt is to take a clonidine to help bring this down. He is also going to keep his BP over the weekend. To check at least twice a day. I will f/u with him on Monday to see how it went. Pt stated understanding.

## 2019-06-10 PROBLEM — G25.81 RLS (RESTLESS LEGS SYNDROME): Status: ACTIVE | Noted: 2019-01-01

## 2019-06-10 PROBLEM — I10 ESSENTIAL HYPERTENSION: Status: ACTIVE | Noted: 2019-01-01

## 2019-06-10 PROBLEM — J44.89 OBSTRUCTIVE CHRONIC BRONCHITIS WITHOUT EXACERBATION: Status: ACTIVE | Noted: 2019-01-01

## 2019-06-10 PROBLEM — J63.2 BERYLLIUM DISEASE (HCC): Status: ACTIVE | Noted: 2019-01-01

## 2019-06-10 PROBLEM — J44.9 OBSTRUCTIVE CHRONIC BRONCHITIS WITHOUT EXACERBATION (HCC): Status: ACTIVE | Noted: 2019-01-01

## 2019-06-10 NOTE — PROGRESS NOTES
Date    COLONOSCOPY  11/18/11        JOINT REPLACEMENT      Bilateral total knees    NECK SURGERY      PACEMAKER PLACEMENT      TOTAL KNEE ARTHROPLASTY      RIGHT AND LEFT KNEE    UPPER GASTROINTESTINAL ENDOSCOPY  2/2012           Social History     Tobacco Use    Smoking status: Never Smoker    Smokeless tobacco: Never Used   Substance Use Topics    Alcohol use:  Yes     Alcohol/week: 1.8 oz     Types: 3 Cans of beer per week     Comment: Daily        Current Outpatient Medications   Medication Sig Dispense Refill    cloNIDine (CATAPRES) 0.2 MG tablet Take 1 tablet by mouth 2 times daily as needed for High Blood Pressure 60 tablet 5    rOPINIRole (REQUIP) 1 MG tablet Take 1 tablet by mouth nightly 90 tablet 1    FLUoxetine (PROZAC) 40 MG capsule Take 1 capsule by mouth daily 90 capsule 2    losartan (COZAAR) 100 MG tablet Take 1 tablet by mouth daily 90 tablet 1    celecoxib (CELEBREX) 200 MG capsule TAKE 1 CAPSULE BY MOUTH EVERY DAY 90 capsule 1    pantoprazole (PROTONIX) 40 MG tablet TAKE 1 TABLET BY MOUTH EVERY MORNING BEFORE BREAKFAST 90 tablet 3    hydrochlorothiazide (HYDRODIURIL) 25 MG tablet Take 1 tablet by mouth daily 90 tablet 1    zoster recombinant adjuvanted vaccine (SHINGRIX) 50 MCG/0.5ML SUSR injection Inject 0.5 mLs into the muscle See Admin Instructions 1 dose now and repeat in 2-6 months 1 each 1    cetirizine (ZYRTEC) 10 MG tablet Take 10 mg by mouth daily      NIFEdipine (PROCARDIA XL) 30 MG extended release tablet Take 1 tablet by mouth daily 30 tablet 3    ipratropium-albuterol (DUONEB) 0.5-2.5 (3) MG/3ML SOLN nebulizer solution Inhale 3 mLs into the lungs every 4 hours 360 mL 3    albuterol sulfate  (90 Base) MCG/ACT inhaler Inhale 2 puffs into the lungs every 6 hours as needed for Wheezing 1 Inhaler 3    mometasone-formoterol (DULERA) 200-5 MCG/ACT inhaler INHALE 2 PUFFS INTO THE LUNGS EVERY 12 HOURS 13 g 11    nitroGLYCERIN (NITROSTAT) 0.3 MG SL tablet up to max of 3 total doses. If no relief after 1 dose, call 911. 30 tablet 3     No current facility-administered medications for this visit. Allergies   Allergen Reactions    Codeine Swelling     Had as a baby--not sure of reaction. Family History   Problem Relation Age of Onset   [facility] Breast Cancer Mother     Colon Cancer Father     Colon Polyps Father                Review of Systems   Constitutional: Negative for appetite change, fatigue and fever. HENT: Negative. Eyes: Negative for pain and visual disturbance. Respiratory: Positive for shortness of breath and wheezing. Negative for cough. Cardiovascular: Negative for chest pain and leg swelling. Gastrointestinal: Negative for abdominal pain, diarrhea, nausea and vomiting. Endocrine: Negative for cold intolerance and heat intolerance. Genitourinary: Negative for difficulty urinating, dysuria and frequency. Musculoskeletal: Positive for arthralgias. Negative for back pain. Leg cramps, spasms. Skin: Negative for rash and wound. Neurological: Negative for dizziness, weakness and headaches. Hematological: Negative for adenopathy. Psychiatric/Behavioral: Negative for dysphoric mood. The patient is not nervous/anxious. Objective:     Physical Exam   Constitutional: He is oriented to person, place, and time. He appears well-developed and well-nourished. No distress. HENT:   Head: Normocephalic and atraumatic. Eyes: Pupils are equal, round, and reactive to light. Neck: Normal range of motion. Cardiovascular: Normal rate, regular rhythm, normal heart sounds and intact distal pulses. No murmur heard. Pulmonary/Chest: Effort normal. No respiratory distress. He has wheezes in the right middle field and the left middle field. Abdominal: Soft. Bowel sounds are normal. He exhibits no distension. There is no tenderness.    Musculoskeletal:        Lumbar back: He exhibits normal range of motion and no tenderness. Lymphadenopathy:     He has no cervical adenopathy. Neurological: He is alert and oriented to person, place, and time. Skin: Skin is warm and dry. No rash noted. Psychiatric: He has a normal mood and affect. His behavior is normal. Thought content normal.   Nursing note and vitals reviewed. BP (!) 140/78 (Site: Left Upper Arm)   Pulse 80   Temp 97.4 °F (36.3 °C) (Temporal)   Ht 5' 8\" (1.727 m)   Wt 268 lb 12.8 oz (121.9 kg)   SpO2 95%   BMI 40.87 kg/m²     Assessment:      Diagnosis Orders   1. Obstructive chronic bronchitis without exacerbation ([facility] [address]     2. Beryllium disease ([facility] [address]     3. Essential hypertension  cloNIDine (CATAPRES) 0.2 MG tablet   4. RLS (restless legs syndrome)  rOPINIRole (REQUIP) 1 MG tablet       No results found for this visit on 06/10/19. Plan:     Increase clonidine to 0.2mg as needed for BP greater than 180/100. Increase requip to 1mg nightly for RLS. It is our recommendation that patient obtain certification with home health services related to the DOL covered diagnoses. This recommendation is due to patient's worsening symptoms including difficulty walking related to the dyspnea and fatigue, and the need of assistance with medications and all ADLs.    Patient is to continue regular scheduled follow-up appointments. All risks and benefits were discussed with the patient. And all previous medical records have been reviewed. Return in about 6 months (around 12/10/2019), or if symptoms worsen or fail to improve, for DOL recertification, 3 months for chronic conditions, HTN. No orders of the defined types were placed in this encounter.       Orders Placed This Encounter   Medications    cloNIDine (CATAPRES) 0.2 MG tablet     Sig: Take 1 tablet by mouth 2 times daily as needed for High Blood Pressure     Dispense:  60 tablet     Refill:  5    rOPINIRole (REQUIP) 1 MG tablet     Sig: Take 1 tablet by mouth nightly     Dispense:  90 tablet     Refill:  1        Patient offered educational materials - see patient instructions for any instruction needed. Discussed use, benefit, and side effects of prescribed medications. All patient questions answered. Instructed to continue current medications, diet and exercise. Patient agreed with treatment plan. Follow up as directed. Patient was advised to go to the ED if condition ever becomes emergent.          Electronically signed by ANDERS Ferguson on 6/10/2019 at 12:51 PM

## 2019-06-14 NOTE — CARE COORDINATION
Called pt to see what his BP readings have been. He was having a few episodes of them being high. They came down and stayed that way after taking a clonidine. Seeing if they are still ok. I wasn't able to get the pt and there wasn't a voicemail box set up to leave a message. Will try again on Monday.

## 2019-06-18 NOTE — CARE COORDINATION
Ambulatory Care Coordination Note  6/18/2019  CM Risk Score: 5  Lobo Mortality Risk Score: 5    ACC: Antonia Pagan, RN    Summary Note:  Called pt to f/u on his BP. I have been following him for this short time of high BP's. Today, if he's stable, I will no longer follow him. Pt states that his BP has been running really good. He hasn't been writing down his numbers, but he states that it has been normal. He has no s/s of problems since then. Told him to keep my contact info and to call me if he has any other troubles. Pt stated understanding.       Future Appointments   Date Time Provider Susan Garcia   9/19/2019 10:30 AM ANDERS Bo [facility] [place]   10/30/2019 10:30 AM SCHEDULE, [facility] [place]   12/12/2019 11:00 AM ANDERS Herrera [facility]

## 2019-07-08 ENCOUNTER — OUTSIDE FACILITY SERVICE (OUTPATIENT)
Dept: PULMONOLOGY | Facility: CLINIC | Age: 69
End: 2019-07-08

## 2019-07-08 PROCEDURE — 94060 EVALUATION OF WHEEZING: CPT | Performed by: INTERNAL MEDICINE

## 2019-07-08 PROCEDURE — 94729 DIFFUSING CAPACITY: CPT | Performed by: INTERNAL MEDICINE

## 2019-07-08 PROCEDURE — 94727 GAS DIL/WSHOT DETER LNG VOL: CPT | Performed by: INTERNAL MEDICINE

## 2019-09-12 NOTE — PROGRESS NOTES
Patient was advised to go to the ED if condition ever becomes emergent. EMR Dragon/transcription disclaimer: Some of this encounter note is an electronic transcription/translation of spoken language to printed text. The electronic translation of spoken language may permit erroneous, or at times, nonsensical words or phrases to be inadvertently transcribed.  Although I have reviewed the note for such errors, some may still exist.      Electronically signed by ANDERS Cason on 9/15/2019 at 7:05 PM

## 2019-09-14 PROBLEM — J96.01 ACUTE HYPOXEMIC RESPIRATORY FAILURE (HCC): Status: ACTIVE | Noted: 2019-01-01

## 2019-09-14 PROCEDURE — 99291 CRITICAL CARE FIRST HOUR: CPT | Performed by: INTERNAL MEDICINE

## 2019-09-14 NOTE — ED NOTES
Patient placed on cardiac monitor, continuous pulse oximeter, and NIBP monitor. Monitor alarms on.        Krystal Wakefield RN  09/14/19 [id number]

## 2019-09-14 NOTE — ED NOTES
Bed: 01-A  Expected date:   Expected time:   Means of arrival:   Comments:  Omid Yepez, RN  09/14/19 [id number]

## 2019-09-14 NOTE — H&P
changes  GI: No nausea / vomiting / abdominal pain / diarrhea / constipation  :  No dysuria / hesitancy / urgency / hematuria   Neuro: No muscle weakness / dysphagia / headache / paresthesias  Musculoskeletal:  No edema / cyanosis / pain  Psychiatric:  No depression / anxiety / insomnia  Skin:  No new rashes / lesions     Physical Examination:   BP (!) 91/53   Pulse 71   Temp 97 °F (36.1 °C) (Temporal)   Resp 30   Ht 5' 8\" (1.727 m)   Wt 270 lb 6.4 oz (122.7 kg)   SpO2 95%   BMI 41.11 kg/m²   General appearance: in +ve  distress  HEENT: Normal cephalic, atraumatic without obvious deformity. Pupils equal, round, and reactive to light. Neck: Supple, with full range of motion. No jugular venous distention. Trachea midline. Thyroid no masses noted. Respiratory: scattered wheezing and crackles . Cardiovascular: Regular rate and rhythm with normal S1/S2 without murmurs, rubs or gallops. Abdomen: Soft, non-tender, non-distended with normal bowel sounds. .  Musculoskeletal: No clubbing, cyanosis or edema bilaterally. .  Neurologic:  Neurovascularly intact without any focal sensory/motor deficits. Cranial nerves: II-XII intact, grossly non-focal.  Psychiatric: Alert and oriented, thought content appropriate, normal insight      Diagnostic Data:  Imaging:   XR CHEST PORTABLE   Final Result   1. Diffuse airspace opacities with perihilar prominence are most   likely due to pulmonary edema. Atypical pneumonia could have a similar   appearance.    Signed by Dr Sabrina Young on 9/14/2019 2:16 PM      CTA PULMONARY W CONTRAST    (Results Pending)     CBC:  Recent Labs     09/14/19  1404   WBC 4.8   HGB 16.4   HCT 51.5        BMP:  Recent Labs     09/14/19  1404 09/14/19  1434     --    K 4.5 3.5     --    CO2 20*  --    BUN 24*  --    CREATININE 0.9  --    CALCIUM 8.7*  --      Recent Labs     09/14/19  1404   AST 28   ALT 18   BILITOT 0.3   ALKPHOS 90     Coag Panel: No results for input(s): INR,

## 2019-09-14 NOTE — CONSULTS
PRN  magnesium hydroxide (MILK OF MAGNESIA) 400 MG/5ML suspension 30 mL, 30 mL, Oral, Daily PRN  ondansetron (ZOFRAN) injection 4 mg, 4 mg, Intravenous, Q6H PRN  enoxaparin (LOVENOX) injection 40 mg, 40 mg, Subcutaneous, Daily  acetaminophen (TYLENOL) tablet 650 mg, 650 mg, Oral, Q4H PRN  furosemide (LASIX) injection 20 mg, 20 mg, Intravenous, Daily  albuterol sulfate  (90 Base) MCG/ACT inhaler 2 puff, 2 puff, Inhalation, Q6H PRN  celecoxib (CELEBREX) capsule 200 mg, 200 mg, Oral, Daily  rOPINIRole (REQUIP) tablet 1 mg, 1 mg, Oral, Nightly  [START ON 9/15/2019] pantoprazole (PROTONIX) tablet 40 mg, 40 mg, Oral, QAM AC  nitroGLYCERIN (NITROSTAT) SL tablet 0.4 mg, 0.4 mg, Sublingual, Q5 Min PRN  ipratropium-albuterol (DUONEB) nebulizer solution 3 mL, 1 vial, Inhalation, Q4H  FLUoxetine (PROZAC) capsule 40 mg, 40 mg, Oral, Daily  budesonide (PULMICORT) nebulizer suspension 500 mcg, 0.5 mg, Nebulization, BID  Allergies:  Codeine    Social History:    Non-smoker never smoked  Drinks 6 beers per day but never had any problem with withdrawal if he goes several days without drinking   Lucie Boeck no illicit substance abuse    Family History:       Problem Relation Age of Onset    Breast Cancer Mother     Colon Cancer Father     Colon Polyps Father      REVIEW OF SYSTEMS:    Constitutional: Positive for diaphoresis. Negative for fever. Respiratory: Positive for cough, shortness of breath and wheezing. Cardiovascular: Positive for chest pain (\"letting up\"). Gastrointestinal: Negative for vomiting.    : Positive for sudden urgency and urinary incontinence but reports good stream  All other systems reviewed and are negative  PHYSICAL EXAM:      Vitals:    BP (!) 103/57   Pulse 92   Temp 97.6 °F (36.4 °C) (Temporal)   Resp 16   Ht 5' 8\" (1.727 m)   Wt 268 lb 14.4 oz (122 kg)   SpO2 (!) 89%   BMI 40.89 kg/m²     PHYSICAL EXAM:  Awake, oriented x3, in mild respiratory distress, talking in short sentences ventricular hypertrophy noted.   Grade I diastolic function with elevated left ventricular filling   pressure.   No evidence of left ventricular mass or thrombus noted.   This was a technically difficult study due to poor acoustical window.   Systolic blunting of the pulmonary veins noted. [address] is normal in size with normal respiratory collapse. IMPRESSION/RECOMMENDATIONS:    1. Acute hypoxemic respiratory failure  2. Acute pulmonary edema  3. History of COPD  4. History of beryllium exposure but no confirmed berylliosis  5. Obesity  6. Nocturnal hypoxemia on nocturnal oxygen  7. Diastolic congestive heart failure  8. History of bradycardia requiring pacemaker    COPD and a non-smoker with no history of asthma is very unlikely, but patient does have wheezes, unsure if this is due to his beryllium exposure before because this can present like a sarcoidosis case. Respective, its unlikely that the COPD can be triggered all of a sudden while at home without obvious exposure and the ABGs as well as a chest x-ray do not go with acute COPD exacerbation. He is wheezing on the left so I agree with the bronchodilator in the steroid, and agree with the Zithromax for its anti-inflammatory benefits but no need for the Rocephin  Patient was given diuretics and advised to be continued  Need to have the echocardiogram sooner than later and to consult cardiology since that looks more suggestive of a cardiac presentation given the acuteness. The normal proBNP is not suggestive of acute decompensated heart failure but is not uncommon with diastolic heart failure to have an acute presentation with pulmonary edema while having normal BMP.   Patient is monitored in the ICU, he is on the Venturi mask which providing adequate oxygenation, he is not a CO2 retainer  Consider work-up for obstructive sleep apnea as a possible cause and contributor of his nocturnal hypoxemia after discharge  Consult cardiology to evaluate, preferably

## 2019-09-14 NOTE — ED PROVIDER NOTES
[facility] ICU  eMERGENCY dEPARTMENT eNCOUnter      Pt Name: Miranda Boyle  MRN: [id number]  [place] 1950  Date of evaluation: 9/14/2019  Provider: Krista Houser MD    [address] [place] [address]       Chief Complaint   Patient presents with    Chest Pain    Respiratory Distress         HISTORY OF PRESENT ILLNESS   (Location/Symptom, Timing/Onset,Context/Setting, Quality, Duration, Modifying Factors, Severity)  Note limiting factors. Miranda Boyle is a 76 y.o. male who presents to the emergency department      The history is provided by the patient. Shortness of Breath   Severity:  Severe  Onset quality:  Sudden  Duration:  1 hour  Timing:  Constant  Progression:  Unchanged  Chronicity:  New  Relieved by:  None tried  Worsened by:  Nothing  Ineffective treatments:  None tried  Associated symptoms: chest pain (\"letting up\"), cough, diaphoresis and wheezing    Associated symptoms: no fever and no vomiting    Risk factors: obesity        NursingNotes were reviewed. REVIEW OF SYSTEMS    (2-9 systems for level 4, 10 or more for level 5)     Review of Systems   Constitutional: Positive for diaphoresis. Negative for fever. Respiratory: Positive for cough, shortness of breath and wheezing. Cardiovascular: Positive for chest pain (\"letting up\"). Gastrointestinal: Negative for vomiting. All other systems reviewed and are negative. Except as noted above the remainder of the review of systems was reviewed and negative.        PAST MEDICAL HISTORY     Past Medical History:   Diagnosis Date    Anemia     Anxiety     Back pain     Cellulitis     Depression     Elevated triglycerides with high cholesterol     GERD (gastroesophageal reflux disease)     Hypertension     Obstructive chronic bronchitis without exacerbation ([facility] [address] 6/10/2019    Osteoarthritis     Pacemaker 04/21/2017         SURGICALHISTORY       Past Surgical History:   Procedure Laterality Date    COLONOSCOPY  11/18/11        JOINT

## 2019-09-15 PROCEDURE — 99233 SBSQ HOSP IP/OBS HIGH 50: CPT | Performed by: INTERNAL MEDICINE

## 2019-09-15 NOTE — PROGRESS NOTES
Intravenous Daily    celecoxib  200 mg Oral Daily    rOPINIRole  1 mg Oral Nightly    pantoprazole  40 mg Oral QAM AC    ipratropium-albuterol  1 vial Inhalation Q4H    FLUoxetine  40 mg Oral Daily    budesonide  0.5 mg Nebulization BID    methylPREDNISolone  40 mg Intravenous Q12H     ipratropium-albuterol, sodium chloride flush, magnesium hydroxide, ondansetron, acetaminophen, albuterol sulfate HFA, nitroGLYCERIN  DIET CARDIAC;         Lab and other Data:     Recent Labs     09/14/19  1404 09/15/19  0559   WBC 4.8 16.1*   HGB 16.4 14.9    248     Recent Labs     09/14/19  1404 09/14/19  1434 09/15/19  0559     --  137   K 4.5 3.5 4.7     --  97*   CO2 20*  --  23   BUN 24*  --  30*   CREATININE 0.9  --  1.5*   GLUCOSE 169*  --  223*     Recent Labs     09/14/19  1404   AST 28   ALT 18   BILITOT 0.3   ALKPHOS 90     Troponin T:   Recent Labs     09/14/19  1759 09/14/19  2350 09/15/19  0559   TROPONINI <0.01 <0.01 <0.01     Pro-BNP: No results for input(s): BNP in the last 72 hours. INR: No results for input(s): INR in the last 72 hours. ABGs:   Lab Results   Component Value Date    PHART 7.420 09/14/2019    PO2ART 51.0 09/14/2019    WNR6HXQ 30.0 09/14/2019     UA:  Recent Labs     09/12/19  1030   NITRITE neg   COLORU yellow   PHUR 5.5   CLARITYU clear   SPECGRAV 1.030   LEUKOCYTESUR neg   BILIRUBINUR neg   BLOODU neg   GLUCOSEU neg       Imaging:   XR CHEST PORTABLE   Final Result   Impression:    1. No significant interval change since previous exam.   Signed by Dr Linda Carrasquillo on 9/15/2019 8:45 AM      CTA PULMONARY W CONTRAST   Final Result   1. Pulmonary edema and small pleural effusions. Signed by Dr Linda Carrasquillo on 9/14/2019 3:23 PM      XR CHEST PORTABLE   Final Result   1. Diffuse airspace opacities with perihilar prominence are most   likely due to pulmonary edema. Atypical pneumonia could have a similar   appearance.    Signed by Dr Linda Carrasquillo on 9/14/2019 2:16 PM

## 2019-09-15 NOTE — CONSULTS
History:   Procedure Laterality Date    COLONOSCOPY  11/18/11        JOINT REPLACEMENT      Bilateral total knees    NECK SURGERY      PACEMAKER PLACEMENT      TOTAL KNEE ARTHROPLASTY      RIGHT AND LEFT KNEE    UPPER GASTROINTESTINAL ENDOSCOPY  2/2012           Past Family History:  Family History   Problem Relation Age of Onset    Breast Cancer Mother     Colon Cancer Father     Colon Polyps Father        Past Social History:  Social History     Socioeconomic History    Marital status:      Spouse name: Not on file    Number of children: Not on file    Years of education: Not on file    Highest education level: Not on file   Occupational History    Not on file   Social Needs    Financial resource strain: Not on file    Food insecurity:     Worry: Not on file     Inability: Not on file    Transportation needs:     Medical: Not on file     Non-medical: Not on file   Tobacco Use    Smoking status: Never Smoker    Smokeless tobacco: Never Used   Substance and Sexual Activity    Alcohol use: Yes     Alcohol/week: 3.0 standard drinks     Types: 3 Cans of beer per week     Comment: Daily    Drug use: No    Sexual activity: Not on file   Lifestyle    Physical activity:     Days per week: Not on file     Minutes per session: Not on file    Stress: Not on file   Relationships    Social connections:     Talks on phone: Not on file     Gets together: Not on file     Attends Hinduism service: Not on file     Active member of club or organization: Not on file     Attends meetings of clubs or organizations: Not on file     Relationship status: Not on file    Intimate partner violence:     Fear of current or ex partner: Not on file     Emotionally abused: Not on file     Physically abused: Not on file     Forced sexual activity: Not on file   Other Topics Concern    Not on file   Social History Narrative    Not on file       Allergies:   Allergies   Allergen Reactions Yecenia Mcguire MD   mometasone-formoterol [facility]) 200-5 MCG/ACT inhaler INHALE 2 PUFFS INTO THE LUNGS EVERY 12 HOURS 12/14/17   Cheryl Roldan MD   nitroGLYCERIN (NITROSTAT) 0.3 MG SL tablet up to max of 3 total doses. If no relief after 1 dose, call 911. 9/19/17   Prakash Calhoun MD       Current Meds:   cefTRIAXone (ROCEPHIN) IV  1 g Intravenous Q24H    azithromycin  500 mg Intravenous Q24H    sodium chloride flush  10 mL Intravenous 2 times per day    enoxaparin  40 mg Subcutaneous Daily    celecoxib  200 mg Oral Daily    rOPINIRole  1 mg Oral Nightly    pantoprazole  40 mg Oral QAM AC    ipratropium-albuterol  1 vial Inhalation Q4H    FLUoxetine  40 mg Oral Daily    budesonide  0.5 mg Nebulization BID    methylPREDNISolone  40 mg Intravenous Q12H       Current Infused Meds:      Physical Exam:  Vitals:    09/15/19 1100   BP: (!) 151/81   Pulse: 101   Resp: 16   Temp:    SpO2: 92%       Intake/Output Summary (Last 24 hours) at 9/15/2019 1345  Last data filed at 9/15/2019 1145  Gross per 24 hour   Intake 200 ml   Output 1150 ml   Net -950 ml     Estimated body mass index is 40.89 kg/m² as calculated from the following:    Height as of this encounter: 5' 8\" (1.727 m). Weight as of this encounter: 268 lb 14.4 oz (122 kg). Physical Exam   Constitutional: He appears well-developed and well-nourished. HENT:   Head: Normocephalic. Eyes: Pupils are equal, round, and reactive to light. Neck: Normal range of motion. Cardiovascular: Normal rate, regular rhythm and normal heart sounds. Pulmonary/Chest: Effort normal.   Abdominal: Soft. Musculoskeletal: Normal range of motion. Neurological: He is alert. Skin: Skin is warm.        Labs:  Recent Labs     09/14/19  1404 09/15/19  0559   WBC 4.8 16.1*   HGB 16.4 14.9    248       Recent Labs     09/14/19  1404 09/14/19  1434 09/15/19  0559     --  137   K 4.5 3.5 4.7     --  97*   CO2 20*  --  23   BUN 24*  --  30*   CREATININE

## 2019-09-16 PROCEDURE — 99233 SBSQ HOSP IP/OBS HIGH 50: CPT | Performed by: INTERNAL MEDICINE

## 2019-09-17 ENCOUNTER — OUTSIDE FACILITY SERVICE (OUTPATIENT)
Dept: PULMONOLOGY | Facility: CLINIC | Age: 69
End: 2019-09-17

## 2019-09-17 PROCEDURE — 99232 SBSQ HOSP IP/OBS MODERATE 35: CPT | Performed by: INTERNAL MEDICINE

## 2019-09-17 NOTE — PROGRESS NOTES
(117.2 kg)   SpO2 (!) 89%   BMI 39.27 kg/m²     Intake/Output Summary (Last 24 hours) at 9/17/2019 1704  Last data filed at 9/17/2019 1200  Gross per 24 hour   Intake 500 ml   Output 1500 ml   Net -1000 ml       Physical Examination:    /84   Pulse 81   Temp 98.4 °F (36.9 °C) (Temporal)   Resp 17   Ht 5' 8\" (1.727 m)   Wt 258 lb 4.8 oz (117.2 kg)   SpO2 (!) 89%   BMI 39.27 kg/m²     GENERAL - well developed and well nourished; is somewhat an active participant in this examination  HEENT -  PERRLA, Hearing appears normal, conjunctiva and lids are normal, ears and nose appear normal  NECK - no thyromegaly, no JVD, trachea is in the midline  CARDIOVASCULAR - PMI is in the left mid line clavicular position, Normal S1 and S2 with a grade 1/6 systolic murmur. No S3 or S4    PULMONARY - No respiratory distress. scattered wheezes and rales. Breath sounds in both  lung fields are Decreased  ABDOMEN  - soft, non tender, no rebound, no hepatomegaly or splenomegaly  MUSCULOSKELETAL  - Prone/Supine, digitals and nails are without clubbing or cyanosis  EXTREMITIES - trace edema  NEUROLOGIC - cranial nerves, II-XII, are normal  SKIN - turgor is normal, no rash  PSYCHIATRIC - normal mood and affect, alert and orientated x 3, judgement and insight appear appropriate      LABORATORY EVALUATION & TESTING:    I have personally reviewed and interpreted the results of the following diagnostic endeavors     CBC:   Recent Labs     09/15/19  0559 09/16/19  0133   WBC 16.1* 17.7*   HGB 14.9 14.4   HCT 45.1 44.0    260     BMP:   Recent Labs     09/16/19  0133 09/17/19  0823    141   K 4.5 3.9   CO2 25 24   BUN 37* 33*   CREATININE 1.2 0.9   LABGLOM >60 >60   GLUCOSE 197* 115*     BNP: No results for input(s): BNP in the last 72 hours. PT/INR: No results for input(s): PROTIME, INR in the last 72 hours. APTT:No results for input(s): APTT in the last 72 hours.   CARDIAC ENZYMES:  Recent Labs     09/14/19  8674

## 2019-09-17 NOTE — PROGRESS NOTES
AST 28  --   --   --   --    ALT 18  --   --   --   --    ALKPHOS 90  --   --   --   --    BILITOT 0.3  --   --   --   --    TROPONINI <0.01  --    < > <0.01  --     < > = values in this interval not displayed. Recent Labs     09/14/19  1617   BC No Growth to date. Any change in status will be called. Radiograph: no new  My radiograph interpretation: none  Pulmonary Assessment:    1. Acute hypoxemic respiratory failure  2. Acute pulmonary edema  3. Hx of COPD  4. Hx of beryllium exposure but no confirmed berylliosis   5. Obesity   6. Nocturnal hypoxemia on nocturnal oxygen   7. Diastolic congestive heart failure  8. Hx of Bradycardia with pacemaker         Recommend:   · Initial acute phase suggestive of pulmonary edema. He is down net (-) 3.4 L and improved. He was changed to Doxycyline yesterday. Blood cultures negative. He is afebrile. Continue to cover with doxycyline for possible pneumonia given leukocytosis. Hopefully home tomorrow. Wean oxygen as tolerated. Taper steroids as tolerated. Electronically signed by Ngoc Larsen on 9/17/2019 at 9:09 AM  Physician's substantive portion:  Subjective: The patient currently appears comfortable on oxygen nasal cannula. Objective: He has fair air movement on chest exam.  O2 saturations are in the low 90s. Assessment/recommendation: We will continue to try to wean his FiO2 as he tolerates. I have seen and examined patient personally, performing a face-to-face diagnostic evaluation with plan of care reviewed and developed with APRN. I have addended and/or modified the above history of present illness, physical examination, and assessment and plan to reflect my findings and impressions. Essential elements of the care plan were discussed with APRN above. Agree with findings and assessment/plan as documented above.     Electronically signed by Ranjeet Rich MD on 9/17/19 at 4:12 PM

## 2019-09-17 NOTE — PLAN OF CARE
Problem: Falls - Risk of:  Goal: Will remain free from falls  Description  Will remain free from falls  9/17/2019 0011 by Trini Brown RN  Outcome: Ongoing     Problem: Falls - Risk of:  Goal: Absence of physical injury  Description  Absence of physical injury  9/17/2019 0011 by Trini Brown RN  Outcome: Ongoing     Problem:  Activity:  Goal: Fatigue will decrease  Description  Fatigue will decrease  9/17/2019 0011 by Trini Brown RN  Outcome: Ongoing     Problem: Cardiac:  Goal: Hemodynamic stability will improve  Description  Hemodynamic stability will improve  9/17/2019 0011 by Trini Brown RN  Outcome: Ongoing     Problem: Coping:  Goal: Level of anxiety will decrease  Description  Level of anxiety will decrease  9/17/2019 0011 by Trini Brown RN  Outcome: Ongoing     Problem: Coping:  Goal: Ability to cope will improve  Description  Ability to cope will improve  9/17/2019 0011 by Trini Brown RN  Outcome: Ongoing     Problem: Coping:  Goal: Ability to establish a method of communication will improve  Description  Ability to establish a method of communication will improve  9/17/2019 0011 by Trini Brown RN  Outcome: Ongoing     Problem: Nutritional:  Goal: Consumption of the prescribed amount of daily calories will improve  Description  Consumption of the prescribed amount of daily calories will improve  9/17/2019 0011 by Trini Brown RN  Outcome: Ongoing     Problem: Respiratory:  Goal: Ability to maintain a clear airway will improve  Description  Ability to maintain a clear airway will improve  9/17/2019 0011 by Trini Brown RN  Outcome: Ongoing     Problem: Respiratory:  Goal: Ability to maintain adequate ventilation will improve  Description  Ability to maintain adequate ventilation will improve  9/17/2019 0011 by Trini Brown RN  Outcome: Ongoing     Problem: Respiratory:  Goal: Complications related to the disease process, condition or treatment will be

## 2019-09-17 NOTE — PROGRESS NOTES
[facility]      Patient:  Miranda Boyle  YOB: 1950  Date of Service: 9/17/2019  MRN: [id number]   Acct: [de-identified]   Primary Care Physician: ANDERS Onofre  Advance Directive: Full Code  Admit Date: 9/14/2019       Hospital Day: 3    Chief Complaint:   Chief Complaint   Patient presents with    Chest Pain    Respiratory Distress   The patient is a 76 y.o. male with PMH of COPD, HTN, Cad s/p pacemaker, morbid obesity ZENON who presented to ER with above, sudden CP with SOB diaphoresis, in ER CXR showed ? Pulmonary edema VS atypical PN, BNP was -ve, troponin -ve was given RT, lasix steroids abx and we were  Called to admit to ICU, he was placed on %100 NRB and pulse OX was around %93  9/15/19  Feeling better, BNP was -ve ?  Atypical PN VS ARDS , echo diastolic dysfunction, EF %85, DENI now Cr 1.5, hold iv lasix, pulmonary appreciated ,AECOPD continue abx awaiting cards , wbc up 2 to steroids added rocephin   9/16/19  BNP repeat -ve, Cr better , appreciate pulmonary remainsvery challenging picture, h/o COPD History of [place] exposure, however no berylliosis suspected since his chest x-ray from 2 years ago did not show any evidence of similar nodular infiltrate, normal BNP twice argues against CHF, also picture not typical for pneumonia, strongly suspect underlying ZENON,awaiting cards , restart bp meds   9/17/19  Much better, reponding to treatment, indicated he needs new PCP and pulmonary As o/P to be checked for ZENON and PFts suspect will need home O2, transfer to floor   Objective:   VITALS:  BP (!) 142/79   Pulse 73   Temp 98.4 °F (36.9 °C) (Temporal)   Resp 18   Ht 5' 8\" (1.727 m)   Wt 258 lb 4.8 oz (117.2 kg)   SpO2 92%   BMI 39.27 kg/m²   24HR INTAKE/OUTPUT:      Intake/Output Summary (Last 24 hours) at 9/17/2019 0805  Last data filed at 9/17/2019 0700  Gross per 24 hour   Intake 640 ml   Output 1975 ml   Net -1335 ml      General appearance: alert and cooperative with exam, on 09/14/2019     UA:  No results for input(s): NITRITE, COLORU, PHUR, LABCAST, WBCUA, RBCUA, MUCUS, TRICHOMONAS, YEAST, BACTERIA, CLARITYU, SPECGRAV, LEUKOCYTESUR, UROBILINOGEN, BILIRUBINUR, BLOODU, GLUCOSEU, AMORPHOUS in the last 72 hours. Invalid input(s): KETONESU    Imaging:   XR CHEST PORTABLE   Final Result   Impression:    1. Decreasing pulmonary edema. .   Signed by Dr Coni Valentin on 9/16/2019 8:20 AM      XR CHEST PORTABLE   Final Result   Impression:    1. No significant interval change since previous exam.   Signed by Dr Jose Carbajal on 9/15/2019 8:45 AM      CTA PULMONARY W CONTRAST   Final Result   1. Pulmonary edema and small pleural effusions. Signed by Dr Jose Carbajal on 9/14/2019 3:23 PM      XR CHEST PORTABLE   Final Result   1. Diffuse airspace opacities with perihilar prominence are most   likely due to pulmonary edema. Atypical pneumonia could have a similar   appearance. Signed by Dr Jose Carbajal on 9/14/2019 2:16 PM        Micro:   Blood Culture, Routine   Date Value Ref Range Status   09/14/2019   Preliminary    No Growth to date.   Any change in status will be called.   , No components found for: LABURINE  Patient Active Problem List    Diagnosis Date Noted    Abnormal nuclear stress test      Priority: High    Atrial fibrillation with slow ventricular response ([address]      Priority: High    Precordial pain      Priority: High    Sick sinus syndrome due to SA node dysfunction (HCC)      Priority: High    Acute hypoxemic respiratory failure ([address] 09/14/2019    Obstructive chronic bronchitis without exacerbation (HCC) 06/10/2019    RLS (restless legs syndrome) 06/10/2019    Essential hypertension 06/10/2019    Beryllium disease ([address] 06/10/2019    Hypertension     SVT (supraventricular tachycardia) ([address] 09/10/2017    Pacemaker 04/21/2017    Encounter for colonoscopy due to history of adenomatous colonic polyps 11/24/2014    NSAID long-term use 03/04/2014    GERD (gastroesophageal reflux disease) 03/04/2013    History of esophageal ulcer 03/04/2013    History of adenomatous polyp of colon 03/04/2013    Patulous lower esophageal sphincter 03/04/2013    Hiatal hernia 03/04/2013    Osteoarthritis 03/04/2013    Chronic pain 03/04/2013       Assessment/plan: Active Problems:    Acute hypoxemic respiratory failure (HCC)  Resolved Problems:    * No resolved hospital problems. *      Plan:   The patient is a 76 y.o. male with PMH of COPD, HTN, Cad s/p pacemaker, morbid obesity ZENON who presented to ER with above, sudden CP with SOB diaphoresis, in ER CXR showed ?  Pulmonary edema VS atypical PN, BNP was -ve, troponin -ve was given RT, lasix steroids abx and we were  Called to admit to ICU, he was placed on %100 NRB and pulse OX was around %93  9/16/19  BNP repeat -ve, Cr better , appreciate pulmonary remainsvery challenging picture, h/o COPD History of [place] exposure, however no berylliosis suspected since his chest x-ray from 2 years ago did not show any evidence of similar nodular infiltrate, normal BNP twice argues against CHF, also picture not typical for pneumonia, strongly suspect underlying ZENON,awaiting cards , restart bp meds   9/17/19  Much better, reponding to treatment, indicated he needs new PCP and pulmonary As o/P to be checked for ZENON and PFts suspect will need home O2, transfer to floor   Dolly Ma MD  Hospitalist Service  9/17/2019  8:05 AM

## 2019-09-18 ENCOUNTER — OUTSIDE FACILITY SERVICE (OUTPATIENT)
Dept: PULMONOLOGY | Facility: CLINIC | Age: 69
End: 2019-09-18

## 2019-09-18 PROCEDURE — 99232 SBSQ HOSP IP/OBS MODERATE 35: CPT | Performed by: INTERNAL MEDICINE

## 2019-09-18 NOTE — PROGRESS NOTES
200 ml   Output 1715 ml   Net -1515 ml     Physical Exam   Constitutional: He is oriented to person, place, and time. He appears well-developed and well-nourished. Nasal cannula in place. Obese   HENT:   Head: Atraumatic. Eyes: Pupils are equal, round, and reactive to light. Right eye exhibits no discharge. Left eye exhibits no discharge. Neck: Normal range of motion. Neck supple. Cardiovascular: Normal rate, regular rhythm and normal heart sounds. Exam reveals no gallop and no friction rub. No murmur heard. Pulmonary/Chest: Effort normal. He has decreased breath sounds. Abdominal: Soft. Bowel sounds are normal.   Musculoskeletal: He exhibits no edema or deformity. Neurological: He is alert and oriented to person, place, and time. Skin: Skin is warm and dry. Psychiatric: He has a normal mood and affect. His behavior is normal. Judgment and thought content normal.     Recent Labs     09/16/19  0133   WBC 17.7*   RBC 4.82   HGB 14.4   HCT 44.0      MCV 91.3   MCH 29.9   MCHC 32.7*   RDW 14.4      Recent Labs     09/16/19  0133 09/17/19  0823    141   K 4.5 3.9   CL 98 102   CO2 25 24   BUN 37* 33*   CREATININE 1.2 0.9   CALCIUM 9.5 9.6   GLUCOSE 197* 115*      No results for input(s): BC, LABGRAM, CULTRESP, BFCX in the last 72 hours. Radiograph: no new  My radiograph interpretation: none    Pulmonary Assessment:    1. Acute hypoxemic respiratory failure, improving  2. Acute pulmonary edema  3. Hx of COPD  4. Hx of beryllium exposure but no confirmed berylliosis   5. Obesity   6. Nocturnal hypoxemia on nocturnal oxygen   7. Diastolic congestive heart failure  8. Hx of Bradycardia with pacemaker         Recommend:   · Continue to wean down oxygen. He is typically on 2 L at home. · He tells me he will be getting set up with a new PCP at discharge. · Plans for an outpatient sleep study. This can be followed with his primary care provider. · Recommend ambulation.   · Pulmonology is

## 2019-09-18 NOTE — PLAN OF CARE
Problem: Falls - Risk of:  Goal: Will remain free from falls  Description  Will remain free from falls  Outcome: Ongoing  Goal: Absence of physical injury  Description  Absence of physical injury  Outcome: Ongoing     Problem: Activity:  Goal: Fatigue will decrease  Description  Fatigue will decrease  Outcome: Ongoing     Problem: Cardiac:  Goal: Hemodynamic stability will improve  Description  Hemodynamic stability will improve  Outcome: Ongoing     Problem: Coping:  Goal: Level of anxiety will decrease  Description  Level of anxiety will decrease  Outcome: Ongoing  Goal: Ability to cope will improve  Description  Ability to cope will improve  Outcome: Ongoing  Goal: Ability to establish a method of communication will improve  Description  Ability to establish a method of communication will improve  Outcome: Ongoing     Problem: Nutritional:  Goal: Consumption of the prescribed amount of daily calories will improve  Description  Consumption of the prescribed amount of daily calories will improve  Outcome: Ongoing     Problem: Respiratory:  Goal: Ability to maintain a clear airway will improve  Description  Ability to maintain a clear airway will improve  Outcome: Ongoing  Goal: Ability to maintain adequate ventilation will improve  Description  Ability to maintain adequate ventilation will improve  Outcome: Ongoing  Goal: Complications related to the disease process, condition or treatment will be avoided or minimized  Description  Complications related to the disease process, condition or treatment will be avoided or minimized  Outcome: Ongoing     Problem: Skin Integrity:  Goal: Risk for impaired skin integrity will decrease  Description  Risk for impaired skin integrity will decrease  Outcome: Ongoing     Problem: Pain:  Description  Pain management should include both nonpharmacologic and pharmacologic interventions.   Goal: Pain level will decrease  Description  Pain level will decrease  Outcome:

## 2019-09-18 NOTE — PROGRESS NOTES
8319  Gross per 24 hour   Intake 200 ml   Output 1365 ml   Net -1165 ml      General appearance: alert and cooperative with exam, on %50 VM   HEENT: atraumatic, eyes with clear conjunctiva and normal lids  Lungs: +ve  increased work of breathing, diminished breath sounds bilaterally  Heart: S1, S2 normal  Abdomen: soft, non-tender; bowel sounds normal; no masses,  no organomegaly  Extremities:atraumatic, no cyanosis no edema no calf tenderness   Neurologic:non focal    Skin: no rashes, nodules. Medications:      budesonide  0.5 mg Nebulization BID    And    albuterol  2.5 mg Nebulization 4x daily    cetirizine  5 mg Oral Daily    metoprolol  5 mg Intravenous Q6H    NIFEdipine  30 mg Oral Daily    losartan  100 mg Oral Daily    busPIRone  10 mg Oral TID    doxycycline hyclate  100 mg Oral 2 times per day    predniSONE  20 mg Oral Daily    sodium chloride flush  10 mL Intravenous 2 times per day    enoxaparin  40 mg Subcutaneous Daily    celecoxib  200 mg Oral Daily    rOPINIRole  1 mg Oral Nightly    pantoprazole  40 mg Oral QAM AC    FLUoxetine  40 mg Oral Daily     hydrALAZINE, melatonin, ipratropium-albuterol, sodium chloride flush, magnesium hydroxide, ondansetron, acetaminophen, nitroGLYCERIN  DIET CARDIAC;         Lab and other Data:     Recent Labs     09/16/19  0133   WBC 17.7*   HGB 14.4        Recent Labs     09/16/19  0133 09/17/19  0823    141   K 4.5 3.9   CL 98 102   CO2 25 24   BUN 37* 33*   CREATININE 1.2 0.9   GLUCOSE 197* 115*     No results for input(s): AST, ALT, ALB, BILITOT, ALKPHOS in the last 72 hours. Troponin T:   No results for input(s): TROPONINI in the last 72 hours. Pro-BNP: No results for input(s): BNP in the last 72 hours. INR: No results for input(s): INR in the last 72 hours.   ABGs:   Lab Results   Component Value Date    PHART 7.420 09/14/2019    PO2ART 51.0 09/14/2019    YAT2MUO 30.0 09/14/2019     UA:  No results for input(s): NITRITE,

## 2019-09-19 NOTE — PLAN OF CARE
Problem: Falls - Risk of:  Goal: Will remain free from falls  Description  Will remain free from falls  Outcome: Ongoing  Goal: Absence of physical injury  Description  Absence of physical injury  Outcome: Ongoing     Problem:  Activity:  Goal: Fatigue will decrease  Description  Fatigue will decrease  Outcome: Ongoing     Problem: Cardiac:  Goal: Hemodynamic stability will improve  Description  Hemodynamic stability will improve  Outcome: Ongoing     Problem: Coping:  Goal: Level of anxiety will decrease  Description  Level of anxiety will decrease  Outcome: Ongoing  Goal: Ability to cope will improve  Description  Ability to cope will improve  Outcome: Ongoing  Goal: Ability to establish a method of communication will improve  Description  Ability to establish a method of communication will improve  Outcome: Ongoing     Problem: Nutritional:  Goal: Consumption of the prescribed amount of daily calories will improve  Description  Consumption of the prescribed amount of daily calories will improve  Outcome: Ongoing     Problem: Respiratory:  Goal: Ability to maintain a clear airway will improve  Description  Ability to maintain a clear airway will improve  Outcome: Ongoing  Goal: Ability to maintain adequate ventilation will improve  Description  Ability to maintain adequate ventilation will improve  Outcome: Ongoing  Goal: Complications related to the disease process, condition or treatment will be avoided or minimized  Description  Complications related to the disease process, condition or treatment will be avoided or minimized  Outcome: Ongoing     Problem: Skin Integrity:  Goal: Risk for impaired skin integrity will decrease  Description  Risk for impaired skin integrity will decrease  Outcome: Ongoing     Problem: Pain:  Goal: Pain level will decrease  Description  Pain level will decrease  Outcome: Ongoing  Goal: Control of acute pain  Description  Control of acute pain  Outcome: Ongoing  Goal: Control of chronic pain  Description  Control of chronic pain  Outcome: Ongoing

## 2019-09-19 NOTE — DISCHARGE SUMMARY
HISTORY: Dyspnea and hypoxia COMPARISON: 12/19/2013. DLP: 801 mGy cm. In order to have a CT radiation dose as low as reasonably achievable, Automated Exposure Control was utilized for adjustment of the mA and/or KV according to patient size. TECHNIQUE: Helical tomographic images of the chest were obtained after the administration of intravenous contrast following angiogram protocol. Additionally, 3D MIP reconstructions in the coronal and sagittal planes were provided. FINDINGS:  Pulmonary arteries: There is adequate enhancement of the pulmonary arteries to evaluate for central and segmental pulmonary emboli. There are no filling defects within the main, lobar, segmental or visualized subsegmental pulmonary arteries. The pulmonary vessels are within normal limits for size. Aorta and great vessels: The aorta is well opacified and demonstrates no aneurysm, stenosis or dissection. The great vessels are normal in appearance. No coronary artery calcifications are visualized. Neck base: The imaged portion of the base of the neck appears unremarkable. Lungs: Diffuse interlobular septal thickening and groundglass opacities are present. Small pleural effusions are noted. The trachea and bronchial tree are patent. Heart: The heart is normal in size. There is no pericardial effusion. Mediastinum and lymph nodes: No pathologically enlarged mediastinal, hilar, or axillary lymph nodes are present. Bones and soft tissues: The osseous structures of the thorax and surrounding soft tissues demonstrate no acute process. Upper abdomen: Severe fatty liver disease is present. 1. Pulmonary edema and small pleural effusions. Signed by Dr Maico Harley on 9/14/2019 3:23 PM        Hospital Course:   80-year-old male, history of COPD, CAD status post pacemaker, ZENON, presenting to the ED on account of acute onset of chest pain and shortness of breath, with an associated diaphoresis.   Chest x-ray upon presentation showing pulmonary edema

## 2019-09-19 NOTE — CARE COORDINATION
Spoke with patient regarding MD orders for [facility] services. Pt states he already has home health with [facility]. Spoke with [facility] and confirmed. [facility] orders and chart information faxed.     Dleores Hernandez  [phone]  Fax  [phone]  Electronically signed by Romeo Mobley RN on 9/19/19 at 3:51 PM
Assistance  Ability to maintain home (clean home, laundry):  Needs Assistance  Ability to drive and/or has transportation:  Independent  Ability to do shopping:  Needs Assistance  Ability to manage finances: Independent  Is patient able to live independently?:  Yes  Hearing and Vision  Visual Impairment:  Reading glasses  Hearing Impairment:  None  Care Transitions Interventions         Follow Up: Met at bedside with patient and discussed CTC process. Contact information given. Patient is agreeable with appropriate follow up as indicated after discharge. Patient's wife and some Sabianist friends were present, but talking in the corner. He said it was ok to talk in front of them. He said he lives at home with his wife. He has home health through [place] [address] through the [address] [place] [address]. He has oxygen, but no other equipment at this time. He is considering whether or not he may possibly need a cane or walker when he goes home. He said they will provide it, but he wasn't sure until now that he needed it. He said he has become quite weak over this hospitalization. He said that his wife, daughter and granddaughter are paid through the organization to help take care of him. He said that he has a nurse that comes once a week. He is not aware of any immediate issues at this time. Will follow along while here and after discharge as indicated. Future Appointments   Date Time Provider Susan Garcia   9/19/2019 10:30 AM ANDERS Rodrigues [facility]   11/1/2019 11:00 AM ANDERS Mckeon [facility]   12/12/2019 11:00 AM ANDERS Mckeon [facility]       Health Maintenance  There are no preventive care reminders to display for this patient.     Andrea Rhodes RN

## 2019-09-30 PROBLEM — R79.89 ELEVATED SERUM CREATININE: Status: ACTIVE | Noted: 2019-01-01

## 2019-09-30 PROBLEM — I51.89 DIASTOLIC DYSFUNCTION: Status: ACTIVE | Noted: 2019-01-01

## 2019-09-30 PROBLEM — G47.9 SLEEP DISTURBANCE: Status: ACTIVE | Noted: 2019-01-01

## 2019-09-30 PROBLEM — J81.0 FLASH PULMONARY EDEMA (HCC): Status: ACTIVE | Noted: 2019-01-01

## 2019-09-30 NOTE — PROGRESS NOTES
mood.     Assessment:     Diagnosis Orders   1. Flash pulmonary edema (HCC)     2. Essential hypertension     3. Sick sinus syndrome due to SA node dysfunction (HCC)     4. Pacemaker     5. Elevated serum creatinine  Basic Metabolic Panel   6. Diastolic dysfunction       Pacer interrogation:  Pacemaker check showed adequate battery status @ 3.02 V. Mode:  AAIR-DDDR. Lead impedances are stable. Pacing:  AS-VS 94%. Reprogramming for sensitivity and threshold testing. Appropriate diagnostics and safety margins noted. A output 0.500 V at 0.40 ms; P wave 3.6 mV. V output 1.00 V at 0.40 ms, R wave 15.8 mV. Sustained arrythmia:  none. Next [facility] remote transmission:  12/0/19. Data reviewed:  9/14/19 echo   Findings    Mitral Valve   The mitral valve was not well visualized.   Mild calcification noted with normal mobility.   No evidence of mitral regurgitaton or stenosis.    Aortic Valve   The aortic valve leaflets were not well visualized.   Aortic valve appears to be tricuspid.   No evidence of aortic stenosis or aortic regurgitation.    Tricuspid Valve   Tricuspid valve was not well visualized.    Pulmonic Valve   The pulmonic valve was not visualized.    Left Atrium   Normal size left atrium.    Left Ventricle   Left ventricular size is normal .   Moderate concentric left ventricular hypertrophy.   Left ventricular ejection fraction is estimated at 55-60%.  E/A flow reversal noted. Suggestive of diastolic dysfunction.   No regional wall motion abnormalities.    Right Atrium   Right Atrium is not clearly visualized appears normal in cavity size.    Right Ventricle   The right ventricle was not clearly visualized appears normal in cavity  size.   Thickened right ventricular free wall.    Pericardial Effusion  Jael Hick is a pericardial effusion noted however difficult to determine size. There is no evidence of right atrial collapse.    Miscellaneous   The aorta is within normal limits.   [address] not well

## 2019-11-22 PROBLEM — G47.30 SLEEP APNEA, UNSPECIFIED: Status: ACTIVE | Noted: 2019-01-01

## 2019-11-25 PROBLEM — R06.09 DYSPNEA ON EXERTION: Status: ACTIVE | Noted: 2019-01-01

## 2019-12-16 PROBLEM — J44.9 OBSTRUCTIVE CHRONIC BRONCHITIS WITHOUT EXACERBATION (HCC): Status: RESOLVED | Noted: 2019-01-01 | Resolved: 2019-01-01

## 2019-12-16 PROBLEM — G25.81 RLS (RESTLESS LEGS SYNDROME): Status: RESOLVED | Noted: 2019-01-01 | Resolved: 2019-01-01

## 2019-12-16 PROBLEM — J44.89 OBSTRUCTIVE CHRONIC BRONCHITIS WITHOUT EXACERBATION: Status: RESOLVED | Noted: 2019-01-01 | Resolved: 2019-01-01

## 2020-01-01 ENCOUNTER — APPOINTMENT (OUTPATIENT)
Dept: GENERAL RADIOLOGY | Age: 70
DRG: 870 | End: 2020-01-01
Payer: MEDICARE

## 2020-01-01 ENCOUNTER — APPOINTMENT (OUTPATIENT)
Dept: MRI IMAGING | Age: 70
DRG: 057 | End: 2020-01-01
Payer: MEDICARE

## 2020-01-01 ENCOUNTER — OFFICE VISIT (OUTPATIENT)
Dept: NEUROLOGY | Age: 70
End: 2020-01-01
Payer: MEDICARE

## 2020-01-01 ENCOUNTER — OFFICE VISIT (OUTPATIENT)
Dept: INTERNAL MEDICINE | Age: 70
End: 2020-01-01
Payer: MEDICARE

## 2020-01-01 ENCOUNTER — PATIENT MESSAGE (OUTPATIENT)
Dept: INTERNAL MEDICINE | Age: 70
End: 2020-01-01

## 2020-01-01 ENCOUNTER — APPOINTMENT (OUTPATIENT)
Dept: GENERAL RADIOLOGY | Age: 70
DRG: 057 | End: 2020-01-01
Payer: MEDICARE

## 2020-01-01 ENCOUNTER — HOSPITAL ENCOUNTER (INPATIENT)
Age: 70
LOS: 12 days | Discharge: HOSPICE | DRG: 870 | End: 2020-03-25
Attending: EMERGENCY MEDICINE | Admitting: INTERNAL MEDICINE
Payer: MEDICARE

## 2020-01-01 ENCOUNTER — CARE COORDINATION (OUTPATIENT)
Dept: CASE MANAGEMENT | Age: 70
End: 2020-01-01

## 2020-01-01 ENCOUNTER — APPOINTMENT (OUTPATIENT)
Dept: ULTRASOUND IMAGING | Age: 70
DRG: 870 | End: 2020-01-01
Payer: MEDICARE

## 2020-01-01 ENCOUNTER — TELEPHONE (OUTPATIENT)
Dept: INTERNAL MEDICINE CLINIC | Age: 70
End: 2020-01-01

## 2020-01-01 ENCOUNTER — TELEPHONE (OUTPATIENT)
Dept: NEUROSURGERY | Age: 70
End: 2020-01-01

## 2020-01-01 ENCOUNTER — APPOINTMENT (OUTPATIENT)
Dept: CT IMAGING | Age: 70
DRG: 870 | End: 2020-01-01
Payer: MEDICARE

## 2020-01-01 ENCOUNTER — APPOINTMENT (OUTPATIENT)
Dept: CT IMAGING | Age: 70
DRG: 057 | End: 2020-01-01
Payer: MEDICARE

## 2020-01-01 ENCOUNTER — TELEPHONE (OUTPATIENT)
Dept: CARDIOLOGY | Age: 70
End: 2020-01-01

## 2020-01-01 ENCOUNTER — TELEPHONE (OUTPATIENT)
Dept: NEUROLOGY | Age: 70
End: 2020-01-01

## 2020-01-01 ENCOUNTER — HOSPITAL ENCOUNTER (INPATIENT)
Age: 70
LOS: 3 days | Discharge: HOME | DRG: 057 | End: 2020-02-23
Attending: PEDIATRICS | Admitting: FAMILY MEDICINE
Payer: MEDICARE

## 2020-01-01 VITALS
WEIGHT: 247.9 LBS | TEMPERATURE: 98.3 F | SYSTOLIC BLOOD PRESSURE: 167 MMHG | HEIGHT: 68 IN | DIASTOLIC BLOOD PRESSURE: 77 MMHG | RESPIRATION RATE: 24 BRPM | OXYGEN SATURATION: 90 % | BODY MASS INDEX: 37.57 KG/M2 | HEART RATE: 92 BPM

## 2020-01-01 VITALS
HEIGHT: 68 IN | OXYGEN SATURATION: 96 % | WEIGHT: 260 LBS | DIASTOLIC BLOOD PRESSURE: 85 MMHG | HEART RATE: 70 BPM | SYSTOLIC BLOOD PRESSURE: 128 MMHG | RESPIRATION RATE: 15 BRPM | BODY MASS INDEX: 39.4 KG/M2 | TEMPERATURE: 97.3 F

## 2020-01-01 VITALS
WEIGHT: 255 LBS | HEART RATE: 84 BPM | HEIGHT: 68 IN | BODY MASS INDEX: 38.65 KG/M2 | DIASTOLIC BLOOD PRESSURE: 69 MMHG | SYSTOLIC BLOOD PRESSURE: 107 MMHG

## 2020-01-01 VITALS
HEART RATE: 72 BPM | OXYGEN SATURATION: 98 % | DIASTOLIC BLOOD PRESSURE: 80 MMHG | SYSTOLIC BLOOD PRESSURE: 150 MMHG | TEMPERATURE: 97.6 F

## 2020-01-01 VITALS
WEIGHT: 253.5 LBS | SYSTOLIC BLOOD PRESSURE: 118 MMHG | OXYGEN SATURATION: 98 % | HEIGHT: 68 IN | HEART RATE: 84 BPM | BODY MASS INDEX: 38.42 KG/M2 | DIASTOLIC BLOOD PRESSURE: 70 MMHG

## 2020-01-01 DIAGNOSIS — R26.89 LOSS OF BALANCE: ICD-10-CM
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
## 2020-01-01 LAB
ACETYLCHOLINE BINDING ANTIBODY: 0 NMOL/L (ref 0–0.4)
ACETYLCHOLINE BLOCKING AB: 8 % (ref 0–26)
ALBUMIN CSF: 46 MG/DL (ref 0–35)
ALBUMIN INDEX: 11.7 RATIO (ref 0–9)
ALBUMIN SERPL-MCNC: 2.4 G/DL (ref 3.5–5.2)
ALBUMIN SERPL-MCNC: 2.7 G/DL (ref 3.5–5.2)
ALBUMIN SERPL-MCNC: 2.8 G/DL (ref 3.5–5.2)
ALBUMIN SERPL-MCNC: 2.9 G/DL (ref 3.5–5.2)
ALBUMIN SERPL-MCNC: 3 G/DL (ref 3.5–5.2)
ALBUMIN SERPL-MCNC: 3 G/DL (ref 3.5–5.2)
ALBUMIN SERPL-MCNC: 3.1 G/DL (ref 3.5–5.2)
ALBUMIN SERPL-MCNC: 3.1 G/DL (ref 3.5–5.2)
ALBUMIN SERPL-MCNC: 3.7 G/DL (ref 3.5–5.2)
ALBUMIN SERPL-MCNC: 3.86 G/DL (ref 3.75–5.01)
ALBUMIN SERPL-MCNC: 3.9 G/DL (ref 3.5–5.2)
ALBUMIN SERPL-MCNC: 4.1 G/DL (ref 3.5–5.2)
ALBUMIN SERPL-MCNC: 4.1 G/DL (ref 3.5–5.2)
ALBUMIN SERPL-MCNC: 4.2 G/DL (ref 3.5–5.2)
ALBUMIN SERPL-MCNC: 4.6 G/DL (ref 3.5–5.2)
ALBUMIN SERPL-MCNC: 4.8 G/DL (ref 3.5–5.2)
ALBUMIN, SERUM BY NEPHELOMETRY: 3930 MG/DL (ref 3500–5200)
ALP BLD-CCNC: 104 U/L (ref 40–130)
ALP BLD-CCNC: 105 U/L (ref 40–130)
ALP BLD-CCNC: 114 U/L (ref 40–130)
ALP BLD-CCNC: 141 U/L (ref 40–130)
ALP BLD-CCNC: 141 U/L (ref 40–130)
ALP BLD-CCNC: 142 U/L (ref 40–130)
ALP BLD-CCNC: 148 U/L (ref 40–130)
ALP BLD-CCNC: 154 U/L (ref 40–130)
ALP BLD-CCNC: 170 U/L (ref 40–130)
ALP BLD-CCNC: 198 U/L (ref 40–130)
ALP BLD-CCNC: 199 U/L (ref 40–130)
ALP BLD-CCNC: 204 U/L (ref 40–130)
ALP BLD-CCNC: 219 U/L (ref 40–130)
ALP BLD-CCNC: 232 U/L (ref 40–130)
ALP BLD-CCNC: 276 U/L (ref 40–130)
ALP BLD-CCNC: 295 U/L (ref 40–130)
ALP BLD-CCNC: 83 U/L (ref 40–130)
ALP BLD-CCNC: 93 U/L (ref 40–130)
ALPHA-1-GLOBULIN: 0.26 G/DL (ref 0.19–0.46)
ALPHA-2-GLOBULIN: 0.64 G/DL (ref 0.48–1.05)
ALT SERPL-CCNC: 13 U/L (ref 5–41)
ALT SERPL-CCNC: 13 U/L (ref 5–41)
ALT SERPL-CCNC: 15 U/L (ref 5–41)
ALT SERPL-CCNC: 23 U/L (ref 5–41)
ALT SERPL-CCNC: 24 U/L (ref 5–41)
ALT SERPL-CCNC: 28 U/L (ref 5–41)
ALT SERPL-CCNC: 29 U/L (ref 5–41)
ALT SERPL-CCNC: 34 U/L (ref 5–41)
ALT SERPL-CCNC: 45 U/L (ref 5–41)
ALT SERPL-CCNC: 50 U/L (ref 5–41)
ALT SERPL-CCNC: 6 U/L (ref 5–41)
ALT SERPL-CCNC: 64 U/L (ref 5–41)
ALT SERPL-CCNC: 67 U/L (ref 5–41)
ALT SERPL-CCNC: 7 U/L (ref 5–41)
ALT SERPL-CCNC: 7 U/L (ref 5–41)
ALT SERPL-CCNC: 74 U/L (ref 5–41)
ALT SERPL-CCNC: 89 U/L (ref 5–41)
ALT SERPL-CCNC: <5 U/L (ref 5–41)
AMMONIA: 20 UMOL/L (ref 16–60)
ANA IGG, ELISA: ABNORMAL
ANAEROBIC CULTURE: NORMAL
ANCA IFA: NORMAL
ANION GAP SERPL CALCULATED.3IONS-SCNC: 11 MMOL/L (ref 7–19)
ANION GAP SERPL CALCULATED.3IONS-SCNC: 12 MMOL/L (ref 7–19)
ANION GAP SERPL CALCULATED.3IONS-SCNC: 13 MMOL/L (ref 7–19)
ANION GAP SERPL CALCULATED.3IONS-SCNC: 14 MMOL/L (ref 7–19)
ANION GAP SERPL CALCULATED.3IONS-SCNC: 15 MMOL/L (ref 7–19)
ANION GAP SERPL CALCULATED.3IONS-SCNC: 16 MMOL/L (ref 7–19)
ANION GAP SERPL CALCULATED.3IONS-SCNC: 17 MMOL/L (ref 7–19)
ANION GAP SERPL CALCULATED.3IONS-SCNC: 18 MMOL/L (ref 7–19)
ANION GAP SERPL CALCULATED.3IONS-SCNC: 19 MMOL/L (ref 7–19)
ANION GAP SERPL CALCULATED.3IONS-SCNC: 21 MMOL/L (ref 7–19)
ANION GAP SERPL CALCULATED.3IONS-SCNC: 22 MMOL/L (ref 7–19)
ANTI DNA DOUBLE STRANDED: 12 IU/ML
ANTIPHOSPHOLIPID AB IGA: 6.1 APU
ANTIPHOSPHOLIPID AB IGG: 2.5 GPU
ANTIPHOSPHOLIPID AB IGM: 20.9 MPU
APPEARANCE CSF: CLEAR
APTT: 103.3 SEC (ref 26–36.2)
APTT: 158.6 SEC (ref 26–36.2)
APTT: 32.6 SEC (ref 26–36.2)
APTT: 33.2 SEC (ref 26–36.2)
APTT: 36.1 SEC (ref 26–36.2)
APTT: 45.8 SEC (ref 26–36.2)
APTT: 47.2 SEC (ref 26–36.2)
APTT: 48.3 SEC (ref 26–36.2)
APTT: 48.8 SEC (ref 26–36.2)
APTT: 49.5 SEC (ref 26–36.2)
APTT: 50.1 SEC (ref 26–36.2)
APTT: 50.4 SEC (ref 26–36.2)
APTT: 52.3 SEC (ref 26–36.2)
APTT: 52.6 SEC (ref 26–36.2)
APTT: 54 SEC (ref 26–36.2)
APTT: 55.2 SEC (ref 26–36.2)
APTT: 56.6 SEC (ref 26–36.2)
APTT: 57.7 SEC (ref 26–36.2)
APTT: 58 SEC (ref 26–36.2)
APTT: 58.1 SEC (ref 26–36.2)
APTT: 60.9 SEC (ref 26–36.2)
APTT: 61.6 SEC (ref 26–36.2)
APTT: 62.5 SEC (ref 26–36.2)
APTT: 62.6 SEC (ref 26–36.2)
APTT: 64.8 SEC (ref 26–36.2)
APTT: 65.9 SEC (ref 26–36.2)
APTT: 67.7 SEC (ref 26–36.2)
APTT: 68.6 SEC (ref 26–36.2)
APTT: 71.3 SEC (ref 26–36.2)
APTT: 83.5 SEC (ref 26–36.2)
APTT: 83.5 SEC (ref 26–36.2)
APTT: >200 SEC (ref 26–36.2)
ARSENIC BLOOD: <10 UG/L (ref 0–12)
AST SERPL-CCNC: 138 U/L (ref 5–40)
AST SERPL-CCNC: 19 U/L (ref 5–40)
AST SERPL-CCNC: 21 U/L (ref 5–40)
AST SERPL-CCNC: 22 U/L (ref 5–40)
AST SERPL-CCNC: 23 U/L (ref 5–40)
AST SERPL-CCNC: 24 U/L (ref 5–40)
AST SERPL-CCNC: 26 U/L (ref 5–40)
AST SERPL-CCNC: 28 U/L (ref 5–40)
AST SERPL-CCNC: 31 U/L (ref 5–40)
AST SERPL-CCNC: 31 U/L (ref 5–40)
AST SERPL-CCNC: 323 U/L (ref 5–40)
AST SERPL-CCNC: 33 U/L (ref 5–40)
AST SERPL-CCNC: 48 U/L (ref 5–40)
AST SERPL-CCNC: 65 U/L (ref 5–40)
AST SERPL-CCNC: 69 U/L (ref 5–40)
AST SERPL-CCNC: 81 U/L (ref 5–40)
ATYPICAL LYMPHOCYTE RELATIVE PERCENT: 1 % (ref 0–8)
B BURGDORFERI AB,CSF: 0.09 LIV
BACTERIA: ABNORMAL /HPF
BANDED NEUTROPHILS RELATIVE PERCENT: 23 % (ref 0–5)
BASE EXCESS ARTERIAL: -1.8 MMOL/L (ref -2–2)
BASE EXCESS ARTERIAL: -10.3 MMOL/L (ref -2–2)
BASE EXCESS ARTERIAL: -11.5 MMOL/L (ref -2–2)
BASE EXCESS ARTERIAL: -13.5 MMOL/L (ref -2–2)
BASE EXCESS ARTERIAL: -15.2 MMOL/L (ref -2–2)
BASE EXCESS ARTERIAL: -2.3 MMOL/L (ref -2–2)
BASE EXCESS ARTERIAL: -20.1 MMOL/L (ref -2–2)
BASE EXCESS ARTERIAL: -4.8 MMOL/L (ref -2–2)
BASE EXCESS ARTERIAL: 10.2 MMOL/L (ref -2–2)
BASE EXCESS ARTERIAL: 12.2 MMOL/L (ref -2–2)
BASE EXCESS ARTERIAL: 3.1 MMOL/L (ref -2–2)
BASE EXCESS ARTERIAL: 5.1 MMOL/L (ref -2–2)
BASE EXCESS ARTERIAL: 5.2 MMOL/L (ref -2–2)
BASE EXCESS ARTERIAL: 9.3 MMOL/L (ref -2–2)
BASE EXCESS ARTERIAL: 9.5 MMOL/L (ref -2–2)
BASOPHILS ABSOLUTE: 0 K/UL (ref 0–0.2)
BASOPHILS ABSOLUTE: 0.1 K/UL (ref 0–0.2)
BASOPHILS RELATIVE PERCENT: 0 % (ref 0–1)
BASOPHILS RELATIVE PERCENT: 0.2 % (ref 0–1)
BASOPHILS RELATIVE PERCENT: 0.3 % (ref 0–1)
BASOPHILS RELATIVE PERCENT: 0.3 % (ref 0–1)
BASOPHILS RELATIVE PERCENT: 0.5 % (ref 0–1)
BASOPHILS RELATIVE PERCENT: 0.6 % (ref 0–1)
BASOPHILS RELATIVE PERCENT: 0.6 % (ref 0–1)
BASOPHILS RELATIVE PERCENT: 0.9 % (ref 0–1)
BETA GLOBULIN: 1.04 G/DL (ref 0.48–1.1)
BILIRUB SERPL-MCNC: 0.3 MG/DL (ref 0.2–1.2)
BILIRUB SERPL-MCNC: 0.5 MG/DL (ref 0.2–1.2)
BILIRUB SERPL-MCNC: 0.6 MG/DL (ref 0.2–1.2)
BILIRUB SERPL-MCNC: 0.7 MG/DL (ref 0.2–1.2)
BILIRUB SERPL-MCNC: 0.8 MG/DL (ref 0.2–1.2)
BILIRUB SERPL-MCNC: 1 MG/DL (ref 0.2–1.2)
BILIRUB SERPL-MCNC: <0.2 MG/DL (ref 0.2–1.2)
BILIRUBIN URINE: NEGATIVE
BILIRUBIN URINE: NEGATIVE
BLOOD CULTURE, ROUTINE: NORMAL
BLOOD, URINE: ABNORMAL
BLOOD, URINE: NEGATIVE
BUN BLDV-MCNC: 11 MG/DL (ref 8–23)
BUN BLDV-MCNC: 13 MG/DL (ref 8–23)
BUN BLDV-MCNC: 15 MG/DL (ref 8–23)
BUN BLDV-MCNC: 21 MG/DL (ref 8–23)
BUN BLDV-MCNC: 23 MG/DL (ref 8–23)
BUN BLDV-MCNC: 27 MG/DL (ref 8–23)
BUN BLDV-MCNC: 29 MG/DL (ref 8–23)
BUN BLDV-MCNC: 29 MG/DL (ref 8–23)
BUN BLDV-MCNC: 34 MG/DL (ref 8–23)
BUN BLDV-MCNC: 38 MG/DL (ref 8–23)
BUN BLDV-MCNC: 39 MG/DL (ref 8–23)
BUN BLDV-MCNC: 41 MG/DL (ref 8–23)
BUN BLDV-MCNC: 44 MG/DL (ref 8–23)
BUN BLDV-MCNC: 47 MG/DL (ref 8–23)
BUN BLDV-MCNC: 51 MG/DL (ref 8–23)
BUN BLDV-MCNC: 56 MG/DL (ref 8–23)
BUN BLDV-MCNC: 58 MG/DL (ref 8–23)
BUN BLDV-MCNC: 8 MG/DL (ref 8–23)
C-REACTIVE PROTEIN: 1.19 MG/DL (ref 0–0.5)
C-REACTIVE PROTEIN: 1.35 MG/DL (ref 0–0.5)
C3 COMPLEMENT: 122 MG/DL (ref 88–201)
C4 COMPLEMENT: 24 MG/DL (ref 10–40)
CALCIUM IONIZED: 1.08 MMOL/L (ref 1.12–1.32)
CALCIUM SERPL-MCNC: 10.4 MG/DL (ref 8.8–10.2)
CALCIUM SERPL-MCNC: 7.8 MG/DL (ref 8.8–10.2)
CALCIUM SERPL-MCNC: 7.8 MG/DL (ref 8.8–10.2)
CALCIUM SERPL-MCNC: 7.9 MG/DL (ref 8.8–10.2)
CALCIUM SERPL-MCNC: 8.1 MG/DL (ref 8.8–10.2)
CALCIUM SERPL-MCNC: 8.2 MG/DL (ref 8.8–10.2)
CALCIUM SERPL-MCNC: 8.4 MG/DL (ref 8.8–10.2)
CALCIUM SERPL-MCNC: 8.7 MG/DL (ref 8.8–10.2)
CALCIUM SERPL-MCNC: 8.8 MG/DL (ref 8.8–10.2)
CALCIUM SERPL-MCNC: 8.8 MG/DL (ref 8.8–10.2)
CALCIUM SERPL-MCNC: 9 MG/DL (ref 8.8–10.2)
CALCIUM SERPL-MCNC: 9.1 MG/DL (ref 8.8–10.2)
CALCIUM SERPL-MCNC: 9.2 MG/DL (ref 8.8–10.2)
CALCIUM SERPL-MCNC: 9.2 MG/DL (ref 8.8–10.2)
CALCIUM SERPL-MCNC: 9.5 MG/DL (ref 8.8–10.2)
CALCIUM SERPL-MCNC: 9.5 MG/DL (ref 8.8–10.2)
CALCIUM SERPL-MCNC: 9.6 MG/DL (ref 8.8–10.2)
CALCIUM SERPL-MCNC: 9.9 MG/DL (ref 8.8–10.2)
CALIFORNIA ENCEPHALITIS IGG AB CSF: NORMAL
CARBOXYHEMOGLOBIN ARTERIAL: 0 % (ref 0–5)
CARBOXYHEMOGLOBIN ARTERIAL: 0.1 % (ref 0–5)
CARBOXYHEMOGLOBIN ARTERIAL: 0.2 % (ref 0–5)
CARBOXYHEMOGLOBIN ARTERIAL: 0.2 % (ref 0–5)
CARBOXYHEMOGLOBIN ARTERIAL: 0.4 % (ref 0–5)
CARBOXYHEMOGLOBIN ARTERIAL: 0.5 % (ref 0–5)
CARBOXYHEMOGLOBIN ARTERIAL: 0.5 % (ref 0–5)
CARBOXYHEMOGLOBIN ARTERIAL: 0.6 % (ref 0–5)
CARBOXYHEMOGLOBIN ARTERIAL: 0.7 % (ref 0–5)
CARBOXYHEMOGLOBIN ARTERIAL: 0.8 % (ref 0–5)
CERULOPLASMIN: 24 MG/DL (ref 15–30)
CHLORIDE BLD-SCNC: 101 MMOL/L (ref 98–111)
CHLORIDE BLD-SCNC: 103 MMOL/L (ref 98–111)
CHLORIDE BLD-SCNC: 104 MMOL/L (ref 98–111)
CHLORIDE BLD-SCNC: 105 MMOL/L (ref 98–111)
CHLORIDE BLD-SCNC: 106 MMOL/L (ref 98–111)
CHLORIDE BLD-SCNC: 108 MMOL/L (ref 98–111)
CHLORIDE BLD-SCNC: 112 MMOL/L (ref 98–111)
CHLORIDE BLD-SCNC: 93 MMOL/L (ref 98–111)
CHLORIDE BLD-SCNC: 95 MMOL/L (ref 98–111)
CHLORIDE BLD-SCNC: 97 MMOL/L (ref 98–111)
CHLORIDE BLD-SCNC: 98 MMOL/L (ref 98–111)
CHLORIDE BLD-SCNC: 99 MMOL/L (ref 98–111)
CHP ED QC CHECK: YES
CLARITY: ABNORMAL
CLARITY: CLEAR
CLOT EVALUATION CSF: ABNORMAL
CO2: 18 MMOL/L (ref 22–29)
CO2: 19 MMOL/L (ref 22–29)
CO2: 20 MMOL/L (ref 22–29)
CO2: 21 MMOL/L (ref 22–29)
CO2: 22 MMOL/L (ref 22–29)
CO2: 22 MMOL/L (ref 22–29)
CO2: 23 MMOL/L (ref 22–29)
CO2: 23 MMOL/L (ref 22–29)
CO2: 24 MMOL/L (ref 22–29)
CO2: 26 MMOL/L (ref 22–29)
CO2: 27 MMOL/L (ref 22–29)
CO2: 29 MMOL/L (ref 22–29)
CO2: 31 MMOL/L (ref 22–29)
COLOR CSF: COLORLESS
COLOR: YELLOW
COLOR: YELLOW
COPPER: 126.2 UG/DL (ref 70–140)
CORTISOL - AM: 16.3 UG/DL (ref 6.2–19.4)
CREAT SERPL-MCNC: 0.7 MG/DL (ref 0.5–1.2)
CREAT SERPL-MCNC: 0.8 MG/DL (ref 0.5–1.2)
CREAT SERPL-MCNC: 0.9 MG/DL (ref 0.5–1.2)
CREAT SERPL-MCNC: 1 MG/DL (ref 0.5–1.2)
CREAT SERPL-MCNC: 1 MG/DL (ref 0.5–1.2)
CREAT SERPL-MCNC: 1.2 MG/DL (ref 0.5–1.2)
CREAT SERPL-MCNC: 1.4 MG/DL (ref 0.5–1.2)
CREAT SERPL-MCNC: 1.5 MG/DL (ref 0.5–1.2)
CREAT SERPL-MCNC: 1.7 MG/DL (ref 0.5–1.2)
CREAT SERPL-MCNC: 1.7 MG/DL (ref 0.5–1.2)
CREAT SERPL-MCNC: 2 MG/DL (ref 0.5–1.2)
CREAT SERPL-MCNC: 2.5 MG/DL (ref 0.5–1.2)
CREAT SERPL-MCNC: 2.7 MG/DL (ref 0.5–1.2)
CREAT SERPL-MCNC: 3.2 MG/DL (ref 0.5–1.2)
CREAT SERPL-MCNC: 3.4 MG/DL (ref 0.5–1.2)
CREATININE URINE: 50.6 MG/DL (ref 4.2–622)
CRYPTOCOCCAL ANTIGEN CSF: NEGATIVE
CRYPTOCOCCUS NEOFORMANS/GATTI BY PCR: NOT DETECTED
CSF CULTURE: NORMAL
CULTURE, BLOOD 2: ABNORMAL
CULTURE, RESPIRATORY: NORMAL
CYTOMEGALOVIRUS BY PCR: NOT DETECTED
EASTERN EQUINE ENCEPHALITIS IGG AB CSF: NORMAL
EKG P AXIS: -33 DEGREES
EKG P AXIS: 56 DEGREES
EKG P AXIS: 57 DEGREES
EKG P AXIS: 57 DEGREES
EKG P AXIS: 61 DEGREES
EKG P AXIS: NORMAL DEGREES
EKG P-R INTERVAL: 108 MS
EKG P-R INTERVAL: 130 MS
EKG P-R INTERVAL: 134 MS
EKG P-R INTERVAL: 162 MS
EKG P-R INTERVAL: 162 MS
EKG P-R INTERVAL: NORMAL MS
EKG Q-T INTERVAL: 314 MS
EKG Q-T INTERVAL: 314 MS
EKG Q-T INTERVAL: 316 MS
EKG Q-T INTERVAL: 340 MS
EKG Q-T INTERVAL: 344 MS
EKG Q-T INTERVAL: 352 MS
EKG Q-T INTERVAL: 364 MS
EKG Q-T INTERVAL: 386 MS
EKG Q-T INTERVAL: 418 MS
EKG Q-T INTERVAL: 418 MS
EKG Q-T INTERVAL: 442 MS
EKG QRS DURATION: 102 MS
EKG QRS DURATION: 106 MS
EKG QRS DURATION: 136 MS
EKG QRS DURATION: 92 MS
EKG QRS DURATION: 98 MS
EKG QRS DURATION: 98 MS
EKG QTC CALCULATION (BAZETT): 408 MS
EKG QTC CALCULATION (BAZETT): 421 MS
EKG QTC CALCULATION (BAZETT): 424 MS
EKG QTC CALCULATION (BAZETT): 437 MS
EKG QTC CALCULATION (BAZETT): 440 MS
EKG QTC CALCULATION (BAZETT): 440 MS
EKG QTC CALCULATION (BAZETT): 455 MS
EKG QTC CALCULATION (BAZETT): 455 MS
EKG QTC CALCULATION (BAZETT): 459 MS
EKG QTC CALCULATION (BAZETT): 461 MS
EKG QTC CALCULATION (BAZETT): 468 MS
EKG T AXIS: -120 DEGREES
EKG T AXIS: -133 DEGREES
EKG T AXIS: -151 DEGREES
EKG T AXIS: -2 DEGREES
EKG T AXIS: 143 DEGREES
EKG T AXIS: 162 DEGREES
EKG T AXIS: 34 DEGREES
EKG T AXIS: 50 DEGREES
EKG T AXIS: 57 DEGREES
EKG T AXIS: 57 DEGREES
EKG T AXIS: 58 DEGREES
ENTEROVIRUS BY PCR: NOT DETECTED
EOSINOPHIL,URINE: NORMAL
EOSINOPHILS ABSOLUTE: 0 K/UL (ref 0–0.6)
EOSINOPHILS ABSOLUTE: 0.1 K/UL (ref 0–0.6)
EOSINOPHILS ABSOLUTE: 0.1 K/UL (ref 0–0.6)
EOSINOPHILS ABSOLUTE: 0.2 K/UL (ref 0–0.6)
EOSINOPHILS ABSOLUTE: 0.3 K/UL (ref 0–0.6)
EOSINOPHILS ABSOLUTE: 0.4 K/UL (ref 0–0.6)
EOSINOPHILS ABSOLUTE: 0.5 K/UL (ref 0–0.6)
EOSINOPHILS ABSOLUTE: 0.6 K/UL (ref 0–0.6)
EOSINOPHILS RELATIVE PERCENT: 0 % (ref 0–5)
EOSINOPHILS RELATIVE PERCENT: 0 % (ref 0–5)
EOSINOPHILS RELATIVE PERCENT: 0.7 % (ref 0–5)
EOSINOPHILS RELATIVE PERCENT: 0.8 % (ref 0–5)
EOSINOPHILS RELATIVE PERCENT: 1 % (ref 0–5)
EOSINOPHILS RELATIVE PERCENT: 2.5 % (ref 0–5)
EOSINOPHILS RELATIVE PERCENT: 3.5 % (ref 0–5)
EOSINOPHILS RELATIVE PERCENT: 3.8 % (ref 0–5)
EOSINOPHILS RELATIVE PERCENT: 4.7 % (ref 0–5)
EOSINOPHILS RELATIVE PERCENT: 4.7 % (ref 0–5)
EPITHELIAL CELLS, UA: ABNORMAL /HPF
ESCHERICHIA COLI K1 BY PCR: NOT DETECTED
FERRITIN: 351.7 NG/ML (ref 30–400)
FREE KAPPA LIGHT CHAINS: 1.54 MG/DL (ref 0.37–1.94)
FREE KAPPA/LAMBDA RATIO: 1.14 (ref 0.26–1.65)
FREE LAMBDA LIGHT CHAINS: 1.35 MG/DL (ref 0.57–2.63)
GAMMA GLOBULIN: 1 G/DL (ref 0.62–1.51)
GFR NON-AFRICAN AMERICAN: 18
GFR NON-AFRICAN AMERICAN: 19
GFR NON-AFRICAN AMERICAN: 24
GFR NON-AFRICAN AMERICAN: 26
GFR NON-AFRICAN AMERICAN: 33
GFR NON-AFRICAN AMERICAN: 40
GFR NON-AFRICAN AMERICAN: 40
GFR NON-AFRICAN AMERICAN: 46
GFR NON-AFRICAN AMERICAN: 50
GFR NON-AFRICAN AMERICAN: 60
GFR NON-AFRICAN AMERICAN: >60
GLIADIN ANTIBODIES IGA: 1.5 U/ML
GLIADIN ANTIBODIES IGG: <0.4 U/ML
GLUCOSE BLD-MCNC: 108 MG/DL (ref 74–109)
GLUCOSE BLD-MCNC: 111 MG/DL (ref 70–99)
GLUCOSE BLD-MCNC: 112 MG/DL (ref 70–99)
GLUCOSE BLD-MCNC: 113 MG/DL (ref 70–99)
GLUCOSE BLD-MCNC: 114 MG/DL (ref 74–109)
GLUCOSE BLD-MCNC: 115 MG/DL (ref 70–99)
GLUCOSE BLD-MCNC: 118 MG/DL (ref 70–99)
GLUCOSE BLD-MCNC: 121 MG/DL (ref 70–99)
GLUCOSE BLD-MCNC: 121 MG/DL (ref 70–99)
GLUCOSE BLD-MCNC: 123 MG/DL (ref 70–99)
GLUCOSE BLD-MCNC: 123 MG/DL (ref 70–99)
GLUCOSE BLD-MCNC: 126 MG/DL (ref 74–109)
GLUCOSE BLD-MCNC: 128 MG/DL (ref 74–109)
GLUCOSE BLD-MCNC: 129 MG/DL (ref 74–109)
GLUCOSE BLD-MCNC: 131 MG/DL (ref 70–99)
GLUCOSE BLD-MCNC: 131 MG/DL (ref 74–109)
GLUCOSE BLD-MCNC: 132 MG/DL (ref 70–99)
GLUCOSE BLD-MCNC: 134 MG/DL (ref 74–109)
GLUCOSE BLD-MCNC: 136 MG/DL (ref 70–99)
GLUCOSE BLD-MCNC: 137 MG/DL (ref 70–99)
GLUCOSE BLD-MCNC: 138 MG/DL (ref 70–99)
GLUCOSE BLD-MCNC: 138 MG/DL (ref 70–99)
GLUCOSE BLD-MCNC: 138 MG/DL (ref 74–109)
GLUCOSE BLD-MCNC: 141 MG/DL (ref 70–99)
GLUCOSE BLD-MCNC: 141 MG/DL (ref 70–99)
GLUCOSE BLD-MCNC: 143 MG/DL (ref 70–99)
GLUCOSE BLD-MCNC: 146 MG/DL (ref 70–99)
GLUCOSE BLD-MCNC: 146 MG/DL (ref 74–109)
GLUCOSE BLD-MCNC: 147 MG/DL (ref 70–99)
GLUCOSE BLD-MCNC: 147 MG/DL (ref 70–99)
GLUCOSE BLD-MCNC: 148 MG/DL (ref 70–99)
GLUCOSE BLD-MCNC: 148 MG/DL (ref 70–99)
GLUCOSE BLD-MCNC: 150 MG/DL (ref 74–109)
GLUCOSE BLD-MCNC: 151 MG/DL (ref 74–109)
GLUCOSE BLD-MCNC: 153 MG/DL (ref 74–109)
GLUCOSE BLD-MCNC: 154 MG/DL (ref 74–109)
GLUCOSE BLD-MCNC: 159 MG/DL (ref 70–99)
GLUCOSE BLD-MCNC: 160 MG/DL (ref 70–99)
GLUCOSE BLD-MCNC: 164 MG/DL (ref 70–99)
GLUCOSE BLD-MCNC: 166 MG/DL (ref 74–109)
GLUCOSE BLD-MCNC: 174 MG/DL (ref 70–99)
GLUCOSE BLD-MCNC: 176 MG/DL (ref 70–99)
GLUCOSE BLD-MCNC: 178 MG/DL (ref 74–109)
GLUCOSE BLD-MCNC: 183 MG/DL (ref 74–109)
GLUCOSE BLD-MCNC: 185 MG/DL (ref 70–99)
GLUCOSE BLD-MCNC: 187 MG/DL (ref 70–99)
GLUCOSE BLD-MCNC: 188 MG/DL (ref 70–99)
GLUCOSE BLD-MCNC: 194 MG/DL (ref 70–99)
GLUCOSE BLD-MCNC: 207 MG/DL (ref 70–99)
GLUCOSE BLD-MCNC: 214 MG/DL (ref 70–99)
GLUCOSE BLD-MCNC: 214 MG/DL (ref 70–99)
GLUCOSE BLD-MCNC: 215 MG/DL (ref 74–109)
GLUCOSE BLD-MCNC: 225 MG/DL (ref 70–99)
GLUCOSE BLD-MCNC: 239 MG/DL (ref 70–99)
GLUCOSE BLD-MCNC: 259 MG/DL (ref 74–109)
GLUCOSE BLD-MCNC: 260 MG/DL (ref 70–99)
GLUCOSE BLD-MCNC: 275 MG/DL (ref 74–109)
GLUCOSE BLD-MCNC: 317 MG/DL (ref 74–109)
GLUCOSE BLD-MCNC: 336 MG/DL
GLUCOSE BLD-MCNC: 336 MG/DL (ref 70–99)
GLUCOSE BLD-MCNC: 97 MG/DL (ref 70–99)
GLUCOSE URINE: 250 MG/DL
GLUCOSE URINE: NEGATIVE MG/DL
GLUCOSE, CSF: 78 MG/DL (ref 40–70)
GRAM STAIN RESULT: NORMAL
GRAM STAIN RESULT: NORMAL
HAEMOPHILUS INFLUENZAE BY PCR: NOT DETECTED
HBA1C MFR BLD: 6.8 % (ref 4–6)
HCO3 ARTERIAL: 14.1 MMOL/L (ref 22–26)
HCO3 ARTERIAL: 15.1 MMOL/L (ref 22–26)
HCO3 ARTERIAL: 17.3 MMOL/L (ref 22–26)
HCO3 ARTERIAL: 17.4 MMOL/L (ref 22–26)
HCO3 ARTERIAL: 19.5 MMOL/L (ref 22–26)
HCO3 ARTERIAL: 22.2 MMOL/L (ref 22–26)
HCO3 ARTERIAL: 22.5 MMOL/L (ref 22–26)
HCO3 ARTERIAL: 25.1 MMOL/L (ref 22–26)
HCO3 ARTERIAL: 27 MMOL/L (ref 22–26)
HCO3 ARTERIAL: 27.9 MMOL/L (ref 22–26)
HCO3 ARTERIAL: 29 MMOL/L (ref 22–26)
HCO3 ARTERIAL: 33.1 MMOL/L (ref 22–26)
HCO3 ARTERIAL: 33.5 MMOL/L (ref 22–26)
HCO3 ARTERIAL: 33.5 MMOL/L (ref 22–26)
HCO3 ARTERIAL: 33.9 MMOL/L (ref 22–26)
HCT VFR BLD CALC: 37 % (ref 42–52)
HCT VFR BLD CALC: 37.4 % (ref 42–52)
HCT VFR BLD CALC: 37.5 % (ref 42–52)
HCT VFR BLD CALC: 38 % (ref 42–52)
HCT VFR BLD CALC: 39 % (ref 42–52)
HCT VFR BLD CALC: 39.2 % (ref 42–52)
HCT VFR BLD CALC: 40 % (ref 42–52)
HCT VFR BLD CALC: 40.4 % (ref 42–52)
HCT VFR BLD CALC: 43.3 % (ref 42–52)
HCT VFR BLD CALC: 44.1 % (ref 42–52)
HCT VFR BLD CALC: 45.3 % (ref 42–52)
HCT VFR BLD CALC: 46.3 % (ref 42–52)
HCT VFR BLD CALC: 48.4 % (ref 42–52)
HCT VFR BLD CALC: 49.5 % (ref 42–52)
HCT VFR BLD CALC: 49.7 % (ref 42–52)
HCT VFR BLD CALC: 52.8 % (ref 42–52)
HCT VFR BLD CALC: 53.8 % (ref 42–52)
HCT VFR BLD CALC: 60.5 % (ref 42–52)
HCT VFR BLD CALC: 65.5 % (ref 42–52)
HEMOGLOBIN, ART, EXTENDED: 12.4 G/DL (ref 14–18)
HEMOGLOBIN, ART, EXTENDED: 12.5 G/DL (ref 14–18)
HEMOGLOBIN, ART, EXTENDED: 12.8 G/DL (ref 14–18)
HEMOGLOBIN, ART, EXTENDED: 12.8 G/DL (ref 14–18)
HEMOGLOBIN, ART, EXTENDED: 12.9 G/DL (ref 14–18)
HEMOGLOBIN, ART, EXTENDED: 12.9 G/DL (ref 14–18)
HEMOGLOBIN, ART, EXTENDED: 13.4 G/DL (ref 14–18)
HEMOGLOBIN, ART, EXTENDED: 14.1 G/DL (ref 14–18)
HEMOGLOBIN, ART, EXTENDED: 16.1 G/DL (ref 14–18)
HEMOGLOBIN, ART, EXTENDED: 17 G/DL (ref 14–18)
HEMOGLOBIN, ART, EXTENDED: 18 G/DL (ref 14–18)
HEMOGLOBIN, ART, EXTENDED: 18.9 G/DL (ref 14–18)
HEMOGLOBIN, ART, EXTENDED: 19.1 G/DL (ref 14–18)
HEMOGLOBIN, ART, EXTENDED: 19.3 G/DL (ref 14–18)
HEMOGLOBIN, ART, EXTENDED: 20.8 G/DL (ref 14–18)
HEMOGLOBIN: 11.7 G/DL (ref 14–18)
HEMOGLOBIN: 11.8 G/DL (ref 14–18)
HEMOGLOBIN: 12 G/DL (ref 14–18)
HEMOGLOBIN: 12.2 G/DL (ref 14–18)
HEMOGLOBIN: 12.4 G/DL (ref 14–18)
HEMOGLOBIN: 12.5 G/DL (ref 14–18)
HEMOGLOBIN: 13.1 G/DL (ref 14–18)
HEMOGLOBIN: 13.6 G/DL (ref 14–18)
HEMOGLOBIN: 14 G/DL (ref 14–18)
HEMOGLOBIN: 14.2 G/DL (ref 14–18)
HEMOGLOBIN: 14.9 G/DL (ref 14–18)
HEMOGLOBIN: 15.3 G/DL (ref 14–18)
HEMOGLOBIN: 16 G/DL (ref 14–18)
HEMOGLOBIN: 16.5 G/DL (ref 14–18)
HEMOGLOBIN: 16.5 G/DL (ref 14–18)
HEMOGLOBIN: 17.2 G/DL (ref 14–18)
HEMOGLOBIN: 17.4 G/DL (ref 14–18)
HEMOGLOBIN: 19.2 G/DL (ref 14–18)
HEMOGLOBIN: 20.7 G/DL (ref 14–18)
HERPES SIMPLEX VIRUS 1 BY PCR: NOT DETECTED
HERPES SIMPLEX VIRUS 2 BY PCR: NOT DETECTED
HERPES SIMPLEX VIRUS BY PCR: NOT DETECTED
HOMOCYSTEINE: 11 UMOL/L
HSV SOURCE: NORMAL
HUMAN HERPESVIRUS 6 BY PCR: NOT DETECTED
HUMAN PARECHOVIRUS BY PCR: NOT DETECTED
HYPOCHROMIA: ABNORMAL
IGA: 490 MG/DL (ref 70–400)
IGG CSF: 5 MG/DL (ref 0–6)
IGG INDEX: 0.51 RATIO (ref 0.28–0.66)
IGG SYNTHESIS RATE CSF: 0 MG/D
IGG/ALBUMIN CSF: 0.11 RATIO (ref 0.09–0.25)
IGG: 844 MG/DL (ref 768–1632)
IMMATURE GRANULOCYTES #: 0 K/UL
IMMATURE GRANULOCYTES #: 0.1 K/UL
IMMATURE GRANULOCYTES #: 0.2 K/UL
IMMATURE GRANULOCYTES #: 0.7 K/UL
IMMATURE GRANULOCYTES #: 1.1 K/UL
IMMUNOFIXATION REFLEX: NORMAL
INR BLD: 1.05 (ref 0.88–1.18)
INR BLD: 1.11 (ref 0.88–1.18)
IRON SATURATION: 12 % (ref 14–50)
IRON: 20 UG/DL (ref 59–158)
KETONES, URINE: NEGATIVE MG/DL
KETONES, URINE: NEGATIVE MG/DL
LACTIC ACID: 1 MMOL/L (ref 0.5–1.9)
LACTIC ACID: 11 MMOL/L (ref 0.5–1.9)
LACTIC ACID: 4.4 MMOL/L (ref 0.5–1.9)
LACTIC ACID: 6.6 MMOL/L (ref 0.5–1.9)
LACTIC ACID: 7.7 MMOL/L (ref 0.5–1.9)
LACTIC ACID: 8.4 MMOL/L (ref 0.5–1.9)
LACTIC ACID: 9.1 MMOL/L (ref 0.5–1.9)
LEAD LEVEL BLOOD: <2 UG/DL (ref 0–4.9)
LEUKOCYTE ESTERASE, URINE: NEGATIVE
LEUKOCYTE ESTERASE, URINE: NEGATIVE
LIPASE: 26 U/L (ref 13–60)
LISTERIA MONOCYTOGENES BY PCR: NOT DETECTED
LV EF: 52 %
LVEF MODALITY: NORMAL
LYME, EIA: 0.57 LIV (ref 0–1.2)
LYME, EIA: 0.63 LIV (ref 0–1.2)
LYMPHOCYTES ABSOLUTE: 0.2 K/UL (ref 1.1–4.5)
LYMPHOCYTES ABSOLUTE: 0.6 K/UL (ref 1.1–4.5)
LYMPHOCYTES ABSOLUTE: 0.7 K/UL (ref 1.1–4.5)
LYMPHOCYTES ABSOLUTE: 0.7 K/UL (ref 1.1–4.5)
LYMPHOCYTES ABSOLUTE: 0.8 K/UL (ref 1.1–4.5)
LYMPHOCYTES ABSOLUTE: 1.1 K/UL (ref 1.1–4.5)
LYMPHOCYTES ABSOLUTE: 1.2 K/UL (ref 1.1–4.5)
LYMPHOCYTES ABSOLUTE: 1.4 K/UL (ref 1.1–4.5)
LYMPHOCYTES ABSOLUTE: 1.5 K/UL (ref 1.1–4.5)
LYMPHOCYTES ABSOLUTE: 2.1 K/UL (ref 1.1–4.5)
LYMPHOCYTES RELATIVE PERCENT: 1 % (ref 20–40)
LYMPHOCYTES RELATIVE PERCENT: 11.6 % (ref 20–40)
LYMPHOCYTES RELATIVE PERCENT: 14.3 % (ref 20–40)
LYMPHOCYTES RELATIVE PERCENT: 17.8 % (ref 20–40)
LYMPHOCYTES RELATIVE PERCENT: 4.8 % (ref 20–40)
LYMPHOCYTES RELATIVE PERCENT: 44.7 % (ref 20–40)
LYMPHOCYTES RELATIVE PERCENT: 5 % (ref 20–40)
LYMPHOCYTES RELATIVE PERCENT: 6 % (ref 20–40)
LYMPHOCYTES RELATIVE PERCENT: 7.1 % (ref 20–40)
LYMPHOCYTES RELATIVE PERCENT: 7.6 % (ref 20–40)
Lab: NORMAL
MAGNESIUM: 1.9 MG/DL (ref 1.6–2.4)
MAGNESIUM: 2 MG/DL (ref 1.6–2.4)
MAGNESIUM: 2.1 MG/DL (ref 1.6–2.4)
MAGNESIUM: 2.2 MG/DL (ref 1.6–2.4)
MAGNESIUM: 2.4 MG/DL (ref 1.6–2.4)
MAGNESIUM: 2.5 MG/DL (ref 1.6–2.4)
MAGNESIUM: 2.7 MG/DL (ref 1.6–2.4)
MCH RBC QN AUTO: 28.4 PG (ref 27–31)
MCH RBC QN AUTO: 28.4 PG (ref 27–31)
MCH RBC QN AUTO: 28.8 PG (ref 27–31)
MCH RBC QN AUTO: 28.8 PG (ref 27–31)
MCH RBC QN AUTO: 28.9 PG (ref 27–31)
MCH RBC QN AUTO: 28.9 PG (ref 27–31)
MCH RBC QN AUTO: 29 PG (ref 27–31)
MCH RBC QN AUTO: 29 PG (ref 27–31)
MCH RBC QN AUTO: 29.1 PG (ref 27–31)
MCH RBC QN AUTO: 29.2 PG (ref 27–31)
MCH RBC QN AUTO: 29.2 PG (ref 27–31)
MCH RBC QN AUTO: 29.3 PG (ref 27–31)
MCH RBC QN AUTO: 29.3 PG (ref 27–31)
MCH RBC QN AUTO: 29.4 PG (ref 27–31)
MCHC RBC AUTO-ENTMCNC: 31.1 G/DL (ref 33–37)
MCHC RBC AUTO-ENTMCNC: 31.5 G/DL (ref 33–37)
MCHC RBC AUTO-ENTMCNC: 31.6 G/DL (ref 33–37)
MCHC RBC AUTO-ENTMCNC: 31.7 G/DL (ref 33–37)
MCHC RBC AUTO-ENTMCNC: 31.7 G/DL (ref 33–37)
MCHC RBC AUTO-ENTMCNC: 32 G/DL (ref 33–37)
MCHC RBC AUTO-ENTMCNC: 32.1 G/DL (ref 33–37)
MCHC RBC AUTO-ENTMCNC: 32.2 G/DL (ref 33–37)
MCHC RBC AUTO-ENTMCNC: 32.4 G/DL (ref 33–37)
MCHC RBC AUTO-ENTMCNC: 32.8 G/DL (ref 33–37)
MCHC RBC AUTO-ENTMCNC: 33 G/DL (ref 33–37)
MCHC RBC AUTO-ENTMCNC: 33.1 G/DL (ref 33–37)
MCHC RBC AUTO-ENTMCNC: 33.2 G/DL (ref 33–37)
MCHC RBC AUTO-ENTMCNC: 33.2 G/DL (ref 33–37)
MCHC RBC AUTO-ENTMCNC: 33.3 G/DL (ref 33–37)
MCHC RBC AUTO-ENTMCNC: 33.8 G/DL (ref 33–37)
MCHC RBC AUTO-ENTMCNC: 34 G/DL (ref 33–37)
MCV RBC AUTO: 85.7 FL (ref 80–94)
MCV RBC AUTO: 86.5 FL (ref 80–94)
MCV RBC AUTO: 87 FL (ref 80–94)
MCV RBC AUTO: 87.1 FL (ref 80–94)
MCV RBC AUTO: 87.3 FL (ref 80–94)
MCV RBC AUTO: 88.4 FL (ref 80–94)
MCV RBC AUTO: 89 FL (ref 80–94)
MCV RBC AUTO: 89.5 FL (ref 80–94)
MCV RBC AUTO: 89.8 FL (ref 80–94)
MCV RBC AUTO: 89.9 FL (ref 80–94)
MCV RBC AUTO: 90.4 FL (ref 80–94)
MCV RBC AUTO: 90.9 FL (ref 80–94)
MCV RBC AUTO: 91.3 FL (ref 80–94)
MCV RBC AUTO: 91.3 FL (ref 80–94)
MCV RBC AUTO: 91.4 FL (ref 80–94)
MCV RBC AUTO: 91.4 FL (ref 80–94)
MCV RBC AUTO: 92.1 FL (ref 80–94)
MCV RBC AUTO: 93 FL (ref 80–94)
MCV RBC AUTO: 93.1 FL (ref 80–94)
MERCURY BLOOD: <2.5 UG/L (ref 0–10)
METHEMOGLOBIN ARTERIAL: 0 %
METHEMOGLOBIN ARTERIAL: 0.1 %
METHEMOGLOBIN ARTERIAL: 0.2 %
METHEMOGLOBIN ARTERIAL: 0.3 %
METHEMOGLOBIN ARTERIAL: 0.4 %
METHEMOGLOBIN ARTERIAL: 0.5 %
METHEMOGLOBIN ARTERIAL: 0.7 %
METHEMOGLOBIN ARTERIAL: 0.9 %
METHYLMALONIC ACID: 0.2 UMOL/L (ref 0–0.4)
MONOCYTES ABSOLUTE: 0 K/UL (ref 0–0.9)
MONOCYTES ABSOLUTE: 0.2 K/UL (ref 0–0.9)
MONOCYTES ABSOLUTE: 0.5 K/UL (ref 0–0.9)
MONOCYTES ABSOLUTE: 0.8 K/UL (ref 0–0.9)
MONOCYTES ABSOLUTE: 0.9 K/UL (ref 0–0.9)
MONOCYTES ABSOLUTE: 1 K/UL (ref 0–0.9)
MONOCYTES ABSOLUTE: 1.3 K/UL (ref 0–0.9)
MONOCYTES RELATIVE PERCENT: 0.8 % (ref 0–10)
MONOCYTES RELATIVE PERCENT: 1.2 % (ref 0–10)
MONOCYTES RELATIVE PERCENT: 3.8 % (ref 0–10)
MONOCYTES RELATIVE PERCENT: 4 % (ref 0–10)
MONOCYTES RELATIVE PERCENT: 5 % (ref 0–10)
MONOCYTES RELATIVE PERCENT: 6.5 % (ref 0–10)
MONOCYTES RELATIVE PERCENT: 7 % (ref 0–10)
MONOCYTES RELATIVE PERCENT: 7.6 % (ref 0–10)
MONOCYTES RELATIVE PERCENT: 8.1 % (ref 0–10)
MONOCYTES RELATIVE PERCENT: 8.9 % (ref 0–10)
MUCUS: ABNORMAL /LPF
MULTIPLE SCLEROSIS PANEL: ABNORMAL
MYELIN BASIC PROTEIN, CSF: 2.33 NG/ML (ref 0–5.5)
MYELOCYTE PERCENT: 1 %
NEISSERIA MENINGITIDIS BY PCR: NOT DETECTED
NEUTROPHILS ABSOLUTE: 11.2 K/UL (ref 1.5–7.5)
NEUTROPHILS ABSOLUTE: 12.1 K/UL (ref 1.5–7.5)
NEUTROPHILS ABSOLUTE: 13.1 K/UL (ref 1.5–7.5)
NEUTROPHILS ABSOLUTE: 17.2 K/UL (ref 1.5–7.5)
NEUTROPHILS ABSOLUTE: 2.5 K/UL (ref 1.5–7.5)
NEUTROPHILS ABSOLUTE: 4.6 K/UL (ref 1.5–7.5)
NEUTROPHILS ABSOLUTE: 6.1 K/UL (ref 1.5–7.5)
NEUTROPHILS ABSOLUTE: 8.5 K/UL (ref 1.5–7.5)
NEUTROPHILS ABSOLUTE: 8.8 K/UL (ref 1.5–7.5)
NEUTROPHILS ABSOLUTE: 9.4 K/UL (ref 1.5–7.5)
NEUTROPHILS RELATIVE PERCENT: 52.5 % (ref 50–65)
NEUTROPHILS RELATIVE PERCENT: 64.2 % (ref 50–65)
NEUTROPHILS RELATIVE PERCENT: 67.5 % (ref 50–65)
NEUTROPHILS RELATIVE PERCENT: 68 % (ref 50–65)
NEUTROPHILS RELATIVE PERCENT: 69.5 % (ref 50–65)
NEUTROPHILS RELATIVE PERCENT: 80.7 % (ref 50–65)
NEUTROPHILS RELATIVE PERCENT: 87 % (ref 50–65)
NEUTROPHILS RELATIVE PERCENT: 88.3 % (ref 50–65)
NEUTROPHILS RELATIVE PERCENT: 89.7 % (ref 50–65)
NEUTROPHILS RELATIVE PERCENT: 91 % (ref 50–65)
NITRITE, URINE: NEGATIVE
NITRITE, URINE: NEGATIVE
NO DIFFERENTIAL CSF: ABNORMAL
O2 CONTENT ARTERIAL: 16.9 ML/DL
O2 CONTENT ARTERIAL: 17.1 ML/DL
O2 CONTENT ARTERIAL: 17.1 ML/DL
O2 CONTENT ARTERIAL: 17.2 ML/DL
O2 CONTENT ARTERIAL: 17.3 ML/DL
O2 CONTENT ARTERIAL: 17.6 ML/DL
O2 CONTENT ARTERIAL: 17.7 ML/DL
O2 CONTENT ARTERIAL: 18.2 ML/DL
O2 CONTENT ARTERIAL: 18.3 ML/DL
O2 CONTENT ARTERIAL: 18.8 ML/DL
O2 CONTENT ARTERIAL: 19.5 ML/DL
O2 CONTENT ARTERIAL: 20.4 ML/DL
O2 CONTENT ARTERIAL: 21.3 ML/DL
O2 CONTENT ARTERIAL: 24.5 ML/DL
O2 CONTENT ARTERIAL: 25.2 ML/DL
O2 SAT, ARTERIAL: 63.2 %
O2 SAT, ARTERIAL: 70 %
O2 SAT, ARTERIAL: 72.2 %
O2 SAT, ARTERIAL: 76.6 %
O2 SAT, ARTERIAL: 94.2 %
O2 SAT, ARTERIAL: 94.9 %
O2 SAT, ARTERIAL: 95.1 %
O2 SAT, ARTERIAL: 95.1 %
O2 SAT, ARTERIAL: 95.6 %
O2 SAT, ARTERIAL: 96 %
O2 SAT, ARTERIAL: 96.2 %
O2 SAT, ARTERIAL: 96.3 %
O2 SAT, ARTERIAL: 96.5 %
O2 SAT, ARTERIAL: 96.6 %
O2 SAT, ARTERIAL: 96.6 %
O2 THERAPY: ABNORMAL
OLIGOCLONAL BANDS CSF: NEGATIVE
OLIGOCLONAL BANDS NUMBER: 0 BANDS (ref 0–1)
ORGANISM: ABNORMAL
OSMOLALITY URINE: 350 MOSM/KG (ref 250–1200)
OVALOCYTES: ABNORMAL
PCO2 ARTERIAL: 104 MMHG (ref 35–45)
PCO2 ARTERIAL: 33 MMHG (ref 35–45)
PCO2 ARTERIAL: 33 MMHG (ref 35–45)
PCO2 ARTERIAL: 35 MMHG (ref 35–45)
PCO2 ARTERIAL: 35 MMHG (ref 35–45)
PCO2 ARTERIAL: 36 MMHG (ref 35–45)
PCO2 ARTERIAL: 37 MMHG (ref 35–45)
PCO2 ARTERIAL: 38 MMHG (ref 35–45)
PCO2 ARTERIAL: 38 MMHG (ref 35–45)
PCO2 ARTERIAL: 39 MMHG (ref 35–45)
PCO2 ARTERIAL: 42 MMHG (ref 35–45)
PCO2 ARTERIAL: 43 MMHG (ref 35–45)
PCO2 ARTERIAL: 57 MMHG (ref 35–45)
PCO2 ARTERIAL: 67 MMHG (ref 35–45)
PCO2 ARTERIAL: 69 MMHG (ref 35–45)
PDW BLD-RTO: 13.2 % (ref 11.5–14.5)
PDW BLD-RTO: 13.3 % (ref 11.5–14.5)
PDW BLD-RTO: 13.4 % (ref 11.5–14.5)
PDW BLD-RTO: 13.5 % (ref 11.5–14.5)
PDW BLD-RTO: 13.6 % (ref 11.5–14.5)
PDW BLD-RTO: 13.7 % (ref 11.5–14.5)
PDW BLD-RTO: 13.8 % (ref 11.5–14.5)
PDW BLD-RTO: 14 % (ref 11.5–14.5)
PDW BLD-RTO: 14.1 % (ref 11.5–14.5)
PDW BLD-RTO: 14.6 % (ref 11.5–14.5)
PDW BLD-RTO: 14.7 % (ref 11.5–14.5)
PDW BLD-RTO: 14.7 % (ref 11.5–14.5)
PDW BLD-RTO: 14.8 % (ref 11.5–14.5)
PDW BLD-RTO: 14.8 % (ref 11.5–14.5)
PDW BLD-RTO: 15.1 % (ref 11.5–14.5)
PERFORMED ON: ABNORMAL
PERFORMED ON: NORMAL
PH ARTERIAL: 6.93 (ref 7.35–7.45)
PH ARTERIAL: 6.99 (ref 7.35–7.45)
PH ARTERIAL: 7.01 (ref 7.35–7.45)
PH ARTERIAL: 7.09 (ref 7.35–7.45)
PH ARTERIAL: 7.23 (ref 7.35–7.45)
PH ARTERIAL: 7.38 (ref 7.35–7.45)
PH ARTERIAL: 7.38 (ref 7.35–7.45)
PH ARTERIAL: 7.41 (ref 7.35–7.45)
PH ARTERIAL: 7.46 (ref 7.35–7.45)
PH ARTERIAL: 7.48 (ref 7.35–7.45)
PH ARTERIAL: 7.5 (ref 7.35–7.45)
PH ARTERIAL: 7.51 (ref 7.35–7.45)
PH ARTERIAL: 7.51 (ref 7.35–7.45)
PH ARTERIAL: 7.56 (ref 7.35–7.45)
PH ARTERIAL: 7.62 (ref 7.35–7.45)
PH UA: 6 (ref 5–8)
PH UA: 6.5 (ref 5–8)
PLATELET # BLD: 134 K/UL (ref 130–400)
PLATELET # BLD: 145 K/UL (ref 130–400)
PLATELET # BLD: 147 K/UL (ref 130–400)
PLATELET # BLD: 149 K/UL (ref 130–400)
PLATELET # BLD: 188 K/UL (ref 130–400)
PLATELET # BLD: 196 K/UL (ref 130–400)
PLATELET # BLD: 211 K/UL (ref 130–400)
PLATELET # BLD: 234 K/UL (ref 130–400)
PLATELET # BLD: 239 K/UL (ref 130–400)
PLATELET # BLD: 286 K/UL (ref 130–400)
PLATELET # BLD: 293 K/UL (ref 130–400)
PLATELET # BLD: 297 K/UL (ref 130–400)
PLATELET # BLD: 298 K/UL (ref 130–400)
PLATELET # BLD: 302 K/UL (ref 130–400)
PLATELET # BLD: 309 K/UL (ref 130–400)
PLATELET # BLD: 309 K/UL (ref 130–400)
PLATELET # BLD: 372 K/UL (ref 130–400)
PLATELET # BLD: 372 K/UL (ref 130–400)
PLATELET # BLD: 444 K/UL (ref 130–400)
PLATELET # BLD: 462 K/UL (ref 130–400)
PLATELET SLIDE REVIEW: ABNORMAL
PLATELET SLIDE REVIEW: ABNORMAL
PLATELET SLIDE REVIEW: ADEQUATE
PMV BLD AUTO: 10 FL (ref 9.4–12.4)
PMV BLD AUTO: 10 FL (ref 9.4–12.4)
PMV BLD AUTO: 10.1 FL (ref 9.4–12.4)
PMV BLD AUTO: 10.2 FL (ref 9.4–12.4)
PMV BLD AUTO: 10.4 FL (ref 9.4–12.4)
PMV BLD AUTO: 10.6 FL (ref 9.4–12.4)
PMV BLD AUTO: 10.7 FL (ref 9.4–12.4)
PMV BLD AUTO: 11 FL (ref 9.4–12.4)
PMV BLD AUTO: 11.3 FL (ref 9.4–12.4)
PMV BLD AUTO: 11.3 FL (ref 9.4–12.4)
PMV BLD AUTO: 9.4 FL (ref 9.4–12.4)
PMV BLD AUTO: 9.5 FL (ref 9.4–12.4)
PMV BLD AUTO: 9.5 FL (ref 9.4–12.4)
PMV BLD AUTO: 9.6 FL (ref 9.4–12.4)
PMV BLD AUTO: 9.6 FL (ref 9.4–12.4)
PMV BLD AUTO: 9.7 FL (ref 9.4–12.4)
PMV BLD AUTO: 9.7 FL (ref 9.4–12.4)
PMV BLD AUTO: 9.8 FL (ref 9.4–12.4)
PMV BLD AUTO: 9.9 FL (ref 9.4–12.4)
PO2 ARTERIAL: 103 MMHG (ref 80–100)
PO2 ARTERIAL: 107 MMHG (ref 80–100)
PO2 ARTERIAL: 117 MMHG (ref 80–100)
PO2 ARTERIAL: 118 MMHG (ref 80–100)
PO2 ARTERIAL: 127 MMHG (ref 80–100)
PO2 ARTERIAL: 151 MMHG (ref 80–100)
PO2 ARTERIAL: 42 MMHG (ref 80–100)
PO2 ARTERIAL: 42 MMHG (ref 80–100)
PO2 ARTERIAL: 47 MMHG (ref 80–100)
PO2 ARTERIAL: 51 MMHG (ref 80–100)
PO2 ARTERIAL: 75 MMHG (ref 80–100)
PO2 ARTERIAL: 76 MMHG (ref 80–100)
PO2 ARTERIAL: 79 MMHG (ref 80–100)
PO2 ARTERIAL: 91 MMHG (ref 80–100)
PO2 ARTERIAL: 91 MMHG (ref 80–100)
POLYCHROMASIA: ABNORMAL
POTASSIUM REFLEX MAGNESIUM: 4.2 MMOL/L (ref 3.5–5)
POTASSIUM REFLEX MAGNESIUM: 4.4 MMOL/L (ref 3.5–5)
POTASSIUM SERPL-SCNC: 2.5 MMOL/L (ref 3.5–5)
POTASSIUM SERPL-SCNC: 3.1 MMOL/L (ref 3.5–5)
POTASSIUM SERPL-SCNC: 3.2 MMOL/L (ref 3.5–5)
POTASSIUM SERPL-SCNC: 3.3 MMOL/L (ref 3.5–5)
POTASSIUM SERPL-SCNC: 3.4 MMOL/L (ref 3.5–5)
POTASSIUM SERPL-SCNC: 3.5 MMOL/L (ref 3.5–5)
POTASSIUM SERPL-SCNC: 3.6 MMOL/L (ref 3.5–5)
POTASSIUM SERPL-SCNC: 3.7 MMOL/L (ref 3.5–5)
POTASSIUM SERPL-SCNC: 3.8 MMOL/L (ref 3.5–5)
POTASSIUM SERPL-SCNC: 3.9 MMOL/L (ref 3.5–5)
POTASSIUM SERPL-SCNC: 4.2 MMOL/L (ref 3.5–5)
POTASSIUM SERPL-SCNC: 4.6 MMOL/L (ref 3.5–5)
POTASSIUM SERPL-SCNC: 4.7 MMOL/L (ref 3.5–5)
POTASSIUM, WHOLE BLOOD: 2.7
POTASSIUM, WHOLE BLOOD: 2.8
POTASSIUM, WHOLE BLOOD: 2.8
POTASSIUM, WHOLE BLOOD: 3.2
POTASSIUM, WHOLE BLOOD: 3.2
POTASSIUM, WHOLE BLOOD: 3.3
POTASSIUM, WHOLE BLOOD: 3.4
POTASSIUM, WHOLE BLOOD: 3.6
POTASSIUM, WHOLE BLOOD: 3.6
POTASSIUM, WHOLE BLOOD: 3.7
POTASSIUM, WHOLE BLOOD: 3.7
POTASSIUM, WHOLE BLOOD: 4.1
POTASSIUM, WHOLE BLOOD: 4.6
PRO-BNP: 1077 PG/ML (ref 0–900)
PRO-BNP: 143 PG/ML (ref 0–900)
PRO-BNP: 75 PG/ML (ref 0–900)
PROTEIN CSF: 70 MG/DL (ref 15–45)
PROTEIN UA: 300 MG/DL
PROTEIN UA: NEGATIVE MG/DL
PROTHROMBIN TIME: 13.1 SEC (ref 12–14.6)
PROTHROMBIN TIME: 14.3 SEC (ref 12–14.6)
RAPID INFLUENZA  B AGN: NEGATIVE
RAPID INFLUENZA A AGN: NEGATIVE
RBC # BLD: 3.98 M/UL (ref 4.7–6.1)
RBC # BLD: 4.08 M/UL (ref 4.7–6.1)
RBC # BLD: 4.15 M/UL (ref 4.7–6.1)
RBC # BLD: 4.27 M/UL (ref 4.7–6.1)
RBC # BLD: 4.29 M/UL (ref 4.7–6.1)
RBC # BLD: 4.3 M/UL (ref 4.7–6.1)
RBC # BLD: 4.5 M/UL (ref 4.7–6.1)
RBC # BLD: 4.67 M/UL (ref 4.7–6.1)
RBC # BLD: 4.84 M/UL (ref 4.7–6.1)
RBC # BLD: 4.85 M/UL (ref 4.7–6.1)
RBC # BLD: 5.15 M/UL (ref 4.7–6.1)
RBC # BLD: 5.24 M/UL (ref 4.7–6.1)
RBC # BLD: 5.44 M/UL (ref 4.7–6.1)
RBC # BLD: 5.62 M/UL (ref 4.7–6.1)
RBC # BLD: 5.68 M/UL (ref 4.7–6.1)
RBC # BLD: 5.89 M/UL (ref 4.7–6.1)
RBC # BLD: 6.05 M/UL (ref 4.7–6.1)
RBC # BLD: 6.62 M/UL (ref 4.7–6.1)
RBC # BLD: 7.11 M/UL (ref 4.7–6.1)
RBC CSF: 12 /CUMM (ref 0–5)
RBC UA: ABNORMAL /HPF (ref 0–2)
REPORT: NORMAL
RHEUMATOID FACTOR: 32 IU/ML
RHEUMATOID FACTOR: 33 IU/ML (ref 0–14)
SEDIMENTATION RATE, ERYTHROCYTE: 16 MM/HR (ref 0–15)
SEDIMENTATION RATE, ERYTHROCYTE: 18 MM/HR (ref 0–15)
SEDIMENTATION RATE, ERYTHROCYTE: 7 MM/HR (ref 0–15)
SODIUM BLD-SCNC: 132 MMOL/L (ref 136–145)
SODIUM BLD-SCNC: 136 MMOL/L (ref 136–145)
SODIUM BLD-SCNC: 137 MMOL/L (ref 136–145)
SODIUM BLD-SCNC: 138 MMOL/L (ref 136–145)
SODIUM BLD-SCNC: 138 MMOL/L (ref 136–145)
SODIUM BLD-SCNC: 139 MMOL/L (ref 136–145)
SODIUM BLD-SCNC: 140 MMOL/L (ref 136–145)
SODIUM BLD-SCNC: 141 MMOL/L (ref 136–145)
SODIUM BLD-SCNC: 142 MMOL/L (ref 136–145)
SODIUM BLD-SCNC: 143 MMOL/L (ref 136–145)
SODIUM BLD-SCNC: 144 MMOL/L (ref 136–145)
SODIUM BLD-SCNC: 146 MMOL/L (ref 136–145)
SODIUM BLD-SCNC: 147 MMOL/L (ref 136–145)
SODIUM BLD-SCNC: 150 MMOL/L (ref 136–145)
SODIUM URINE: 88 MMOL/L
SPE/IFE INTERPRETATION: NORMAL
SPECIFIC GRAVITY UA: 1.01 (ref 1–1.03)
SPECIFIC GRAVITY UA: 1.02 (ref 1–1.03)
ST. LOUIS ENCEPH IGG ,CSF: NORMAL
STREPTOCCOCCUS PNEUMONIAE BY PCR: NOT DETECTED
STREPTOCCUS AGALACTIAE BY PCR: NOT DETECTED
T4 FREE: 0.86 NG/DL (ref 0.93–1.7)
T4 TOTAL: 5 UG/DL (ref 4.5–11.7)
THIS TEST SENT TO: NORMAL
TISSUE TRANSGLUTAMINASE IGA: 1.8 U/ML
TOTAL CK: 127 U/L (ref 39–308)
TOTAL IRON BINDING CAPACITY: 165 UG/DL (ref 250–400)
TOTAL PROTEIN: 5.6 G/DL (ref 6.6–8.7)
TOTAL PROTEIN: 5.7 G/DL (ref 6.6–8.7)
TOTAL PROTEIN: 6.2 G/DL (ref 6.6–8.7)
TOTAL PROTEIN: 6.3 G/DL (ref 6.6–8.7)
TOTAL PROTEIN: 6.4 G/DL (ref 6.6–8.7)
TOTAL PROTEIN: 6.5 G/DL (ref 6.6–8.7)
TOTAL PROTEIN: 6.5 G/DL (ref 6.6–8.7)
TOTAL PROTEIN: 6.6 G/DL (ref 6.6–8.7)
TOTAL PROTEIN: 6.8 G/DL (ref 6.3–8.2)
TOTAL PROTEIN: 6.8 G/DL (ref 6.6–8.7)
TOTAL PROTEIN: 6.9 G/DL (ref 6.6–8.7)
TOTAL PROTEIN: 7 G/DL (ref 6.6–8.7)
TOTAL PROTEIN: 7.2 G/DL (ref 6.6–8.7)
TOTAL PROTEIN: 7.5 G/DL (ref 6.6–8.7)
TOTAL PROTEIN: 7.7 G/DL (ref 6.6–8.7)
TOTAL PROTEIN: 7.8 G/DL (ref 6.6–8.7)
TOTAL PROTEIN: 8.3 G/DL (ref 6.6–8.7)
TROPONIN: 0.17 NG/ML (ref 0–0.03)
TROPONIN: 0.23 NG/ML (ref 0–0.03)
TROPONIN: 0.3 NG/ML (ref 0–0.03)
TROPONIN: 0.31 NG/ML (ref 0–0.03)
TROPONIN: <0.01 NG/ML (ref 0–0.03)
TROPONIN: <0.01 NG/ML (ref 0–0.03)
TSH SERPL DL<=0.05 MIU/L-ACNC: 2.95 UIU/ML (ref 0.27–4.2)
TSH SERPL DL<=0.05 MIU/L-ACNC: 8.6 UIU/ML (ref 0.27–4.2)
TUBE NUMBER CSF: ABNORMAL
URINE REFLEX TO CULTURE: ABNORMAL
URINE REFLEX TO CULTURE: NORMAL
UROBILINOGEN, URINE: 0.2 E.U./DL
UROBILINOGEN, URINE: 1 E.U./DL
VANCOMYCIN RANDOM: 11.9 UG/ML
VANCOMYCIN RANDOM: 13.5 UG/ML
VANCOMYCIN RANDOM: 14.4 UG/ML
VANCOMYCIN RANDOM: 15.1 UG/ML
VANCOMYCIN RANDOM: 15.7 UG/ML
VANCOMYCIN RANDOM: 9.4 UG/ML
VANCOMYCIN TROUGH: 12 UG/ML (ref 10–20)
VANCOMYCIN TROUGH: 16.7 UG/ML (ref 10–20)
VARICELLA ZOSTER VIRUS BY PCR: NOT DETECTED
VARICELLA-ZOSTER, PCR: NOT DETECTED
VDRL CSF SCREEN: NON REACTIVE
VITAMIN B-12: 627 PG/ML (ref 211–946)
VITAMIN B-12: 671 PG/ML (ref 211–946)
VITAMIN B-12: 751 PG/ML (ref 211–946)
VZ SOURCE: NORMAL
WBC # BLD: 10.1 K/UL (ref 4.8–10.8)
WBC # BLD: 11.5 K/UL (ref 4.8–10.8)
WBC # BLD: 11.6 K/UL (ref 4.8–10.8)
WBC # BLD: 12.3 K/UL (ref 4.8–10.8)
WBC # BLD: 13 K/UL (ref 4.8–10.8)
WBC # BLD: 13.7 K/UL (ref 4.8–10.8)
WBC # BLD: 14.3 K/UL (ref 4.8–10.8)
WBC # BLD: 14.6 K/UL (ref 4.8–10.8)
WBC # BLD: 15.2 K/UL (ref 4.8–10.8)
WBC # BLD: 15.7 K/UL (ref 4.8–10.8)
WBC # BLD: 16 K/UL (ref 4.8–10.8)
WBC # BLD: 18.7 K/UL (ref 4.8–10.8)
WBC # BLD: 4.8 K/UL (ref 4.8–10.8)
WBC # BLD: 6 K/UL (ref 4.8–10.8)
WBC # BLD: 6.6 K/UL (ref 4.8–10.8)
WBC # BLD: 8.3 K/UL (ref 4.8–10.8)
WBC # BLD: 9.4 K/UL (ref 4.8–10.8)
WBC # BLD: 9.5 K/UL (ref 4.8–10.8)
WBC # BLD: 9.8 K/UL (ref 4.8–10.8)
WBC CSF: 1 /CUMM (ref 0–8)
WBC UA: ABNORMAL /HPF (ref 0–5)
WEST NILE IGG, CSF: NORMAL IV
WEST NILE IGM ANTIBODY CSF: 0 IV
WEST NILE VIRUS, IGG: 0.52 IV
WEST NILE VIRUS, IGM: 0.02 IV
WESTERN EQUINE ENCEPHALITIS IGG AB CSF: NORMAL

## 2020-01-01 PROCEDURE — 97530 THERAPEUTIC ACTIVITIES: CPT

## 2020-01-01 PROCEDURE — 99285 EMERGENCY DEPT VISIT HI MDM: CPT

## 2020-01-01 PROCEDURE — 80053 COMPREHEN METABOLIC PANEL: CPT

## 2020-01-01 PROCEDURE — 6370000000 HC RX 637 (ALT 250 FOR IP): Performed by: INTERNAL MEDICINE

## 2020-01-01 PROCEDURE — 6370000000 HC RX 637 (ALT 250 FOR IP): Performed by: FAMILY MEDICINE

## 2020-01-01 PROCEDURE — 6360000002 HC RX W HCPCS: Performed by: FAMILY MEDICINE

## 2020-01-01 PROCEDURE — 83935 ASSAY OF URINE OSMOLALITY: CPT

## 2020-01-01 PROCEDURE — 99496 TRANSJ CARE MGMT HIGH F2F 7D: CPT | Performed by: INTERNAL MEDICINE

## 2020-01-01 PROCEDURE — 97161 PT EVAL LOW COMPLEX 20 MIN: CPT

## 2020-01-01 PROCEDURE — 82947 ASSAY GLUCOSE BLOOD QUANT: CPT

## 2020-01-01 PROCEDURE — 83735 ASSAY OF MAGNESIUM: CPT

## 2020-01-01 PROCEDURE — 93005 ELECTROCARDIOGRAM TRACING: CPT | Performed by: INTERNAL MEDICINE

## 2020-01-01 PROCEDURE — 2100000000 HC CCU R&B

## 2020-01-01 PROCEDURE — 99214 OFFICE O/P EST MOD 30 MIN: CPT | Performed by: PSYCHIATRY & NEUROLOGY

## 2020-01-01 PROCEDURE — 82042 OTHER SOURCE ALBUMIN QUAN EA: CPT

## 2020-01-01 PROCEDURE — 85027 COMPLETE CBC AUTOMATED: CPT

## 2020-01-01 PROCEDURE — 2580000003 HC RX 258: Performed by: HOSPITALIST

## 2020-01-01 PROCEDURE — 6360000004 HC RX CONTRAST MEDICATION: Performed by: EMERGENCY MEDICINE

## 2020-01-01 PROCEDURE — 71275 CT ANGIOGRAPHY CHEST: CPT

## 2020-01-01 PROCEDURE — 36592 COLLECT BLOOD FROM PICC: CPT

## 2020-01-01 PROCEDURE — 94003 VENT MGMT INPAT SUBQ DAY: CPT

## 2020-01-01 PROCEDURE — 6370000000 HC RX 637 (ALT 250 FOR IP): Performed by: PSYCHIATRY & NEUROLOGY

## 2020-01-01 PROCEDURE — 2700000000 HC OXYGEN THERAPY PER DAY

## 2020-01-01 PROCEDURE — 85025 COMPLETE CBC W/AUTO DIFF WBC: CPT

## 2020-01-01 PROCEDURE — 82803 BLOOD GASES ANY COMBINATION: CPT

## 2020-01-01 PROCEDURE — 02HV33Z INSERTION OF INFUSION DEVICE INTO SUPERIOR VENA CAVA, PERCUTANEOUS APPROACH: ICD-10-PCS | Performed by: INTERNAL MEDICINE

## 2020-01-01 PROCEDURE — 84132 ASSAY OF SERUM POTASSIUM: CPT

## 2020-01-01 PROCEDURE — 99231 SBSQ HOSP IP/OBS SF/LOW 25: CPT | Performed by: INTERNAL MEDICINE

## 2020-01-01 PROCEDURE — 71045 X-RAY EXAM CHEST 1 VIEW: CPT

## 2020-01-01 PROCEDURE — 2580000003 HC RX 258: Performed by: EMERGENCY MEDICINE

## 2020-01-01 PROCEDURE — 94640 AIRWAY INHALATION TREATMENT: CPT

## 2020-01-01 PROCEDURE — 6370000000 HC RX 637 (ALT 250 FOR IP): Performed by: HOSPITALIST

## 2020-01-01 PROCEDURE — 84157 ASSAY OF PROTEIN OTHER: CPT

## 2020-01-01 PROCEDURE — 6360000002 HC RX W HCPCS: Performed by: INTERNAL MEDICINE

## 2020-01-01 PROCEDURE — 83873 ASSAY OF CSF PROTEIN: CPT

## 2020-01-01 PROCEDURE — 83540 ASSAY OF IRON: CPT

## 2020-01-01 PROCEDURE — 2580000003 HC RX 258: Performed by: INTERNAL MEDICINE

## 2020-01-01 PROCEDURE — 84165 PROTEIN E-PHORESIS SERUM: CPT

## 2020-01-01 PROCEDURE — 92610 EVALUATE SWALLOWING FUNCTION: CPT

## 2020-01-01 PROCEDURE — 99233 SBSQ HOSP IP/OBS HIGH 50: CPT | Performed by: PSYCHIATRY & NEUROLOGY

## 2020-01-01 PROCEDURE — 6360000004 HC RX CONTRAST MEDICATION: Performed by: PEDIATRICS

## 2020-01-01 PROCEDURE — 83883 ASSAY NEPHELOMETRY NOT SPEC: CPT

## 2020-01-01 PROCEDURE — 85652 RBC SED RATE AUTOMATED: CPT

## 2020-01-01 PROCEDURE — 36415 COLL VENOUS BLD VENIPUNCTURE: CPT

## 2020-01-01 PROCEDURE — 82570 ASSAY OF URINE CREATININE: CPT

## 2020-01-01 PROCEDURE — 2500000003 HC RX 250 WO HCPCS: Performed by: INTERNAL MEDICINE

## 2020-01-01 PROCEDURE — C8929 TTE W OR WO FOL WCON,DOPPLER: HCPCS

## 2020-01-01 PROCEDURE — 83605 ASSAY OF LACTIC ACID: CPT

## 2020-01-01 PROCEDURE — 99214 OFFICE O/P EST MOD 30 MIN: CPT | Performed by: INTERNAL MEDICINE

## 2020-01-01 PROCEDURE — 1210000000 HC MED SURG R&B

## 2020-01-01 PROCEDURE — 85610 PROTHROMBIN TIME: CPT

## 2020-01-01 PROCEDURE — 99233 SBSQ HOSP IP/OBS HIGH 50: CPT | Performed by: NURSE PRACTITIONER

## 2020-01-01 PROCEDURE — 82607 VITAMIN B-12: CPT

## 2020-01-01 PROCEDURE — 88112 CYTOPATH CELL ENHANCE TECH: CPT

## 2020-01-01 PROCEDURE — 86431 RHEUMATOID FACTOR QUANT: CPT

## 2020-01-01 PROCEDURE — G8482 FLU IMMUNIZE ORDER/ADMIN: HCPCS | Performed by: INTERNAL MEDICINE

## 2020-01-01 PROCEDURE — 03HB33Z INSERTION OF INFUSION DEVICE INTO RIGHT RADIAL ARTERY, PERCUTANEOUS APPROACH: ICD-10-PCS | Performed by: INTERNAL MEDICINE

## 2020-01-01 PROCEDURE — 93010 ELECTROCARDIOGRAM REPORT: CPT | Performed by: INTERNAL MEDICINE

## 2020-01-01 PROCEDURE — 86255 FLUORESCENT ANTIBODY SCREEN: CPT

## 2020-01-01 PROCEDURE — 99223 1ST HOSP IP/OBS HIGH 75: CPT | Performed by: NURSE PRACTITIONER

## 2020-01-01 PROCEDURE — 86652 ENCEPHALTIS EAST EQNE ANBDY: CPT

## 2020-01-01 PROCEDURE — 80202 ASSAY OF VANCOMYCIN: CPT

## 2020-01-01 PROCEDURE — 6360000002 HC RX W HCPCS: Performed by: PSYCHIATRY & NEUROLOGY

## 2020-01-01 PROCEDURE — 82040 ASSAY OF SERUM ALBUMIN: CPT

## 2020-01-01 PROCEDURE — 36569 INSJ PICC 5 YR+ W/O IMAGING: CPT

## 2020-01-01 PROCEDURE — G8427 DOCREV CUR MEDS BY ELIG CLIN: HCPCS | Performed by: PSYCHIATRY & NEUROLOGY

## 2020-01-01 PROCEDURE — 85730 THROMBOPLASTIN TIME PARTIAL: CPT

## 2020-01-01 PROCEDURE — G0378 HOSPITAL OBSERVATION PER HR: HCPCS

## 2020-01-01 PROCEDURE — 2500000003 HC RX 250 WO HCPCS: Performed by: EMERGENCY MEDICINE

## 2020-01-01 PROCEDURE — 82330 ASSAY OF CALCIUM: CPT

## 2020-01-01 PROCEDURE — 86789 WEST NILE VIRUS ANTIBODY: CPT

## 2020-01-01 PROCEDURE — 0100U HC RESPIRPTHGN MULT REV TRANS & AMP PRB TECH 21 TRGT: CPT

## 2020-01-01 PROCEDURE — 86788 WEST NILE VIRUS AB IGM: CPT

## 2020-01-01 PROCEDURE — G8417 CALC BMI ABV UP PARAM F/U: HCPCS | Performed by: PSYCHIATRY & NEUROLOGY

## 2020-01-01 PROCEDURE — 6360000002 HC RX W HCPCS: Performed by: EMERGENCY MEDICINE

## 2020-01-01 PROCEDURE — 0BH17EZ INSERTION OF ENDOTRACHEAL AIRWAY INTO TRACHEA, VIA NATURAL OR ARTIFICIAL OPENING: ICD-10-PCS | Performed by: EMERGENCY MEDICINE

## 2020-01-01 PROCEDURE — G8427 DOCREV CUR MEDS BY ELIG CLIN: HCPCS | Performed by: INTERNAL MEDICINE

## 2020-01-01 PROCEDURE — 84443 ASSAY THYROID STIM HORMONE: CPT

## 2020-01-01 PROCEDURE — 62328 DX LMBR SPI PNXR W/FLUOR/CT: CPT

## 2020-01-01 PROCEDURE — 83036 HEMOGLOBIN GLYCOSYLATED A1C: CPT

## 2020-01-01 PROCEDURE — 36556 INSERT NON-TUNNEL CV CATH: CPT

## 2020-01-01 PROCEDURE — 2500000003 HC RX 250 WO HCPCS: Performed by: HOSPITALIST

## 2020-01-01 PROCEDURE — 2580000003 HC RX 258: Performed by: PSYCHIATRY & NEUROLOGY

## 2020-01-01 PROCEDURE — 84155 ASSAY OF PROTEIN SERUM: CPT

## 2020-01-01 PROCEDURE — 87798 DETECT AGENT NOS DNA AMP: CPT

## 2020-01-01 PROCEDURE — 83916 OLIGOCLONAL BANDS: CPT

## 2020-01-01 PROCEDURE — 96375 TX/PRO/DX INJ NEW DRUG ADDON: CPT

## 2020-01-01 PROCEDURE — 97110 THERAPEUTIC EXERCISES: CPT

## 2020-01-01 PROCEDURE — 99223 1ST HOSP IP/OBS HIGH 75: CPT | Performed by: PSYCHIATRY & NEUROLOGY

## 2020-01-01 PROCEDURE — 82525 ASSAY OF COPPER: CPT

## 2020-01-01 PROCEDURE — 6360000002 HC RX W HCPCS

## 2020-01-01 PROCEDURE — 86653 ENCEPHALTIS ST LOUIS ANTBODY: CPT

## 2020-01-01 PROCEDURE — 94002 VENT MGMT INPAT INIT DAY: CPT

## 2020-01-01 PROCEDURE — 6370000000 HC RX 637 (ALT 250 FOR IP)

## 2020-01-01 PROCEDURE — 83550 IRON BINDING TEST: CPT

## 2020-01-01 PROCEDURE — 82784 ASSAY IGA/IGD/IGG/IGM EACH: CPT

## 2020-01-01 PROCEDURE — 87205 SMEAR GRAM STAIN: CPT

## 2020-01-01 PROCEDURE — 84484 ASSAY OF TROPONIN QUANT: CPT

## 2020-01-01 PROCEDURE — 87070 CULTURE OTHR SPECIMN AEROBIC: CPT

## 2020-01-01 PROCEDURE — 6360000002 HC RX W HCPCS: Performed by: HOSPITALIST

## 2020-01-01 PROCEDURE — 72125 CT NECK SPINE W/O DYE: CPT

## 2020-01-01 PROCEDURE — 97116 GAIT TRAINING THERAPY: CPT

## 2020-01-01 PROCEDURE — 99232 SBSQ HOSP IP/OBS MODERATE 35: CPT | Performed by: PSYCHIATRY & NEUROLOGY

## 2020-01-01 PROCEDURE — 87040 BLOOD CULTURE FOR BACTERIA: CPT

## 2020-01-01 PROCEDURE — 85049 AUTOMATED PLATELET COUNT: CPT

## 2020-01-01 PROCEDURE — 31500 INSERT EMERGENCY AIRWAY: CPT

## 2020-01-01 PROCEDURE — 76770 US EXAM ABDO BACK WALL COMP: CPT

## 2020-01-01 PROCEDURE — 1123F ACP DISCUSS/DSCN MKR DOCD: CPT | Performed by: INTERNAL MEDICINE

## 2020-01-01 PROCEDURE — 89050 BODY FLUID CELL COUNT: CPT

## 2020-01-01 PROCEDURE — 74018 RADEX ABDOMEN 1 VIEW: CPT

## 2020-01-01 PROCEDURE — 72157 MRI CHEST SPINE W/O & W/DYE: CPT

## 2020-01-01 PROCEDURE — 5A1955Z RESPIRATORY VENTILATION, GREATER THAN 96 CONSECUTIVE HOURS: ICD-10-PCS | Performed by: INTERNAL MEDICINE

## 2020-01-01 PROCEDURE — 92526 ORAL FUNCTION THERAPY: CPT

## 2020-01-01 PROCEDURE — 87804 INFLUENZA ASSAY W/OPTIC: CPT

## 2020-01-01 PROCEDURE — 93005 ELECTROCARDIOGRAM TRACING: CPT | Performed by: EMERGENCY MEDICINE

## 2020-01-01 PROCEDURE — 83516 IMMUNOASSAY NONANTIBODY: CPT

## 2020-01-01 PROCEDURE — 83880 ASSAY OF NATRIURETIC PEPTIDE: CPT

## 2020-01-01 PROCEDURE — 99232 SBSQ HOSP IP/OBS MODERATE 35: CPT | Performed by: INTERNAL MEDICINE

## 2020-01-01 PROCEDURE — 72156 MRI NECK SPINE W/O & W/DYE: CPT

## 2020-01-01 PROCEDURE — 97166 OT EVAL MOD COMPLEX 45 MIN: CPT

## 2020-01-01 PROCEDURE — G8482 FLU IMMUNIZE ORDER/ADMIN: HCPCS | Performed by: PSYCHIATRY & NEUROLOGY

## 2020-01-01 PROCEDURE — 87075 CULTR BACTERIA EXCEPT BLOOD: CPT

## 2020-01-01 PROCEDURE — 1123F ACP DISCUSS/DSCN MKR DOCD: CPT | Performed by: PSYCHIATRY & NEUROLOGY

## 2020-01-01 PROCEDURE — 36600 WITHDRAWAL OF ARTERIAL BLOOD: CPT

## 2020-01-01 PROCEDURE — 86160 COMPLEMENT ANTIGEN: CPT

## 2020-01-01 PROCEDURE — 84300 ASSAY OF URINE SODIUM: CPT

## 2020-01-01 PROCEDURE — 2500000003 HC RX 250 WO HCPCS: Performed by: FAMILY MEDICINE

## 2020-01-01 PROCEDURE — 6360000004 HC RX CONTRAST MEDICATION: Performed by: PSYCHIATRY & NEUROLOGY

## 2020-01-01 PROCEDURE — 99292 CRITICAL CARE ADDL 30 MIN: CPT

## 2020-01-01 PROCEDURE — G8417 CALC BMI ABV UP PARAM F/U: HCPCS | Performed by: INTERNAL MEDICINE

## 2020-01-01 PROCEDURE — 80048 BASIC METABOLIC PNL TOTAL CA: CPT

## 2020-01-01 PROCEDURE — 86038 ANTINUCLEAR ANTIBODIES: CPT

## 2020-01-01 PROCEDURE — 81003 URINALYSIS AUTO W/O SCOPE: CPT

## 2020-01-01 PROCEDURE — 87483 CNS DNA AMP PROBE TYPE 12-25: CPT

## 2020-01-01 PROCEDURE — 84439 ASSAY OF FREE THYROXINE: CPT

## 2020-01-01 PROCEDURE — 82550 ASSAY OF CK (CPK): CPT

## 2020-01-01 PROCEDURE — 99291 CRITICAL CARE FIRST HOUR: CPT

## 2020-01-01 PROCEDURE — 1036F TOBACCO NON-USER: CPT | Performed by: PSYCHIATRY & NEUROLOGY

## 2020-01-01 PROCEDURE — 86225 DNA ANTIBODY NATIVE: CPT

## 2020-01-01 PROCEDURE — 6360000004 HC RX CONTRAST MEDICATION: Performed by: INTERNAL MEDICINE

## 2020-01-01 PROCEDURE — 83690 ASSAY OF LIPASE: CPT

## 2020-01-01 PROCEDURE — 93005 ELECTROCARDIOGRAM TRACING: CPT | Performed by: HOSPITALIST

## 2020-01-01 PROCEDURE — 2500000003 HC RX 250 WO HCPCS

## 2020-01-01 PROCEDURE — 86592 SYPHILIS TEST NON-TREP QUAL: CPT

## 2020-01-01 PROCEDURE — A9577 INJ MULTIHANCE: HCPCS | Performed by: PSYCHIATRY & NEUROLOGY

## 2020-01-01 PROCEDURE — 2580000003 HC RX 258: Performed by: PEDIATRICS

## 2020-01-01 PROCEDURE — 86689 HTLV/HIV CONFIRMJ ANTIBODY: CPT

## 2020-01-01 PROCEDURE — 1036F TOBACCO NON-USER: CPT | Performed by: INTERNAL MEDICINE

## 2020-01-01 PROCEDURE — 2580000003 HC RX 258: Performed by: FAMILY MEDICINE

## 2020-01-01 PROCEDURE — 83519 RIA NONANTIBODY: CPT

## 2020-01-01 PROCEDURE — C1751 CATH, INF, PER/CENT/MIDLINE: HCPCS

## 2020-01-01 PROCEDURE — 82175 ASSAY OF ARSENIC: CPT

## 2020-01-01 PROCEDURE — 009U3ZX DRAINAGE OF SPINAL CANAL, PERCUTANEOUS APPROACH, DIAGNOSTIC: ICD-10-PCS | Performed by: RADIOLOGY

## 2020-01-01 PROCEDURE — 82390 ASSAY OF CERULOPLASMIN: CPT

## 2020-01-01 PROCEDURE — 82140 ASSAY OF AMMONIA: CPT

## 2020-01-01 PROCEDURE — 87899 AGENT NOS ASSAY W/OPTIC: CPT

## 2020-01-01 PROCEDURE — 96374 THER/PROPH/DIAG INJ IV PUSH: CPT

## 2020-01-01 PROCEDURE — 86654 ENCEPHALTIS WEST EQNE ANTBDY: CPT

## 2020-01-01 PROCEDURE — 83655 ASSAY OF LEAD: CPT

## 2020-01-01 PROCEDURE — 86618 LYME DISEASE ANTIBODY: CPT

## 2020-01-01 PROCEDURE — 76937 US GUIDE VASCULAR ACCESS: CPT

## 2020-01-01 PROCEDURE — 72148 MRI LUMBAR SPINE W/O DYE: CPT

## 2020-01-01 PROCEDURE — 84436 ASSAY OF TOTAL THYROXINE: CPT

## 2020-01-01 PROCEDURE — 4040F PNEUMOC VAC/ADMIN/RCVD: CPT | Performed by: PSYCHIATRY & NEUROLOGY

## 2020-01-01 PROCEDURE — A9577 INJ MULTIHANCE: HCPCS | Performed by: PEDIATRICS

## 2020-01-01 PROCEDURE — 83825 ASSAY OF MERCURY: CPT

## 2020-01-01 PROCEDURE — 83921 ORGANIC ACID SINGLE QUANT: CPT

## 2020-01-01 PROCEDURE — 86140 C-REACTIVE PROTEIN: CPT

## 2020-01-01 PROCEDURE — 3017F COLORECTAL CA SCREEN DOC REV: CPT | Performed by: INTERNAL MEDICINE

## 2020-01-01 PROCEDURE — 83090 ASSAY OF HOMOCYSTEINE: CPT

## 2020-01-01 PROCEDURE — 1111F DSCHRG MED/CURRENT MED MERGE: CPT | Performed by: INTERNAL MEDICINE

## 2020-01-01 PROCEDURE — 1111F DSCHRG MED/CURRENT MED MERGE: CPT | Performed by: PSYCHIATRY & NEUROLOGY

## 2020-01-01 PROCEDURE — 81001 URINALYSIS AUTO W/SCOPE: CPT

## 2020-01-01 PROCEDURE — 82728 ASSAY OF FERRITIN: CPT

## 2020-01-01 PROCEDURE — 3017F COLORECTAL CA SCREEN DOC REV: CPT | Performed by: PSYCHIATRY & NEUROLOGY

## 2020-01-01 PROCEDURE — 6370000000 HC RX 637 (ALT 250 FOR IP): Performed by: EMERGENCY MEDICINE

## 2020-01-01 PROCEDURE — 4040F PNEUMOC VAC/ADMIN/RCVD: CPT | Performed by: INTERNAL MEDICINE

## 2020-01-01 PROCEDURE — 93005 ELECTROCARDIOGRAM TRACING: CPT | Performed by: PEDIATRICS

## 2020-01-01 PROCEDURE — 70553 MRI BRAIN STEM W/O & W/DYE: CPT

## 2020-01-01 PROCEDURE — 82945 GLUCOSE OTHER FLUID: CPT

## 2020-01-01 PROCEDURE — 82533 TOTAL CORTISOL: CPT

## 2020-01-01 PROCEDURE — 86651 ENCEPHALITIS CALIFORN ANTBDY: CPT

## 2020-01-01 RX ORDER — SODIUM CHLORIDE, SODIUM LACTATE, POTASSIUM CHLORIDE, CALCIUM CHLORIDE 600; 310; 30; 20 MG/100ML; MG/100ML; MG/100ML; MG/100ML
1000 INJECTION, SOLUTION INTRAVENOUS ONCE
Status: COMPLETED | OUTPATIENT
Start: 2020-01-01 | End: 2020-01-01

## 2020-01-01 RX ORDER — HYDRALAZINE HYDROCHLORIDE 20 MG/ML
10 INJECTION INTRAMUSCULAR; INTRAVENOUS EVERY 6 HOURS PRN
Status: DISCONTINUED | OUTPATIENT
Start: 2020-01-01 | End: 2020-01-01 | Stop reason: HOSPADM

## 2020-01-01 RX ORDER — ARFORMOTEROL TARTRATE 15 UG/2ML
15 SOLUTION RESPIRATORY (INHALATION) 2 TIMES DAILY
Status: DISCONTINUED | OUTPATIENT
Start: 2020-01-01 | End: 2020-01-01 | Stop reason: HOSPADM

## 2020-01-01 RX ORDER — DILTIAZEM HCL/D5W 125 MG/125
5 PLASTIC BAG, INJECTION (ML) INTRAVENOUS CONTINUOUS
Status: DISCONTINUED | OUTPATIENT
Start: 2020-01-01 | End: 2020-01-01 | Stop reason: SDUPTHER

## 2020-01-01 RX ORDER — MORPHINE SULFATE 4 MG/ML
1 INJECTION, SOLUTION INTRAMUSCULAR; INTRAVENOUS EVERY 4 HOURS PRN
Status: DISCONTINUED | OUTPATIENT
Start: 2020-01-01 | End: 2020-01-01 | Stop reason: HOSPADM

## 2020-01-01 RX ORDER — POTASSIUM CHLORIDE 29.8 MG/ML
20 INJECTION INTRAVENOUS PRN
Status: DISCONTINUED | OUTPATIENT
Start: 2020-01-01 | End: 2020-01-01 | Stop reason: HOSPADM

## 2020-01-01 RX ORDER — 0.9 % SODIUM CHLORIDE 0.9 %
500 INTRAVENOUS SOLUTION INTRAVENOUS ONCE
Status: DISCONTINUED | OUTPATIENT
Start: 2020-01-01 | End: 2020-01-01

## 2020-01-01 RX ORDER — KETOROLAC TROMETHAMINE 30 MG/ML
15 INJECTION, SOLUTION INTRAMUSCULAR; INTRAVENOUS EVERY 6 HOURS PRN
Status: DISCONTINUED | OUTPATIENT
Start: 2020-01-01 | End: 2020-01-01 | Stop reason: HOSPADM

## 2020-01-01 RX ORDER — METOPROLOL TARTRATE 5 MG/5ML
10 INJECTION INTRAVENOUS EVERY 6 HOURS PRN
Status: DISCONTINUED | OUTPATIENT
Start: 2020-01-01 | End: 2020-01-01 | Stop reason: HOSPADM

## 2020-01-01 RX ORDER — ACETAMINOPHEN 325 MG/1
650 TABLET ORAL EVERY 4 HOURS PRN
Status: DISCONTINUED | OUTPATIENT
Start: 2020-01-01 | End: 2020-01-01 | Stop reason: HOSPADM

## 2020-01-01 RX ORDER — ACETAMINOPHEN 325 MG/1
650 TABLET ORAL EVERY 4 HOURS PRN
Status: CANCELLED | OUTPATIENT
Start: 2020-01-01

## 2020-01-01 RX ORDER — FUROSEMIDE 10 MG/ML
20 INJECTION INTRAMUSCULAR; INTRAVENOUS DAILY
Status: CANCELLED | OUTPATIENT
Start: 2020-01-01

## 2020-01-01 RX ORDER — METOPROLOL TARTRATE 5 MG/5ML
7.5 INJECTION INTRAVENOUS EVERY 4 HOURS
Status: DISCONTINUED | OUTPATIENT
Start: 2020-01-01 | End: 2020-01-01

## 2020-01-01 RX ORDER — ALBUTEROL SULFATE 90 UG/1
2 AEROSOL, METERED RESPIRATORY (INHALATION) EVERY 6 HOURS PRN
Status: DISCONTINUED | OUTPATIENT
Start: 2020-01-01 | End: 2020-01-01 | Stop reason: HOSPADM

## 2020-01-01 RX ORDER — HEPARIN SODIUM 1000 [USP'U]/ML
80 INJECTION, SOLUTION INTRAVENOUS; SUBCUTANEOUS PRN
Status: DISCONTINUED | OUTPATIENT
Start: 2020-01-01 | End: 2020-01-01

## 2020-01-01 RX ORDER — LEVOTHYROXINE SODIUM 0.05 MG/1
50 TABLET ORAL DAILY
Status: CANCELLED | OUTPATIENT
Start: 2020-01-01

## 2020-01-01 RX ORDER — ETOMIDATE 2 MG/ML
20 INJECTION INTRAVENOUS ONCE
Status: COMPLETED | OUTPATIENT
Start: 2020-01-01 | End: 2020-01-01

## 2020-01-01 RX ORDER — SODIUM CHLORIDE 0.9 % (FLUSH) 0.9 %
10 SYRINGE (ML) INJECTION PRN
Status: DISCONTINUED | OUTPATIENT
Start: 2020-01-01 | End: 2020-01-01 | Stop reason: SDUPTHER

## 2020-01-01 RX ORDER — NIFEDIPINE 30 MG/1
30 TABLET, EXTENDED RELEASE ORAL DAILY
Status: DISCONTINUED | OUTPATIENT
Start: 2020-01-01 | End: 2020-01-01 | Stop reason: HOSPADM

## 2020-01-01 RX ORDER — LEVOTHYROXINE SODIUM 50 MCG
50 TABLET ORAL DAILY
Qty: 30 TABLET | Refills: 3 | Status: ON HOLD | OUTPATIENT
Start: 2020-01-01 | End: 2020-01-01 | Stop reason: HOSPADM

## 2020-01-01 RX ORDER — SUCCINYLCHOLINE CHLORIDE 20 MG/ML
200 INJECTION INTRAMUSCULAR; INTRAVENOUS ONCE
Status: COMPLETED | OUTPATIENT
Start: 2020-01-01 | End: 2020-01-01

## 2020-01-01 RX ORDER — CLONIDINE HYDROCHLORIDE 0.2 MG/1
0.2 TABLET ORAL 3 TIMES DAILY PRN
Status: DISCONTINUED | OUTPATIENT
Start: 2020-01-01 | End: 2020-01-01

## 2020-01-01 RX ORDER — POLYVINYL ALCOHOL 14 MG/ML
1 SOLUTION/ DROPS OPHTHALMIC EVERY 4 HOURS
Status: DISCONTINUED | OUTPATIENT
Start: 2020-01-01 | End: 2020-01-01

## 2020-01-01 RX ORDER — FLUOXETINE HYDROCHLORIDE 20 MG/1
20 CAPSULE ORAL DAILY
Status: DISCONTINUED | OUTPATIENT
Start: 2020-01-01 | End: 2020-01-01 | Stop reason: HOSPADM

## 2020-01-01 RX ORDER — NICOTINE POLACRILEX 4 MG
15 LOZENGE BUCCAL PRN
Status: CANCELLED | OUTPATIENT
Start: 2020-01-01

## 2020-01-01 RX ORDER — SODIUM CHLORIDE 0.9 % (FLUSH) 0.9 %
10 SYRINGE (ML) INJECTION PRN
Status: CANCELLED | OUTPATIENT
Start: 2020-01-01

## 2020-01-01 RX ORDER — POTASSIUM CHLORIDE 29.8 MG/ML
20 INJECTION INTRAVENOUS PRN
Status: CANCELLED | OUTPATIENT
Start: 2020-01-01

## 2020-01-01 RX ORDER — FUROSEMIDE 40 MG/1
40 TABLET ORAL PRN
Status: DISCONTINUED | OUTPATIENT
Start: 2020-01-01 | End: 2020-01-01 | Stop reason: HOSPADM

## 2020-01-01 RX ORDER — PROPOFOL 10 MG/ML
10 INJECTION, EMULSION INTRAVENOUS
Status: DISCONTINUED | OUTPATIENT
Start: 2020-01-01 | End: 2020-01-01

## 2020-01-01 RX ORDER — IPRATROPIUM BROMIDE AND ALBUTEROL SULFATE 2.5; .5 MG/3ML; MG/3ML
1 SOLUTION RESPIRATORY (INHALATION)
Status: DISCONTINUED | OUTPATIENT
Start: 2020-01-01 | End: 2020-01-01

## 2020-01-01 RX ORDER — METOPROLOL TARTRATE 50 MG/1
50 TABLET, FILM COATED ORAL 2 TIMES DAILY
Status: CANCELLED | OUTPATIENT
Start: 2020-01-01

## 2020-01-01 RX ORDER — BUDESONIDE AND FORMOTEROL FUMARATE DIHYDRATE 160; 4.5 UG/1; UG/1
2 AEROSOL RESPIRATORY (INHALATION) 2 TIMES DAILY
Status: DISCONTINUED | OUTPATIENT
Start: 2020-01-01 | End: 2020-01-01

## 2020-01-01 RX ORDER — PANTOPRAZOLE SODIUM 40 MG/1
40 TABLET, DELAYED RELEASE ORAL
Status: DISCONTINUED | OUTPATIENT
Start: 2020-01-01 | End: 2020-01-01 | Stop reason: HOSPADM

## 2020-01-01 RX ORDER — FUROSEMIDE 10 MG/ML
80 INJECTION INTRAMUSCULAR; INTRAVENOUS ONCE
Status: COMPLETED | OUTPATIENT
Start: 2020-01-01 | End: 2020-01-01

## 2020-01-01 RX ORDER — BUDESONIDE 0.5 MG/2ML
0.5 INHALANT ORAL 2 TIMES DAILY
Status: CANCELLED | OUTPATIENT
Start: 2020-01-01

## 2020-01-01 RX ORDER — IPRATROPIUM BROMIDE AND ALBUTEROL SULFATE 2.5; .5 MG/3ML; MG/3ML
1 SOLUTION RESPIRATORY (INHALATION) EVERY 4 HOURS PRN
Status: DISCONTINUED | OUTPATIENT
Start: 2020-01-01 | End: 2020-01-01 | Stop reason: HOSPADM

## 2020-01-01 RX ORDER — MIDAZOLAM HYDROCHLORIDE 1 MG/ML
INJECTION INTRAMUSCULAR; INTRAVENOUS
Status: COMPLETED
Start: 2020-01-01 | End: 2020-01-01

## 2020-01-01 RX ORDER — DILTIAZEM HYDROCHLORIDE 5 MG/ML
10 INJECTION INTRAVENOUS ONCE
Status: COMPLETED | OUTPATIENT
Start: 2020-01-01 | End: 2020-01-01

## 2020-01-01 RX ORDER — DEXTROSE MONOHYDRATE 50 MG/ML
100 INJECTION, SOLUTION INTRAVENOUS PRN
Status: DISCONTINUED | OUTPATIENT
Start: 2020-01-01 | End: 2020-01-01 | Stop reason: HOSPADM

## 2020-01-01 RX ORDER — PROMETHAZINE HYDROCHLORIDE 25 MG/ML
6.25 INJECTION, SOLUTION INTRAMUSCULAR; INTRAVENOUS EVERY 6 HOURS PRN
Status: DISCONTINUED | OUTPATIENT
Start: 2020-01-01 | End: 2020-01-01 | Stop reason: HOSPADM

## 2020-01-01 RX ORDER — BUSPIRONE HYDROCHLORIDE 10 MG/1
10 TABLET ORAL 2 TIMES DAILY
Status: DISCONTINUED | OUTPATIENT
Start: 2020-01-01 | End: 2020-01-01 | Stop reason: HOSPADM

## 2020-01-01 RX ORDER — HEPARIN SODIUM 1000 [USP'U]/ML
80 INJECTION, SOLUTION INTRAVENOUS; SUBCUTANEOUS ONCE
Status: DISCONTINUED | OUTPATIENT
Start: 2020-01-01 | End: 2020-01-01

## 2020-01-01 RX ORDER — SODIUM CHLORIDE 0.9 % (FLUSH) 0.9 %
10 SYRINGE (ML) INJECTION PRN
Status: DISCONTINUED | OUTPATIENT
Start: 2020-01-01 | End: 2020-01-01 | Stop reason: HOSPADM

## 2020-01-01 RX ORDER — LIDOCAINE HYDROCHLORIDE 10 MG/ML
5 INJECTION, SOLUTION EPIDURAL; INFILTRATION; INTRACAUDAL; PERINEURAL ONCE
Status: DISCONTINUED | OUTPATIENT
Start: 2020-01-01 | End: 2020-01-01 | Stop reason: HOSPADM

## 2020-01-01 RX ORDER — POTASSIUM CHLORIDE 29.8 MG/ML
20 INJECTION INTRAVENOUS ONCE
Status: COMPLETED | OUTPATIENT
Start: 2020-01-01 | End: 2020-01-01

## 2020-01-01 RX ORDER — METOPROLOL TARTRATE 50 MG/1
50 TABLET, FILM COATED ORAL 2 TIMES DAILY
Status: DISCONTINUED | OUTPATIENT
Start: 2020-01-01 | End: 2020-01-01 | Stop reason: HOSPADM

## 2020-01-01 RX ORDER — IPRATROPIUM BROMIDE AND ALBUTEROL SULFATE 2.5; .5 MG/3ML; MG/3ML
1 SOLUTION RESPIRATORY (INHALATION) EVERY 4 HOURS
Status: DISCONTINUED | OUTPATIENT
Start: 2020-01-01 | End: 2020-01-01 | Stop reason: HOSPADM

## 2020-01-01 RX ORDER — VECURONIUM BROMIDE 1 MG/ML
10 INJECTION, POWDER, LYOPHILIZED, FOR SOLUTION INTRAVENOUS ONCE
Status: COMPLETED | OUTPATIENT
Start: 2020-01-01 | End: 2020-01-01

## 2020-01-01 RX ORDER — HEPARIN SODIUM 10000 [USP'U]/100ML
18 INJECTION, SOLUTION INTRAVENOUS CONTINUOUS
Status: DISCONTINUED | OUTPATIENT
Start: 2020-01-01 | End: 2020-01-01

## 2020-01-01 RX ORDER — HEPARIN SODIUM 1000 [USP'U]/ML
40 INJECTION, SOLUTION INTRAVENOUS; SUBCUTANEOUS PRN
Status: DISCONTINUED | OUTPATIENT
Start: 2020-01-01 | End: 2020-01-01

## 2020-01-01 RX ORDER — CLONIDINE HYDROCHLORIDE 0.1 MG/1
TABLET ORAL
Qty: 180 TABLET | Refills: 1 | Status: SHIPPED | OUTPATIENT
Start: 2020-01-01 | End: 2020-01-01 | Stop reason: HOSPADM

## 2020-01-01 RX ORDER — SODIUM CHLORIDE 9 MG/ML
INJECTION, SOLUTION INTRAVENOUS CONTINUOUS
Status: DISCONTINUED | OUTPATIENT
Start: 2020-01-01 | End: 2020-01-01

## 2020-01-01 RX ORDER — LORAZEPAM 2 MG/ML
2 INJECTION INTRAMUSCULAR ONCE
Status: COMPLETED | OUTPATIENT
Start: 2020-01-01 | End: 2020-01-01

## 2020-01-01 RX ORDER — BUMETANIDE 0.25 MG/ML
2 INJECTION, SOLUTION INTRAMUSCULAR; INTRAVENOUS EVERY 12 HOURS
Status: DISCONTINUED | OUTPATIENT
Start: 2020-01-01 | End: 2020-01-01

## 2020-01-01 RX ORDER — DEXTROSE MONOHYDRATE 25 G/50ML
12.5 INJECTION, SOLUTION INTRAVENOUS PRN
Status: DISCONTINUED | OUTPATIENT
Start: 2020-01-01 | End: 2020-01-01 | Stop reason: HOSPADM

## 2020-01-01 RX ORDER — DILTIAZEM HYDROCHLORIDE 5 MG/ML
INJECTION INTRAVENOUS
Status: COMPLETED
Start: 2020-01-01 | End: 2020-01-01

## 2020-01-01 RX ORDER — SODIUM CHLORIDE 0.9 % (FLUSH) 0.9 %
10 SYRINGE (ML) INJECTION EVERY 12 HOURS SCHEDULED
Status: DISCONTINUED | OUTPATIENT
Start: 2020-01-01 | End: 2020-01-01 | Stop reason: SDUPTHER

## 2020-01-01 RX ORDER — BUSPIRONE HYDROCHLORIDE 10 MG/1
TABLET ORAL
Qty: 270 TABLET | Refills: 0 | Status: ON HOLD | OUTPATIENT
Start: 2020-01-01 | End: 2020-01-01 | Stop reason: HOSPADM

## 2020-01-01 RX ORDER — POTASSIUM CHLORIDE 7.45 MG/ML
10 INJECTION INTRAVENOUS PRN
Status: DISCONTINUED | OUTPATIENT
Start: 2020-01-01 | End: 2020-01-01 | Stop reason: HOSPADM

## 2020-01-01 RX ORDER — FUROSEMIDE 10 MG/ML
40 INJECTION INTRAMUSCULAR; INTRAVENOUS ONCE
Status: COMPLETED | OUTPATIENT
Start: 2020-01-01 | End: 2020-01-01

## 2020-01-01 RX ORDER — ARFORMOTEROL TARTRATE 15 UG/2ML
15 SOLUTION RESPIRATORY (INHALATION) 2 TIMES DAILY
Status: CANCELLED | OUTPATIENT
Start: 2020-01-01

## 2020-01-01 RX ORDER — ONDANSETRON 2 MG/ML
INJECTION INTRAMUSCULAR; INTRAVENOUS
Status: COMPLETED
Start: 2020-01-01 | End: 2020-01-01

## 2020-01-01 RX ORDER — MORPHINE SULFATE 4 MG/ML
INJECTION, SOLUTION INTRAMUSCULAR; INTRAVENOUS
Status: COMPLETED
Start: 2020-01-01 | End: 2020-01-01

## 2020-01-01 RX ORDER — METOPROLOL TARTRATE 5 MG/5ML
5 INJECTION INTRAVENOUS EVERY 6 HOURS
Status: DISCONTINUED | OUTPATIENT
Start: 2020-01-01 | End: 2020-01-01

## 2020-01-01 RX ORDER — LEVOTHYROXINE SODIUM 0.05 MG/1
50 TABLET ORAL DAILY
Status: DISCONTINUED | OUTPATIENT
Start: 2020-01-01 | End: 2020-01-01 | Stop reason: HOSPADM

## 2020-01-01 RX ORDER — FENTANYL CITRATE 50 UG/ML
INJECTION, SOLUTION INTRAMUSCULAR; INTRAVENOUS
Status: DISPENSED
Start: 2020-01-01 | End: 2020-01-01

## 2020-01-01 RX ORDER — BUDESONIDE 0.5 MG/2ML
0.5 INHALANT ORAL 2 TIMES DAILY
Status: DISCONTINUED | OUTPATIENT
Start: 2020-01-01 | End: 2020-01-01 | Stop reason: HOSPADM

## 2020-01-01 RX ORDER — FUROSEMIDE 10 MG/ML
20 INJECTION INTRAMUSCULAR; INTRAVENOUS DAILY
Status: DISCONTINUED | OUTPATIENT
Start: 2020-01-01 | End: 2020-01-01 | Stop reason: HOSPADM

## 2020-01-01 RX ORDER — DEXTROSE MONOHYDRATE 50 MG/ML
100 INJECTION, SOLUTION INTRAVENOUS PRN
Status: CANCELLED | OUTPATIENT
Start: 2020-01-01

## 2020-01-01 RX ORDER — METOCLOPRAMIDE HYDROCHLORIDE 5 MG/ML
10 INJECTION INTRAMUSCULAR; INTRAVENOUS EVERY 6 HOURS
Status: DISCONTINUED | OUTPATIENT
Start: 2020-01-01 | End: 2020-01-01

## 2020-01-01 RX ORDER — CLONIDINE HYDROCHLORIDE 0.2 MG/1
0.2 TABLET ORAL PRN
Status: DISCONTINUED | OUTPATIENT
Start: 2020-01-01 | End: 2020-01-01

## 2020-01-01 RX ORDER — IPRATROPIUM BROMIDE AND ALBUTEROL SULFATE 2.5; .5 MG/3ML; MG/3ML
1 SOLUTION RESPIRATORY (INHALATION)
Status: COMPLETED | OUTPATIENT
Start: 2020-01-01 | End: 2020-01-01

## 2020-01-01 RX ORDER — ONDANSETRON 2 MG/ML
4 INJECTION INTRAMUSCULAR; INTRAVENOUS EVERY 6 HOURS PRN
Status: DISCONTINUED | OUTPATIENT
Start: 2020-01-01 | End: 2020-01-01 | Stop reason: HOSPADM

## 2020-01-01 RX ORDER — MORPHINE SULFATE 4 MG/ML
1 INJECTION, SOLUTION INTRAMUSCULAR; INTRAVENOUS EVERY 4 HOURS PRN
Status: CANCELLED | OUTPATIENT
Start: 2020-01-01

## 2020-01-01 RX ORDER — SODIUM CHLORIDE 9 MG/ML
1000 INJECTION, SOLUTION INTRAVENOUS CONTINUOUS
Status: DISCONTINUED | OUTPATIENT
Start: 2020-01-01 | End: 2020-01-01

## 2020-01-01 RX ORDER — ROPINIROLE 1 MG/1
1 TABLET, FILM COATED ORAL NIGHTLY
Status: DISCONTINUED | OUTPATIENT
Start: 2020-01-01 | End: 2020-01-01 | Stop reason: HOSPADM

## 2020-01-01 RX ORDER — NICOTINE POLACRILEX 4 MG
15 LOZENGE BUCCAL PRN
Status: DISCONTINUED | OUTPATIENT
Start: 2020-01-01 | End: 2020-01-01 | Stop reason: HOSPADM

## 2020-01-01 RX ORDER — NITROGLYCERIN 0.4 MG/1
TABLET SUBLINGUAL
Status: COMPLETED
Start: 2020-01-01 | End: 2020-01-01

## 2020-01-01 RX ORDER — HYDRALAZINE HYDROCHLORIDE 20 MG/ML
5 INJECTION INTRAMUSCULAR; INTRAVENOUS EVERY 6 HOURS PRN
Status: DISCONTINUED | OUTPATIENT
Start: 2020-01-01 | End: 2020-01-01

## 2020-01-01 RX ORDER — POTASSIUM CHLORIDE 20 MEQ/1
40 TABLET, EXTENDED RELEASE ORAL PRN
Status: DISCONTINUED | OUTPATIENT
Start: 2020-01-01 | End: 2020-01-01 | Stop reason: HOSPADM

## 2020-01-01 RX ORDER — WHITE PETROLATUM 57.7 %-MINERAL OIL 31.9 % EYE OINTMENT
EVERY 4 HOURS
Status: DISCONTINUED | OUTPATIENT
Start: 2020-01-01 | End: 2020-01-01

## 2020-01-01 RX ORDER — CLONIDINE HYDROCHLORIDE 0.3 MG/1
0.3 TABLET ORAL 2 TIMES DAILY
Qty: 60 TABLET | Refills: 1 | Status: ON HOLD | OUTPATIENT
Start: 2020-01-01 | End: 2020-01-01 | Stop reason: HOSPADM

## 2020-01-01 RX ORDER — SODIUM CHLORIDE 0.9 % (FLUSH) 0.9 %
10 SYRINGE (ML) INJECTION EVERY 12 HOURS SCHEDULED
Status: CANCELLED | OUTPATIENT
Start: 2020-01-01

## 2020-01-01 RX ORDER — PANTOPRAZOLE SODIUM 40 MG/1
TABLET, DELAYED RELEASE ORAL
Qty: 90 TABLET | Refills: 3 | Status: ON HOLD | OUTPATIENT
Start: 2020-01-01 | End: 2020-01-01 | Stop reason: HOSPADM

## 2020-01-01 RX ORDER — SODIUM CHLORIDE 0.9 % (FLUSH) 0.9 %
10 SYRINGE (ML) INJECTION EVERY 12 HOURS SCHEDULED
Status: DISCONTINUED | OUTPATIENT
Start: 2020-01-01 | End: 2020-01-01 | Stop reason: HOSPADM

## 2020-01-01 RX ORDER — NITROGLYCERIN 20 MG/100ML
INJECTION INTRAVENOUS
Status: DISCONTINUED
Start: 2020-01-01 | End: 2020-01-01 | Stop reason: WASHOUT

## 2020-01-01 RX ORDER — DEXTROSE MONOHYDRATE 25 G/50ML
12.5 INJECTION, SOLUTION INTRAVENOUS PRN
Status: CANCELLED | OUTPATIENT
Start: 2020-01-01

## 2020-01-01 RX ORDER — CHLORHEXIDINE GLUCONATE 0.12 MG/ML
15 RINSE ORAL 2 TIMES DAILY
Status: DISCONTINUED | OUTPATIENT
Start: 2020-01-01 | End: 2020-01-01

## 2020-01-01 RX ORDER — LOSARTAN POTASSIUM 100 MG/1
100 TABLET ORAL DAILY
Status: DISCONTINUED | OUTPATIENT
Start: 2020-01-01 | End: 2020-01-01 | Stop reason: HOSPADM

## 2020-01-01 RX ORDER — POTASSIUM CHLORIDE 7.45 MG/ML
10 INJECTION INTRAVENOUS ONCE
Status: COMPLETED | OUTPATIENT
Start: 2020-01-01 | End: 2020-01-01

## 2020-01-01 RX ORDER — FLUOXETINE HYDROCHLORIDE 20 MG/1
40 CAPSULE ORAL DAILY
Status: DISCONTINUED | OUTPATIENT
Start: 2020-01-01 | End: 2020-01-01 | Stop reason: HOSPADM

## 2020-01-01 RX ORDER — METOPROLOL TARTRATE 5 MG/5ML
5 INJECTION INTRAVENOUS EVERY 6 HOURS PRN
Status: DISCONTINUED | OUTPATIENT
Start: 2020-01-01 | End: 2020-01-01

## 2020-01-01 RX ORDER — IPRATROPIUM BROMIDE AND ALBUTEROL SULFATE 2.5; .5 MG/3ML; MG/3ML
1 SOLUTION RESPIRATORY (INHALATION) EVERY 4 HOURS
Status: CANCELLED | OUTPATIENT
Start: 2020-01-01

## 2020-01-01 RX ORDER — SCOLOPAMINE TRANSDERMAL SYSTEM 1 MG/1
1 PATCH, EXTENDED RELEASE TRANSDERMAL
Status: DISCONTINUED | OUTPATIENT
Start: 2020-01-01 | End: 2020-01-01

## 2020-01-01 RX ORDER — 0.9 % SODIUM CHLORIDE 0.9 %
500 INTRAVENOUS SOLUTION INTRAVENOUS ONCE
Status: COMPLETED | OUTPATIENT
Start: 2020-01-01 | End: 2020-01-01

## 2020-01-01 RX ORDER — SODIUM CHLORIDE 9 MG/ML
INJECTION, SOLUTION INTRAVENOUS CONTINUOUS
Status: DISCONTINUED | OUTPATIENT
Start: 2020-01-01 | End: 2020-01-01 | Stop reason: HOSPADM

## 2020-01-01 RX ADMIN — CHLORHEXIDINE GLUCONATE 15 ML: 1.2 RINSE ORAL at 20:42

## 2020-01-01 RX ADMIN — IPRATROPIUM BROMIDE AND ALBUTEROL SULFATE 1 AMPULE: 2.5; .5 SOLUTION RESPIRATORY (INHALATION) at 02:04

## 2020-01-01 RX ADMIN — BUSPIRONE HYDROCHLORIDE 10 MG: 10 TABLET ORAL at 19:54

## 2020-01-01 RX ADMIN — Medication 10 ML: at 08:56

## 2020-01-01 RX ADMIN — METOCLOPRAMIDE 10 MG: 5 INJECTION, SOLUTION INTRAMUSCULAR; INTRAVENOUS at 09:19

## 2020-01-01 RX ADMIN — IPRATROPIUM BROMIDE AND ALBUTEROL SULFATE 1 AMPULE: 2.5; .5 SOLUTION RESPIRATORY (INHALATION) at 18:05

## 2020-01-01 RX ADMIN — PROPOFOL 30 MCG/KG/MIN: 10 INJECTION, EMULSION INTRAVENOUS at 02:22

## 2020-01-01 RX ADMIN — PROPOFOL 25 MCG/KG/MIN: 10 INJECTION, EMULSION INTRAVENOUS at 03:46

## 2020-01-01 RX ADMIN — IPRATROPIUM BROMIDE AND ALBUTEROL SULFATE 1 AMPULE: 2.5; .5 SOLUTION RESPIRATORY (INHALATION) at 22:10

## 2020-01-01 RX ADMIN — IPRATROPIUM BROMIDE AND ALBUTEROL SULFATE 1 AMPULE: 2.5; .5 SOLUTION RESPIRATORY (INHALATION) at 10:40

## 2020-01-01 RX ADMIN — SODIUM CHLORIDE, PRESERVATIVE FREE 10 ML: 5 INJECTION INTRAVENOUS at 09:02

## 2020-01-01 RX ADMIN — BUSPIRONE HYDROCHLORIDE 10 MG: 10 TABLET ORAL at 21:37

## 2020-01-01 RX ADMIN — Medication 10 ML: at 09:48

## 2020-01-01 RX ADMIN — WHITE PETROLATUM 57.7 %-MINERAL OIL 31.9 % EYE OINTMENT: at 17:44

## 2020-01-01 RX ADMIN — METOPROLOL TARTRATE 7.5 MG: 5 INJECTION, SOLUTION INTRAVENOUS at 23:02

## 2020-01-01 RX ADMIN — DEXTROSE MONOHYDRATE 1250 MG: 50 INJECTION, SOLUTION INTRAVENOUS at 11:03

## 2020-01-01 RX ADMIN — ROPINIROLE HYDROCHLORIDE 1 MG: 1 TABLET, FILM COATED ORAL at 20:51

## 2020-01-01 RX ADMIN — ACETAZOLAMIDE SODIUM 250 MG: 500 INJECTION, POWDER, LYOPHILIZED, FOR SOLUTION INTRAVENOUS at 17:48

## 2020-01-01 RX ADMIN — IPRATROPIUM BROMIDE AND ALBUTEROL SULFATE 1 AMPULE: 2.5; .5 SOLUTION RESPIRATORY (INHALATION) at 18:37

## 2020-01-01 RX ADMIN — PIPERACILLIN SODIUM AND TAZOBACTAM SODIUM 3.38 G: 3; .375 INJECTION, POWDER, LYOPHILIZED, FOR SOLUTION INTRAVENOUS at 04:36

## 2020-01-01 RX ADMIN — CLONIDINE HYDROCHLORIDE 0.3 MG: 0.1 TABLET ORAL at 20:51

## 2020-01-01 RX ADMIN — CLONIDINE HYDROCHLORIDE 0.2 MG: 0.2 TABLET ORAL at 06:40

## 2020-01-01 RX ADMIN — MORPHINE SULFATE 1 MG: 4 INJECTION INTRAVENOUS at 06:41

## 2020-01-01 RX ADMIN — POLYVINYL ALCOHOL 1 DROP: 14 SOLUTION/ DROPS OPHTHALMIC at 11:10

## 2020-01-01 RX ADMIN — METOCLOPRAMIDE 10 MG: 5 INJECTION, SOLUTION INTRAMUSCULAR; INTRAVENOUS at 22:13

## 2020-01-01 RX ADMIN — METOPROLOL TARTRATE 7.5 MG: 5 INJECTION, SOLUTION INTRAVENOUS at 23:28

## 2020-01-01 RX ADMIN — GADOBENATE DIMEGLUMINE 20 ML: 529 INJECTION, SOLUTION INTRAVENOUS at 17:33

## 2020-01-01 RX ADMIN — POTASSIUM CHLORIDE: 2 INJECTION, SOLUTION, CONCENTRATE INTRAVENOUS at 12:15

## 2020-01-01 RX ADMIN — IPRATROPIUM BROMIDE AND ALBUTEROL SULFATE 1 AMPULE: 2.5; .5 SOLUTION RESPIRATORY (INHALATION) at 02:26

## 2020-01-01 RX ADMIN — DILTIAZEM HYDROCHLORIDE 30 MG: 30 TABLET, FILM COATED ORAL at 05:51

## 2020-01-01 RX ADMIN — PROPOFOL 40 MCG/KG/MIN: 10 INJECTION, EMULSION INTRAVENOUS at 07:44

## 2020-01-01 RX ADMIN — METOPROLOL TARTRATE 7.5 MG: 5 INJECTION, SOLUTION INTRAVENOUS at 08:53

## 2020-01-01 RX ADMIN — PANTOPRAZOLE SODIUM 40 MG: 40 TABLET, DELAYED RELEASE ORAL at 05:57

## 2020-01-01 RX ADMIN — IPRATROPIUM BROMIDE AND ALBUTEROL SULFATE 1 AMPULE: 2.5; .5 SOLUTION RESPIRATORY (INHALATION) at 18:13

## 2020-01-01 RX ADMIN — HYDROCORTISONE SODIUM SUCCINATE 50 MG: 100 INJECTION, POWDER, FOR SOLUTION INTRAMUSCULAR; INTRAVENOUS at 18:06

## 2020-01-01 RX ADMIN — HYDROCORTISONE SODIUM SUCCINATE 50 MG: 100 INJECTION, POWDER, FOR SOLUTION INTRAMUSCULAR; INTRAVENOUS at 11:10

## 2020-01-01 RX ADMIN — IPRATROPIUM BROMIDE AND ALBUTEROL SULFATE 1 AMPULE: 2.5; .5 SOLUTION RESPIRATORY (INHALATION) at 02:15

## 2020-01-01 RX ADMIN — POLYVINYL ALCOHOL 1 DROP: 14 SOLUTION/ DROPS OPHTHALMIC at 00:36

## 2020-01-01 RX ADMIN — PROPOFOL 20 MCG/KG/MIN: 10 INJECTION, EMULSION INTRAVENOUS at 11:51

## 2020-01-01 RX ADMIN — MORPHINE SULFATE 1 MG: 4 INJECTION INTRAVENOUS at 10:46

## 2020-01-01 RX ADMIN — VANCOMYCIN HYDROCHLORIDE 1750 MG: 10 INJECTION, POWDER, LYOPHILIZED, FOR SOLUTION INTRAVENOUS at 03:48

## 2020-01-01 RX ADMIN — ARFORMOTEROL TARTRATE 15 MCG: 15 SOLUTION RESPIRATORY (INHALATION) at 10:11

## 2020-01-01 RX ADMIN — KETOROLAC TROMETHAMINE 15 MG: 30 INJECTION, SOLUTION INTRAMUSCULAR at 21:37

## 2020-01-01 RX ADMIN — METOPROLOL TARTRATE 25 MG: 25 TABLET, FILM COATED ORAL at 12:38

## 2020-01-01 RX ADMIN — Medication: at 08:24

## 2020-01-01 RX ADMIN — ACETAMINOPHEN 650 MG: 325 TABLET ORAL at 10:10

## 2020-01-01 RX ADMIN — SODIUM CHLORIDE, PRESERVATIVE FREE 10 ML: 5 INJECTION INTRAVENOUS at 08:55

## 2020-01-01 RX ADMIN — ENOXAPARIN SODIUM 40 MG: 40 INJECTION SUBCUTANEOUS at 09:24

## 2020-01-01 RX ADMIN — PROPOFOL 30 MCG/KG/MIN: 10 INJECTION, EMULSION INTRAVENOUS at 18:04

## 2020-01-01 RX ADMIN — PANTOPRAZOLE SODIUM 40 MG: 40 TABLET, DELAYED RELEASE ORAL at 06:44

## 2020-01-01 RX ADMIN — METOPROLOL TARTRATE 7.5 MG: 5 INJECTION, SOLUTION INTRAVENOUS at 00:41

## 2020-01-01 RX ADMIN — HYDROCORTISONE SODIUM SUCCINATE 50 MG: 100 INJECTION, POWDER, FOR SOLUTION INTRAMUSCULAR; INTRAVENOUS at 04:45

## 2020-01-01 RX ADMIN — IPRATROPIUM BROMIDE AND ALBUTEROL SULFATE 1 AMPULE: 2.5; .5 SOLUTION RESPIRATORY (INHALATION) at 18:10

## 2020-01-01 RX ADMIN — FLUOXETINE HYDROCHLORIDE 40 MG: 20 CAPSULE ORAL at 08:56

## 2020-01-01 RX ADMIN — HEPARIN SODIUM 16 UNITS/KG/HR: 10000 INJECTION, SOLUTION INTRAVENOUS at 06:28

## 2020-01-01 RX ADMIN — ACETAMINOPHEN 650 MG: 325 TABLET ORAL at 04:24

## 2020-01-01 RX ADMIN — METOCLOPRAMIDE 10 MG: 5 INJECTION, SOLUTION INTRAMUSCULAR; INTRAVENOUS at 04:21

## 2020-01-01 RX ADMIN — ROPINIROLE HYDROCHLORIDE 1 MG: 1 TABLET, FILM COATED ORAL at 21:34

## 2020-01-01 RX ADMIN — BUMETANIDE 2 MG: 0.25 INJECTION INTRAMUSCULAR; INTRAVENOUS at 22:49

## 2020-01-01 RX ADMIN — CLONIDINE HYDROCHLORIDE 0.3 MG: 0.1 TABLET ORAL at 21:34

## 2020-01-01 RX ADMIN — ACETAZOLAMIDE SODIUM 250 MG: 500 INJECTION, POWDER, LYOPHILIZED, FOR SOLUTION INTRAVENOUS at 18:04

## 2020-01-01 RX ADMIN — METOPROLOL TARTRATE 7.5 MG: 5 INJECTION, SOLUTION INTRAVENOUS at 07:57

## 2020-01-01 RX ADMIN — PIPERACILLIN AND TAZOBACTAM 2.25 G: 2; .25 INJECTION, POWDER, FOR SOLUTION INTRAVENOUS at 18:33

## 2020-01-01 RX ADMIN — KETOROLAC TROMETHAMINE 15 MG: 30 INJECTION, SOLUTION INTRAMUSCULAR at 00:53

## 2020-01-01 RX ADMIN — WHITE PETROLATUM 57.7 %-MINERAL OIL 31.9 % EYE OINTMENT: at 22:30

## 2020-01-01 RX ADMIN — INSULIN LISPRO 3 UNITS: 100 INJECTION, SOLUTION INTRAVENOUS; SUBCUTANEOUS at 17:10

## 2020-01-01 RX ADMIN — PROPOFOL 30 MCG/KG/MIN: 10 INJECTION, EMULSION INTRAVENOUS at 22:13

## 2020-01-01 RX ADMIN — PROPOFOL 30 MCG/KG/MIN: 10 INJECTION, EMULSION INTRAVENOUS at 22:48

## 2020-01-01 RX ADMIN — POTASSIUM CHLORIDE 40 MEQ: 1500 TABLET, EXTENDED RELEASE ORAL at 06:44

## 2020-01-01 RX ADMIN — Medication 10 ML: at 21:04

## 2020-01-01 RX ADMIN — METOCLOPRAMIDE 10 MG: 5 INJECTION, SOLUTION INTRAMUSCULAR; INTRAVENOUS at 15:18

## 2020-01-01 RX ADMIN — GADOBENATE DIMEGLUMINE 20 ML: 529 INJECTION, SOLUTION INTRAVENOUS at 17:18

## 2020-01-01 RX ADMIN — METOPROLOL TARTRATE 5 MG: 5 INJECTION, SOLUTION INTRAVENOUS at 02:49

## 2020-01-01 RX ADMIN — NIFEDIPINE 30 MG: 30 TABLET, FILM COATED, EXTENDED RELEASE ORAL at 08:54

## 2020-01-01 RX ADMIN — METOPROLOL TARTRATE 25 MG: 25 TABLET, FILM COATED ORAL at 00:08

## 2020-01-01 RX ADMIN — Medication 10 ML: at 21:12

## 2020-01-01 RX ADMIN — METOPROLOL TARTRATE 7.5 MG: 5 INJECTION, SOLUTION INTRAVENOUS at 16:19

## 2020-01-01 RX ADMIN — POLYVINYL ALCOHOL 1 DROP: 14 SOLUTION/ DROPS OPHTHALMIC at 19:39

## 2020-01-01 RX ADMIN — POTASSIUM CHLORIDE: 2 INJECTION, SOLUTION, CONCENTRATE INTRAVENOUS at 00:25

## 2020-01-01 RX ADMIN — FAMOTIDINE 20 MG: 10 INJECTION, SOLUTION INTRAVENOUS at 10:22

## 2020-01-01 RX ADMIN — IPRATROPIUM BROMIDE AND ALBUTEROL SULFATE 1 AMPULE: 2.5; .5 SOLUTION RESPIRATORY (INHALATION) at 06:30

## 2020-01-01 RX ADMIN — IPRATROPIUM BROMIDE AND ALBUTEROL SULFATE 1 AMPULE: 2.5; .5 SOLUTION RESPIRATORY (INHALATION) at 10:06

## 2020-01-01 RX ADMIN — METOPROLOL TARTRATE 7.5 MG: 5 INJECTION, SOLUTION INTRAVENOUS at 19:54

## 2020-01-01 RX ADMIN — PROPOFOL 15 MCG/KG/MIN: 10 INJECTION, EMULSION INTRAVENOUS at 13:46

## 2020-01-01 RX ADMIN — IPRATROPIUM BROMIDE AND ALBUTEROL SULFATE 1 AMPULE: 2.5; .5 SOLUTION RESPIRATORY (INHALATION) at 09:58

## 2020-01-01 RX ADMIN — METOPROLOL TARTRATE 25 MG: 25 TABLET, FILM COATED ORAL at 11:33

## 2020-01-01 RX ADMIN — PIPERACILLIN SODIUM AND TAZOBACTAM SODIUM 3.38 G: 3; .375 INJECTION, POWDER, LYOPHILIZED, FOR SOLUTION INTRAVENOUS at 12:16

## 2020-01-01 RX ADMIN — SODIUM CHLORIDE: 9 INJECTION, SOLUTION INTRAVENOUS at 20:39

## 2020-01-01 RX ADMIN — DILTIAZEM HYDROCHLORIDE 30 MG: 30 TABLET, FILM COATED ORAL at 00:25

## 2020-01-01 RX ADMIN — PERFLUTREN 1.65 MG: 6.52 INJECTION, SUSPENSION INTRAVENOUS at 12:05

## 2020-01-01 RX ADMIN — CHLORHEXIDINE GLUCONATE 15 ML: 1.2 RINSE ORAL at 20:00

## 2020-01-01 RX ADMIN — IPRATROPIUM BROMIDE AND ALBUTEROL SULFATE 1 AMPULE: 2.5; .5 SOLUTION RESPIRATORY (INHALATION) at 18:28

## 2020-01-01 RX ADMIN — HYDROCORTISONE SODIUM SUCCINATE 50 MG: 100 INJECTION, POWDER, FOR SOLUTION INTRAMUSCULAR; INTRAVENOUS at 03:45

## 2020-01-01 RX ADMIN — SODIUM CHLORIDE, PRESERVATIVE FREE 10 ML: 5 INJECTION INTRAVENOUS at 20:28

## 2020-01-01 RX ADMIN — BUSPIRONE HYDROCHLORIDE 10 MG: 10 TABLET ORAL at 20:34

## 2020-01-01 RX ADMIN — IPRATROPIUM BROMIDE AND ALBUTEROL SULFATE 1 AMPULE: 2.5; .5 SOLUTION RESPIRATORY (INHALATION) at 22:16

## 2020-01-01 RX ADMIN — LEVOTHYROXINE SODIUM 50 MCG: 50 TABLET ORAL at 09:34

## 2020-01-01 RX ADMIN — Medication 500 MG: at 14:03

## 2020-01-01 RX ADMIN — PROPOFOL 40 MCG/KG/MIN: 10 INJECTION, EMULSION INTRAVENOUS at 13:11

## 2020-01-01 RX ADMIN — PROPOFOL 30 MCG/KG/MIN: 10 INJECTION, EMULSION INTRAVENOUS at 19:35

## 2020-01-01 RX ADMIN — FUROSEMIDE 20 MG: 10 INJECTION, SOLUTION INTRAMUSCULAR; INTRAVENOUS at 17:49

## 2020-01-01 RX ADMIN — METOCLOPRAMIDE 10 MG: 5 INJECTION, SOLUTION INTRAMUSCULAR; INTRAVENOUS at 16:19

## 2020-01-01 RX ADMIN — POLYVINYL ALCOHOL 1 DROP: 14 SOLUTION/ DROPS OPHTHALMIC at 16:31

## 2020-01-01 RX ADMIN — FUROSEMIDE 20 MG: 10 INJECTION, SOLUTION INTRAMUSCULAR; INTRAVENOUS at 08:37

## 2020-01-01 RX ADMIN — IPRATROPIUM BROMIDE AND ALBUTEROL SULFATE 1 AMPULE: 2.5; .5 SOLUTION RESPIRATORY (INHALATION) at 06:39

## 2020-01-01 RX ADMIN — WHITE PETROLATUM 57.7 %-MINERAL OIL 31.9 % EYE OINTMENT: at 22:09

## 2020-01-01 RX ADMIN — METOPROLOL TARTRATE 50 MG: 50 TABLET, FILM COATED ORAL at 21:11

## 2020-01-01 RX ADMIN — VANCOMYCIN HYDROCHLORIDE 1250 MG: 10 INJECTION, POWDER, LYOPHILIZED, FOR SOLUTION INTRAVENOUS at 21:03

## 2020-01-01 RX ADMIN — POTASSIUM CHLORIDE: 2 INJECTION, SOLUTION, CONCENTRATE INTRAVENOUS at 20:46

## 2020-01-01 RX ADMIN — PROPOFOL 40 MCG/KG/MIN: 10 INJECTION, EMULSION INTRAVENOUS at 11:04

## 2020-01-01 RX ADMIN — ACETAMINOPHEN 650 MG: 325 TABLET ORAL at 19:54

## 2020-01-01 RX ADMIN — METOPROLOL TARTRATE 7.5 MG: 5 INJECTION, SOLUTION INTRAVENOUS at 11:04

## 2020-01-01 RX ADMIN — INSULIN LISPRO 1 UNITS: 100 INJECTION, SOLUTION INTRAVENOUS; SUBCUTANEOUS at 09:09

## 2020-01-01 RX ADMIN — KETOROLAC TROMETHAMINE 15 MG: 30 INJECTION, SOLUTION INTRAMUSCULAR at 18:25

## 2020-01-01 RX ADMIN — IPRATROPIUM BROMIDE AND ALBUTEROL SULFATE 1 AMPULE: 2.5; .5 SOLUTION RESPIRATORY (INHALATION) at 14:23

## 2020-01-01 RX ADMIN — IPRATROPIUM BROMIDE AND ALBUTEROL SULFATE 1 AMPULE: 2.5; .5 SOLUTION RESPIRATORY (INHALATION) at 22:30

## 2020-01-01 RX ADMIN — METOPROLOL TARTRATE 12.5 MG: 25 TABLET, FILM COATED ORAL at 15:27

## 2020-01-01 RX ADMIN — METOCLOPRAMIDE 10 MG: 5 INJECTION, SOLUTION INTRAMUSCULAR; INTRAVENOUS at 02:49

## 2020-01-01 RX ADMIN — LEVOTHYROXINE SODIUM 50 MCG: 50 TABLET ORAL at 06:21

## 2020-01-01 RX ADMIN — POLYVINYL ALCOHOL 1 DROP: 14 SOLUTION/ DROPS OPHTHALMIC at 04:09

## 2020-01-01 RX ADMIN — PROPOFOL 40 MCG/KG/MIN: 10 INJECTION, EMULSION INTRAVENOUS at 17:09

## 2020-01-01 RX ADMIN — NIFEDIPINE 30 MG: 30 TABLET, FILM COATED, EXTENDED RELEASE ORAL at 09:22

## 2020-01-01 RX ADMIN — VANCOMYCIN HYDROCHLORIDE 1750 MG: 10 INJECTION, POWDER, LYOPHILIZED, FOR SOLUTION INTRAVENOUS at 15:19

## 2020-01-01 RX ADMIN — METOPROLOL TARTRATE 50 MG: 50 TABLET, FILM COATED ORAL at 21:41

## 2020-01-01 RX ADMIN — ACETAZOLAMIDE SODIUM 250 MG: 500 INJECTION, POWDER, LYOPHILIZED, FOR SOLUTION INTRAVENOUS at 17:09

## 2020-01-01 RX ADMIN — LEVOTHYROXINE SODIUM 50 MCG: 50 TABLET ORAL at 06:18

## 2020-01-01 RX ADMIN — IPRATROPIUM BROMIDE AND ALBUTEROL SULFATE 1 AMPULE: 2.5; .5 SOLUTION RESPIRATORY (INHALATION) at 02:11

## 2020-01-01 RX ADMIN — CARBIDOPA AND LEVODOPA 1 TABLET: 25; 100 TABLET ORAL at 15:54

## 2020-01-01 RX ADMIN — WHITE PETROLATUM 57.7 %-MINERAL OIL 31.9 % EYE OINTMENT: at 07:49

## 2020-01-01 RX ADMIN — PIPERACILLIN AND TAZOBACTAM 2.25 G: 2; .25 INJECTION, POWDER, FOR SOLUTION INTRAVENOUS at 00:08

## 2020-01-01 RX ADMIN — LEVOTHYROXINE SODIUM 50 MCG: 50 TABLET ORAL at 08:52

## 2020-01-01 RX ADMIN — PROPOFOL 40 MCG/KG/MIN: 10 INJECTION, EMULSION INTRAVENOUS at 23:58

## 2020-01-01 RX ADMIN — HYDROCORTISONE SODIUM SUCCINATE 50 MG: 100 INJECTION, POWDER, FOR SOLUTION INTRAMUSCULAR; INTRAVENOUS at 08:53

## 2020-01-01 RX ADMIN — VANCOMYCIN HYDROCHLORIDE 1250 MG: 10 INJECTION, POWDER, LYOPHILIZED, FOR SOLUTION INTRAVENOUS at 09:48

## 2020-01-01 RX ADMIN — IPRATROPIUM BROMIDE AND ALBUTEROL SULFATE 1 AMPULE: 2.5; .5 SOLUTION RESPIRATORY (INHALATION) at 14:35

## 2020-01-01 RX ADMIN — PROPOFOL 30 MCG/KG/MIN: 10 INJECTION, EMULSION INTRAVENOUS at 13:40

## 2020-01-01 RX ADMIN — PIPERACILLIN SODIUM AND TAZOBACTAM SODIUM 3.38 G: 3; .375 INJECTION, POWDER, LYOPHILIZED, FOR SOLUTION INTRAVENOUS at 06:00

## 2020-01-01 RX ADMIN — IPRATROPIUM BROMIDE AND ALBUTEROL SULFATE 1 AMPULE: 2.5; .5 SOLUTION RESPIRATORY (INHALATION) at 10:12

## 2020-01-01 RX ADMIN — IPRATROPIUM BROMIDE AND ALBUTEROL SULFATE 1 AMPULE: 2.5; .5 SOLUTION RESPIRATORY (INHALATION) at 22:11

## 2020-01-01 RX ADMIN — IPRATROPIUM BROMIDE AND ALBUTEROL SULFATE 1 AMPULE: 2.5; .5 SOLUTION RESPIRATORY (INHALATION) at 14:36

## 2020-01-01 RX ADMIN — HYDRALAZINE HYDROCHLORIDE 5 MG: 20 INJECTION INTRAMUSCULAR; INTRAVENOUS at 10:32

## 2020-01-01 RX ADMIN — PROPOFOL 40 MCG/KG/MIN: 10 INJECTION, EMULSION INTRAVENOUS at 23:59

## 2020-01-01 RX ADMIN — ACETAZOLAMIDE SODIUM 250 MG: 500 INJECTION, POWDER, LYOPHILIZED, FOR SOLUTION INTRAVENOUS at 02:24

## 2020-01-01 RX ADMIN — HYDROCORTISONE SODIUM SUCCINATE 50 MG: 100 INJECTION, POWDER, FOR SOLUTION INTRAMUSCULAR; INTRAVENOUS at 17:08

## 2020-01-01 RX ADMIN — ACETAMINOPHEN 650 MG: 325 TABLET ORAL at 23:35

## 2020-01-01 RX ADMIN — IPRATROPIUM BROMIDE AND ALBUTEROL SULFATE 1 AMPULE: 2.5; .5 SOLUTION RESPIRATORY (INHALATION) at 06:40

## 2020-01-01 RX ADMIN — IPRATROPIUM BROMIDE AND ALBUTEROL SULFATE 1 AMPULE: 2.5; .5 SOLUTION RESPIRATORY (INHALATION) at 02:07

## 2020-01-01 RX ADMIN — POLYVINYL ALCOHOL 1 DROP: 14 SOLUTION/ DROPS OPHTHALMIC at 05:09

## 2020-01-01 RX ADMIN — IPRATROPIUM BROMIDE AND ALBUTEROL SULFATE 1 AMPULE: 2.5; .5 SOLUTION RESPIRATORY (INHALATION) at 06:36

## 2020-01-01 RX ADMIN — HYDROCORTISONE SODIUM SUCCINATE 50 MG: 100 INJECTION, POWDER, FOR SOLUTION INTRAMUSCULAR; INTRAVENOUS at 00:23

## 2020-01-01 RX ADMIN — PIPERACILLIN SODIUM AND TAZOBACTAM SODIUM 3.38 G: 3; .375 INJECTION, POWDER, LYOPHILIZED, FOR SOLUTION INTRAVENOUS at 20:54

## 2020-01-01 RX ADMIN — DILTIAZEM HYDROCHLORIDE 30 MG: 30 TABLET, FILM COATED ORAL at 11:48

## 2020-01-01 RX ADMIN — IPRATROPIUM BROMIDE AND ALBUTEROL SULFATE 1 AMPULE: 2.5; .5 SOLUTION RESPIRATORY (INHALATION) at 14:04

## 2020-01-01 RX ADMIN — METOCLOPRAMIDE 10 MG: 5 INJECTION, SOLUTION INTRAMUSCULAR; INTRAVENOUS at 21:47

## 2020-01-01 RX ADMIN — SODIUM CHLORIDE: 9 INJECTION, SOLUTION INTRAVENOUS at 11:26

## 2020-01-01 RX ADMIN — SODIUM CHLORIDE: 9 INJECTION, SOLUTION INTRAVENOUS at 05:50

## 2020-01-01 RX ADMIN — METOPROLOL TARTRATE 7.5 MG: 5 INJECTION, SOLUTION INTRAVENOUS at 19:36

## 2020-01-01 RX ADMIN — CEFEPIME HYDROCHLORIDE 2 G: 2 INJECTION, POWDER, FOR SOLUTION INTRAVENOUS at 13:32

## 2020-01-01 RX ADMIN — ACETAZOLAMIDE SODIUM 250 MG: 500 INJECTION, POWDER, LYOPHILIZED, FOR SOLUTION INTRAVENOUS at 11:19

## 2020-01-01 RX ADMIN — IPRATROPIUM BROMIDE AND ALBUTEROL SULFATE 1 AMPULE: 2.5; .5 SOLUTION RESPIRATORY (INHALATION) at 22:29

## 2020-01-01 RX ADMIN — WHITE PETROLATUM 57.7 %-MINERAL OIL 31.9 % EYE OINTMENT: at 06:05

## 2020-01-01 RX ADMIN — POLYVINYL ALCOHOL 1 DROP: 14 SOLUTION/ DROPS OPHTHALMIC at 09:05

## 2020-01-01 RX ADMIN — LOSARTAN POTASSIUM 100 MG: 100 TABLET, FILM COATED ORAL at 09:02

## 2020-01-01 RX ADMIN — METOCLOPRAMIDE 10 MG: 5 INJECTION, SOLUTION INTRAMUSCULAR; INTRAVENOUS at 10:35

## 2020-01-01 RX ADMIN — IPRATROPIUM BROMIDE AND ALBUTEROL SULFATE 1 AMPULE: 2.5; .5 SOLUTION RESPIRATORY (INHALATION) at 14:34

## 2020-01-01 RX ADMIN — BUMETANIDE 2 MG: 0.25 INJECTION INTRAMUSCULAR; INTRAVENOUS at 10:39

## 2020-01-01 RX ADMIN — IPRATROPIUM BROMIDE AND ALBUTEROL SULFATE 1 AMPULE: 2.5; .5 SOLUTION RESPIRATORY (INHALATION) at 18:14

## 2020-01-01 RX ADMIN — INSULIN LISPRO 1 UNITS: 100 INJECTION, SOLUTION INTRAVENOUS; SUBCUTANEOUS at 10:23

## 2020-01-01 RX ADMIN — HYDRALAZINE HYDROCHLORIDE 10 MG: 20 INJECTION INTRAMUSCULAR; INTRAVENOUS at 00:53

## 2020-01-01 RX ADMIN — PIPERACILLIN SODIUM AND TAZOBACTAM SODIUM 3.38 G: 3; .375 INJECTION, POWDER, LYOPHILIZED, FOR SOLUTION INTRAVENOUS at 20:05

## 2020-01-01 RX ADMIN — VASOPRESSIN 0.04 UNITS/MIN: 20 INJECTION INTRAVENOUS at 23:57

## 2020-01-01 RX ADMIN — LOSARTAN POTASSIUM 100 MG: 100 TABLET, FILM COATED ORAL at 04:15

## 2020-01-01 RX ADMIN — SODIUM CHLORIDE, PRESERVATIVE FREE 10 ML: 5 INJECTION INTRAVENOUS at 08:47

## 2020-01-01 RX ADMIN — PROPOFOL 40 MCG/KG/MIN: 10 INJECTION, EMULSION INTRAVENOUS at 03:41

## 2020-01-01 RX ADMIN — PROPOFOL 40 MCG/KG/MIN: 10 INJECTION, EMULSION INTRAVENOUS at 02:49

## 2020-01-01 RX ADMIN — POLYVINYL ALCOHOL 1 DROP: 14 SOLUTION/ DROPS OPHTHALMIC at 02:28

## 2020-01-01 RX ADMIN — PIPERACILLIN SODIUM AND TAZOBACTAM SODIUM 3.38 G: 3; .375 INJECTION, POWDER, LYOPHILIZED, FOR SOLUTION INTRAVENOUS at 19:57

## 2020-01-01 RX ADMIN — POTASSIUM CHLORIDE 10 MEQ: 10 INJECTION, SOLUTION INTRAVENOUS at 13:50

## 2020-01-01 RX ADMIN — METOPROLOL TARTRATE 7.5 MG: 5 INJECTION, SOLUTION INTRAVENOUS at 03:36

## 2020-01-01 RX ADMIN — PIPERACILLIN SODIUM AND TAZOBACTAM SODIUM 3.38 G: 3; .375 INJECTION, POWDER, LYOPHILIZED, FOR SOLUTION INTRAVENOUS at 03:46

## 2020-01-01 RX ADMIN — BUSPIRONE HYDROCHLORIDE 10 MG: 10 TABLET ORAL at 09:22

## 2020-01-01 RX ADMIN — ONDANSETRON 4 MG: 2 INJECTION INTRAMUSCULAR; INTRAVENOUS at 12:17

## 2020-01-01 RX ADMIN — POLYVINYL ALCOHOL 1 DROP: 14 SOLUTION/ DROPS OPHTHALMIC at 17:51

## 2020-01-01 RX ADMIN — IPRATROPIUM BROMIDE AND ALBUTEROL SULFATE 1 AMPULE: 2.5; .5 SOLUTION RESPIRATORY (INHALATION) at 10:11

## 2020-01-01 RX ADMIN — Medication 10 ML: at 08:52

## 2020-01-01 RX ADMIN — PROPOFOL 35 MCG/KG/MIN: 10 INJECTION, EMULSION INTRAVENOUS at 05:31

## 2020-01-01 RX ADMIN — POLYVINYL ALCOHOL 1 DROP: 14 SOLUTION/ DROPS OPHTHALMIC at 20:40

## 2020-01-01 RX ADMIN — PROPOFOL 35 MCG/KG/MIN: 10 INJECTION, EMULSION INTRAVENOUS at 23:31

## 2020-01-01 RX ADMIN — POTASSIUM CHLORIDE: 2 INJECTION, SOLUTION, CONCENTRATE INTRAVENOUS at 19:58

## 2020-01-01 RX ADMIN — BUMETANIDE 2 MG: 0.25 INJECTION INTRAMUSCULAR; INTRAVENOUS at 23:23

## 2020-01-01 RX ADMIN — DILTIAZEM HYDROCHLORIDE 30 MG: 30 TABLET, FILM COATED ORAL at 00:44

## 2020-01-01 RX ADMIN — PIPERACILLIN SODIUM AND TAZOBACTAM SODIUM 3.38 G: 3; .375 INJECTION, POWDER, LYOPHILIZED, FOR SOLUTION INTRAVENOUS at 00:24

## 2020-01-01 RX ADMIN — LEVOTHYROXINE SODIUM 50 MCG: 50 TABLET ORAL at 05:51

## 2020-01-01 RX ADMIN — LOSARTAN POTASSIUM 100 MG: 100 TABLET, FILM COATED ORAL at 08:54

## 2020-01-01 RX ADMIN — LEVOTHYROXINE SODIUM 50 MCG: 50 TABLET ORAL at 07:19

## 2020-01-01 RX ADMIN — SODIUM CHLORIDE 500 ML: 9 INJECTION, SOLUTION INTRAVENOUS at 08:54

## 2020-01-01 RX ADMIN — METOCLOPRAMIDE 10 MG: 5 INJECTION, SOLUTION INTRAMUSCULAR; INTRAVENOUS at 03:36

## 2020-01-01 RX ADMIN — IPRATROPIUM BROMIDE AND ALBUTEROL SULFATE 1 AMPULE: 2.5; .5 SOLUTION RESPIRATORY (INHALATION) at 10:32

## 2020-01-01 RX ADMIN — POTASSIUM CHLORIDE: 2 INJECTION, SOLUTION, CONCENTRATE INTRAVENOUS at 03:10

## 2020-01-01 RX ADMIN — LEVOTHYROXINE SODIUM 50 MCG: 50 TABLET ORAL at 06:31

## 2020-01-01 RX ADMIN — IPRATROPIUM BROMIDE AND ALBUTEROL SULFATE 1 AMPULE: 2.5; .5 SOLUTION RESPIRATORY (INHALATION) at 06:20

## 2020-01-01 RX ADMIN — Medication 10 ML: at 08:55

## 2020-01-01 RX ADMIN — INSULIN LISPRO 3 UNITS: 100 INJECTION, SOLUTION INTRAVENOUS; SUBCUTANEOUS at 08:38

## 2020-01-01 RX ADMIN — PROPOFOL 40 MCG/KG/MIN: 10 INJECTION, EMULSION INTRAVENOUS at 00:41

## 2020-01-01 RX ADMIN — Medication: at 19:14

## 2020-01-01 RX ADMIN — BUMETANIDE 2 MG: 0.25 INJECTION INTRAMUSCULAR; INTRAVENOUS at 11:27

## 2020-01-01 RX ADMIN — VANCOMYCIN HYDROCHLORIDE 1750 MG: 5 INJECTION, POWDER, LYOPHILIZED, FOR SOLUTION INTRAVENOUS at 21:00

## 2020-01-01 RX ADMIN — HYDROCORTISONE SODIUM SUCCINATE 50 MG: 100 INJECTION, POWDER, FOR SOLUTION INTRAMUSCULAR; INTRAVENOUS at 18:42

## 2020-01-01 RX ADMIN — METOCLOPRAMIDE 10 MG: 5 INJECTION, SOLUTION INTRAMUSCULAR; INTRAVENOUS at 21:58

## 2020-01-01 RX ADMIN — PROPOFOL 40 MCG/KG/MIN: 10 INJECTION, EMULSION INTRAVENOUS at 09:38

## 2020-01-01 RX ADMIN — ENOXAPARIN SODIUM 40 MG: 40 INJECTION SUBCUTANEOUS at 08:44

## 2020-01-01 RX ADMIN — POLYVINYL ALCOHOL 1 DROP: 14 SOLUTION/ DROPS OPHTHALMIC at 00:25

## 2020-01-01 RX ADMIN — HEPARIN SODIUM 4700 UNITS: 1000 INJECTION INTRAVENOUS; SUBCUTANEOUS at 08:49

## 2020-01-01 RX ADMIN — BUDESONIDE 500 MCG: 0.5 INHALANT RESPIRATORY (INHALATION) at 10:11

## 2020-01-01 RX ADMIN — PROPOFOL 30 MCG/KG/MIN: 10 INJECTION, EMULSION INTRAVENOUS at 01:47

## 2020-01-01 RX ADMIN — PIPERACILLIN SODIUM AND TAZOBACTAM SODIUM 3.38 G: 3; .375 INJECTION, POWDER, LYOPHILIZED, FOR SOLUTION INTRAVENOUS at 13:10

## 2020-01-01 RX ADMIN — METOPROLOL TARTRATE 7.5 MG: 5 INJECTION, SOLUTION INTRAVENOUS at 20:48

## 2020-01-01 RX ADMIN — DILTIAZEM HYDROCHLORIDE 10 MG: 5 INJECTION INTRAVENOUS at 03:02

## 2020-01-01 RX ADMIN — SODIUM CHLORIDE, SODIUM LACTATE, POTASSIUM CHLORIDE, AND CALCIUM CHLORIDE 1000 ML: 600; 310; 30; 20 INJECTION, SOLUTION INTRAVENOUS at 12:31

## 2020-01-01 RX ADMIN — PIPERACILLIN SODIUM AND TAZOBACTAM SODIUM 3.38 G: 3; .375 INJECTION, POWDER, LYOPHILIZED, FOR SOLUTION INTRAVENOUS at 19:55

## 2020-01-01 RX ADMIN — SODIUM CHLORIDE, POTASSIUM CHLORIDE, SODIUM LACTATE AND CALCIUM CHLORIDE 1000 ML: 600; 310; 30; 20 INJECTION, SOLUTION INTRAVENOUS at 13:31

## 2020-01-01 RX ADMIN — LOSARTAN POTASSIUM 100 MG: 100 TABLET, FILM COATED ORAL at 02:24

## 2020-01-01 RX ADMIN — SODIUM CHLORIDE, POTASSIUM CHLORIDE, SODIUM LACTATE AND CALCIUM CHLORIDE 1000 ML: 600; 310; 30; 20 INJECTION, SOLUTION INTRAVENOUS at 13:37

## 2020-01-01 RX ADMIN — FLUOXETINE HYDROCHLORIDE 20 MG: 20 CAPSULE ORAL at 08:46

## 2020-01-01 RX ADMIN — VANCOMYCIN HYDROCHLORIDE 1750 MG: 10 INJECTION, POWDER, LYOPHILIZED, FOR SOLUTION INTRAVENOUS at 17:08

## 2020-01-01 RX ADMIN — FLUOXETINE HYDROCHLORIDE 20 MG: 20 CAPSULE ORAL at 08:56

## 2020-01-01 RX ADMIN — FLUOXETINE HYDROCHLORIDE 40 MG: 20 CAPSULE ORAL at 08:45

## 2020-01-01 RX ADMIN — IPRATROPIUM BROMIDE AND ALBUTEROL SULFATE 1 AMPULE: 2.5; .5 SOLUTION RESPIRATORY (INHALATION) at 11:11

## 2020-01-01 RX ADMIN — Medication 10 ML: at 20:00

## 2020-01-01 RX ADMIN — HEPARIN SODIUM 12 UNITS/KG/HR: 10000 INJECTION, SOLUTION INTRAVENOUS at 10:16

## 2020-01-01 RX ADMIN — CHLORHEXIDINE GLUCONATE 15 ML: 1.2 RINSE ORAL at 10:23

## 2020-01-01 RX ADMIN — POTASSIUM CHLORIDE 20 MEQ: 29.8 INJECTION, SOLUTION INTRAVENOUS at 07:19

## 2020-01-01 RX ADMIN — PROPOFOL 40 MCG/KG/MIN: 10 INJECTION, EMULSION INTRAVENOUS at 13:34

## 2020-01-01 RX ADMIN — SODIUM CHLORIDE, PRESERVATIVE FREE 10 ML: 5 INJECTION INTRAVENOUS at 20:52

## 2020-01-01 RX ADMIN — IPRATROPIUM BROMIDE AND ALBUTEROL SULFATE 1 AMPULE: 2.5; .5 SOLUTION RESPIRATORY (INHALATION) at 21:53

## 2020-01-01 RX ADMIN — PIPERACILLIN SODIUM AND TAZOBACTAM SODIUM 3.38 G: 3; .375 INJECTION, POWDER, LYOPHILIZED, FOR SOLUTION INTRAVENOUS at 12:04

## 2020-01-01 RX ADMIN — INSULIN LISPRO 2 UNITS: 100 INJECTION, SOLUTION INTRAVENOUS; SUBCUTANEOUS at 12:29

## 2020-01-01 RX ADMIN — WHITE PETROLATUM 57.7 %-MINERAL OIL 31.9 % EYE OINTMENT: at 23:20

## 2020-01-01 RX ADMIN — WHITE PETROLATUM 57.7 %-MINERAL OIL 31.9 % EYE OINTMENT: at 04:42

## 2020-01-01 RX ADMIN — PROPOFOL 25 MCG/KG/MIN: 10 INJECTION, EMULSION INTRAVENOUS at 09:32

## 2020-01-01 RX ADMIN — WHITE PETROLATUM 57.7 %-MINERAL OIL 31.9 % EYE OINTMENT: at 02:45

## 2020-01-01 RX ADMIN — PIPERACILLIN AND TAZOBACTAM 2.25 G: 2; .25 INJECTION, POWDER, FOR SOLUTION INTRAVENOUS at 18:05

## 2020-01-01 RX ADMIN — ACETAMINOPHEN 650 MG: 325 TABLET ORAL at 04:19

## 2020-01-01 RX ADMIN — PROPOFOL 40 MCG/KG/MIN: 10 INJECTION, EMULSION INTRAVENOUS at 14:51

## 2020-01-01 RX ADMIN — METOPROLOL TARTRATE 5 MG: 5 INJECTION, SOLUTION INTRAVENOUS at 20:53

## 2020-01-01 RX ADMIN — PIPERACILLIN AND TAZOBACTAM 2.25 G: 2; .25 INJECTION, POWDER, FOR SOLUTION INTRAVENOUS at 05:12

## 2020-01-01 RX ADMIN — INSULIN LISPRO 2 UNITS: 100 INJECTION, SOLUTION INTRAVENOUS; SUBCUTANEOUS at 11:40

## 2020-01-01 RX ADMIN — HYDROCORTISONE SODIUM SUCCINATE 50 MG: 100 INJECTION, POWDER, FOR SOLUTION INTRAMUSCULAR; INTRAVENOUS at 09:59

## 2020-01-01 RX ADMIN — VANCOMYCIN HYDROCHLORIDE 1500 MG: 10 INJECTION, POWDER, LYOPHILIZED, FOR SOLUTION INTRAVENOUS at 00:08

## 2020-01-01 RX ADMIN — METOPROLOL TARTRATE 7.5 MG: 5 INJECTION, SOLUTION INTRAVENOUS at 03:45

## 2020-01-01 RX ADMIN — FAMOTIDINE 20 MG: 10 INJECTION, SOLUTION INTRAVENOUS at 09:03

## 2020-01-01 RX ADMIN — CARBIDOPA AND LEVODOPA 1 TABLET: 25; 100 TABLET ORAL at 14:44

## 2020-01-01 RX ADMIN — HEPARIN SODIUM 16 UNITS/KG/HR: 10000 INJECTION, SOLUTION INTRAVENOUS at 10:47

## 2020-01-01 RX ADMIN — WHITE PETROLATUM 57.7 %-MINERAL OIL 31.9 % EYE OINTMENT: at 05:48

## 2020-01-01 RX ADMIN — FUROSEMIDE 20 MG: 10 INJECTION, SOLUTION INTRAMUSCULAR; INTRAVENOUS at 08:56

## 2020-01-01 RX ADMIN — ROPINIROLE HYDROCHLORIDE 1 MG: 1 TABLET, FILM COATED ORAL at 20:27

## 2020-01-01 RX ADMIN — ROPINIROLE HYDROCHLORIDE 1 MG: 1 TABLET, FILM COATED ORAL at 20:34

## 2020-01-01 RX ADMIN — BUMETANIDE 2 MG: 0.25 INJECTION INTRAMUSCULAR; INTRAVENOUS at 23:09

## 2020-01-01 RX ADMIN — IPRATROPIUM BROMIDE AND ALBUTEROL SULFATE 1 AMPULE: 2.5; .5 SOLUTION RESPIRATORY (INHALATION) at 02:27

## 2020-01-01 RX ADMIN — PROPOFOL 30 MCG/KG/MIN: 10 INJECTION, EMULSION INTRAVENOUS at 00:13

## 2020-01-01 RX ADMIN — MORPHINE SULFATE 1 MG: 4 INJECTION INTRAVENOUS at 02:22

## 2020-01-01 RX ADMIN — IPRATROPIUM BROMIDE AND ALBUTEROL SULFATE 1 AMPULE: 2.5; .5 SOLUTION RESPIRATORY (INHALATION) at 06:05

## 2020-01-01 RX ADMIN — POLYVINYL ALCOHOL 1 DROP: 14 SOLUTION/ DROPS OPHTHALMIC at 05:00

## 2020-01-01 RX ADMIN — PROPOFOL 40 MCG/KG/MIN: 10 INJECTION, EMULSION INTRAVENOUS at 21:26

## 2020-01-01 RX ADMIN — IPRATROPIUM BROMIDE AND ALBUTEROL SULFATE 1 AMPULE: 2.5; .5 SOLUTION RESPIRATORY (INHALATION) at 06:04

## 2020-01-01 RX ADMIN — WHITE PETROLATUM 57.7 %-MINERAL OIL 31.9 % EYE OINTMENT: at 18:05

## 2020-01-01 RX ADMIN — WHITE PETROLATUM 57.7 %-MINERAL OIL 31.9 % EYE OINTMENT: at 11:15

## 2020-01-01 RX ADMIN — PIPERACILLIN AND TAZOBACTAM 2.25 G: 2; .25 INJECTION, POWDER, FOR SOLUTION INTRAVENOUS at 05:48

## 2020-01-01 RX ADMIN — BUMETANIDE 2 MG: 0.25 INJECTION INTRAMUSCULAR; INTRAVENOUS at 10:25

## 2020-01-01 RX ADMIN — PIPERACILLIN AND TAZOBACTAM 2.25 G: 2; .25 INJECTION, POWDER, FOR SOLUTION INTRAVENOUS at 17:44

## 2020-01-01 RX ADMIN — POTASSIUM CHLORIDE 20 MEQ: 29.8 INJECTION, SOLUTION INTRAVENOUS at 19:38

## 2020-01-01 RX ADMIN — ENOXAPARIN SODIUM 40 MG: 40 INJECTION SUBCUTANEOUS at 08:55

## 2020-01-01 RX ADMIN — Medication: at 08:34

## 2020-01-01 RX ADMIN — POLYVINYL ALCOHOL 1 DROP: 14 SOLUTION/ DROPS OPHTHALMIC at 22:00

## 2020-01-01 RX ADMIN — SUCCINYLCHOLINE CHLORIDE 200 MG: 20 INJECTION, SOLUTION INTRAMUSCULAR; INTRAVENOUS at 12:31

## 2020-01-01 RX ADMIN — METOPROLOL TARTRATE 25 MG: 25 TABLET, FILM COATED ORAL at 01:19

## 2020-01-01 RX ADMIN — PIPERACILLIN AND TAZOBACTAM 2.25 G: 2; .25 INJECTION, POWDER, FOR SOLUTION INTRAVENOUS at 11:34

## 2020-01-01 RX ADMIN — SODIUM CHLORIDE 1000 ML: 9 INJECTION, SOLUTION INTRAVENOUS at 05:55

## 2020-01-01 RX ADMIN — METOPROLOL TARTRATE 7.5 MG: 5 INJECTION, SOLUTION INTRAVENOUS at 00:02

## 2020-01-01 RX ADMIN — WHITE PETROLATUM 57.7 %-MINERAL OIL 31.9 % EYE OINTMENT: at 11:20

## 2020-01-01 RX ADMIN — ACETAZOLAMIDE SODIUM 250 MG: 500 INJECTION, POWDER, LYOPHILIZED, FOR SOLUTION INTRAVENOUS at 02:27

## 2020-01-01 RX ADMIN — NITROGLYCERIN 0.4 MG: 0.4 TABLET, ORALLY DISINTEGRATING SUBLINGUAL at 12:15

## 2020-01-01 RX ADMIN — POLYVINYL ALCOHOL 1 DROP: 14 SOLUTION/ DROPS OPHTHALMIC at 07:57

## 2020-01-01 RX ADMIN — METOPROLOL TARTRATE 7.5 MG: 5 INJECTION, SOLUTION INTRAVENOUS at 09:59

## 2020-01-01 RX ADMIN — BUMETANIDE 2 MG: 0.25 INJECTION INTRAMUSCULAR; INTRAVENOUS at 23:49

## 2020-01-01 RX ADMIN — PROPOFOL 20 MCG/KG/MIN: 10 INJECTION, EMULSION INTRAVENOUS at 05:09

## 2020-01-01 RX ADMIN — HYDRALAZINE HYDROCHLORIDE 10 MG: 20 INJECTION INTRAMUSCULAR; INTRAVENOUS at 18:26

## 2020-01-01 RX ADMIN — WHITE PETROLATUM 57.7 %-MINERAL OIL 31.9 % EYE OINTMENT: at 22:19

## 2020-01-01 RX ADMIN — CEFEPIME HYDROCHLORIDE 2 G: 2 INJECTION, POWDER, FOR SOLUTION INTRAVENOUS at 10:10

## 2020-01-01 RX ADMIN — PIPERACILLIN AND TAZOBACTAM 2.25 G: 2; .25 INJECTION, POWDER, FOR SOLUTION INTRAVENOUS at 00:36

## 2020-01-01 RX ADMIN — SODIUM CHLORIDE: 9 INJECTION, SOLUTION INTRAVENOUS at 23:12

## 2020-01-01 RX ADMIN — WHITE PETROLATUM 57.7 %-MINERAL OIL 31.9 % EYE OINTMENT: at 13:50

## 2020-01-01 RX ADMIN — CARBIDOPA AND LEVODOPA 1 TABLET: 25; 100 TABLET ORAL at 08:54

## 2020-01-01 RX ADMIN — ACETAZOLAMIDE SODIUM 250 MG: 500 INJECTION, POWDER, LYOPHILIZED, FOR SOLUTION INTRAVENOUS at 09:20

## 2020-01-01 RX ADMIN — IPRATROPIUM BROMIDE AND ALBUTEROL SULFATE 1 AMPULE: 2.5; .5 SOLUTION RESPIRATORY (INHALATION) at 18:18

## 2020-01-01 RX ADMIN — IPRATROPIUM BROMIDE AND ALBUTEROL SULFATE 1 AMPULE: 2.5; .5 SOLUTION RESPIRATORY (INHALATION) at 18:11

## 2020-01-01 RX ADMIN — PROPOFOL 30 MCG/KG/MIN: 10 INJECTION, EMULSION INTRAVENOUS at 15:08

## 2020-01-01 RX ADMIN — SODIUM BICARBONATE 50 MEQ: 84 INJECTION INTRAVENOUS at 12:32

## 2020-01-01 RX ADMIN — CARBIDOPA AND LEVODOPA 1 TABLET: 25; 100 TABLET ORAL at 14:26

## 2020-01-01 RX ADMIN — WHITE PETROLATUM 57.7 %-MINERAL OIL 31.9 % EYE OINTMENT: at 14:56

## 2020-01-01 RX ADMIN — MORPHINE SULFATE 4 MG: 4 INJECTION INTRAVENOUS at 12:18

## 2020-01-01 RX ADMIN — METOCLOPRAMIDE 10 MG: 5 INJECTION, SOLUTION INTRAMUSCULAR; INTRAVENOUS at 15:37

## 2020-01-01 RX ADMIN — VASOPRESSIN 0.04 UNITS/MIN: 20 INJECTION INTRAVENOUS at 13:48

## 2020-01-01 RX ADMIN — Medication 10 ML: at 10:00

## 2020-01-01 RX ADMIN — PROPOFOL 30 MCG/KG/MIN: 10 INJECTION, EMULSION INTRAVENOUS at 11:21

## 2020-01-01 RX ADMIN — HYDROCORTISONE SODIUM SUCCINATE 50 MG: 100 INJECTION, POWDER, FOR SOLUTION INTRAMUSCULAR; INTRAVENOUS at 06:03

## 2020-01-01 RX ADMIN — DILTIAZEM HYDROCHLORIDE 30 MG: 30 TABLET, FILM COATED ORAL at 06:21

## 2020-01-01 RX ADMIN — HEPARIN SODIUM 16 UNITS/KG/HR: 10000 INJECTION, SOLUTION INTRAVENOUS at 00:37

## 2020-01-01 RX ADMIN — VASOPRESSIN 0.04 UNITS/MIN: 20 INJECTION INTRAVENOUS at 15:30

## 2020-01-01 RX ADMIN — METOPROLOL TARTRATE 50 MG: 50 TABLET, FILM COATED ORAL at 08:56

## 2020-01-01 RX ADMIN — DILTIAZEM HYDROCHLORIDE 30 MG: 30 TABLET, FILM COATED ORAL at 17:14

## 2020-01-01 RX ADMIN — VANCOMYCIN HYDROCHLORIDE 1750 MG: 5 INJECTION, POWDER, LYOPHILIZED, FOR SOLUTION INTRAVENOUS at 02:27

## 2020-01-01 RX ADMIN — PIPERACILLIN SODIUM AND TAZOBACTAM SODIUM 3.38 G: 3; .375 INJECTION, POWDER, LYOPHILIZED, FOR SOLUTION INTRAVENOUS at 11:40

## 2020-01-01 RX ADMIN — VANCOMYCIN HYDROCHLORIDE 1750 MG: 10 INJECTION, POWDER, LYOPHILIZED, FOR SOLUTION INTRAVENOUS at 05:57

## 2020-01-01 RX ADMIN — PROMETHAZINE HYDROCHLORIDE 6.25 MG: 25 INJECTION INTRAMUSCULAR; INTRAVENOUS at 02:32

## 2020-01-01 RX ADMIN — WHITE PETROLATUM 57.7 %-MINERAL OIL 31.9 % EYE OINTMENT: at 15:13

## 2020-01-01 RX ADMIN — NOREPINEPHRINE BITARTRATE 17 MCG/MIN: 1 INJECTION INTRAVENOUS at 12:40

## 2020-01-01 RX ADMIN — WHITE PETROLATUM 57.7 %-MINERAL OIL 31.9 % EYE OINTMENT: at 02:09

## 2020-01-01 RX ADMIN — FLUOXETINE HYDROCHLORIDE 40 MG: 20 CAPSULE ORAL at 09:24

## 2020-01-01 RX ADMIN — IPRATROPIUM BROMIDE AND ALBUTEROL SULFATE 1 AMPULE: 2.5; .5 SOLUTION RESPIRATORY (INHALATION) at 06:33

## 2020-01-01 RX ADMIN — NOREPINEPHRINE BITARTRATE 20 MCG/MIN: 1 INJECTION INTRAVENOUS at 00:32

## 2020-01-01 RX ADMIN — METOPROLOL TARTRATE 7.5 MG: 5 INJECTION, SOLUTION INTRAVENOUS at 15:02

## 2020-01-01 RX ADMIN — IPRATROPIUM BROMIDE AND ALBUTEROL SULFATE 1 AMPULE: 2.5; .5 SOLUTION RESPIRATORY (INHALATION) at 22:23

## 2020-01-01 RX ADMIN — IPRATROPIUM BROMIDE AND ALBUTEROL SULFATE 1 AMPULE: 2.5; .5 SOLUTION RESPIRATORY (INHALATION) at 14:31

## 2020-01-01 RX ADMIN — PROPOFOL 40 MCG/KG/MIN: 10 INJECTION, EMULSION INTRAVENOUS at 07:40

## 2020-01-01 RX ADMIN — HEPARIN SODIUM 9690 UNITS: 1000 INJECTION INTRAVENOUS; SUBCUTANEOUS at 04:33

## 2020-01-01 RX ADMIN — PROPOFOL 35 MCG/KG/MIN: 10 INJECTION, EMULSION INTRAVENOUS at 16:11

## 2020-01-01 RX ADMIN — HEPARIN SODIUM 14 UNITS/KG/HR: 10000 INJECTION, SOLUTION INTRAVENOUS at 16:34

## 2020-01-01 RX ADMIN — ACETAZOLAMIDE SODIUM 250 MG: 500 INJECTION, POWDER, LYOPHILIZED, FOR SOLUTION INTRAVENOUS at 09:30

## 2020-01-01 RX ADMIN — IPRATROPIUM BROMIDE AND ALBUTEROL SULFATE 1 AMPULE: 2.5; .5 SOLUTION RESPIRATORY (INHALATION) at 10:15

## 2020-01-01 RX ADMIN — VASOPRESSIN 0.04 UNITS/MIN: 20 INJECTION INTRAVENOUS at 09:57

## 2020-01-01 RX ADMIN — POLYVINYL ALCOHOL 1 DROP: 14 SOLUTION/ DROPS OPHTHALMIC at 20:21

## 2020-01-01 RX ADMIN — SODIUM CHLORIDE, PRESERVATIVE FREE 10 ML: 5 INJECTION INTRAVENOUS at 09:08

## 2020-01-01 RX ADMIN — METOCLOPRAMIDE 10 MG: 5 INJECTION, SOLUTION INTRAMUSCULAR; INTRAVENOUS at 15:39

## 2020-01-01 RX ADMIN — KETOROLAC TROMETHAMINE 15 MG: 30 INJECTION, SOLUTION INTRAMUSCULAR at 08:44

## 2020-01-01 RX ADMIN — CLONIDINE HYDROCHLORIDE 0.3 MG: 0.1 TABLET ORAL at 08:44

## 2020-01-01 RX ADMIN — HYDROCORTISONE SODIUM SUCCINATE 50 MG: 100 INJECTION, POWDER, FOR SOLUTION INTRAMUSCULAR; INTRAVENOUS at 09:06

## 2020-01-01 RX ADMIN — LOSARTAN POTASSIUM 100 MG: 100 TABLET, FILM COATED ORAL at 09:22

## 2020-01-01 RX ADMIN — PIPERACILLIN SODIUM AND TAZOBACTAM SODIUM 3.38 G: 3; .375 INJECTION, POWDER, LYOPHILIZED, FOR SOLUTION INTRAVENOUS at 04:30

## 2020-01-01 RX ADMIN — MIDAZOLAM 1 MG: 1 INJECTION INTRAMUSCULAR; INTRAVENOUS at 13:11

## 2020-01-01 RX ADMIN — POLYVINYL ALCOHOL 1 DROP: 14 SOLUTION/ DROPS OPHTHALMIC at 01:00

## 2020-01-01 RX ADMIN — IPRATROPIUM BROMIDE AND ALBUTEROL SULFATE 1 AMPULE: 2.5; .5 SOLUTION RESPIRATORY (INHALATION) at 22:32

## 2020-01-01 RX ADMIN — CARBIDOPA AND LEVODOPA 1 TABLET: 25; 100 TABLET ORAL at 21:34

## 2020-01-01 RX ADMIN — FAMOTIDINE 20 MG: 10 INJECTION, SOLUTION INTRAVENOUS at 08:33

## 2020-01-01 RX ADMIN — POTASSIUM CHLORIDE 20 MEQ: 29.8 INJECTION, SOLUTION INTRAVENOUS at 17:26

## 2020-01-01 RX ADMIN — SODIUM CHLORIDE: 9 INJECTION, SOLUTION INTRAVENOUS at 11:01

## 2020-01-01 RX ADMIN — PROPOFOL 40 MCG/KG/MIN: 10 INJECTION, EMULSION INTRAVENOUS at 17:51

## 2020-01-01 RX ADMIN — CALCIUM GLUCONATE 2 G: 98 INJECTION, SOLUTION INTRAVENOUS at 20:04

## 2020-01-01 RX ADMIN — LORAZEPAM 2 MG: 2 INJECTION INTRAMUSCULAR; INTRAVENOUS at 17:53

## 2020-01-01 RX ADMIN — METOPROLOL TARTRATE 50 MG: 50 TABLET, FILM COATED ORAL at 08:37

## 2020-01-01 RX ADMIN — POTASSIUM CHLORIDE: 2 INJECTION, SOLUTION, CONCENTRATE INTRAVENOUS at 04:19

## 2020-01-01 RX ADMIN — FLUOXETINE HYDROCHLORIDE 40 MG: 20 CAPSULE ORAL at 08:54

## 2020-01-01 RX ADMIN — POLYVINYL ALCOHOL 1 DROP: 14 SOLUTION/ DROPS OPHTHALMIC at 17:45

## 2020-01-01 RX ADMIN — CHLORHEXIDINE GLUCONATE 15 ML: 1.2 RINSE ORAL at 09:12

## 2020-01-01 RX ADMIN — METOCLOPRAMIDE 10 MG: 5 INJECTION, SOLUTION INTRAMUSCULAR; INTRAVENOUS at 03:46

## 2020-01-01 RX ADMIN — ENOXAPARIN SODIUM 40 MG: 40 INJECTION SUBCUTANEOUS at 17:14

## 2020-01-01 RX ADMIN — IPRATROPIUM BROMIDE AND ALBUTEROL SULFATE 1 AMPULE: 2.5; .5 SOLUTION RESPIRATORY (INHALATION) at 21:58

## 2020-01-01 RX ADMIN — METOPROLOL TARTRATE 5 MG: 5 INJECTION, SOLUTION INTRAVENOUS at 07:51

## 2020-01-01 RX ADMIN — IPRATROPIUM BROMIDE AND ALBUTEROL SULFATE 1 AMPULE: 2.5; .5 SOLUTION RESPIRATORY (INHALATION) at 14:16

## 2020-01-01 RX ADMIN — METOPROLOL TARTRATE 7.5 MG: 5 INJECTION, SOLUTION INTRAVENOUS at 11:10

## 2020-01-01 RX ADMIN — IPRATROPIUM BROMIDE AND ALBUTEROL SULFATE 1 AMPULE: 2.5; .5 SOLUTION RESPIRATORY (INHALATION) at 14:32

## 2020-01-01 RX ADMIN — METOPROLOL TARTRATE 25 MG: 25 TABLET, FILM COATED ORAL at 00:36

## 2020-01-01 RX ADMIN — NIFEDIPINE 30 MG: 30 TABLET, FILM COATED, EXTENDED RELEASE ORAL at 09:01

## 2020-01-01 RX ADMIN — PIPERACILLIN SODIUM AND TAZOBACTAM SODIUM 3.38 G: 3; .375 INJECTION, POWDER, LYOPHILIZED, FOR SOLUTION INTRAVENOUS at 04:25

## 2020-01-01 RX ADMIN — HYDROCORTISONE SODIUM SUCCINATE 50 MG: 100 INJECTION, POWDER, FOR SOLUTION INTRAMUSCULAR; INTRAVENOUS at 11:29

## 2020-01-01 RX ADMIN — PANTOPRAZOLE SODIUM 40 MG: 40 TABLET, DELAYED RELEASE ORAL at 09:04

## 2020-01-01 RX ADMIN — VANCOMYCIN HYDROCHLORIDE 1250 MG: 10 INJECTION, POWDER, LYOPHILIZED, FOR SOLUTION INTRAVENOUS at 10:35

## 2020-01-01 RX ADMIN — FAMOTIDINE 20 MG: 10 INJECTION, SOLUTION INTRAVENOUS at 07:57

## 2020-01-01 RX ADMIN — IPRATROPIUM BROMIDE AND ALBUTEROL SULFATE 1 AMPULE: 2.5; .5 SOLUTION RESPIRATORY (INHALATION) at 14:52

## 2020-01-01 RX ADMIN — BUSPIRONE HYDROCHLORIDE 10 MG: 10 TABLET ORAL at 08:54

## 2020-01-01 RX ADMIN — IPRATROPIUM BROMIDE AND ALBUTEROL SULFATE 1 AMPULE: 2.5; .5 SOLUTION RESPIRATORY (INHALATION) at 13:54

## 2020-01-01 RX ADMIN — METOCLOPRAMIDE 10 MG: 5 INJECTION, SOLUTION INTRAMUSCULAR; INTRAVENOUS at 16:34

## 2020-01-01 RX ADMIN — VECURONIUM BROMIDE 10 MG: 1 INJECTION, POWDER, LYOPHILIZED, FOR SOLUTION INTRAVENOUS at 12:33

## 2020-01-01 RX ADMIN — HEPARIN SODIUM 14 UNITS/KG/HR: 10000 INJECTION, SOLUTION INTRAVENOUS at 17:12

## 2020-01-01 RX ADMIN — METOCLOPRAMIDE 10 MG: 5 INJECTION, SOLUTION INTRAMUSCULAR; INTRAVENOUS at 04:25

## 2020-01-01 RX ADMIN — HYDROCORTISONE SODIUM SUCCINATE 50 MG: 100 INJECTION, POWDER, FOR SOLUTION INTRAMUSCULAR; INTRAVENOUS at 11:27

## 2020-01-01 RX ADMIN — DILTIAZEM HYDROCHLORIDE 5 MG/HR: 5 INJECTION INTRAVENOUS at 03:26

## 2020-01-01 RX ADMIN — CARBIDOPA AND LEVODOPA 1 TABLET: 25; 100 TABLET ORAL at 20:51

## 2020-01-01 RX ADMIN — BUSPIRONE HYDROCHLORIDE 10 MG: 10 TABLET ORAL at 09:02

## 2020-01-01 RX ADMIN — Medication: at 06:27

## 2020-01-01 RX ADMIN — PIPERACILLIN AND TAZOBACTAM 2.25 G: 2; .25 INJECTION, POWDER, FOR SOLUTION INTRAVENOUS at 01:49

## 2020-01-01 RX ADMIN — SODIUM CHLORIDE 1000 ML: 9 INJECTION, SOLUTION INTRAVENOUS at 15:42

## 2020-01-01 RX ADMIN — CLONIDINE HYDROCHLORIDE 0.3 MG: 0.1 TABLET ORAL at 08:54

## 2020-01-01 RX ADMIN — IPRATROPIUM BROMIDE AND ALBUTEROL SULFATE 1 AMPULE: 2.5; .5 SOLUTION RESPIRATORY (INHALATION) at 13:55

## 2020-01-01 RX ADMIN — PIPERACILLIN SODIUM AND TAZOBACTAM SODIUM 3.38 G: 3; .375 INJECTION, POWDER, LYOPHILIZED, FOR SOLUTION INTRAVENOUS at 20:48

## 2020-01-01 RX ADMIN — POLYVINYL ALCOHOL 1 DROP: 14 SOLUTION/ DROPS OPHTHALMIC at 05:29

## 2020-01-01 RX ADMIN — METOCLOPRAMIDE 10 MG: 5 INJECTION, SOLUTION INTRAMUSCULAR; INTRAVENOUS at 23:49

## 2020-01-01 RX ADMIN — VASOPRESSIN 0.04 UNITS/MIN: 20 INJECTION INTRAVENOUS at 21:53

## 2020-01-01 RX ADMIN — PROPOFOL 30 MCG/KG/MIN: 10 INJECTION, EMULSION INTRAVENOUS at 17:08

## 2020-01-01 RX ADMIN — METOPROLOL TARTRATE 7.5 MG: 5 INJECTION, SOLUTION INTRAVENOUS at 03:13

## 2020-01-01 RX ADMIN — DILTIAZEM HYDROCHLORIDE 30 MG: 30 TABLET, FILM COATED ORAL at 17:04

## 2020-01-01 RX ADMIN — PROPOFOL 40 MCG/KG/MIN: 10 INJECTION, EMULSION INTRAVENOUS at 17:34

## 2020-01-01 RX ADMIN — IPRATROPIUM BROMIDE AND ALBUTEROL SULFATE 1 AMPULE: 2.5; .5 SOLUTION RESPIRATORY (INHALATION) at 10:42

## 2020-01-01 RX ADMIN — PIPERACILLIN AND TAZOBACTAM 2.25 G: 2; .25 INJECTION, POWDER, FOR SOLUTION INTRAVENOUS at 11:09

## 2020-01-01 RX ADMIN — FLUOXETINE HYDROCHLORIDE 20 MG: 20 CAPSULE ORAL at 08:54

## 2020-01-01 RX ADMIN — METOPROLOL TARTRATE 5 MG: 5 INJECTION, SOLUTION INTRAVENOUS at 07:19

## 2020-01-01 RX ADMIN — METOCLOPRAMIDE 10 MG: 5 INJECTION, SOLUTION INTRAMUSCULAR; INTRAVENOUS at 09:23

## 2020-01-01 RX ADMIN — INSULIN LISPRO 1 UNITS: 100 INJECTION, SOLUTION INTRAVENOUS; SUBCUTANEOUS at 22:16

## 2020-01-01 RX ADMIN — FLUOXETINE HYDROCHLORIDE 40 MG: 20 CAPSULE ORAL at 09:08

## 2020-01-01 RX ADMIN — HEPARIN SODIUM 4840 UNITS: 1000 INJECTION INTRAVENOUS; SUBCUTANEOUS at 02:20

## 2020-01-01 RX ADMIN — IPRATROPIUM BROMIDE AND ALBUTEROL SULFATE 1 AMPULE: 2.5; .5 SOLUTION RESPIRATORY (INHALATION) at 21:57

## 2020-01-01 RX ADMIN — HYDROCORTISONE SODIUM SUCCINATE 50 MG: 100 INJECTION, POWDER, FOR SOLUTION INTRAMUSCULAR; INTRAVENOUS at 08:51

## 2020-01-01 RX ADMIN — PIPERACILLIN SODIUM AND TAZOBACTAM SODIUM 3.38 G: 3; .375 INJECTION, POWDER, LYOPHILIZED, FOR SOLUTION INTRAVENOUS at 18:01

## 2020-01-01 RX ADMIN — PIPERACILLIN AND TAZOBACTAM 2.25 G: 2; .25 INJECTION, POWDER, FOR SOLUTION INTRAVENOUS at 06:03

## 2020-01-01 RX ADMIN — HEPARIN SODIUM 14 UNITS/KG/HR: 10000 INJECTION, SOLUTION INTRAVENOUS at 15:40

## 2020-01-01 RX ADMIN — PROPOFOL 40 MCG/KG/MIN: 10 INJECTION, EMULSION INTRAVENOUS at 06:21

## 2020-01-01 RX ADMIN — CALCIUM GLUCONATE 1 G: 98 INJECTION, SOLUTION INTRAVENOUS at 17:45

## 2020-01-01 RX ADMIN — VANCOMYCIN HYDROCHLORIDE 1750 MG: 5 INJECTION, POWDER, LYOPHILIZED, FOR SOLUTION INTRAVENOUS at 09:20

## 2020-01-01 RX ADMIN — PROPOFOL 30 MCG/KG/MIN: 10 INJECTION, EMULSION INTRAVENOUS at 06:30

## 2020-01-01 RX ADMIN — PIPERACILLIN AND TAZOBACTAM 2.25 G: 2; .25 INJECTION, POWDER, FOR SOLUTION INTRAVENOUS at 11:30

## 2020-01-01 RX ADMIN — FLUOXETINE HYDROCHLORIDE 20 MG: 20 CAPSULE ORAL at 09:02

## 2020-01-01 RX ADMIN — IPRATROPIUM BROMIDE AND ALBUTEROL SULFATE 1 AMPULE: 2.5; .5 SOLUTION RESPIRATORY (INHALATION) at 02:20

## 2020-01-01 RX ADMIN — BUMETANIDE 2 MG: 0.25 INJECTION INTRAMUSCULAR; INTRAVENOUS at 23:12

## 2020-01-01 RX ADMIN — HEPARIN SODIUM 18 UNITS/KG/HR: 10000 INJECTION, SOLUTION INTRAVENOUS at 10:30

## 2020-01-01 RX ADMIN — IPRATROPIUM BROMIDE AND ALBUTEROL SULFATE 1 AMPULE: 2.5; .5 SOLUTION RESPIRATORY (INHALATION) at 02:06

## 2020-01-01 RX ADMIN — Medication 10 ML: at 08:38

## 2020-01-01 RX ADMIN — Medication 10 ML: at 19:58

## 2020-01-01 RX ADMIN — HEPARIN SODIUM 14 UNITS/KG/HR: 10000 INJECTION, SOLUTION INTRAVENOUS at 07:54

## 2020-01-01 RX ADMIN — PROPOFOL 40 MCG/KG/MIN: 10 INJECTION, EMULSION INTRAVENOUS at 11:16

## 2020-01-01 RX ADMIN — HYDROCORTISONE SODIUM SUCCINATE 50 MG: 100 INJECTION, POWDER, FOR SOLUTION INTRAMUSCULAR; INTRAVENOUS at 23:57

## 2020-01-01 RX ADMIN — PROPOFOL 35 MCG/KG/MIN: 10 INJECTION, EMULSION INTRAVENOUS at 06:30

## 2020-01-01 RX ADMIN — PROPOFOL 30 MCG/KG/MIN: 10 INJECTION, EMULSION INTRAVENOUS at 22:33

## 2020-01-01 RX ADMIN — BUMETANIDE 2 MG: 0.25 INJECTION INTRAMUSCULAR; INTRAVENOUS at 11:25

## 2020-01-01 RX ADMIN — ROPINIROLE HYDROCHLORIDE 1 MG: 1 TABLET, FILM COATED ORAL at 19:54

## 2020-01-01 RX ADMIN — METOPROLOL TARTRATE 7.5 MG: 5 INJECTION, SOLUTION INTRAVENOUS at 15:34

## 2020-01-01 RX ADMIN — SODIUM CHLORIDE: 9 INJECTION, SOLUTION INTRAVENOUS at 04:42

## 2020-01-01 RX ADMIN — HYDROCORTISONE SODIUM SUCCINATE 50 MG: 100 INJECTION, POWDER, FOR SOLUTION INTRAMUSCULAR; INTRAVENOUS at 09:04

## 2020-01-01 RX ADMIN — PIPERACILLIN AND TAZOBACTAM 2.25 G: 2; .25 INJECTION, POWDER, FOR SOLUTION INTRAVENOUS at 12:38

## 2020-01-01 RX ADMIN — METOPROLOL TARTRATE 7.5 MG: 5 INJECTION, SOLUTION INTRAVENOUS at 20:04

## 2020-01-01 RX ADMIN — CLONIDINE HYDROCHLORIDE 0.3 MG: 0.1 TABLET ORAL at 09:21

## 2020-01-01 RX ADMIN — PROPOFOL 40 MCG/KG/MIN: 10 INJECTION, EMULSION INTRAVENOUS at 17:39

## 2020-01-01 RX ADMIN — HYDROCORTISONE SODIUM SUCCINATE 50 MG: 100 INJECTION, POWDER, FOR SOLUTION INTRAMUSCULAR; INTRAVENOUS at 08:16

## 2020-01-01 RX ADMIN — INSULIN LISPRO 1 UNITS: 100 INJECTION, SOLUTION INTRAVENOUS; SUBCUTANEOUS at 12:18

## 2020-01-01 RX ADMIN — WHITE PETROLATUM 57.7 %-MINERAL OIL 31.9 % EYE OINTMENT: at 18:33

## 2020-01-01 RX ADMIN — METOCLOPRAMIDE 10 MG: 5 INJECTION, SOLUTION INTRAMUSCULAR; INTRAVENOUS at 09:59

## 2020-01-01 RX ADMIN — METOCLOPRAMIDE 10 MG: 5 INJECTION, SOLUTION INTRAMUSCULAR; INTRAVENOUS at 10:00

## 2020-01-01 RX ADMIN — METOPROLOL TARTRATE 7.5 MG: 5 INJECTION, SOLUTION INTRAVENOUS at 11:33

## 2020-01-01 RX ADMIN — POLYVINYL ALCOHOL 1 DROP: 14 SOLUTION/ DROPS OPHTHALMIC at 13:57

## 2020-01-01 RX ADMIN — PROPOFOL 40 MCG/KG/MIN: 10 INJECTION, EMULSION INTRAVENOUS at 21:55

## 2020-01-01 RX ADMIN — IPRATROPIUM BROMIDE AND ALBUTEROL SULFATE 1 AMPULE: 2.5; .5 SOLUTION RESPIRATORY (INHALATION) at 10:03

## 2020-01-01 RX ADMIN — INSULIN LISPRO 1 UNITS: 100 INJECTION, SOLUTION INTRAVENOUS; SUBCUTANEOUS at 21:01

## 2020-01-01 RX ADMIN — PROPOFOL 20 MCG/KG/MIN: 10 INJECTION, EMULSION INTRAVENOUS at 23:12

## 2020-01-01 RX ADMIN — METOPROLOL TARTRATE 10 MG: 5 INJECTION, SOLUTION INTRAVENOUS at 21:44

## 2020-01-01 RX ADMIN — PROPOFOL 30 MCG/KG/MIN: 10 INJECTION, EMULSION INTRAVENOUS at 02:45

## 2020-01-01 RX ADMIN — WHITE PETROLATUM 57.7 %-MINERAL OIL 31.9 % EYE OINTMENT: at 03:24

## 2020-01-01 RX ADMIN — CHLORHEXIDINE GLUCONATE 15 ML: 1.2 RINSE ORAL at 07:57

## 2020-01-01 RX ADMIN — HEPARIN SODIUM 12 UNITS/KG/HR: 10000 INJECTION, SOLUTION INTRAVENOUS at 02:00

## 2020-01-01 RX ADMIN — IPRATROPIUM BROMIDE AND ALBUTEROL SULFATE 1 AMPULE: 2.5; .5 SOLUTION RESPIRATORY (INHALATION) at 01:18

## 2020-01-01 RX ADMIN — PIPERACILLIN SODIUM AND TAZOBACTAM SODIUM 3.38 G: 3; .375 INJECTION, POWDER, LYOPHILIZED, FOR SOLUTION INTRAVENOUS at 04:21

## 2020-01-01 RX ADMIN — POTASSIUM CHLORIDE 20 MEQ: 29.8 INJECTION, SOLUTION INTRAVENOUS at 05:57

## 2020-01-01 RX ADMIN — METOPROLOL TARTRATE 10 MG: 5 INJECTION, SOLUTION INTRAVENOUS at 04:24

## 2020-01-01 RX ADMIN — METOPROLOL TARTRATE 7.5 MG: 5 INJECTION, SOLUTION INTRAVENOUS at 06:31

## 2020-01-01 RX ADMIN — PROPOFOL 40 MCG/KG/MIN: 10 INJECTION, EMULSION INTRAVENOUS at 20:53

## 2020-01-01 RX ADMIN — BUMETANIDE 2 MG: 0.25 INJECTION INTRAMUSCULAR; INTRAVENOUS at 11:09

## 2020-01-01 RX ADMIN — VANCOMYCIN HYDROCHLORIDE 1750 MG: 10 INJECTION, POWDER, LYOPHILIZED, FOR SOLUTION INTRAVENOUS at 18:45

## 2020-01-01 RX ADMIN — SODIUM BICARBONATE 50 MEQ: 84 INJECTION INTRAVENOUS at 12:37

## 2020-01-01 RX ADMIN — IPRATROPIUM BROMIDE AND ALBUTEROL SULFATE 1 AMPULE: 2.5; .5 SOLUTION RESPIRATORY (INHALATION) at 13:41

## 2020-01-01 RX ADMIN — PIPERACILLIN AND TAZOBACTAM 2.25 G: 2; .25 INJECTION, POWDER, FOR SOLUTION INTRAVENOUS at 18:01

## 2020-01-01 RX ADMIN — SODIUM CHLORIDE, PRESERVATIVE FREE 10 ML: 5 INJECTION INTRAVENOUS at 21:35

## 2020-01-01 RX ADMIN — CHLORHEXIDINE GLUCONATE 15 ML: 1.2 RINSE ORAL at 19:40

## 2020-01-01 RX ADMIN — Medication 10 ML: at 09:24

## 2020-01-01 RX ADMIN — POLYVINYL ALCOHOL 1 DROP: 14 SOLUTION/ DROPS OPHTHALMIC at 14:00

## 2020-01-01 RX ADMIN — DILTIAZEM HYDROCHLORIDE 5 MG/HR: 5 INJECTION INTRAVENOUS at 20:41

## 2020-01-01 RX ADMIN — FLUOXETINE HYDROCHLORIDE 20 MG: 20 CAPSULE ORAL at 09:22

## 2020-01-01 RX ADMIN — HEPARIN SODIUM 4700 UNITS: 1000 INJECTION INTRAVENOUS; SUBCUTANEOUS at 17:49

## 2020-01-01 RX ADMIN — ACETAZOLAMIDE SODIUM 250 MG: 500 INJECTION, POWDER, LYOPHILIZED, FOR SOLUTION INTRAVENOUS at 10:35

## 2020-01-01 RX ADMIN — POLYVINYL ALCOHOL 1 DROP: 14 SOLUTION/ DROPS OPHTHALMIC at 17:22

## 2020-01-01 RX ADMIN — PROPOFOL 40 MCG/KG/MIN: 10 INJECTION, EMULSION INTRAVENOUS at 14:14

## 2020-01-01 RX ADMIN — METOPROLOL TARTRATE 5 MG: 5 INJECTION, SOLUTION INTRAVENOUS at 11:46

## 2020-01-01 RX ADMIN — Medication 10 ML: at 21:41

## 2020-01-01 RX ADMIN — BUSPIRONE HYDROCHLORIDE 10 MG: 10 TABLET ORAL at 08:45

## 2020-01-01 RX ADMIN — IPRATROPIUM BROMIDE AND ALBUTEROL SULFATE 1 AMPULE: 2.5; .5 SOLUTION RESPIRATORY (INHALATION) at 06:23

## 2020-01-01 RX ADMIN — WHITE PETROLATUM 57.7 %-MINERAL OIL 31.9 % EYE OINTMENT: at 18:00

## 2020-01-01 RX ADMIN — CARBIDOPA AND LEVODOPA 1 TABLET: 25; 100 TABLET ORAL at 09:22

## 2020-01-01 RX ADMIN — BUSPIRONE HYDROCHLORIDE 10 MG: 10 TABLET ORAL at 20:51

## 2020-01-01 RX ADMIN — CLONIDINE HYDROCHLORIDE 0.2 MG: 0.2 TABLET ORAL at 23:35

## 2020-01-01 RX ADMIN — CHLORHEXIDINE GLUCONATE 15 ML: 1.2 RINSE ORAL at 21:00

## 2020-01-01 RX ADMIN — METOPROLOL TARTRATE 5 MG: 5 INJECTION, SOLUTION INTRAVENOUS at 13:34

## 2020-01-01 RX ADMIN — FAMOTIDINE 20 MG: 10 INJECTION, SOLUTION INTRAVENOUS at 19:38

## 2020-01-01 RX ADMIN — KETOROLAC TROMETHAMINE 15 MG: 30 INJECTION, SOLUTION INTRAMUSCULAR at 09:01

## 2020-01-01 RX ADMIN — MORPHINE SULFATE 1 MG: 4 INJECTION INTRAVENOUS at 15:09

## 2020-01-01 RX ADMIN — WHITE PETROLATUM 57.7 %-MINERAL OIL 31.9 % EYE OINTMENT: at 02:30

## 2020-01-01 RX ADMIN — DILTIAZEM HYDROCHLORIDE 30 MG: 30 TABLET, FILM COATED ORAL at 13:02

## 2020-01-01 RX ADMIN — POLYVINYL ALCOHOL 1 DROP: 14 SOLUTION/ DROPS OPHTHALMIC at 07:46

## 2020-01-01 RX ADMIN — POLYVINYL ALCOHOL 1 DROP: 14 SOLUTION/ DROPS OPHTHALMIC at 08:33

## 2020-01-01 RX ADMIN — METOPROLOL TARTRATE 7.5 MG: 5 INJECTION, SOLUTION INTRAVENOUS at 04:21

## 2020-01-01 RX ADMIN — INSULIN LISPRO 1 UNITS: 100 INJECTION, SOLUTION INTRAVENOUS; SUBCUTANEOUS at 17:22

## 2020-01-01 RX ADMIN — PIPERACILLIN AND TAZOBACTAM 2.25 G: 2; .25 INJECTION, POWDER, FOR SOLUTION INTRAVENOUS at 06:30

## 2020-01-01 RX ADMIN — METOPROLOL TARTRATE 7.5 MG: 5 INJECTION, SOLUTION INTRAVENOUS at 07:56

## 2020-01-01 RX ADMIN — ENOXAPARIN SODIUM 40 MG: 40 INJECTION SUBCUTANEOUS at 17:08

## 2020-01-01 RX ADMIN — FUROSEMIDE 40 MG: 10 INJECTION, SOLUTION INTRAMUSCULAR; INTRAVENOUS at 15:02

## 2020-01-01 RX ADMIN — PROPOFOL 40 MCG/KG/MIN: 10 INJECTION, EMULSION INTRAVENOUS at 11:28

## 2020-01-01 RX ADMIN — SODIUM CHLORIDE, PRESERVATIVE FREE 10 ML: 5 INJECTION INTRAVENOUS at 08:54

## 2020-01-01 RX ADMIN — ETOMIDATE 20 MG: 2 INJECTION, SOLUTION INTRAVENOUS at 12:31

## 2020-01-01 RX ADMIN — VASOPRESSIN 0.04 UNITS/MIN: 20 INJECTION INTRAVENOUS at 05:30

## 2020-01-01 RX ADMIN — PIPERACILLIN SODIUM AND TAZOBACTAM SODIUM 3.38 G: 3; .375 INJECTION, POWDER, LYOPHILIZED, FOR SOLUTION INTRAVENOUS at 12:00

## 2020-01-01 RX ADMIN — HEPARIN SODIUM 16 UNITS/KG/HR: 10000 INJECTION, SOLUTION INTRAVENOUS at 19:44

## 2020-01-01 RX ADMIN — ACETAZOLAMIDE SODIUM 250 MG: 500 INJECTION, POWDER, LYOPHILIZED, FOR SOLUTION INTRAVENOUS at 02:30

## 2020-01-01 RX ADMIN — SODIUM CHLORIDE, PRESERVATIVE FREE 10 ML: 5 INJECTION INTRAVENOUS at 08:56

## 2020-01-01 RX ADMIN — PROPOFOL 35 MCG/KG/MIN: 10 INJECTION, EMULSION INTRAVENOUS at 20:39

## 2020-01-01 RX ADMIN — Medication 17 MCG/MIN: at 12:40

## 2020-01-01 RX ADMIN — HEPARIN SODIUM 13 UNITS/KG/HR: 10000 INJECTION, SOLUTION INTRAVENOUS at 00:36

## 2020-01-01 RX ADMIN — CHLORHEXIDINE GLUCONATE 15 ML: 1.2 RINSE ORAL at 08:24

## 2020-01-01 RX ADMIN — PIPERACILLIN AND TAZOBACTAM 2.25 G: 2; .25 INJECTION, POWDER, FOR SOLUTION INTRAVENOUS at 00:00

## 2020-01-01 RX ADMIN — METOCLOPRAMIDE 10 MG: 5 INJECTION, SOLUTION INTRAMUSCULAR; INTRAVENOUS at 21:14

## 2020-01-01 RX ADMIN — BUSPIRONE HYDROCHLORIDE 10 MG: 10 TABLET ORAL at 20:27

## 2020-01-01 RX ADMIN — METOCLOPRAMIDE 10 MG: 5 INJECTION, SOLUTION INTRAMUSCULAR; INTRAVENOUS at 09:34

## 2020-01-01 RX ADMIN — IOPAMIDOL 90 ML: 755 INJECTION, SOLUTION INTRAVENOUS at 14:32

## 2020-01-01 RX ADMIN — METOPROLOL TARTRATE 7.5 MG: 5 INJECTION, SOLUTION INTRAVENOUS at 15:17

## 2020-01-01 RX ADMIN — INSULIN LISPRO 3 UNITS: 100 INJECTION, SOLUTION INTRAVENOUS; SUBCUTANEOUS at 11:34

## 2020-01-01 RX ADMIN — NIFEDIPINE 30 MG: 30 TABLET, FILM COATED, EXTENDED RELEASE ORAL at 08:44

## 2020-01-01 RX ADMIN — POTASSIUM CHLORIDE: 2 INJECTION, SOLUTION, CONCENTRATE INTRAVENOUS at 12:13

## 2020-01-01 RX ADMIN — Medication 10 ML: at 21:02

## 2020-01-01 RX ADMIN — PROMETHAZINE HYDROCHLORIDE 6.25 MG: 25 INJECTION INTRAMUSCULAR; INTRAVENOUS at 20:41

## 2020-01-01 RX ADMIN — METOPROLOL TARTRATE 7.5 MG: 5 INJECTION, SOLUTION INTRAVENOUS at 11:29

## 2020-01-01 RX ADMIN — IPRATROPIUM BROMIDE AND ALBUTEROL SULFATE 1 AMPULE: 2.5; .5 SOLUTION RESPIRATORY (INHALATION) at 06:08

## 2020-01-01 RX ADMIN — FUROSEMIDE 80 MG: 10 INJECTION, SOLUTION INTRAMUSCULAR; INTRAVENOUS at 12:17

## 2020-01-01 ASSESSMENT — PULMONARY FUNCTION TESTS
PIF_VALUE: 55
PIF_VALUE: 42
PIF_VALUE: 13
PIF_VALUE: 39
PIF_VALUE: 50
PIF_VALUE: 38
PIF_VALUE: 43
PIF_VALUE: 52
PIF_VALUE: 46
PIF_VALUE: 56
PIF_VALUE: 43
PIF_VALUE: 55
PIF_VALUE: 27
PIF_VALUE: 52
PIF_VALUE: 35
PIF_VALUE: 41
PIF_VALUE: 45
PIF_VALUE: 72
PIF_VALUE: 42
PIF_VALUE: 28
PIF_VALUE: 46
PIF_VALUE: 38
PIF_VALUE: 42
PIF_VALUE: 13
PIF_VALUE: 48
PIF_VALUE: 60
PIF_VALUE: 40
PIF_VALUE: 46
PIF_VALUE: 60
PIF_VALUE: 24
PIF_VALUE: 49
PIF_VALUE: 51
PIF_VALUE: 76
PIF_VALUE: 39
PIF_VALUE: 44
PIF_VALUE: 37
PIF_VALUE: 42
PIF_VALUE: 42
PIF_VALUE: 45
PIF_VALUE: 51
PIF_VALUE: 42
PIF_VALUE: 42
PIF_VALUE: 13
PIF_VALUE: 76
PIF_VALUE: 47
PIF_VALUE: 42
PIF_VALUE: 46
PIF_VALUE: 48
PIF_VALUE: 58
PIF_VALUE: 50
PIF_VALUE: 66
PIF_VALUE: 41
PIF_VALUE: 45
PIF_VALUE: 12
PIF_VALUE: 35
PIF_VALUE: 49
PIF_VALUE: 46
PIF_VALUE: 56
PIF_VALUE: 53
PIF_VALUE: 45
PIF_VALUE: 35
PIF_VALUE: 13
PIF_VALUE: 61
PIF_VALUE: 45
PIF_VALUE: 40
PIF_VALUE: 77
PIF_VALUE: 42
PIF_VALUE: 51
PIF_VALUE: 47
PIF_VALUE: 47
PIF_VALUE: 45
PIF_VALUE: 64
PIF_VALUE: 55
PIF_VALUE: 49
PIF_VALUE: 43
PIF_VALUE: 44
PIF_VALUE: 42
PIF_VALUE: 44
PIF_VALUE: 42
PIF_VALUE: 43
PIF_VALUE: 41
PIF_VALUE: 40
PIF_VALUE: 51
PIF_VALUE: 59
PIF_VALUE: 28
PIF_VALUE: 43
PIF_VALUE: 46
PIF_VALUE: 51
PIF_VALUE: 51
PIF_VALUE: 50
PIF_VALUE: 49
PIF_VALUE: 28
PIF_VALUE: 75
PIF_VALUE: 28
PIF_VALUE: 12
PIF_VALUE: 79
PIF_VALUE: 42
PIF_VALUE: 36
PIF_VALUE: 49
PIF_VALUE: 57
PIF_VALUE: 60
PIF_VALUE: 45
PIF_VALUE: 35
PIF_VALUE: 41
PIF_VALUE: 47
PIF_VALUE: 43
PIF_VALUE: 26
PIF_VALUE: 47
PIF_VALUE: 39
PIF_VALUE: 46
PIF_VALUE: 57
PIF_VALUE: 44
PIF_VALUE: 29
PIF_VALUE: 45
PIF_VALUE: 39
PIF_VALUE: 53
PIF_VALUE: 69
PIF_VALUE: 47
PIF_VALUE: 27
PIF_VALUE: 44
PIF_VALUE: 42
PIF_VALUE: 59
PIF_VALUE: 13
PIF_VALUE: 28
PIF_VALUE: 28
PIF_VALUE: 60
PIF_VALUE: 43
PIF_VALUE: 30
PIF_VALUE: 47
PIF_VALUE: 75
PIF_VALUE: 30
PIF_VALUE: 44
PIF_VALUE: 38
PIF_VALUE: 93
PIF_VALUE: 30
PIF_VALUE: 42
PIF_VALUE: 61
PIF_VALUE: 58
PIF_VALUE: 41
PIF_VALUE: 44
PIF_VALUE: 56
PIF_VALUE: 57
PIF_VALUE: 41
PIF_VALUE: 52
PIF_VALUE: 96
PIF_VALUE: 44
PIF_VALUE: 58
PIF_VALUE: 27
PIF_VALUE: 48
PIF_VALUE: 52
PIF_VALUE: 56
PIF_VALUE: 52
PIF_VALUE: 49
PIF_VALUE: 32
PIF_VALUE: 43
PIF_VALUE: 62
PIF_VALUE: 28
PIF_VALUE: 41
PIF_VALUE: 24
PIF_VALUE: 28
PIF_VALUE: 47
PIF_VALUE: 45
PIF_VALUE: 48
PIF_VALUE: 54
PIF_VALUE: 88
PIF_VALUE: 56
PIF_VALUE: 40
PIF_VALUE: 37
PIF_VALUE: 61
PIF_VALUE: 43
PIF_VALUE: 45
PIF_VALUE: 41
PIF_VALUE: 27
PIF_VALUE: 41
PIF_VALUE: 62
PIF_VALUE: 47
PIF_VALUE: 45
PIF_VALUE: 35
PIF_VALUE: 43
PIF_VALUE: 51
PIF_VALUE: 43
PIF_VALUE: 51
PIF_VALUE: 56
PIF_VALUE: 45
PIF_VALUE: 30
PIF_VALUE: 48
PIF_VALUE: 55
PIF_VALUE: 11
PIF_VALUE: 53
PIF_VALUE: 39
PIF_VALUE: 39
PIF_VALUE: 46
PIF_VALUE: 50
PIF_VALUE: 37
PIF_VALUE: 41
PIF_VALUE: 44
PIF_VALUE: 68
PIF_VALUE: 48
PIF_VALUE: 58
PIF_VALUE: 60
PIF_VALUE: 38
PIF_VALUE: 52
PIF_VALUE: 38
PIF_VALUE: 44
PIF_VALUE: 58
PIF_VALUE: 48
PIF_VALUE: 55
PIF_VALUE: 55
PIF_VALUE: 49
PIF_VALUE: 66
PIF_VALUE: 47
PIF_VALUE: 49
PIF_VALUE: 35
PIF_VALUE: 64
PIF_VALUE: 44
PIF_VALUE: 50
PIF_VALUE: 38
PIF_VALUE: 40
PIF_VALUE: 46
PIF_VALUE: 38
PIF_VALUE: 39
PIF_VALUE: 46
PIF_VALUE: 42
PIF_VALUE: 35
PIF_VALUE: 45
PIF_VALUE: 59
PIF_VALUE: 40
PIF_VALUE: 40
PIF_VALUE: 100
PIF_VALUE: 43
PIF_VALUE: 30
PIF_VALUE: 23
PIF_VALUE: 60
PIF_VALUE: 43
PIF_VALUE: 45
PIF_VALUE: 45
PIF_VALUE: 71
PIF_VALUE: 37
PIF_VALUE: 52
PIF_VALUE: 38
PIF_VALUE: 42
PIF_VALUE: 33
PIF_VALUE: 41
PIF_VALUE: 39
PIF_VALUE: 27
PIF_VALUE: 44
PIF_VALUE: 61
PIF_VALUE: 43
PIF_VALUE: 42
PIF_VALUE: 42
PIF_VALUE: 44
PIF_VALUE: 43
PIF_VALUE: 13

## 2020-01-01 ASSESSMENT — PAIN DESCRIPTION - ONSET
ONSET: GRADUAL

## 2020-01-01 ASSESSMENT — ENCOUNTER SYMPTOMS
BACK PAIN: 0
EYE PAIN: 0
EYE REDNESS: 0
COLOR CHANGE: 0
EYE ITCHING: 0
SORE THROAT: 0
BLOOD IN STOOL: 0
ABDOMINAL PAIN: 0
CONSTIPATION: 0
WHEEZING: 0
SORE THROAT: 0
SORE THROAT: 0
BLOOD IN STOOL: 0
EYE ITCHING: 0
CHEST TIGHTNESS: 0
COUGH: 1
EYE DISCHARGE: 0
VOMITING: 0
SORE THROAT: 0
SHORTNESS OF BREATH: 0
SHORTNESS OF BREATH: 0
WHEEZING: 0
EYE REDNESS: 0
VOMITING: 1
WHEEZING: 0
EYE DISCHARGE: 0
VOMITING: 0
ABDOMINAL DISTENTION: 0
COLOR CHANGE: 0
ABDOMINAL DISTENTION: 0
ABDOMINAL PAIN: 0
ABDOMINAL PAIN: 0
TROUBLE SWALLOWING: 0
TROUBLE SWALLOWING: 0
WHEEZING: 0
SHORTNESS OF BREATH: 0
SINUS PRESSURE: 0
SORE THROAT: 0
VOMITING: 0
CHEST TIGHTNESS: 0
VOMITING: 0
TROUBLE SWALLOWING: 0
SHORTNESS OF BREATH: 0
BACK PAIN: 0
VOICE CHANGE: 0
SINUS PRESSURE: 0
VOMITING: 0
VOMITING: 0
EYE REDNESS: 0
SHORTNESS OF BREATH: 0
NAUSEA: 0
COLOR CHANGE: 0
COLOR CHANGE: 0
BACK PAIN: 0
CONSTIPATION: 0
WHEEZING: 0
CHEST TIGHTNESS: 0
NAUSEA: 0
TROUBLE SWALLOWING: 0
SHORTNESS OF BREATH: 0
TROUBLE SWALLOWING: 0
SORE THROAT: 0
ABDOMINAL PAIN: 0
NAUSEA: 1
WHEEZING: 0
NAUSEA: 0
DIARRHEA: 0
SHORTNESS OF BREATH: 0
ABDOMINAL PAIN: 0
EYE REDNESS: 0
VOMITING: 0
RHINORRHEA: 0
TROUBLE SWALLOWING: 0
ABDOMINAL PAIN: 0
SHORTNESS OF BREATH: 0
WHEEZING: 0
ABDOMINAL PAIN: 0
BLOOD IN STOOL: 0
DIARRHEA: 0
COUGH: 0
NAUSEA: 0
SORE THROAT: 0
TROUBLE SWALLOWING: 0
NAUSEA: 0
CHEST TIGHTNESS: 0
NAUSEA: 0
EYE DISCHARGE: 0
BACK PAIN: 0

## 2020-01-01 ASSESSMENT — PAIN SCALES - GENERAL
PAINLEVEL_OUTOF10: 0
PAINLEVEL_OUTOF10: 6
PAINLEVEL_OUTOF10: 0
PAINLEVEL_OUTOF10: 5
PAINLEVEL_OUTOF10: 0
PAINLEVEL_OUTOF10: 7
PAINLEVEL_OUTOF10: 0
PAINLEVEL_OUTOF10: 5
PAINLEVEL_OUTOF10: 0
PAINLEVEL_OUTOF10: 5
PAINLEVEL_OUTOF10: 0
PAINLEVEL_OUTOF10: 1
PAINLEVEL_OUTOF10: 0
PAINLEVEL_OUTOF10: 5
PAINLEVEL_OUTOF10: 0
PAINLEVEL_OUTOF10: 0
PAINLEVEL_OUTOF10: 3
PAINLEVEL_OUTOF10: 0
PAINLEVEL_OUTOF10: 0
PAINLEVEL_OUTOF10: 6
PAINLEVEL_OUTOF10: 0
PAINLEVEL_OUTOF10: 5
PAINLEVEL_OUTOF10: 0
PAINLEVEL_OUTOF10: 3
PAINLEVEL_OUTOF10: 4
PAINLEVEL_OUTOF10: 4
PAINLEVEL_OUTOF10: 2
PAINLEVEL_OUTOF10: 0
PAINLEVEL_OUTOF10: 4
PAINLEVEL_OUTOF10: 3
PAINLEVEL_OUTOF10: 0
PAINLEVEL_OUTOF10: 6
PAINLEVEL_OUTOF10: 0
PAINLEVEL_OUTOF10: 4

## 2020-01-01 ASSESSMENT — PAIN DESCRIPTION - DIRECTION: RADIATING_TOWARDS: BACK

## 2020-01-01 ASSESSMENT — PAIN DESCRIPTION - PROGRESSION
CLINICAL_PROGRESSION: GRADUALLY IMPROVING

## 2020-01-01 ASSESSMENT — PAIN DESCRIPTION - PAIN TYPE
TYPE: ACUTE PAIN
TYPE: CHRONIC PAIN
TYPE: OTHER (COMMENT)
TYPE: ACUTE PAIN

## 2020-01-01 ASSESSMENT — PAIN - FUNCTIONAL ASSESSMENT
PAIN_FUNCTIONAL_ASSESSMENT: PREVENTS OR INTERFERES SOME ACTIVE ACTIVITIES AND ADLS

## 2020-01-01 ASSESSMENT — PAIN DESCRIPTION - LOCATION
LOCATION: HEAD
LOCATION: BACK
LOCATION: OTHER (COMMENT)
LOCATION: GENERALIZED
LOCATION: ARM
LOCATION: HEAD;LEG
LOCATION: OTHER (COMMENT)
LOCATION: GENERALIZED

## 2020-01-01 ASSESSMENT — PATIENT HEALTH QUESTIONNAIRE - PHQ9
SUM OF ALL RESPONSES TO PHQ QUESTIONS 1-9: 0
SUM OF ALL RESPONSES TO PHQ QUESTIONS 1-9: 0
2. FEELING DOWN, DEPRESSED OR HOPELESS: 0
SUM OF ALL RESPONSES TO PHQ9 QUESTIONS 1 & 2: 0
1. LITTLE INTEREST OR PLEASURE IN DOING THINGS: 0

## 2020-01-01 ASSESSMENT — PAIN SCALES - PAIN ASSESSMENT IN ADVANCED DEMENTIA (PAINAD)
BODYLANGUAGE: 2
FACIALEXPRESSION: 0
NEGVOCALIZATION: 0
TOTALSCORE: 4
CONSOLABILITY: 0
BREATHING: 2
BREATHING: 2
CONSOLABILITY: 0
FACIALEXPRESSION: 0
NEGVOCALIZATION: 0
TOTALSCORE: 3
BODYLANGUAGE: 1

## 2020-01-01 ASSESSMENT — PAIN SCALES - WONG BAKER
WONGBAKER_NUMERICALRESPONSE: 4
WONGBAKER_NUMERICALRESPONSE: 4

## 2020-01-01 ASSESSMENT — PAIN DESCRIPTION - DESCRIPTORS
DESCRIPTORS: DISCOMFORT
DESCRIPTORS: ACHING
DESCRIPTORS: ACHING

## 2020-01-01 ASSESSMENT — PAIN DESCRIPTION - FREQUENCY
FREQUENCY: INTERMITTENT

## 2020-01-01 ASSESSMENT — PAIN DESCRIPTION - ORIENTATION
ORIENTATION: RIGHT;LEFT
ORIENTATION: MID

## 2020-01-28 NOTE — TELEPHONE ENCOUNTER
Rowena Escalante called requesting a refill of the below medication which has been pended for you:     Requested Prescriptions     Pending Prescriptions Disp Refills    pantoprazole (PROTONIX) 40 MG tablet [Pharmacy Med Name: PANTOPRAZOLE SOD DR 40 MG TAB] 90 tablet 3     Sig: TAKE 1 TABLET BY MOUTH EVERY MORNING BEFORE BREAKFAST       Last Appointment Date: 9/12/2019  Next Appointment Date: Visit date not found    Allergies   Allergen Reactions    Codeine Swelling     Had as a baby--not sure of reaction.

## 2020-02-04 NOTE — TELEPHONE ENCOUNTER
Patient called and is having a problem with his Cpap for [facility]. He has been given 2 different machines in less that 2 weeks and can't use them. He reports that he was not give any instruction on the machines and his daughter has tried to help him. Spoke to Gerson Villalta at [facility] and he can change DME we will need to sent a order and copy of sleep study.  Orders and sleep study faxed to [facility] per patients request.

## 2020-02-06 NOTE — TELEPHONE ENCOUNTER
Called spoke with patient with his appointment with Earnest Back, patient is aware of appointment and location.
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
## 2020-02-14 PROBLEM — R26.89 LOSS OF BALANCE: Status: ACTIVE | Noted: 2020-01-01

## 2020-02-14 NOTE — PROGRESS NOTES
after 1 dose, call 911. 30 tablet 3    furosemide (LASIX) 40 MG tablet Take 1 tablet by mouth as needed (fluid retention) 30 tablet 5    ipratropium-albuterol (DUONEB) 0.5-2.5 (3) MG/3ML SOLN nebulizer solution Inhale 1 vial into the lungs nightly      losartan (COZAAR) 100 MG tablet Take 1 tablet by mouth daily 90 tablet 2    FLUoxetine (PROZAC) 20 MG capsule Take 1 capsule by mouth daily Take 40mg along with 20mg capsule daily 90 capsule 2    FLUoxetine (PROZAC) 40 MG capsule Take 1 capsule by mouth daily 90 capsule 2    NIFEdipine (PROCARDIA XL) 30 MG extended release tablet Take 1 tablet by mouth daily 30 tablet 3    mometasone-formoterol (DULERA) 200-5 MCG/ACT inhaler INHALE 2 PUFFS INTO THE LUNGS EVERY 12 HOURS 13 g 11    cloNIDine (CATAPRES) 0.2 MG tablet Take 0.2 mg by mouth as needed (Hypertension)      busPIRone (BUSPAR) 10 MG tablet Take 10 mg by mouth 3 times daily      rOPINIRole (REQUIP) 1 MG tablet Take 1 tablet by mouth nightly 90 tablet 1    celecoxib (CELEBREX) 200 MG capsule TAKE 1 CAPSULE BY MOUTH EVERY DAY 90 capsule 2    cetirizine (ZYRTEC) 10 MG tablet Take 10 mg by mouth daily      albuterol sulfate  (90 Base) MCG/ACT inhaler Inhale 2 puffs into the lungs every 6 hours as needed for Wheezing 1 Inhaler 3     No current facility-administered medications for this visit. Allergies   Allergen Reactions    Codejesús Osman     Had as a baby--not sure of reaction. Subjective:     Review of Systems   Constitutional: Negative for activity change, appetite change, fatigue and fever. HENT: Negative for congestion, hearing loss, sinus pressure, sore throat and trouble swallowing. Eyes: Negative for discharge and itching. Respiratory: Negative for shortness of breath and wheezing. Cardiovascular: Negative for chest pain, palpitations and leg swelling. Gastrointestinal: Negative for abdominal distention, abdominal pain, blood in stool, nausea and vomiting. Endocrine: Negative for cold intolerance, heat intolerance and polydipsia. Genitourinary: Negative for flank pain, frequency, hematuria and urgency. Musculoskeletal: Negative for arthralgias, back pain and joint swelling. Skin: Negative for rash and wound. Allergic/Immunologic: Negative for environmental allergies and food allergies. Neurological: Negative for dizziness, tremors, syncope, weakness, numbness and headaches. Hematological: Negative for adenopathy. Psychiatric/Behavioral: Negative for agitation and hallucinations. The patient is not nervous/anxious. Objective:      BP (!) 150/80   Pulse 72   Temp 97.6 °F (36.4 °C)   SpO2 98%    Physical Exam  Constitutional:       General: He is not in acute distress. Appearance: He is well-developed. He is not diaphoretic. HENT:      Head: Normocephalic and atraumatic. Right Ear: External ear normal.      Left Ear: External ear normal.      Nose: Nose normal.      Mouth/Throat:      Pharynx: No oropharyngeal exudate. Eyes:      General: No scleral icterus. Right eye: No discharge. Left eye: No discharge. Conjunctiva/sclera: Conjunctivae normal.      Pupils: Pupils are equal, round, and reactive to light. Neck:      Musculoskeletal: Normal range of motion and neck supple. Thyroid: No thyromegaly. Vascular: No JVD. Trachea: No tracheal deviation. Cardiovascular:      Rate and Rhythm: Normal rate and regular rhythm. Heart sounds: Normal heart sounds. No murmur. No friction rub. No gallop. Pulmonary:      Effort: Pulmonary effort is normal. No respiratory distress. Breath sounds: Normal breath sounds. No wheezing or rales. Abdominal:      General: Bowel sounds are normal. There is no distension. Palpations: Abdomen is soft. There is no mass. Tenderness: There is no abdominal tenderness. There is no guarding or rebound. Musculoskeletal: Normal range of motion. General: No tenderness or deformity. Comments: Limited weakness in both lower extremities   Lymphadenopathy:      Cervical: No cervical adenopathy. Skin:     General: Skin is warm and dry. Coloration: Skin is not pale. Findings: No erythema or rash. Neurological:      Mental Status: He is alert and oriented to person, place, and time. Cranial Nerves: No cranial nerve deficit. Motor: No abnormal muscle tone. Coordination: Coordination normal.      Deep Tendon Reflexes: Reflexes are normal and symmetric. Reflexes normal.   Psychiatric:         Behavior: Behavior normal.         Thought Content: Thought content normal.         Judgment: Judgment normal.          Assessment:      Diagnosis Orders   1. Loss of balance  Sedimentation Rate    B. Burgdorferi Antibodies    MRI BRAIN WO CONTRAST    VL DUP CAROTID BILATERAL    Ambulatory referral to Neurology   2. Tick bite, initial encounter  Sedimentation Rate    B. Burgdorferi Antibodies            Plan:     Balance with weakness and incidental history of a tick bite that was never treated. I am going to get his Lyme disease titer and a sed rate on him today. I will get an MRI of his head because of the balance issues and the weakness. My get carotid studies on him as well and set him up to see physical therapy for an evaluation of strength and endurance and gait. Also get him a referral to Dr. Troy Zapata. He is to keep scheduled appointment Have spent 25 minutes with patient today, 50% of the time is been spent counseling on cardiovascular risk factors, fall risk precautions, and immunizations,  . Return for Keep scheduled appointment. .     Patient given educational materials- see patient instructions. Discussed use, benefit, and side effects of prescribedmedications. All patient questions answered. Pt voiced understanding. Reviewedhealth maintenance. Instructed to continue current medications, diet and exercise. Patient agreed

## 2020-02-18 PROBLEM — R53.1 WEAKNESS: Status: ACTIVE | Noted: 2020-01-01

## 2020-02-18 NOTE — ED PROVIDER NOTES
discharge. Respiratory: Negative for cough, chest tightness, shortness of breath and wheezing. Cardiovascular: Positive for leg swelling (Chronic without change. ). Negative for chest pain. Gastrointestinal: Negative for abdominal pain, blood in stool, constipation, diarrhea and vomiting. Endocrine: Negative for cold intolerance. Genitourinary: Negative for difficulty urinating, dysuria and flank pain. Musculoskeletal: Negative for back pain, myalgias, neck pain and neck stiffness. Skin: Negative for pallor and rash. Neurological: Positive for weakness. Negative for dizziness, syncope, speech difficulty, light-headedness and numbness. Psychiatric/Behavioral: Negative for agitation, behavioral problems and confusion. All other systems reviewed and are negative.            PAST MEDICALHISTORY     Past Medical History:   Diagnosis Date    Anemia     Anxiety     Back pain     Cellulitis     Depression     Elevated triglycerides with high cholesterol     GERD (gastroesophageal reflux disease)     Hypertension     Obstructive chronic bronchitis without exacerbation ([facility] [address] 6/10/2019    Osteoarthritis     Pacemaker 04/21/2017    Sleep apnea, unspecified 11/22/2019         SURGICAL HISTORY       Past Surgical History:   Procedure Laterality Date    COLONOSCOPY  11/18/11        JOINT REPLACEMENT      Bilateral total knees    NECK SURGERY      PACEMAKER PLACEMENT      TOTAL KNEE ARTHROPLASTY      RIGHT AND LEFT KNEE    UPPER GASTROINTESTINAL ENDOSCOPY  2/2012             CURRENT MEDICATIONS     Current Discharge Medication List      CONTINUE these medications which have NOT CHANGED    Details   busPIRone (BUSPAR) 10 MG tablet TAKE 1 TABLET BY MOUTH THREE TIMES A DAY  Qty: 270 tablet, Refills: 0      pantoprazole (PROTONIX) 40 MG tablet TAKE 1 TABLET BY MOUTH EVERY MORNING BEFORE BREAKFAST  Qty: 90 tablet, Refills: 3      cloNIDine (CATAPRES) 0.2 MG tablet Take 0.2 are visualized and preliminarily interpreted bythe emergency physician with the below findings:          MRI C/ [address]   Final Result   1. Atrophy and small vessel disease. 2. No acute infarct. 3. No mass. Signed by Dr Reggie Beltran on 2/18/2020 5:39 PM      CT CERVICAL SPINE WO CONTRAST   Final Result   1. Anterior cervical fusion of C3-C7 with partial C6 vertebrectomy. Moderate to advanced degenerative disc change at C7-T1 with diffuse   cervical  facet osteoarthropathy resulting in moderate to severe   neural foramen narrowing. Only mild central canal stenosis. No acute   cervical vertebral fracture. Signed by Dr Rick Galindo on 2/18/2020 4:16 PM      XR CHEST PORTABLE   Final Result   1. No radiographic evidence of acute cardiopulmonary process.    Signed by Dr Sandee Raymond on 2/18/2020 4:05 PM              LABS:  Labs Reviewed   CBC WITH AUTO DIFFERENTIAL - Abnormal; Notable for the following components:       Result Value    Neutrophils % 69.5 (*)     Lymphocytes % 17.8 (*)     All other components within normal limits   COMPREHENSIVE METABOLIC PANEL W/ REFLEX TO MG FOR LOW K - Abnormal; Notable for the following components:    Glucose 150 (*)     All other components within normal limits   SEDIMENTATION RATE - Abnormal; Notable for the following components:    Sed Rate 18 (*)     All other components within normal limits   C-REACTIVE PROTEIN - Abnormal; Notable for the following components:    CRP 1.19 (*)     All other components within normal limits   CBC - Abnormal; Notable for the following components:    MCHC 32.8 (*)     All other components within normal limits   TROPONIN   BRAIN NATRIURETIC PEPTIDE   URINE RT REFLEX TO CULTURE   LACTIC ACID, PLASMA   TSH WITHOUT REFLEX   T4   CK   BASIC METABOLIC PANEL W/ REFLEX TO MG FOR LOW K   VITAMIN B12   AMMONIA    Narrative:     Collection has been rescheduled by [facility] at 02/18/2020 20:25 Reason: Add   onto 6 am labs       All other
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
## 2020-02-19 NOTE — PROGRESS NOTES
Nightly    losartan  100 mg Oral Daily    NIFEdipine  30 mg Oral Daily    busPIRone  10 mg Oral BID    FLUoxetine  20 mg Oral Daily    FLUoxetine  40 mg Oral Daily    sodium chloride flush  10 mL Intravenous 2 times per day    [Held by provider] enoxaparin  40 mg Subcutaneous Daily     Continuous Infusions:    sodium chloride 1,000 mL (20 0555)     PRN Meds: albuterol sulfate HFA, furosemide, cloNIDine, sodium chloride flush, magnesium hydroxide, ondansetron, acetaminophen    Past History    Past Medical History:   has a past medical history of Anemia, Anxiety, Back pain, Cellulitis, Depression, Elevated triglycerides with high cholesterol, GERD (gastroesophageal reflux disease), Hypertension, Obstructive chronic bronchitis without exacerbation ([address] Osteoarthritis, Pacemaker, and Sleep apnea, unspecified. Social History:   reports that he has never smoked. He has never used smokeless tobacco. He reports current alcohol use. He reports that he does not use drugs. Family History:   Family History   Problem Relation Age of Onset    Breast Cancer Mother     Colon Cancer Father     Colon Polyps Father     Prostate Cancer Brother        Physical Examination      Vitals:  BP (!) 189/94   Pulse 62   Temp 98.6 °F (37 °C) (Temporal)   Resp 18   Ht 5' 8\" (1.727 m)   Wt 260 lb (117.9 kg)   SpO2 95%   BMI 39.53 kg/m²   Temp (24hrs), Av.7 °F (36.5 °C), Min:97.2 °F (36.2 °C), Max:98.6 °F (37 °C)      I/O (24Hr): Intake/Output Summary (Last 24 hours) at 2020 1124  Last data filed at 2020 0830  Gross per 24 hour   Intake 1310.81 ml   Output 400 ml   Net 910.81 ml       Physical Exam  Vitals signs and nursing note reviewed. Constitutional:       General: He is not in acute distress. Appearance: He is not ill-appearing or toxic-appearing. HENT:      Head: Normocephalic and atraumatic. Nose: Nose normal.   Eyes:      General: No scleral icterus.      Conjunctiva/sclera: is no hardware fracture or radiographic evidence of loosening. Mild canal stenosis suspected at C6/7 secondary to posterior endplate spurring. There is scattered moderate to severe bilateral neural foramen narrowing due to the uncinate process hypertrophy and facet osteoarthropathy, greatest at C5/6. No apical pneumothorax. Left subclavian cardiac pacer device suspected. 1. Anterior cervical fusion of C3-C7 with partial C6 vertebrectomy. Moderate to advanced degenerative disc change at C7-T1 with diffuse cervical  facet osteoarthropathy resulting in moderate to severe neural foramen narrowing. Only mild central canal stenosis. No acute cervical vertebral fracture. Signed by Dr Marshal Gray on 2/18/2020 4:16 PM    Xr Chest Portable    Result Date: 2/18/2020  EXAMINATION: XR CHEST PORTABLE 2/18/2020 4:02 PM HISTORY: XR CHEST PORTABLE 2/18/2020 2:45 PM HISTORY: Short of breath COMPARISON: September 19, 2019. FINDINGS: The lungs are clear. Cardiac silhouette is normal. Pacing device is present soft tissues the left chest. Postsurgical change from anterior fusion of the cervical spine is noted. . The osseous structures and surrounding soft tissues demonstrate no acute abnormality. 1. No radiographic evidence of acute cardiopulmonary process. Signed by Dr Ranulfo Stanton on 2/18/2020 4:05 PM    Mri Brain W Wo Contrast    Result Date: 2/18/2020  MRI BRAIN W WO CONTRAST 2/18/2020 5:38 PM History: Stroke symptoms. Generalized weakness. Pre and postcontrast MR imaging of the brain. No acute parenchymal ischemia based on the DWI sequence. Moderate diffuse cortical atrophy. Left maxillary sinus and ethmoid sinus mucosal thickening. Mild-to-moderate confluent small vessel disease throughout the periventricular white matter. No brain hemorrhage or abnormal calcification. Postcontrast imaging shows no enhancing brain lesion. No sign of vasculitis. The dural venous sinuses are patent. 1. Atrophy and small vessel disease.

## 2020-02-19 NOTE — H&P
 TOTAL KNEE ARTHROPLASTY      RIGHT AND LEFT KNEE    UPPER GASTROINTESTINAL ENDOSCOPY  2/2012           Home Medications:  Prior to Admission medications    Medication Sig Start Date End Date Taking? Authorizing Provider   busPIRone (BUSPAR) 10 MG tablet TAKE 1 TABLET BY MOUTH THREE TIMES A DAY 2/17/20  Yes Sonu Mckeon MD   pantoprazole (PROTONIX) 40 MG tablet TAKE 1 TABLET BY MOUTH EVERY MORNING BEFORE BREAKFAST 1/28/20  Yes Sonu Mckeon MD   cloNIDine (CATAPRES) 0.2 MG tablet Take 0.2 mg by mouth as needed (Hypertension)   Yes Historical Provider, MD   furosemide (LASIX) 40 MG tablet Take 1 tablet by mouth as needed (fluid retention) 11/25/19  Yes Maximino Lopez MD   celecoxib (CELEBREX) 200 MG capsule TAKE 1 CAPSULE BY MOUTH EVERY DAY 9/12/19  Yes Maridee Schaumann, APRN   losartan (COZAAR) 100 MG tablet Take 1 tablet by mouth daily 9/12/19  Yes ANDERS Sen   FLUoxetine (PROZAC) 20 MG capsule Take 1 capsule by mouth daily Take 40mg along with 20mg capsule daily 9/12/19  Yes ANDERS Sen   FLUoxetine (PROZAC) 40 MG capsule Take 1 capsule by mouth daily 9/12/19  Yes ANDERS Sen   cetirizine (ZYRTEC) 10 MG tablet Take 10 mg by mouth daily   Yes Historical Provider, MD   NIFEdipine (PROCARDIA XL) 30 MG extended release tablet Take 1 tablet by mouth daily 8/16/18  Yes Joby Hawk MD   albuterol sulfate  (90 Base) MCG/ACT inhaler Inhale 2 puffs into the lungs every 6 hours as needed for Wheezing 4/17/18  Yes Joby Hawk MD   mometasone-formoterol [facility]) 200-5 MCG/ACT inhaler INHALE 2 PUFFS INTO THE LUNGS EVERY 12 HOURS 12/14/17  Yes Joby Hawk MD   nitroGLYCERIN (NITROSTAT) 0.3 MG SL tablet up to max of 3 total doses.  If no relief after 1 dose, call 911. 12/19/19   Maximino Lopez MD   rOPINIRole (REQUIP) 1 MG tablet Take 1 tablet by mouth nightly 11/8/19 12/8/19  Sonu Mckeon MD   ipratropium-albuterol (Sonny Prude) 0.5-2.5 (3) MG/3ML SOLN nebulizer solution Inhale 1 vial into the lungs nightly    Historical Provider, MD       Allergies:    Codeine    Social History:      Tobacco:   reports that he has never smoked. He has never used smokeless tobacco.  Alcohol:   reports current alcohol use. Illicit Drugs: no     Family History:      Problem Relation Age of Onset   [facility] Breast Cancer Mother     Colon Cancer Father     Colon Polyps Father     Prostate Cancer Brother        Review of Systems:   Constitutional / general:  No fever / chills / sweats  HEENT: No sore throat / hoarseness / vision changes  CV:  No chest pain / palpitations/ orthopnea   Respiratory:  No cough / shortness of breath / sputum / hemoptysis  GI: No nausea / vomiting / abdominal pain / diarrhea / constipation  :  No dysuria / hesitancy / urgency / hematuria   Musculoskeletal:  No edema / cyanosis / pain  Psychiatric:  No depression / anxiety / insomnia  Skin:  No new rashes / lesions    Physical Examination:     BP (!) 167/96   Pulse 79   Temp 97.9 °F (36.6 °C) (Oral)   Resp 20   Ht 5' 8\" (1.727 m)   Wt 260 lb (117.9 kg)   SpO2 93%   BMI 39.53 kg/m²   General appearance: No apparent distress, appears stated age and cooperative. Well developed and well groomed. HEENT: Normal cephalic, atraumatic without obvious deformity. Pupils equal, round, and reactive to light. Extra ocular muscles intact. Conjunctivae/corneas clear. Normal ears and nose. Neck: Supple, with full range of motion. No jugular venous distention. Trachea midline. Thyroid no masses noted. Respiratory: Normal respiratory effort. Clear to auscultation bilaterally, without Rales/Wheezes/Rhonchi. Cardiovascular: Regular rate and rhythm with normal S1/S2 without murmurs, rubs or gallops. Abdomen: Soft, non-tender, non-distended with normal bowel sounds. .  Musculoskeletal: No clubbing, cyanosis or edema bilaterally.   Full range of motion without deformity in 4

## 2020-02-19 NOTE — CONSULTS
Daily, Feli Maria MD    furosemide (LASIX) tablet 40 mg, 40 mg, Oral, PRN, Feli Maria MD    NIFEdipine (PROCARDIA XL) extended release tablet 30 mg, 30 mg, Oral, Daily, Feli Maria MD    busPIRone (BUSPAR) tablet 10 mg, 10 mg, Oral, BID, Feli Maria MD, 10 mg at 02/18/20 2027    cloNIDine (CATAPRES) tablet 0.2 mg, 0.2 mg, Oral, PRN, Feli Maria MD    FLUoxetine (PROZAC) capsule 20 mg, 20 mg, Oral, Daily, Feli Maria MD    FLUoxetine (PROZAC) capsule 40 mg, 40 mg, Oral, Daily, Feli Maria MD    sodium chloride flush 0.9 % injection 10 mL, 10 mL, Intravenous, 2 times per day, Feli Maria MD, 10 mL at 02/18/20 2028    sodium chloride flush 0.9 % injection 10 mL, 10 mL, Intravenous, PRN, Feli Maria MD    magnesium hydroxide (MILK OF MAGNESIA) 400 MG/5ML suspension 30 mL, 30 mL, Oral, Daily PRN, Feli Maria MD    ondansetron [facility]) injection 4 mg, 4 mg, Intravenous, Q6H PRN, Feli Maria MD    enoxaparin (LOVENOX) injection 40 mg, 40 mg, Subcutaneous, Daily, Feli Maria MD, Stopped at 02/18/20 2038    acetaminophen (TYLENOL) tablet 650 mg, 650 mg, Oral, Q4H PRN, Feli Maria MD    Allergies:  Codeine    Social History:   TOBACCO:   reports that he has never smoked. He has never used smokeless tobacco.  ETOH:   reports current alcohol use. Family History:       Problem Relation Age of Onset    Breast Cancer Mother     Colon Cancer Father     Colon Polyps Father     Prostate Cancer Brother            Physical Exam:    Vitals: BP (!) 179/84   Pulse 61   Temp 97.4 °F (36.3 °C) (Temporal)   Resp 18   Ht 5' 8\" (1.727 m)   Wt 260 lb (117.9 kg)   SpO2 98%   BMI 39.53 kg/m²     Constitutional - well developed, well nourished.     Eyes - conjunctiva normal.  Pupils react to light  Ear, nose, throat -hearing intact to finger rub No scars, masses, or lesions over external nose or ears, no atrophy of tongue  Neck-symmetric, no masses noted, no jugular vein distension  Respiration- chest wall appears symmetric, good expansion,   normal effort without use of accessory muscles  Musculoskeletal - no significant wasting of muscles noted, no bony deformities  Extremities-no clubbing, cyanosis or edema  Skin - warm, dry, and intact. No rash, erythema, or pallor. Psychiatric - mood, affect, and behavior appear normal.      Neurological exam  Awake, alert, fluent oriented x 3 appropriate affect  Attention and concentration appear appropriate  Recent and remote memory appears unremarkable  Speech normal without dysarthria  No clear issues with language of fund of knowledge    Cranial Nerve Exam   CN II- Visual fields grossly unremarkable  CN III, IV,VI-EOMI, No nystagmus, conjugate eye movements, no ptosis  CN V-sensation intact to LT over face  CN VII-no facial assymetry  CN VIII-Hearing intact to finger rub  CN IX and X- Palate elevates in midline  CN XI-good shoulder shrug  CN XII-Tongue midline with no fasciculations or fibrillations    Motor Exam  V/V throughout upper and lower extremities bilaterally, no cogwheeling, normal tone  Difficulty sitting up in bed    Sensory Exam  Sensation intact to light touch and temperature upper and lower extremities bilaterally    Reflexes   2+ biceps bilaterally  1+ brachioradialis  1+patella  1+ ankle jerks  No clonus ankles  No Cowan's sign bilateral hands    Tremors- no tremors in hands or head noted    Gait  Slow shuffling gait with small steps    Coordination  Finger to nose-unremarkable        CBC:   Recent Labs     02/18/20  1450 02/19/20  0326   WBC 6.6 6.0   HGB 16.0 14.2    234       BMP:    Recent Labs     02/18/20  1450 02/19/20  0326    143   K 4.4 4.2    105   CO2 26 27   BUN 13 11   CREATININE 1.0 0.9   GLUCOSE 150* 108       Hepatic:   Recent Labs     02/18/20  1450   AST 21   ALT 13   BILITOT 0.5   ALKPHOS 104       Lipids: No results for input(s): CHOL, HDL in the last 72 hours.     Invalid input(s): LDLCALCU    INR: No results for input(s): INR in the last 72 hours.         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   Exam with NO weakness even with repetitive testing ( x 20 times ) of deltoids, triceps or hip flexors-some giveway except  some mild giveway in the right shoulder due to some shoulder issues   No diplopia even with sustained up gaze, no disconjugate gaze   No neck flexor or extensor weakness/no triceps weakness   No hyperreflexia/no pathological reflexes   No sensory abnormalities   No cognitive issues noted     Gait was slowed with some short steps with no significant tachypnea   Needed assistance in sitting up in bed despite lack of weakness     MRI brain reviewed personally with moderate amount of cortical atrophy of unclear significance and some ventriculomegaly  which is not significantly out of proportion to cortical atrophy/ moderate subcortical white matter change      Suspect patient does not have weakness and or fatigable weakness on exam or evidence of myelopathy  He appears to have more of gait apraxia which is an issues cerebral motor planning often seen with normal pressure hydrocephalus, parkinson's diease and a variety of dementias  Patient has no clinical evidence of dementia, or clear incontinence ( he does have frequency of urination)    At this time plan   MRI C spine   LP with large volume tap to see if clinical improvement in gait-taught family to perform a timed gait-CSF MS panel   Labs including myasthenia gravis panel    Could consider a trial of mestinon following this if no significant change post high volume LP    Hold Lovenox for LP    DW family    Dr Kirstin Young covering for me till 3/1        Please feel free to call with any questions   [phone] (cell phone)    Dr Freddie Donovan certified in Neurology  Board Certified in MC2 61 Neurophysiology  Fellowship Trained in MC2 6104

## 2020-02-19 NOTE — PROGRESS NOTES
PHYSICAL THERAPY    Please discontinue STRICT BEDREST order to begin mobility assessment.     Electronically signed by Althea Heck PT on 2/19/2020 at 8:07 AM

## 2020-02-20 PROBLEM — R26.9 GAIT DIFFICULTY: Status: ACTIVE | Noted: 2020-01-01

## 2020-02-20 NOTE — PROGRESS NOTES
[facility] Neurology Progress Note      Patient:   Randy Jerry  MR#:    [id number]   Room:    22/522-01   YOB: 1950  Date of Progress Note: 2/20/2020  Time of Note                           12:21 PM  Consulting Physician:  Debbie Bell DO  Attending Physician:  Aimee Ramirez MD      INTERVAL HISTORY:  No acute events noted overnight, post LP headache noted this am, no other new complaints. REVIEW OF SYSTEMS:  Constitutional: No fevers   Neck:No stiffness  Respiratory: No shortness of breath  Cardiovascular: No chest pain   Gastrointestinal: No abdominal pain    Genitourinary: No Dysuria  Neurological: No confusion    PHYSICAL EXAM:    Constitutional -   BP (!) 199/96   Pulse 85   Temp 97.5 °F (36.4 °C) (Temporal)   Resp 18   Ht 5' 8\" (1.727 m)   Wt 260 lb (117.9 kg)   SpO2 93%   BMI 39.53 kg/m²   General appearance: No acute distress   EYES -   Conjunctiva normal  Pupillary exam as below, see CN exam in the neurologic exam  ENT-    No scars, masses, or lesions over external nose or ears  Hearing normal bilaterally to finger rub  Neck-   No neck masses noted  Thyroid normal   No jugular vein distension  Cardiovascular -   No clubbing, cyanosis, or edema   Pulmonary-   Good expansion, normal effort without use of accessory muscles  Musculoskeletal -   No significant wasting of muscles noted  Gait as below, see gait exam in the neurologic exam  Muscle strength, tone, stability as below see the motor exam in the neurologic exam.   No bony deformities  Skin -   Warm, dry, and intact to inspection and palpation.     No rash, erythema, or pallor  Psychiatric -   Mood, affect, and behavior appear normal    Memory as below see mental status examination in the neurologic exam      NEUROLOGICAL EXAM    Mental status   [x] Awake, alert, oriented   [x] Affect attention and concentration appear appropriate  [x] Recent and remote memory appears unremarkable  [x] Speech normal without dysarthria or aphasia, comprehension and repetition intact. COMMENTS:   Cranial Nerves [x] No VF deficit to confrontation  [x] PERRLA, EOMI, no nystagmus, conjugate eye movements, no ptosis  [x] Face symmetric  [x] Facial sensation intact  [x] Tongue midline no atrophy or fasciculations present  [x] Palate midline, hearing to finger rub normal  [x] Shoulder shrug and SCM testing normal  COMMENTS:   Motor   [x] 5/5 strength x 4 extremities  [] Normal bulk and tone  [] No tremor present  [] No rigidity or bradykinesia noted  COMMENTS:   Sensory  [x] Sensation intact to light touch, pin prick, vibration, and proprioception BLE  [] Sensation intact to light touch, pin prick, vibration, and proprioception BUE  COMMENTS:   Coordination [x] FTN normal bilaterally   [] HTS normal bilaterally  [] TACO normal.   COMMENTS:   Reflexes  [] Symmetric and non-pathological  [x] Toes downgoing bilaterally  [x] No clonus present  COMMENTS: Hypoactive LE   Gait                  [] Normal steady gait    [] Ataxic    [] Spastic     [] Magnetic     [] Shuffling  [x] Not assessed  COMMENTS:       LABS/IMAGING:    Ct Cervical Spine Wo Contrast    Result Date: 2/18/2020  CT cervical spine 2/18/2020 2:45 PM HISTORY: Previous fusion, weakness TECHNIQUE: Axial images of the cervical spine were obtained without IV contrast. Coronal and sagittal reformatted images are reconstructed and reviewed. COMPARISON: None. DLP: 774 mGy cm Automated exposure control was utilized to minimize patient radiation dose. FINDINGS: There is anterior cervical fusion of C3-C7 with C6 partial vertebrectomy. There is straightening of the normal cervical curvature. Please moderate degenerative disc change seen at C7-T1. There is uncinate process hypertrophy at C6 and C7. There is mild facet arthropathy. No acute cervical vertebral fracture. There is no hardware fracture or radiographic evidence of loosening.  Mild canal stenosis suspected at C6/7 secondary to posterior endplate spurring. There is scattered moderate to severe bilateral neural foramen narrowing due to the uncinate process hypertrophy and facet osteoarthropathy, greatest at C5/6. No apical pneumothorax. Left subclavian cardiac pacer device suspected. 1. Anterior cervical fusion of C3-C7 with partial C6 vertebrectomy. Moderate to advanced degenerative disc change at C7-T1 with diffuse cervical  facet osteoarthropathy resulting in moderate to severe neural foramen narrowing. Only mild central canal stenosis. No acute cervical vertebral fracture. Signed by Dr Linda Avalos on 2/18/2020 4:16 PM    Xr Chest Portable    Result Date: 2/18/2020  EXAMINATION: XR CHEST PORTABLE 2/18/2020 4:02 PM HISTORY: XR CHEST PORTABLE 2/18/2020 2:45 PM HISTORY: Short of breath COMPARISON: September 19, 2019. FINDINGS: The lungs are clear. Cardiac silhouette is normal. Pacing device is present soft tissues the left chest. Postsurgical change from anterior fusion of the cervical spine is noted. . The osseous structures and surrounding soft tissues demonstrate no acute abnormality. 1. No radiographic evidence of acute cardiopulmonary process. Signed by Dr Guido Arechiga on 2/18/2020 4:05 PM    Mri Brain W Wo Contrast    Result Date: 2/18/2020  MRI BRAIN W WO CONTRAST 2/18/2020 5:38 PM History: Stroke symptoms. Generalized weakness. Pre and postcontrast MR imaging of the brain. No acute parenchymal ischemia based on the DWI sequence. Moderate diffuse cortical atrophy. Left maxillary sinus and ethmoid sinus mucosal thickening. Mild-to-moderate confluent small vessel disease throughout the periventricular white matter. No brain hemorrhage or abnormal calcification. Postcontrast imaging shows no enhancing brain lesion. No sign of vasculitis. The dural venous sinuses are patent. 1. Atrophy and small vessel disease. 2. No acute infarct. 3. No mass.  Signed by Dr Angus Edwards on 2/18/2020 5:39 PM    Fl Lumbar Puncture Diag    Result Date: ALKPHOS 105 02/20/2020    AST 19 02/20/2020    ALT 13 02/20/2020    LABGLOM >60 02/20/2020    GLOB 2.8 04/20/2017     Lab Results   Component Value Date    INR 1.05 02/19/2020    INR 0.97 09/19/2017    INR 1.12 04/22/2017    PROTIME 13.1 02/19/2020    PROTIME 12.8 09/19/2017    PROTIME 14.4 04/22/2017       RECORD REVIEW:   Previous medical records, medications were reviewed at today's visit. Nursing/physician notes, imaging, labs and vitals reviewed. PT,OT and/or speech notes reviewed    ASSESSMENT:   71 y.o. admitted with generalized weakness and worsening gait difficulty, etiology unclear. MRI brain negative outside of questionable NPH findings, now s/p high volume LP with no overt improvement in gait thus far. Mild nonspecific elevation of inflammatory markers noted, unclear if clinically significant. Will plan additional work up to try and clarify the source of his gait difficulty. PLAN:   1. MRI C/T spine to exclude spinal pathology, though exam is not overtly suggestive. 2.  Consider NCS/EMG and L-spine imaging pending above. 3.  Follow up remaining labs  4.  PT, OT, ST  5. Resume DVT proph  6. IVF, encourged to lay down flat given post LP headache, consider IV caffeine if does not improve, blood patch if worsens as well. 7.  Will have PT walk the patient once he is able from a headache standpoint to continue to assess gait post LP. Please feel free to call with any questions. [phone] (cell phone).       Manfred Paz DO  Board Certified Neurology

## 2020-02-20 NOTE — PROGRESS NOTES
Progress Note    Date:2/20/2020       Room:0522/522-01  Patient Name:Mani Gutiérrez     Date of Birth:[birth year]     Age:69 y.o. Subjective   Interval History Status: not changed. Patient seen and examined this a.m., nursing present at the bedside as well as patient's spouse. Note of patient's elevated blood pressure this a.m. continues to have pain. Status post lumbar puncture completed yesterday. Patient states there plans for further imaging modalities per neuro services today. Patient currently denies any chest pain, shortness of breath, abdominal pain, urinary or bowel changes, fevers or chills. Cumulative hospital course: Patient admitted 2/18 presented to the emergency room with worsening weakness as well as history of falls. Patient says he has been experienced this over the past month or so and over the past 2 weeks it is increased its frequency. Patient was outside on the ground for approximately 1 hour yesterday. Work-up in the emergency room thus far revealed MRI with atrophy no mass seen, urinalysis negative Lyme disease panel negative CT cervical spine negative chest x-ray negative. Lumbar puncture completed 2/19/2020, plans for MRI C-spine. Review of Systems   Review of Systems   Constitutional: Positive for activity change and fatigue. HENT: Negative for sore throat and trouble swallowing. Eyes: Negative for redness and visual disturbance. Respiratory: Negative for chest tightness, shortness of breath and wheezing. Cardiovascular: Negative for chest pain and palpitations. Gastrointestinal: Negative for abdominal pain, nausea and vomiting. Genitourinary: Negative for difficulty urinating. Musculoskeletal: Positive for arthralgias and gait problem. Skin: Negative for color change and pallor. Neurological: Positive for weakness. Psychiatric/Behavioral: Negative for agitation and confusion.        Medications   Scheduled Meds:    LORazepam  2 mg Intravenous Once    sodium chloride flush  10 mL Intravenous 2 times per day    rOPINIRole  1 mg Oral Nightly    losartan  100 mg Oral Daily    NIFEdipine  30 mg Oral Daily    busPIRone  10 mg Oral BID    FLUoxetine  20 mg Oral Daily    FLUoxetine  40 mg Oral Daily    sodium chloride flush  10 mL Intravenous 2 times per day    [Held by provider] enoxaparin  40 mg Subcutaneous Daily     Continuous Infusions:    sodium chloride Stopped (20 1603)     PRN Meds: ketorolac, hydrALAZINE, metoprolol, ipratropium-albuterol, sodium chloride flush, albuterol sulfate HFA, furosemide, cloNIDine, sodium chloride flush, magnesium hydroxide, ondansetron, acetaminophen    Past History    Past Medical History:   has a past medical history of Anemia, Anxiety, Back pain, Cellulitis, Depression, Elevated triglycerides with high cholesterol, GERD (gastroesophageal reflux disease), Hypertension, Obstructive chronic bronchitis without exacerbation ([address] Osteoarthritis, Pacemaker, and Sleep apnea, unspecified. Social History:   reports that he has never smoked. He has never used smokeless tobacco. He reports current alcohol use. He reports that he does not use drugs. Family History:   Family History   Problem Relation Age of Onset    Breast Cancer Mother     Colon Cancer Father     Colon Polyps Father     Prostate Cancer Brother        Physical Examination      Vitals:  BP (!) 192/105 Comment: notified Dr. Paulino Villagran regarding elevated BP  Pulse 88   Temp 98.5 °F (36.9 °C) (Temporal)   Resp 16   Ht 5' 8\" (1.727 m)   Wt 260 lb (117.9 kg)   SpO2 98%   BMI 39.53 kg/m²   Temp (24hrs), Av.1 °F (37.3 °C), Min:98 °F (36.7 °C), Max:99.9 °F (37.7 °C)      I/O (24Hr): Intake/Output Summary (Last 24 hours) at 2020 0956  Last data filed at 2020 0925  Gross per 24 hour   Intake 480 ml   Output 1000 ml   Net -520 ml       Physical Exam  Vitals signs and nursing note reviewed.    Constitutional:       General: He is not in acute distress. Appearance: He is not ill-appearing or toxic-appearing. HENT:      Head: Normocephalic and atraumatic. Nose: Nose normal.   Eyes:      General: No scleral icterus. Conjunctiva/sclera: Conjunctivae normal.   Neck:      Musculoskeletal: No neck rigidity. Cardiovascular:      Rate and Rhythm: Normal rate. Pulses: Normal pulses. Pulmonary:      Effort: Pulmonary effort is normal.   Abdominal:      General: Bowel sounds are normal.      Tenderness: There is no abdominal tenderness. There is no guarding or rebound. Musculoskeletal:         General: Tenderness (lumbar ) present. Skin:     Capillary Refill: Capillary refill takes less than 2 seconds. Neurological:      Mental Status: He is alert and oriented to person, place, and time. Motor: Weakness present. Gait: Gait abnormal.   Psychiatric:         Mood and Affect: Mood normal.         Labs/Imaging/Diagnostics   Labs:  CBC:  Recent Labs     02/18/20  1450 02/19/20  0326 02/20/20  0441   WBC 6.6 6.0 10.1   RBC 5.44 4.84 5.68   HGB 16.0 14.2 16.5   HCT 48.4 43.3 49.5   MCV 89.0 89.5 87.1   RDW 13.4 13.3 13.2    234 309     CHEMISTRIES:  Recent Labs     02/18/20  1450 02/19/20  0326 02/20/20  0441    143 140   K 4.4 4.2 3.7    105 99   CO2 26 27 23   BUN 13 11 8   CREATININE 1.0 0.9 0.9   GLUCOSE 150* 108 126*     PT/INR:  Recent Labs     02/19/20  1345   PROTIME 13.1   INR 1.05     APTT:  Recent Labs     02/19/20  1345   APTT 32.6     LIVER PROFILE:  Recent Labs     02/18/20  1450 02/20/20  0441   AST 21 19   ALT 13 13   BILITOT 0.5 0.6   ALKPHOS 104 105       Imaging Last 24 Hours:  Ct Cervical Spine Wo Contrast    Result Date: 2/18/2020  CT cervical spine 2/18/2020 2:45 PM HISTORY: Previous fusion, weakness TECHNIQUE: Axial images of the cervical spine were obtained without IV contrast. Coronal and sagittal reformatted images are reconstructed and reviewed. COMPARISON: None.  DLP: 774 mGy cm hypertension/hypertensive urgency-chronic condition, continue with antihypertensive medications, routine vitals, PRN medications placed    Anxiety/depression-chronic condition, continue with home regimen    COPD-chronic condition, continue with nebulized therapy, maintain O2 sats greater than 92%      Electronically signed by   Emily Navarro   Internal Medicine Hospitalist  On 2/20/2020  At 9:56 AM    EMR Dragon/Transcription disclaimer:   Much of this encounter note is an electronic transcription/translation of spoken language to printed text.  The electronic translation of spoken language may permit erroneous, or at times, nonsensical words or phrases to be inadvertently transcribed; although attempts have made to review the note for such errors, some may still exist.

## 2020-02-21 NOTE — PROGRESS NOTES
Physical Therapy  Attempted PT eval, but pt was in MRI.   Electronically signed by Gris De La Cruz PT on 2/21/2020 at 3:48 PM

## 2020-02-21 NOTE — PROGRESS NOTES
Patient had multiple episodes of nausea with vomiting, treating with phenegran, will continue to monitor

## 2020-02-21 NOTE — PROGRESS NOTES
[facility] Neurology Progress Note      Patient:   Mirela Llanos  MR#:    [id number]   Room:    0522/522-01   YOB: 1950  Date of Progress Note: 2/21/2020  Time of Note                           11:54 AM  Consulting Physician:  Paolo Will DO  Attending Physician:  Radha Charles MD      INTERVAL HISTORY:  No acute events noted overnight, headache better, gait difficulty unchanged, no improvement. REVIEW OF SYSTEMS:  Constitutional: No fevers   Neck:No stiffness  Respiratory: No shortness of breath  Cardiovascular: No chest pain   Gastrointestinal: No abdominal pain    Genitourinary: No Dysuria  Neurological: No confusion    PHYSICAL EXAM:    Constitutional -   BP (!) 157/85   Pulse 64   Temp 97.6 °F (36.4 °C)   Resp 16   Ht 5' 8\" (1.727 m)   Wt 260 lb (117.9 kg)   SpO2 95%   BMI 39.53 kg/m²   General appearance: No acute distress   EYES -   Conjunctiva normal  Pupillary exam as below, see CN exam in the neurologic exam  ENT-    No scars, masses, or lesions over external nose or ears  Hearing normal bilaterally to finger rub  Neck-   No neck masses noted  Thyroid normal   No jugular vein distension  Cardiovascular -   No clubbing, cyanosis, or edema   Pulmonary-   Good expansion, normal effort without use of accessory muscles  Musculoskeletal -   No significant wasting of muscles noted  Gait as below, see gait exam in the neurologic exam  Muscle strength, tone, stability as below see the motor exam in the neurologic exam.   No bony deformities  Skin -   Warm, dry, and intact to inspection and palpation.     No rash, erythema, or pallor  Psychiatric -   Mood, affect, and behavior appear normal    Memory as below see mental status examination in the neurologic exam      NEUROLOGICAL EXAM    Mental status   [x] Awake, alert, oriented   [x] Affect attention and concentration appear appropriate  [x] Recent and remote memory appears unremarkable  [x] Speech normal without dysarthria or aphasia, comprehension and repetition intact. COMMENTS:   Cranial Nerves [x] No VF deficit to confrontation  [x] PERRLA, EOMI, no nystagmus, conjugate eye movements, no ptosis  [x] Face symmetric  [x] Facial sensation intact  [x] Tongue midline no atrophy or fasciculations present  [x] Palate midline, hearing to finger rub normal  [x] Shoulder shrug and SCM testing normal  COMMENTS:   Motor   [x] 5/5 strength x 4 extremities  [] Normal bulk and tone  [] No tremor present  [] No rigidity or bradykinesia noted  COMMENTS:   Sensory  [x] Sensation intact to light touch, pin prick, vibration, and proprioception BLE  [] Sensation intact to light touch, pin prick, vibration, and proprioception BUE  COMMENTS:   Coordination [x] FTN normal bilaterally   [] HTS normal bilaterally  [] TACO normal.   COMMENTS:   Reflexes  [] Symmetric and non-pathological  [x] Toes downgoing bilaterally  [x] No clonus present  COMMENTS: Hypoactive LE   Gait                  [] Normal steady gait    [] Ataxic    [] Spastic     [] Magnetic     [] Shuffling  [] Not assessed  COMMENTS: Shuffling, apraxic / ataxic gait        LABS/IMAGING:    Ct Cervical Spine Wo Contrast    Result Date: 2/18/2020  CT cervical spine 2/18/2020 2:45 PM HISTORY: Previous fusion, weakness TECHNIQUE: Axial images of the cervical spine were obtained without IV contrast. Coronal and sagittal reformatted images are reconstructed and reviewed. COMPARISON: None. DLP: 774 mGy cm Automated exposure control was utilized to minimize patient radiation dose. FINDINGS: There is anterior cervical fusion of C3-C7 with C6 partial vertebrectomy. There is straightening of the normal cervical curvature. Please moderate degenerative disc change seen at C7-T1. There is uncinate process hypertrophy at C6 and C7. There is mild facet arthropathy. No acute cervical vertebral fracture. There is no hardware fracture or radiographic evidence of loosening.  Mild canal stenosis suspected at C6/7 secondary to posterior endplate spurring. There is scattered moderate to severe bilateral neural foramen narrowing due to the uncinate process hypertrophy and facet osteoarthropathy, greatest at C5/6. No apical pneumothorax. Left subclavian cardiac pacer device suspected. 1. Anterior cervical fusion of C3-C7 with partial C6 vertebrectomy. Moderate to advanced degenerative disc change at C7-T1 with diffuse cervical  facet osteoarthropathy resulting in moderate to severe neural foramen narrowing. Only mild central canal stenosis. No acute cervical vertebral fracture. Signed by Dr Sisi Reis on 2/18/2020 4:16 PM    Xr Chest Portable    Result Date: 2/18/2020  EXAMINATION: XR CHEST PORTABLE 2/18/2020 4:02 PM HISTORY: XR CHEST PORTABLE 2/18/2020 2:45 PM HISTORY: Short of breath COMPARISON: September 19, 2019. FINDINGS: The lungs are clear. Cardiac silhouette is normal. Pacing device is present soft tissues the left chest. Postsurgical change from anterior fusion of the cervical spine is noted. . The osseous structures and surrounding soft tissues demonstrate no acute abnormality. 1. No radiographic evidence of acute cardiopulmonary process. Signed by Dr Yaneli Denise on 2/18/2020 4:05 PM    Mri Brain W Wo Contrast    Result Date: 2/18/2020  MRI BRAIN W WO CONTRAST 2/18/2020 5:38 PM History: Stroke symptoms. Generalized weakness. Pre and postcontrast MR imaging of the brain. No acute parenchymal ischemia based on the DWI sequence. Moderate diffuse cortical atrophy. Left maxillary sinus and ethmoid sinus mucosal thickening. Mild-to-moderate confluent small vessel disease throughout the periventricular white matter. No brain hemorrhage or abnormal calcification. Postcontrast imaging shows no enhancing brain lesion. No sign of vasculitis. The dural venous sinuses are patent. 1. Atrophy and small vessel disease. 2. No acute infarct. 3. No mass.  Signed by Dr Maddie Walter on 2/18/2020 5:39 PM    Fl Lumbar Puncture Diag    Result Date: 2/19/2020  Examination. FL LUMBAR PUNCTURE DIAG 2/19/2020 11:15 AM History: Weakness and loss of balance. The procedure was explained to the patient. The benefits, the risks and complications are discussed. The patient understood the procedure and signed the consent. The patient was placed on the fluoroscopy table and the lumbar spine was examined. The patient has moderately severe dextroscoliosis. A suitable spot opposite interspace L3-4 was selected for puncture. The spot was marked with an ink marker. After sterile preparation and application of local anesthesia a size 20-gauge longer spine needle was introduced into the thecal sac and a FreeFlow of clear colorless thin fluid was established. The patient was immediately turned into a decubitus position. The outer hub of the needle was connected to a pressure measuring device and opening pressure was measured. The opening pressure is 15.5 cm water. 30 mL of CSF was withdrawn and dispensed in 4 separate test tubes which were sent to the laboratory for further evaluation as instructed by the attending physician. The needle was then withdrawn and the puncture site was covered with a sterile Band-Aid. The patient tolerated the procedure well. No immediate complications were noted. The patient was given post lumbar puncture care instructions. A successful lumbar puncture and opening pressure measurement. Fluoroscopy time 2 minutes 11 seconds. Total dose: 1.889eTos6.  Signed by Dr Gonzalo Gann on 2/19/2020 4:45 PM      Lab Results   Component Value Date    WBC 11.5 (H) 02/21/2020    HGB 17.4 02/21/2020    HCT 52.8 (H) 02/21/2020    MCV 87.3 02/21/2020     02/21/2020     Lab Results   Component Value Date     02/21/2020    K 3.8 02/21/2020    CL 95 (L) 02/21/2020    CO2 23 02/21/2020    BUN 15 02/21/2020    CREATININE 1.0 02/21/2020    GLUCOSE 129 (H) 02/21/2020    CALCIUM 10.4 (H) 02/21/2020    PROT 8.3 02/21/2020

## 2020-02-21 NOTE — PROGRESS NOTES
Progress Note    Date:2/21/2020       Room:0522/522-01  Patient Name:Mani Leyva     Date of Birth:[birth year]     Age:69 y.o. Subjective   Interval History Status: not changed. Patient seen and examined this a.m. spouse present at the bedside, patient sitting up in bedside chair stating it is more comfortable. Patient had a rough evening multiple bouts of emesis, blood pressure continue to be elevated. Patient did complete MRIs of the thoracic as well as cervical spine. Patient currently denies any chest pain, shortness of breath, abdominal pain, urinary or bowel changes, fevers or chills. Cumulative hospital course: Patient admitted 2/18 presented to the emergency room with worsening weakness as well as history of falls. Patient says he has been experienced this over the past month or so and over the past 2 weeks it is increased its frequency. Patient was outside on the ground for approximately 1 hour yesterday. Work-up in the emergency room thus far revealed MRI with atrophy no mass seen, urinalysis negative Lyme disease panel negative CT cervical spine negative chest x-ray negative. Lumbar puncture completed 2/19/2020, MRI thoracic spine completed 2/20 revealed-trilevel degenerative disc disease no fractures stenosis or neural compression. MRI C-spine completed 2/20 revealed -normally patent spinal canal no cord compression or abnormal cord enhancement. Some evidence of bilateral foraminal stenosis from C2-C3. Review of Systems   Review of Systems   Constitutional: Positive for activity change and fatigue. HENT: Negative for sore throat and trouble swallowing. Eyes: Negative for redness and visual disturbance. Respiratory: Negative for chest tightness, shortness of breath and wheezing. Cardiovascular: Negative for chest pain and palpitations. Gastrointestinal: Positive for nausea and vomiting. Negative for abdominal pain. Genitourinary: Negative for difficulty urinating. Summary (Last 24 hours) at 2/21/2020 1014  Last data filed at 2/21/2020 0225  Gross per 24 hour   Intake 0 ml   Output 150 ml   Net -150 ml       Physical Exam  Vitals signs and nursing note reviewed. Constitutional:       General: He is not in acute distress. Appearance: He is not ill-appearing or toxic-appearing. HENT:      Head: Normocephalic and atraumatic. Nose: Nose normal.   Eyes:      General: No scleral icterus. Conjunctiva/sclera: Conjunctivae normal.   Neck:      Musculoskeletal: No neck rigidity. Cardiovascular:      Rate and Rhythm: Normal rate. Pulses: Normal pulses. Pulmonary:      Effort: Pulmonary effort is normal.      Breath sounds: No wheezing. Abdominal:      General: Bowel sounds are normal.      Tenderness: There is no abdominal tenderness. There is no guarding or rebound. Musculoskeletal:         General: Tenderness (lumbar ) present. Skin:     Capillary Refill: Capillary refill takes less than 2 seconds. Coloration: Skin is not jaundiced. Findings: No erythema or lesion. Neurological:      Mental Status: He is alert and oriented to person, place, and time. Motor: Weakness present.       Gait: Gait abnormal.   Psychiatric:         Mood and Affect: Mood normal.         Labs/Imaging/Diagnostics   Labs:  CBC:  Recent Labs     02/19/20  0326 02/20/20  0441 02/21/20  0313   WBC 6.0 10.1 11.5*   RBC 4.84 5.68 6.05   HGB 14.2 16.5 17.4   HCT 43.3 49.5 52.8*   MCV 89.5 87.1 87.3   RDW 13.3 13.2 13.4    309 372     CHEMISTRIES:  Recent Labs     02/19/20  0326 02/20/20  0441 02/21/20  0313    140 139   K 4.2 3.7 3.8    99 95*   CO2 27 23 23   BUN 11 8 15   CREATININE 0.9 0.9 1.0   GLUCOSE 108 126* 129*     PT/INR:  Recent Labs     02/19/20  1345   PROTIME 13.1   INR 1.05     APTT:  Recent Labs     02/19/20  1345   APTT 32.6     LIVER PROFILE:  Recent Labs     02/18/20  1450 02/20/20  0441 02/21/20  0313   AST 21 19 23   ALT 13 13 15 BILITOT 0.5 0.6 0.6   ALKPHOS 104 105 114       Imaging Last 24 Hours:  Ct Cervical Spine Wo Contrast    Result Date: 2/18/2020  CT cervical spine 2/18/2020 2:45 PM HISTORY: Previous fusion, weakness TECHNIQUE: Axial images of the cervical spine were obtained without IV contrast. Coronal and sagittal reformatted images are reconstructed and reviewed. COMPARISON: None. DLP: 774 mGy cm Automated exposure control was utilized to minimize patient radiation dose. FINDINGS: There is anterior cervical fusion of C3-C7 with C6 partial vertebrectomy. There is straightening of the normal cervical curvature. Please moderate degenerative disc change seen at C7-T1. There is uncinate process hypertrophy at C6 and C7. There is mild facet arthropathy. No acute cervical vertebral fracture. There is no hardware fracture or radiographic evidence of loosening. Mild canal stenosis suspected at C6/7 secondary to posterior endplate spurring. There is scattered moderate to severe bilateral neural foramen narrowing due to the uncinate process hypertrophy and facet osteoarthropathy, greatest at C5/6. No apical pneumothorax. Left subclavian cardiac pacer device suspected. 1. Anterior cervical fusion of C3-C7 with partial C6 vertebrectomy. Moderate to advanced degenerative disc change at C7-T1 with diffuse cervical  facet osteoarthropathy resulting in moderate to severe neural foramen narrowing. Only mild central canal stenosis. No acute cervical vertebral fracture. Signed by Dr Dolores Lainez on 2/18/2020 4:16 PM    Xr Chest Portable    Result Date: 2/18/2020  EXAMINATION: XR CHEST PORTABLE 2/18/2020 4:02 PM HISTORY: XR CHEST PORTABLE 2/18/2020 2:45 PM HISTORY: Short of breath COMPARISON: September 19, 2019. FINDINGS: The lungs are clear. Cardiac silhouette is normal. Pacing device is present soft tissues the left chest. Postsurgical change from anterior fusion of the cervical spine is noted. . The osseous structures and surrounding soft plan for MRI of the C-spine as well as plan for lumbar puncture, Lovenox held  (ammonia, thyroid studies, CK within normal limits) slight elevation of anti-inflammatory markers sedimentation rate/CRP, rheumatoid factor elevated, coccal antigen negative,  Evidence of foraminal stenosis C2-C5 on MRI C-spine completed 2/20/2020, MRI thoracic spine unremarkable  Neurology services consulted and following appreciate recommendations. Essential hypertension/hypertensive urgency- improving, medication adjustment, routine vitals, continue with as needed medications as needed. Continue to wear CPAP device nightly. Gastroesophageal reflux disease-chronic condition, continue with antireflux medication    Anxiety/depression-chronic condition, continue with home regimen    COPD-chronic condition, continue with nebulized therapy, maintain O2 sats greater than 92%      Electronically signed by   Pablo Lowe   Internal Medicine Hospitalist  On 2/21/2020  At 10:14 AM    EMR Dragon/Transcription disclaimer:   Much of this encounter note is an electronic transcription/translation of spoken language to printed text.  The electronic translation of spoken language may permit erroneous, or at times, nonsensical words or phrases to be inadvertently transcribed; although attempts have made to review the note for such errors, some may still exist.

## 2020-02-21 NOTE — PROGRESS NOTES
Contacted hospitalist regarding patient's blood pressure consistently running greater than 200/100 which has been treated with PRN IV lopressor and hydralazine with little effect. Hospitalist said to treat by giving patient his morning BP med early.

## 2020-02-22 NOTE — PROGRESS NOTES
Physical Therapy    Facility/Department: [facility] SURG SERVICES  Initial Assessment    NAME: Val Lam  : 1950  MRN: [id number]    Date of Service: 2020    Discharge Recommendations:  Continue to assess pending progress        Assessment   Body structures, Functions, Activity limitations: Decreased functional mobility ; Decreased balance;Decreased posture  Assessment: AMB IN [place] WITHOUT DIFFICULTY, NO AD. RECOMMENDED TEMPORARY USE OF RW UPON D/C HOME. WILL FOLLOW FOR PROGRESSIVE AMBULATION PRIOR TO D/C HOME. PT Education: PT Role;General Safety; Family Education  REQUIRES PT FOLLOW UP: Yes  Activity Tolerance  Activity Tolerance: Patient Tolerated treatment well       Patient Diagnosis(es): The primary encounter diagnosis was General weakness. A diagnosis of Fall, initial encounter was also pertinent to this visit. has a past medical history of Anemia, Anxiety, Back pain, Cellulitis, Depression, Elevated triglycerides with high cholesterol, GERD (gastroesophageal reflux disease), Hypertension, Obstructive chronic bronchitis without exacerbation ([facility] [address] Osteoarthritis, Pacemaker, and Sleep apnea, unspecified. has a past surgical history that includes Total knee arthroplasty; Colonoscopy (11); Upper gastrointestinal endoscopy (2012); joint replacement; pacemaker placement; and Neck surgery.     Restrictions  Restrictions/Precautions  Restrictions/Precautions: Fall Risk  Vision/Hearing        Subjective  General  Diagnosis: L1-2,2-3 STENOSIS  General Comment  Comments: FIRST ATTEMPT EARLIER IN AM, Pt SLEEPING AND DECLINED TIL LATER  Subjective  Subjective: Pt WILLING TO WALK AND SIT UP IN CHAIR  Pain Screening  Patient Currently in Pain: No  Vital Signs  Patient Currently in Pain: No       Orientation     Social/Functional History  Social/Functional History  Lives With: Spouse  Type of Home: House  Home Layout: Able to Live on Main level with bedroom/bathroom  Home Access: Stairs to enter without rails  Entrance Stairs - Number of Steps: 2  Bathroom Shower/Tub: Walk-in shower  Bathroom Toilet: Handicap height  Home Equipment: Rolling walker  ADL Assistance: Independent  Homemaking Assistance: Independent  Homemaking Responsibilities: Yes  Ambulation Assistance: Independent  Transfer Assistance: Independent  Active :  Yes  Additional Comments: HISTORY OF FALLS  Cognition        Objective          AROM RLE (degrees)  RLE AROM: WNL  AROM LLE (degrees)  LLE AROM : WNL  Strength RLE  Comment: GROSSLY +4/5  Strength LLE  Comment: GROSSLY +4/5  Coordination  Heel to Shin: Normal  Sensation  Overall Sensation Status: WNL  Bed mobility  Supine to Sit: Supervision  Transfers  Sit to Stand: Contact guard assistance  Stand to sit: Contact guard assistance  Ambulation 1  Device: No Device  Assistance: Contact guard assistance  Quality of Gait: GUARDED PACE, NO LOB  Gait Deviations: Decreased step length;Decreased step height  Distance: 125'     Balance  Sitting - Dynamic: Good  Standing - Dynamic: Good;-        Plan   Plan  Times per week: AT LEAST 5-7  Current Treatment Recommendations: Balance Training, Functional Mobility Training, Transfer Training, Gait Training, Patient/Caregiver Education & Training, Safety Education & Training  Safety Devices  Type of devices: Left in chair, Call light within reach    G-Code       OutComes Score                                                  AM-PAC Score             Goals  Short term goals  Time Frame for Short term goals: 14 DAYS  Short term goal 1: BED MOB INDEPENDENT  Short term goal 2: TRANSFERS INDEPENDENT  Short term goal 3: ' SUP-IND       Therapy Time   Individual Concurrent Group Co-treatment   Time In           Time Out           Minutes                   Olga Deal, PT

## 2020-02-22 NOTE — PROGRESS NOTES
Progress Note    Date:2/22/2020       Room:0522/522-01  Patient Name:Mani Steward     Date of Birth:[birth year]     Age:69 y.o. Subjective   Interval History Status: not changed. Patient seen and examined this a.m. resting comfortably in bed utilizing CPAP device. Improved blood pressure as per nursing staff. MRI lumbar spine completed on 2/21. Patient currently denies any chest pain, shortness of breath, abdominal pain, urinary or bowel changes, fevers or chills, nausea vomiting bouts have resolved. Cumulative hospital course: Patient admitted 2/18 presented to the emergency room with worsening weakness as well as history of falls. Patient says he has been experienced this over the past month or so and over the past 2 weeks it is increased its frequency. Patient was outside on the ground for approximately 1 hour yesterday. Work-up in the emergency room thus far revealed MRI with atrophy no mass seen, urinalysis negative Lyme disease panel negative CT cervical spine negative chest x-ray negative. Lumbar puncture completed 2/19/2020, MRI thoracic spine completed 2/20 revealed-trilevel degenerative disc disease no fractures stenosis or neural compression. MRI C-spine completed 2/20 revealed -normally patent spinal canal no cord compression or abnormal cord enhancement. Some evidence of bilateral foraminal stenosis from C2-C3. Patient did complete lumbar MRI yesterday with the following results: Moderate to severe thecal sac stenosis L1-L2 and L2-L3. Review of Systems   Review of Systems   Constitutional: Positive for fatigue. Negative for activity change and appetite change. HENT: Negative for sore throat and trouble swallowing. Eyes: Negative for redness and visual disturbance. Respiratory: Negative for chest tightness, shortness of breath and wheezing. Cardiovascular: Negative for chest pain and palpitations. Gastrointestinal: Negative for abdominal pain, nausea and vomiting. Genitourinary: Negative for difficulty urinating. Musculoskeletal: Positive for arthralgias and gait problem. Skin: Negative for color change and pallor. Neurological: Positive for weakness. Psychiatric/Behavioral: Negative for agitation and confusion. Medications   Scheduled Meds:    cloNIDine  0.3 mg Oral BID    pantoprazole  40 mg Oral QAM AC    carbidopa-levodopa  1 tablet Oral TID    sodium chloride flush  10 mL Intravenous 2 times per day    rOPINIRole  1 mg Oral Nightly    losartan  100 mg Oral Daily    NIFEdipine  30 mg Oral Daily    busPIRone  10 mg Oral BID    FLUoxetine  20 mg Oral Daily    FLUoxetine  40 mg Oral Daily    enoxaparin  40 mg Subcutaneous Daily     Continuous Infusions:    sodium chloride Stopped (02/19/20 1603)     PRN Meds: potassium chloride **OR** potassium alternative oral replacement **OR** potassium chloride, ketorolac, promethazine, hydrALAZINE, metoprolol, ipratropium-albuterol, sodium chloride flush, albuterol sulfate HFA, furosemide, magnesium hydroxide, ondansetron, acetaminophen    Past History    Past Medical History:   has a past medical history of Anemia, Anxiety, Back pain, Cellulitis, Depression, Elevated triglycerides with high cholesterol, GERD (gastroesophageal reflux disease), Hypertension, Obstructive chronic bronchitis without exacerbation ([facility] [address] Osteoarthritis, Pacemaker, and Sleep apnea, unspecified. Social History:   reports that he has never smoked. He has never used smokeless tobacco. He reports current alcohol use. He reports that he does not use drugs.      Family History:   Family History   Problem Relation Age of Onset    Breast Cancer Mother     Colon Cancer Father     Colon Polyps Father     Prostate Cancer Brother        Physical Examination      Vitals:  BP (!) 149/79   Pulse 70   Temp 97.1 °F (36.2 °C) (Temporal)   Resp 18   Ht 5' 8\" (1.727 m)   Wt 260 lb (117.9 kg)   SpO2 93%   BMI 39.53 kg/m²   Temp (24hrs), tissues the left chest. Postsurgical change from anterior fusion of the cervical spine is noted. . The osseous structures and surrounding soft tissues demonstrate no acute abnormality. 1. No radiographic evidence of acute cardiopulmonary process. Signed by Dr Michael Yoo on 2/18/2020 4:05 PM    Mri Brain W Wo Contrast    Result Date: 2/18/2020  MRI BRAIN W WO CONTRAST 2/18/2020 5:38 PM History: Stroke symptoms. Generalized weakness. Pre and postcontrast MR imaging of the brain. No acute parenchymal ischemia based on the DWI sequence. Moderate diffuse cortical atrophy. Left maxillary sinus and ethmoid sinus mucosal thickening. Mild-to-moderate confluent small vessel disease throughout the periventricular white matter. No brain hemorrhage or abnormal calcification. Postcontrast imaging shows no enhancing brain lesion. No sign of vasculitis. The dural venous sinuses are patent. 1. Atrophy and small vessel disease. 2. No acute infarct. 3. No mass. Signed by Dr Liliane Figueroa on 2/18/2020 5:39 PM        Assessment        Hospital Problems           Last Modified POA    * (Principal) Weakness 2/19/2020 Yes    Sick sinus syndrome due to SA node dysfunction ([address] 2/19/2020 Yes    Overview Signed 9/17/2019  5:14 PM by Nneka Felder MD     4/20/2017  Echo  Normal LVFX  4/21/2017  lexiscan negative for myocardial ischemia, EF 63  %   4/21/2017  Dual chamber PPM  9/14/2019  Echo  Normal LVFX, DD, cLVH         Dyspnea on exertion 2/19/2020 Yes    Osteoarthritis 2/19/2020 Yes    Pacemaker 2/19/2020 Yes    Sleep apnea, unspecified 2/19/2020 Yes    Fall 2/19/2020 Yes    Gait difficulty 2/20/2020 Yes          Plan:        Generalized weakness/difficulty with ambulation -MRI brain revealed moderate amount of cortical atrophy no mass or abscesses noted. Lumbar puncture completed 2/19-spinal fluid culture no growth to date, glucose elevated 78, protein elevated at 70 opening pressure 15.5 cm.   Have been consulted plan

## 2020-02-22 NOTE — PROGRESS NOTES
[facility] Neurology Progress Note      Patient:   Surekha Glasgow  MR#:    [id number]   Room:    0522/522-01   YOB: 1950  Date of Progress Note: 2/22/2020  Time of Note                           11:29 AM  Consulting Physician:  Senia Ball DO  Attending Physician:  Cait Barcenas MD      INTERVAL HISTORY:  No acute events noted overnight, headache better, noting some improvement with gait on sinemet. REVIEW OF SYSTEMS:  Constitutional: No fevers   Neck:No stiffness  Respiratory: No shortness of breath  Cardiovascular: No chest pain   Gastrointestinal: No abdominal pain    Genitourinary: No Dysuria  Neurological: No confusion    PHYSICAL EXAM:    Constitutional -   BP (!) 149/79   Pulse 70   Temp 97.1 °F (36.2 °C) (Temporal)   Resp 18   Ht 5' 8\" (1.727 m)   Wt 260 lb (117.9 kg)   SpO2 93%   BMI 39.53 kg/m²   General appearance: No acute distress   EYES -   Conjunctiva normal  Pupillary exam as below, see CN exam in the neurologic exam  ENT-    No scars, masses, or lesions over external nose or ears  Hearing normal bilaterally to finger rub  Neck-   No neck masses noted  Thyroid normal   No jugular vein distension  Cardiovascular -   No clubbing, cyanosis, or edema   Pulmonary-   Good expansion, normal effort without use of accessory muscles  Musculoskeletal -   No significant wasting of muscles noted  Gait as below, see gait exam in the neurologic exam  Muscle strength, tone, stability as below see the motor exam in the neurologic exam.   No bony deformities  Skin -   Warm, dry, and intact to inspection and palpation.     No rash, erythema, or pallor  Psychiatric -   Mood, affect, and behavior appear normal    Memory as below see mental status examination in the neurologic exam      NEUROLOGICAL EXAM    Mental status   [x] Awake, alert, oriented   [x] Affect attention and concentration appear appropriate  [x] Recent and remote memory appears unremarkable  [x] Speech normal without PM    Fl Lumbar Puncture Diag    Result Date: 2/19/2020  Examination. FL LUMBAR PUNCTURE DIAG 2/19/2020 11:15 AM History: Weakness and loss of balance. The procedure was explained to the patient. The benefits, the risks and complications are discussed. The patient understood the procedure and signed the consent. The patient was placed on the fluoroscopy table and the lumbar spine was examined. The patient has moderately severe dextroscoliosis. A suitable spot opposite interspace L3-4 was selected for puncture. The spot was marked with an ink marker. After sterile preparation and application of local anesthesia a size 20-gauge longer spine needle was introduced into the thecal sac and a FreeFlow of clear colorless thin fluid was established. The patient was immediately turned into a decubitus position. The outer hub of the needle was connected to a pressure measuring device and opening pressure was measured. The opening pressure is 15.5 cm water. 30 mL of CSF was withdrawn and dispensed in 4 separate test tubes which were sent to the laboratory for further evaluation as instructed by the attending physician. The needle was then withdrawn and the puncture site was covered with a sterile Band-Aid. The patient tolerated the procedure well. No immediate complications were noted. The patient was given post lumbar puncture care instructions. A successful lumbar puncture and opening pressure measurement. Fluoroscopy time 2 minutes 11 seconds. Total dose: 1.805wWff7.  Signed by Dr Jordan Kothari on 2/19/2020 4:45 PM      Lab Results   Component Value Date    WBC 9.4 02/22/2020    HGB 16.5 02/22/2020    HCT 49.7 02/22/2020    MCV 88.4 02/22/2020     02/22/2020     Lab Results   Component Value Date     02/22/2020    K 3.1 (L) 02/22/2020    CL 99 02/22/2020    CO2 24 02/22/2020    BUN 34 (H) 02/22/2020    CREATININE 1.2 02/22/2020    GLUCOSE 114 (H) 02/22/2020    CALCIUM 9.5 02/22/2020    PROT 7.7 02/22/2020 LABALBU 4.1 02/22/2020    BILITOT 0.5 02/22/2020    ALKPHOS 93 02/22/2020    AST 23 02/22/2020    ALT 6 02/22/2020    LABGLOM 60 (A) 02/22/2020    GLOB 2.8 04/20/2017     Lab Results   Component Value Date    INR 1.05 02/19/2020    INR 0.97 09/19/2017    INR 1.12 04/22/2017    PROTIME 13.1 02/19/2020    PROTIME 12.8 09/19/2017    PROTIME 14.4 04/22/2017       RECORD REVIEW:   Previous medical records, medications were reviewed at today's visit. Nursing/physician notes, imaging, labs and vitals reviewed. PT,OT and/or speech notes reviewed    ASSESSMENT:   71 y.o. admitted with generalized weakness and worsening gait difficulty, etiology unclear. MRI brain negative outside of questionable NPH findings, now s/p high volume LP with no overt improvement in gait thus far. Mild nonspecific elevation of inflammatory markers noted, unclear if clinically significant. MRI C/T spine largely negative. Gait assesd some improvement noted, shuffling, ataxic/apraxic appearing, possible parkinsonian. MRI L-spine without clear source for symptoms. Labs largely negative, celiac abs were positive. PLAN:   1. Hold off on NCS/EMG for now given some improvement with sinemet, no weakness or davi sensory loss on exam, GBS or myopathy felt unlikely. 2.  Follow up remaining serum and csf labs, send heavy metals  3.  PT, OT, ST  4. DVT proph  5. Continue sinemet   6. Gluten fee diet, given celiac testing. Please feel free to call with any questions. [phone] (cell phone).       Rosalia Castañeda, DO  Board Certified Neurology

## 2020-02-22 NOTE — PROGRESS NOTES
Progress Note    Date:2/22/2020       Room:0522/522-01  Patient Name:Mani Meade     Date of Birth:[birth year]     Age:69 y.o. Subjective   Interval History Status: not changed. Patient seen and examined this a.m. resting comfortably in bed utilizing CPAP device. Improved blood pressure as per nursing staff. MRI lumbar spine completed on 2/21. Patient currently denies any chest pain, shortness of breath, abdominal pain, urinary or bowel changes, fevers or chills, nausea vomiting bouts have resolved. Cumulative hospital course: Patient admitted 2/18 presented to the emergency room with worsening weakness as well as history of falls. Patient says he has been experienced this over the past month or so and over the past 2 weeks it is increased its frequency. Patient was outside on the ground for approximately 1 hour yesterday. Work-up in the emergency room thus far revealed MRI with atrophy no mass seen, urinalysis negative Lyme disease panel negative CT cervical spine negative chest x-ray negative. Lumbar puncture completed 2/19/2020, MRI thoracic spine completed 2/20 revealed-trilevel degenerative disc disease no fractures stenosis or neural compression. MRI C-spine completed 2/20 revealed -normally patent spinal canal no cord compression or abnormal cord enhancement. Some evidence of bilateral foraminal stenosis from C2-C3. Patient did complete lumbar MRI yesterday with the following results: Moderate to severe thecal sac stenosis L1-L2 and L2-L3. Review of Systems   Review of Systems   Constitutional: Positive for fatigue. Negative for activity change and appetite change. HENT: Negative for sore throat and trouble swallowing. Eyes: Negative for redness and visual disturbance. Respiratory: Negative for chest tightness, shortness of breath and wheezing. Cardiovascular: Negative for chest pain and palpitations. Gastrointestinal: Negative for abdominal pain, nausea and vomiting. Genitourinary: Negative for difficulty urinating. Musculoskeletal: Positive for arthralgias and gait problem. Skin: Negative for color change and pallor. Neurological: Positive for weakness. Psychiatric/Behavioral: Negative for agitation and confusion. Medications   Scheduled Meds:    cloNIDine  0.3 mg Oral BID    pantoprazole  40 mg Oral QAM AC    carbidopa-levodopa  1 tablet Oral TID    sodium chloride flush  10 mL Intravenous 2 times per day    rOPINIRole  1 mg Oral Nightly    losartan  100 mg Oral Daily    NIFEdipine  30 mg Oral Daily    busPIRone  10 mg Oral BID    FLUoxetine  20 mg Oral Daily    FLUoxetine  40 mg Oral Daily    enoxaparin  40 mg Subcutaneous Daily     Continuous Infusions:    sodium chloride Stopped (02/19/20 1603)     PRN Meds: potassium chloride **OR** potassium alternative oral replacement **OR** potassium chloride, ketorolac, promethazine, hydrALAZINE, metoprolol, ipratropium-albuterol, sodium chloride flush, albuterol sulfate HFA, furosemide, magnesium hydroxide, ondansetron, acetaminophen    Past History    Past Medical History:   has a past medical history of Anemia, Anxiety, Back pain, Cellulitis, Depression, Elevated triglycerides with high cholesterol, GERD (gastroesophageal reflux disease), Hypertension, Obstructive chronic bronchitis without exacerbation ([facility] [address] Osteoarthritis, Pacemaker, and Sleep apnea, unspecified. Social History:   reports that he has never smoked. He has never used smokeless tobacco. He reports current alcohol use. He reports that he does not use drugs.      Family History:   Family History   Problem Relation Age of Onset    Breast Cancer Mother     Colon Cancer Father     Colon Polyps Father     Prostate Cancer Brother        Physical Examination      Vitals:  BP (!) 149/79   Pulse 70   Temp 97.1 °F (36.2 °C) (Temporal)   Resp 18   Ht 5' 8\" (1.727 m)   Wt 260 lb (117.9 kg)   SpO2 93%   BMI 39.53 kg/m²   Temp (24hrs), Av.3 °F (36.3 °C), Min:97 °F (36.1 °C), Max:97.6 °F (36.4 °C)      I/O (24Hr): Intake/Output Summary (Last 24 hours) at 2020 9833  Last data filed at 2020 0419  Gross per 24 hour   Intake 200 ml   Output 400 ml   Net -200 ml       Physical Exam  Vitals signs and nursing note reviewed. Constitutional:       General: He is not in acute distress. Appearance: He is not ill-appearing or toxic-appearing. HENT:      Head: Normocephalic and atraumatic. Nose: Nose normal.   Eyes:      General: No scleral icterus. Conjunctiva/sclera: Conjunctivae normal.   Neck:      Musculoskeletal: No neck rigidity. Cardiovascular:      Rate and Rhythm: Normal rate. Pulses: Normal pulses. Pulmonary:      Effort: Pulmonary effort is normal.      Breath sounds: No wheezing. Abdominal:      General: Bowel sounds are normal. There is no distension. Tenderness: There is no abdominal tenderness. There is no guarding or rebound. Musculoskeletal:         General: Tenderness (lumbar ) present. No swelling. Skin:     Capillary Refill: Capillary refill takes less than 2 seconds. Coloration: Skin is not jaundiced. Findings: No erythema or lesion. Neurological:      Mental Status: He is alert and oriented to person, place, and time. Motor: Weakness present.       Gait: Gait abnormal.   Psychiatric:         Mood and Affect: Mood normal.         Labs/Imaging/Diagnostics   Labs:  CBC:  Recent Labs     20  0441 20  0313 20  0402   WBC 10.1 11.5* 9.4   RBC 5.68 6.05 5.62   HGB 16.5 17.4 16.5   HCT 49.5 52.8* 49.7   MCV 87.1 87.3 88.4   RDW 13.2 13.4 13.6    372 309     CHEMISTRIES:  Recent Labs     20  0441 20  0313 20  0402    139 140   K 3.7 3.8 3.1*   CL 99 95* 99   CO2 23 23 24   BUN 8 15 34*   CREATININE 0.9 1.0 1.2   GLUCOSE 126* 129* 114*     PT/INR:  Recent Labs     20  1345   PROTIME 13.1   INR 1.05     APTT:  Recent Labs

## 2020-02-23 NOTE — DISCHARGE INSTR - DIET

## 2020-02-23 NOTE — DISCHARGE SUMMARY
Discharge Summary  Date:2/23/2020        Patient Name:Mani Sampson     Date of Birth:[birth year]     Age:69 y.o. Admit Date:2/18/2020   Admission Condition:poor   Discharged Condition:fair  Discharge Date: 02/23/20     Discharge Diagnoses   Principal Problem:    Weakness  Active Problems:    Sick sinus syndrome due to SA node dysfunction (HCC)    Dyspnea on exertion    Osteoarthritis    Pacemaker    Sleep apnea, unspecified    Fall    Gait difficulty  Resolved Problems:    * No resolved hospital problems. [facility] Stay   Narrative of Hospital Course: Patient admitted 2/18 presented to the emergency room with worsening weakness as well as history of falls. Patient says he has been experienced this over the past month or so and over the past 2 weeks it is increased its frequency. Patient was outside on the ground for approximately 1 hour yesterday. Work-up in the emergency room thus far revealed MRI with atrophy no mass seen, urinalysis negative Lyme disease panel negative CT cervical spine negative chest x-ray negative. Lumbar puncture completed 2/19/2020, MRI thoracic spine completed 2/20 revealed-trilevel degenerative disc disease no fractures stenosis or neural compression. MRI C-spine completed 2/20 revealed -normally patent spinal canal no cord compression or abnormal cord enhancement. Some evidence of bilateral foraminal stenosis from C2-C3. Patient did complete lumbar MRI yesterday with the following results: Moderate to severe thecal sac stenosis L1-L2 and L2-L3. Celiac antibodies were noted to be positive. Neurology recommending gluten-free diet. Patient was started on Sinemet, due to improvement clinically neurology has decided to hold off on NCS/EMG. Patient participated with physical therapy services while inpatient, last documentation 2/23/2020- Safe for return to home.   During inpatient stay noted minor acute kidney injury, patient received bolus of fluids and maintenance subsequently degenerative disc and endplate changes with no fracture, stenosis, or neural compression. Signed by Dr Tigre Hart on 2/20/2020 5:24 PM    Xr Chest Portable    Result Date: 2/18/2020  1. No radiographic evidence of acute cardiopulmonary process. Signed by Dr Hailey Cruz on 2/18/2020 4:05 PM    Mri Brain W Wo Contrast    Result Date: 2/18/2020  1. Atrophy and small vessel disease. 2. No acute infarct. 3. No mass. Signed by Dr Tigre Hart on 2/18/2020 5:39 PM    Fl Lumbar Puncture Diag    Result Date: 2/19/2020  A successful lumbar puncture and opening pressure measurement. Fluoroscopy time 2 minutes 11 seconds. Total dose: 1.854jCds6. Signed by Dr Cinda Lesch on 2/19/2020 4:45 PM    Physical Exam  Vitals signs and nursing note reviewed. Constitutional:       General: He is not in acute distress. Tone improved. Appearance: He is not ill-appearing or toxic-appearing. HENT:      Head: Normocephalic and atraumatic. Nose: Nose normal.   Eyes:      General: No scleral icterus. Conjunctiva/sclera: Conjunctivae normal.   Neck:      Musculoskeletal: No neck rigidity. Cardiovascular:      Rate and Rhythm: Normal rate. Pulses: Normal pulses. Pulmonary:      Effort: Pulmonary effort is normal.      Breath sounds: No wheezing. Abdominal:      General: Bowel sounds are normal. There is no distension. Tenderness: There is no abdominal tenderness. There is no guarding or rebound. Musculoskeletal:         General: Tenderness (lumbar ) present. No swelling. Skin:     Capillary Refill: Capillary refill takes less than 2 seconds. Coloration: Skin is not jaundiced. Findings: No erythema or lesion. Neurological:      Mental Status: He is alert and oriented to person, place, and time.       Motor: Weakness improved     Gait: Gait and balance improved, seen actively walking with Physical Therapy  Psychiatric:         Mood and Affect: Mood normal.          Discharge Plan Disposition: Home    Provider Follow-Up:   Tran Mitchell MD  [address]  [place] [address]          [place]enton[place] Den[place] [phone]    In 1 week  follow up hospital discharge         Patient Instructions   Diet: Gluten Free Diet Recommended, Educational Materials attached     Activity: activity as tolerated      Discharge Medications         Medication List      START taking these medications    carbidopa-levodopa  MG per tablet  Commonly known as:  SINEMET  Take 1 tablet by mouth 3 times daily        CHANGE how you take these medications    cloNIDine 0.3 MG tablet  Commonly known as:  CATAPRES  Take 1 tablet by mouth 2 times daily  What changed:    · medication strength  · how much to take  · when to take this  · reasons to take this        CONTINUE taking these medications    albuterol sulfate  (90 Base) MCG/ACT inhaler  Inhale 2 puffs into the lungs every 6 hours as needed for Wheezing     busPIRone 10 MG tablet  Commonly known as:  BUSPAR  TAKE 1 TABLET BY MOUTH THREE TIMES A DAY     celecoxib 200 MG capsule  Commonly known as:  CELEBREX  TAKE 1 CAPSULE BY MOUTH EVERY DAY     * FLUoxetine 20 MG capsule  Commonly known as:  PROZAC  Take 1 capsule by mouth daily Take 40mg along with 20mg capsule daily     * FLUoxetine 40 MG capsule  Commonly known as:  PROzac  Take 1 capsule by mouth daily     furosemide 40 MG tablet  Commonly known as:  LASIX  Take 1 tablet by mouth as needed (fluid retention)     ipratropium-albuterol 0.5-2.5 (3) MG/3ML Soln nebulizer solution  Commonly known as:  DUONEB     losartan 100 MG tablet  Commonly known as:  COZAAR  Take 1 tablet by mouth daily     mometasone-formoterol 200-5 MCG/ACT inhaler  Commonly known as:  Dulera  INHALE 2 PUFFS INTO THE LUNGS EVERY 12 HOURS     NIFEdipine 30 MG extended release tablet  Commonly known as:  PROCARDIA XL  Take 1 tablet by mouth daily     nitroGLYCERIN 0.3 MG SL tablet  Commonly known as:  Nitrostat  up to max of 3 total doses. If no relief after 1 dose, call 911. pantoprazole 40 MG tablet  Commonly known as:  PROTONIX  TAKE 1 TABLET BY MOUTH EVERY MORNING BEFORE BREAKFAST     rOPINIRole 1 MG tablet  Commonly known as:  REQUIP  Take 1 tablet by mouth nightly         * This list has 2 medication(s) that are the same as other medications prescribed for you. Read the directions carefully, and ask your doctor or other care provider to review them with you. STOP taking these medications    cetirizine 10 MG tablet  Commonly known as:  ZYRTEC           Where to Get Your Medications      These medications were [facility] to [facility]pharmacy #[id number] [place], [address], [address] [facility] [address]    Phone:  [phone]   · carbidopa-levodopa  MG per tablet  · cloNIDine 0.3 MG tablet         Electronically signed by   Daria Ramirez   Internal Medicine Hospitalist  On 2/23/2020  At 4:21 PM    EMR Dragon/Transcription disclaimer:   Much of this encounter note is an electronic transcription/translation of spoken language to printed text.  The electronic translation of spoken language may permit erroneous, or at times, nonsensical words or phrases to be inadvertently transcribed; although attempts have made to review the note for such errors, some may still exist.

## 2020-02-23 NOTE — PROGRESS NOTES
Physical Therapy    Bradford Bronson  [id number]       02/23/20 1018   General   Diagnosis L1-2,2-3 STENOSIS   Subjective   Subjective Up in chair, appears better spirits today. States possible home later today. Pain Screening   Patient Currently in Pain No   Bed Mobility   Comment NT: up in chair   Transfers   Sit to Stand Stand by assistance;Contact guard assistance   Stand to sit Stand by assistance;Contact guard assistance   Ambulation 1   Device No Device   Assistance Stand by assistance;Contact guard assistance   Quality of Gait guarded but moderate pace, no LOB   Distance 150'   Balance   Sitting - Dynamic Good   Standing - Dynamic Good;-   Short term goals   Time Frame for Short term goals 14 DAYS   Short term goal 1 BED MOB INDEPENDENT   Short term goal 2 TRANSFERS INDEPENDENT   Short term goal 3 ' SUP-IND   Conditions Requiring Skilled Therapeutic Intervention   Body structures, Functions, Activity limitations Decreased functional mobility ; Decreased balance;Decreased posture   Assessment Safe for return home when medically ready   Discharge Recommendations Continue to assess pending progress     Electronically signed by Pako Rojas PT on 2/23/2020 at 10:24 AM

## 2020-02-23 NOTE — PROGRESS NOTES
02/23/2020    BILITOT <0.2 02/23/2020    ALKPHOS 83 02/23/2020    AST 28 02/23/2020    ALT 7 02/23/2020    LABGLOM 46 (A) 02/23/2020    GLOB 2.8 04/20/2017     Lab Results   Component Value Date    INR 1.05 02/19/2020    INR 0.97 09/19/2017    INR 1.12 04/22/2017    PROTIME 13.1 02/19/2020    PROTIME 12.8 09/19/2017    PROTIME 14.4 04/22/2017       RECORD REVIEW:   Previous medical records, medications were reviewed at today's visit. Nursing/physician notes, imaging, labs and vitals reviewed. PT,OT and/or speech notes reviewed    ASSESSMENT:   71 y.o. admitted with generalized weakness and worsening gait difficulty, etiology unclear. MRI brain negative outside of questionable NPH findings, now s/p high volume LP with no overt improvement initially in gait, though now much improved after starting sinemet. Mild nonspecific elevation of inflammatory markers noted, unclear if clinically significant. MRI C/T/L spine largely negative. Gait assesd this am and much improved, likely parkinsonian. Labs largely negative, celiac abs were positive. Unclear if gait improvement could be delayed from the LP or if he is having a robust response to sinemet. Will need to continue to follow closely. PLAN:   1. Hold off on NCS/EMG for now given some improvement with sinemet, no weakness or davi sensory loss on exam, GBS or myopathy felt unlikely. 2.  Follow up remaining serum and csf labs, heavy metals  3.  PT, OT, ST  4. DVT proph  5. Continue sinemet   6. Gluten fee diet, given celiac testing. 7.  Given his improvement, he could be discharged from my standpoint once cleared medically. Follow up with Dr. Tram Reyna in 1-2 weeks, continue sinemet. Please feel free to call with any questions. [phone] (cell phone).       Rosalia Castañeda, DO  Board Certified Neurology

## 2020-02-24 NOTE — CARE COORDINATION
[facility]    Call within 2 business days of discharge: Yes    Patient: Ankush Ball Patient : 1950   MRN: [id number]   Discharge Date: 20 RARS: Readmission Risk Score: 11      Last Discharge [facility]       Complaint Diagnosis Description Type Department Provider    20 Fatigue; Fall General weakness . .. ED to Hosp-Admission (Discharged) (ADMITTED) L 5 SURG Haroon Rios MD; Audit Verify ... Spoke with: N/A    Facility: [place] [id number]    Non-face-to-face services provided:  Reviewed encounter information for continuity of care prior to follow up phone call - chart notes, consults, progress notes, test results, med list, appointments, AVS, other information. Care Transitions 24 Hour Call    Do you have all of your prescriptions and are they filled?:  Yes  Patient Home Equipment:  Oxygen  Do you have support at home?:  Partner/Spouse/SO  Are you an active caregiver in your home?:  No  Care Transitions Interventions                                 Follow Up: Attempted to make contact with patient for an initial follow up call post discharge without success. Unable to leave a message regarding intent of call and call back information due to an unidentifiable voice mail. Will try again at a later time.      Future Appointments   Date Time Provider Susan Garcia   2020 12:45 PM [place] [facility]   3/3/2020 12:15 PM Keren Severe, MD [facility] [place]O [place]   3/5/2020  3:00 PM Ernesto Mckeon OT [facility]   3/5/2020  4:00 PM Collins Tarango PT [facility]   2020  1:00 PM [facility]   6/15/2020 12:45 PM [place] [facility]   2020  3:00 PM Jason Virk MD [facility] Cardio [place]       Mary Alice Schuster RN

## 2020-02-25 NOTE — CARE COORDINATION
wears CPAP at night, says he feels well rested using it. He does not wear oxygen. Did go over medications, 1111F order added. He states no problems with meds. No problems or complaints. He says he is getting around better than he has in some time. Very appreciative of calls and concern. Encouraged to call with any needs or issues that may come up. Will follow up with patient at a later time.    Future Appointments   Date Time Provider Susan Garcia   2/28/2020 12:45 PM [facility]   3/3/2020 12:15 PM Tremayne Onofre MD [facility] NEURO [facility]   3/5/2020  3:00 PM Tiny Primes, OT [facility]   3/5/2020  4:00 PM Yuri Pittman PT [facility]   4/6/2020  1:00 PM Chris Treadwell MD [facility]   6/15/2020 12:45 PM Chris Treadwell MD [facility]   6/25/2020  3:00 PM Ellender Goodell, MD [facility] Cardio [facility]       Russell Rollins RN

## 2020-02-28 NOTE — PROGRESS NOTES
Post-Discharge Transitional Care Management Services      Raj Arriola   YOB: 1950    Date of Visit:  2/28/2020  30 Day Post-Discharge Date: March 23, 2020    Allergies   Allergen Reactions    Codeine Swelling     Had as a baby--not sure of reaction. No outpatient medications have been marked as taking for the 2/28/20 encounter (Office Visit) with Romeo Mccoy MD.         Vitals:    02/28/20 1227   BP: 118/70   Pulse: 84   SpO2: 98%   Weight: 253 lb 8 oz (115 kg)   Height: 5' 8\" (1.727 m)     Body mass index is 38.54 kg/m². Wt Readings from Last 3 Encounters:   02/28/20 253 lb 8 oz (115 kg)   02/18/20 260 lb (117.9 kg)   12/19/19 273 lb (123.8 kg)     BP Readings from Last 3 Encounters:   02/28/20 118/70   02/23/20 128/85   02/14/20 (!) 150/80        Patient was admitted to [facility] from February 18, 2020 to February 23, 2020 for weakness and ataxic gait. HPI:  Patient is in for hospital follow-up visit. He was admitted on 18 February with a generalized weakness and difficulty walking with an ataxic gait. He was discharged on 23 February and was seen by neurology and treated for parkinsonism and started on Sinemet. He is doing amazingly well at this time and much better. Inpatient course: Discharge summary reviewed- see chart. Current status: Good    Review of Systems:  Review of Systems   Constitutional: Positive for fatigue. Negative for activity change, appetite change and fever. HENT: Negative for congestion, hearing loss, sinus pressure, sore throat and trouble swallowing. Eyes: Negative for discharge and itching. Respiratory: Negative for shortness of breath and wheezing. Cardiovascular: Negative for chest pain, palpitations and leg swelling. Gastrointestinal: Negative for abdominal distention, abdominal pain, blood in stool, nausea and vomiting. Endocrine: Negative for cold intolerance, heat intolerance and polydipsia.    Genitourinary: Coloration: Skin is not pale. Findings: No erythema or rash. Neurological:      Mental Status: He is alert and oriented to person, place, and time. Cranial Nerves: No cranial nerve deficit. Motor: No abnormal muscle tone. Coordination: Coordination normal.      Deep Tendon Reflexes: Reflexes are normal and symmetric. Reflexes normal.   Psychiatric:         Behavior: Behavior normal.         Thought Content: Thought content normal.         Judgment: Judgment normal.         Initial post-discharge communication occurred between nurse care coordinator and patient on February 25, 2020- see documentation in chart: telephone encounter. Assessment/Plan:  Caesar Izaguirre was seen today for follow-up from hospital.    Diagnoses and all orders for this visit:    Ataxia  -     [place] DISCHARGE MEDS RECONCILED W/ CURRENT OUTPATIENT MED LIST          Diagnostic test results reviewed: inpatient labs    Patient risk of morbidity and mortality: high    Medical Decision Making: high complexity  Doing well at this time post hospitalization. He can tell a tremendous difference in how he feels since starting the Sinemet. His balance and ataxia has improved as has his tremor. He is going to start physical therapy a week from today and he sees neurology next week. He is to keep his scheduled appointment with me as planned.

## 2020-02-28 NOTE — CARE COORDINATION
[facility] Transitions Follow Up Call    2020    Patient: Mirela Llanos  Patient : 1950   MRN: [id number]   Discharge Date: 20 RARS: Readmission Risk Score: 6         Spoke with: [address] Transitions Subsequent and Final Call    Schedule Follow Up Appointment with PCP:  Completed  Subsequent and Final Calls  Do you have any ongoing symptoms?:  No  Have your medications changed?:  No  Do you have any questions related to your medications?:  No  Do you currently have any active services?:  No  Are you currently active with any services?:  Home Health  Do you have any needs or concerns that I can assist you with?:  No  Identified Barriers:  None  Care Transitions Interventions                          Other Interventions: Follow Up: Placed a call to patient for follow up. He reported that he is doing well. He said he just came from Dr. Mele Palacios office having his follow up appointment. He said he was pleased with him and his progress. He said that he is eating and drinking well. He is sleeping well. He is not having any issues with his breathing. Feels good and was happy to get out and get some fresh air today. Will call prn any issues. CTN to call for follow up in a few days.     Plan for next call - assess overall status, CP status, abnormal signs and symptoms, effectiveness of medications, activity level and tolerance, falls/other unexpected events, findings from follow up appointments, medication/treatment changes, any additional teaching needs, etc.      Future Appointments   Date Time Provider Susan Garcia   3/3/2020 12:15 PM Darrel Madsen MD [facility]   3/5/2020  3:00 PM Binu Kaye OT [facility]   3/5/2020  4:00 PM Samy Phillips PT [facility]   2020  1:00 PM Gregg Potts MD [facility]   6/15/2020 12:45 PM Gregg Potts MD [facility]   2020  3:00 PM Coni Saunders MD [facility]

## 2020-03-03 PROBLEM — R94.02 ABNORMAL BRAIN SCAN: Status: ACTIVE | Noted: 2020-01-01

## 2020-03-03 NOTE — TELEPHONE ENCOUNTER
[facility] pulmonary office is wanting us to clear patient for procedure. I have called their office explaining they must fax us a cardiac clearance request in order for us to give the ok for patient to proceed.  He had stress that was normal.

## 2020-03-09 NOTE — CARE COORDINATION
[facility] Transitions Follow Up Call    3/9/2020    Patient: Bradford Bronson  Patient : 1950   MRN: [id number]    Discharge Date: 20 RARS: Readmission Risk Score: 6         Spoke with: [address] Transitions Subsequent and Final Call    Schedule Follow Up Appointment with PCP:  Completed  Subsequent and Final Calls  Have your medications changed?:  No  Do you have any questions related to your medications?:  No  Do you currently have any active services?:  Yes  Are you currently active with any services?:  Home Health  Do you have any needs or concerns that I can assist you with?:  No  Identified Barriers:  None  Care Transitions Interventions  Other Interventions: Follow Up: Placed a call to the home and spoke with patient. He reported that he is doing very well except that he passed out yesterday. He said he got up yesterday morning and started to the other room and the next thing he knew, he was on the floor. He said he had no warning whatsoever. He was not dizzy or weak. He did say that his blood pressure medicine dries him out and he sips on liquids throughout the night, but does not really drink a lot in the evening or during the night. He is not aware of how his blood pressure runs during the night or early morning hours. I did advise him of dangling and moving arms and hands about to get blood flowing well prior to rising in the morning. Also, advised to stand still for a few minutes, at least 2-3 minutes, before walking off. Instructed on how blood pressure may bottom out after he has changed positions and is standing. Advised to check his blood pressure over the next ocuple of days by doing a lying, sitting and standing blood pressure and pulse or at least sitting and standing. Keep a log and notify CTN or PCP if there are noticeable differences. Voiced understanding. Reported that his breathing is good today. He is eating well. Sleeping well.   No

## 2020-03-12 NOTE — CARE COORDINATION
4/7/2020  2:45 PM JUAN Stevens LPS NEURO [facility]   6/15/2020 12:45 PM Shamika Sharpe MD LPS [facility]   6/25/2020  3:00 PM Nora Tinsley MD [facility] Cardio [facility]       Sandro Castillo RN

## 2020-03-13 ENCOUNTER — LAB REQUISITION (OUTPATIENT)
Dept: LAB | Facility: HOSPITAL | Age: 70
End: 2020-03-13

## 2020-03-13 ENCOUNTER — OUTSIDE FACILITY SERVICE (OUTPATIENT)
Dept: PULMONOLOGY | Facility: CLINIC | Age: 70
End: 2020-03-13

## 2020-03-13 DIAGNOSIS — Z00.00 ROUTINE GENERAL MEDICAL EXAMINATION AT A HEALTH CARE FACILITY: ICD-10-CM

## 2020-03-13 PROBLEM — A41.9 SEPTIC SHOCK (HCC): Status: ACTIVE | Noted: 2020-01-01

## 2020-03-13 PROBLEM — R65.21 SEPTIC SHOCK (HCC): Status: ACTIVE | Noted: 2020-01-01

## 2020-03-13 PROBLEM — Z51.5 PALLIATIVE CARE PATIENT: Status: ACTIVE | Noted: 2020-01-01

## 2020-03-13 PROBLEM — J96.02 ACUTE RESPIRATORY FAILURE WITH HYPOXIA AND HYPERCAPNIA (HCC): Status: ACTIVE | Noted: 2019-01-01

## 2020-03-13 LAB

## 2020-03-13 PROCEDURE — 99223 1ST HOSP IP/OBS HIGH 75: CPT | Performed by: INTERNAL MEDICINE

## 2020-03-13 PROCEDURE — 0100U HC BIOFIRE FILMARRAY RESP PANEL 2: CPT

## 2020-03-13 NOTE — PROGRESS NOTES
Lab Results From [facility]     Component Value Ref Range & Units Status Collected Lab   pH, Arterial 6.930Low Panic   7.350 - 7.450 Final 03/13/2020 12:33 PM [address] [place] [address] Lab   pCO2, Arterial 67. 0High   35.0 - 45.0 mmHg Final 03/13/2020 12:33 PM [facility] Lab   HCO3, Arterial 14.1Low   22.0 - 26.0 mmol/L Final 03/13/2020 12:33 PM [facility] Excess, Arterial -20.1Low   -2.0 - 2.0 mmol/L Final 03/13/2020 12:33 PM [address] [place] [address] Lab   Hemoglobin, Art, Extended 20. 8High   14.0 - 18.0 g/dL Final 03/13/2020 12:33 PM [address] [place] [address] Lab   Carboxyhgb, Arterial 0.5  0.0 - 5.0 % Final 03/13/2020 12:33 PM [facility] Lab        0.0-1.5   (Smokers 1.5-5.0)    Methemoglobin, Arterial 0.2  <1.5 % Final 03/13/2020 12:33 PM [address] [place] [address] Lab   O2 Content, Arterial 20.4  Not Established mL/dL Final 03/13/2020 12:33 PM [address] [place] [address] Lab   Testing Performed By     Carmen Pablo Name Director Address Valid Date Range   [address] - [address] LAB Fadia Kowalski  [facility],[address]  [address] [place] [address] 08/30/17 0733-Present   Lab and Collection     Blood Gas, Arterial - 3/13/2020   Result History     Blood Gas, Arterial on 3/13/2020   Result Information     Flag: CriticalPanic   Status: Preliminary result (Collected: 3/13/2020 12:33) Provider Status: Ordered   Click to Print Result   View [facility] [employer] Info     Blood Gas, Arterial (Order #[id number]) on 3/13/20   Order Report     Order Details   Pt on cpap14/7  15 lpm

## 2020-03-13 NOTE — PROGRESS NOTES
Pt bagged from ER to CT scan and then to CCU room 701 with no problems. Pt back on vent at previous settings.

## 2020-03-13 NOTE — PROGRESS NOTES
Pharmacy Renal Adjustment    Farida Brown is a 71 y.o. male. Pharmacy has renally adjusted medications per protocol. Recent Labs     03/13/20  1219   BUN 23       Recent Labs     03/13/20  1219   CREATININE 1.7*       Estimated Creatinine Clearance: 51 mL/min (A) (based on SCr of 1.7 mg/dL (H)). Height:   Ht Readings from Last 1 Encounters:   03/13/20 5' 8\" (1.727 m)     Weight:  Wt Readings from Last 1 Encounters:   03/13/20 255 lb (115.7 kg)       Plan: Adjust the following medications based on renal function:           Since obese, I adjusted dosing based on renal function of CrCl  20-40 ml/min range.   So change Zosyn to 3.375 gram every 6 hours    Electronically signed by Shane Roe [facility] on 3/13/2020 at 5:05 PM

## 2020-03-13 NOTE — PROGRESS NOTES
Patient's vent rate increased to 26, VT decreased to 600, and peep increased to 16 per Dr. Royer Jordan.

## 2020-03-13 NOTE — PROGRESS NOTES
3/13/2020  1:24 PM - Cinthya Hanna Incoming Lab Results From [employer] Value Ref Range & Units Status Collected Lab   pH, Arterial 7.010Low Panic   7.350 - 7.450 Final 03/13/2020  1:23 PM [address] [place] [address] Lab   pCO2, Arterial 69. 0High   35.0 - 45.0 mmHg Final 03/13/2020  1:23 PM [facility] Lab   pO2, Arterial 51. 0Low   80.0 - 100.0 mmHg Final 03/13/2020  1:23 PM [facility] Lab   HCO3, Arterial 17.4Low   22.0 - 26.0 mmol/L Final 03/13/2020  1:23 PM [address] Excess, Arterial -15. 2Low   -2.0 - 2.0 mmol/L Final 03/13/2020  1:23 PM [address] [place] [address] Lab   Hemoglobin, Art, Extended 17.0  14.0 - 18.0 g/dL Final 03/13/2020  1:23 PM [address] [place] [address] Lab   O2 Sat, Arterial 76.6Low Panic   >92 % Final 03/13/2020  1:23 PM [address] [place] [address] Lab   Carboxyhgb, Arterial 0.7  0.0 - 5.0 % Final 03/13/2020  1:23 PM [facility] Lab        0.0-1.5   (Smokers 1.5-5.0)    Methemoglobin, Arterial 0.1  <1.5 % Final 03/13/2020  1:23 PM [address] [place] [address] Lab   O2 Content, Arterial 18.3  Not Established mL/dL Final 03/13/2020  1:23 PM [address] [place] [address] Lab   O2 Therapy Unknown   Final 03/13/2020  1:23 PM [address] [place] [address] Lab   Testing Performed By     242Chio Pablo Name Director Address Valid Date Range   [address] - [address] LAB Erik Deal  [place],[address]  [address] [facility] [place] [address] 08/30/17 0733-Present   Narrative   Performed by: [address] [place] [address] Lab   CALL  [place]  [facility] tel. ,  dr Junito Geiger, 03/13/2020 13:24, by MCWDO   Pt on VC 16, , 12 peep and 100% fio2, RR, AT+

## 2020-03-13 NOTE — H&P
H&P  Chief Complaint:    Chief Complaint   Patient presents with    Respiratory Distress     pt complains of resp distress and chest pain         History Of Present Illness:   71 y.o. male PMH possible COPD occasional home O2 use, s/p pacemaker presents with feeling bad, SOB this am.  Couldn't get out of bed and subsequently called wife. Wife brought pt to ED where found to be grossly hypoxic. In ED did not tolerate bipap and subsequently intubated. Severe respiratory acidosis and hypoxia seen on ABG and started on high peep strategy. Admitted a month ago for generalized weakness. Extensive neuro work-up, no gross findings. Review of Systems:   14 ROS completed, all are negative except below:  SOB    Past Medical History:        Diagnosis Date    Anemia     Anxiety     Back pain     Cellulitis     Depression     Elevated triglycerides with high cholesterol     GERD (gastroesophageal reflux disease)     Hypertension     Obstructive chronic bronchitis without exacerbation ([facility] [address] 6/10/2019    Osteoarthritis     Pacemaker 04/21/2017    Palliative care patient 03/13/2020    Sleep apnea, unspecified 11/22/2019         Procedure Laterality Date    COLONOSCOPY  11/18/11        JOINT REPLACEMENT      Bilateral total knees    NECK SURGERY      PACEMAKER PLACEMENT      TOTAL KNEE ARTHROPLASTY      RIGHT AND LEFT KNEE    UPPER GASTROINTESTINAL ENDOSCOPY  2/2012             Home Medications:  Prior to Admission medications    Medication Sig Start Date End Date Taking?  Authorizing Provider   SYNTHROID 50 MCG tablet Take 1 tablet by mouth Daily 3/5/20   Malu Mcfarlane MD   cloNIDine (CATAPRES) 0.3 MG tablet Take 1 tablet by mouth 2 times daily 2/23/20   Maximino Glass MD   carbidopa-levodopa (SINEMET)  MG per tablet Take 1 tablet by mouth 3 times daily  Patient not taking: Reported on 3/4/2020 2/23/20   Maximino Glass MD   busPIRone (BUSPAR) 10 MG tablet TAKE 1

## 2020-03-13 NOTE — PROCEDURES
Department of Internal Medicine  Bedside Procedure Note        PROCEDURE PERFORMED:    R radial arterial line      INDICATION:    Acute hypoxic respiratory failure, septic shock    ANESTHESIA:    None      ESTIMATED BLOOD LOSS:    3ml    COMPLICATIONS:    none    DESCRIPTION:  R radial arterial line placed under sterile conditions with good wave form.         Nhan Xiong M.D.,  3/13/2020

## 2020-03-13 NOTE — CONSULTS
Palliative Care Consult Note  3/13/2020 3:38 PM  Subjective:   Admit Date: 3/13/2020  PCP: [place] Ramires MD    Reason For Consult: Goals of care, Code status, Family Support    Requesting Physician: Dr. Ar Peterson    History Obtained From: EMR, attending, nursing    Chief Complaint: intubated and mechanically ventilated    History of Present Illness: Patient is a 51-year-old male with a PMH of COPD, CHF, beryllium exposure, presented to the ED today with respiratory distress that started earlier this morning. Patient was at home with his wife when respiratory distress began, EMS was called and he received Solu-Medrol duo nebs and was placed on BiPAP by EMS. Upon arrival patient was distressed, pale, diaphoretic, and short of breath. He was slightly confused with some chest pain and continued to decompensate requiring intubation. EKG showing sinus tachycardia with , XR with diffuse bilateral pulmonary opacities. Showing lactic acid 11, creatinine 1.7, gases pH 6.9, PCO2 67, PO2 47, O2 sats 70. Blood and urine cultures obtained, he was negative for rapid influenza a/B, respiratory panel compleated and it is reported patient was positive for RSV. Was admitted to CCU and he remains critical, poor prognosis. Palliative medicine was consulted to discuss goals of care, CODE STATUS, family support.     Past Medical History:        Diagnosis Date    Anemia     Anxiety     Back pain     Cellulitis     Depression     Elevated triglycerides with high cholesterol     GERD (gastroesophageal reflux disease)     Hypertension     Obstructive chronic bronchitis without exacerbation ([facility] [address] 6/10/2019    Osteoarthritis     Pacemaker 04/21/2017    Palliative care patient 03/13/2020    Sleep apnea, unspecified 11/22/2019       Past Surgical History:        Procedure Laterality Date    COLONOSCOPY  11/18/11        JOINT REPLACEMENT      Bilateral total knees    NECK SURGERY      PACEMAKER PLACEMENT      TOTAL KNEE ARTHROPLASTY      RIGHT AND LEFT KNEE    UPPER GASTROINTESTINAL ENDOSCOPY  2/2012           Allergies:  Codeine    Social History:    TOBACCO:   reports that he has never smoked. He has never used smokeless tobacco.  ETOH:   reports current alcohol use.   MARITAL STATUS:    Patient currently lives with family     Family History:       Problem Relation Age of Onset    Breast Cancer Mother     Colon Cancer Father     Colon Polyps Father     Prostate Cancer Brother        Palliative Performance Score: 10%    Review of Systems   Unable to obtain, intubated and mechanically ventilated    Palliative Review of Advance Directives:     Surrogate Decision Maker:yes, spouse    Durable Power of :no    Advanced Directives/Living Marti: yes, no copy on file    Out of hospital medical orders in place to reflect resuscitation status (MOLST/POLST): no    Information Sharing:  Patient's awareness of illness:  [] Terminal [] Life-Threatening [] Serious [] Non life-threatening [] Not serious   [x] Not discussed    Family awareness of illness:   [] Terminal [x] Life-Threatening [] Serious [] Nonlife-threatening [] Not serious   [] Not discussed    Diet NPO Effective Now    Medications:   norepinephrine 17 mcg/min (03/13/20 1240)    vasopressin (Septic Shock) infusion       Current Facility-Administered Medications   Medication Dose Route Frequency Provider Last Rate Last Dose    fentaNYL (SUBLIMAZE) 100 MCG/2ML injection             norepinephrine (LEVOPHED) 16 mg in dextrose 5% 250 mL infusion  2 mcg/min Intravenous Continuous Lizeth Antoine MD 15.9 mL/hr at 03/13/20 1240 17 mcg/min at 03/13/20 1240    vasopressin 20 Units in dextrose 5 % 100 mL infusion  0.04 Units/min Intravenous Continuous Lizeth Antoine MD        hydrocortisone sodium succinate PF (SOLU-CORTEF) injection 50 mg  50 mg Intravenous Q6H Arnel Perez MD        0.9 % sodium chloride bolus  500 mL ending 03/13/20 1538  Physical Exam      General appearance: intubated, appears ill, toxic, pale  HEENT: atraumatic, eyes with clear conjunctiva and normal lids, pupils and irises normal, external ears and nose are normal,lips normal, ETT in place. Neck: without masses, supple  Lungs:  intubated and mechanically ventilated, ventilating bilaterally  Heart: regular rate and rhythm and S1, S2 normal, tachycardic  Abdomen: soft, non-tender; bowel sounds normal; no masses,  no organomegaly  Genitourinary: No bladder fullness, masses, or tenderness  Extremities: atraumatic, normal ROM  Neurologic: sedated and intubated  Psychiatric: sedated and intubated  Skin: pale, dry    Assessment and Plan: Active Problems:    Septic shock [facility])    Palliative care patient  Resolved Problems:    * No resolved hospital problems. *  Additional problem addressed:  Acute respiratory failure    Visit Summary: I saw the patient at the bedside with no family present. Patient remains quite critical, sats continue to drop to low 80s, vent management ongoing. I met with the patient's spouse and family members in the family room to discuss his current critical status and possible poor prognosis. I answered questions regarding current treatments and discussed CODE STATUS due to his high chance of decline. Patient spouse states that the patient has a living will and DNR, we do not have a copy on file, and she did confirm that she would like for the patient to be DNR as well. She would like for him to continue to have aggressive measures to hopefully reverse his condition although she does understand that he may have a poor outcome and possibly die despite aggressive treatment. I did explain that hospice care and comfort measures may be recommended if patient fails to improve and believe that family would be agreeable if he continues to decline.  I provided emotional support to the patient's family and have updated the attending and nursing

## 2020-03-13 NOTE — ED PROVIDER NOTES
[address] [facility] [place] EMERGENCY DEPT  eMERGENCY dEPARTMENT eNCOUnter      Pt Name: Rufus Hines  MRN: [id number]  Birthdate 1950  Date of evaluation: 3/13/2020  Provider: Vini Goff MD    CHIEF COMPLAINT       Chief Complaint   Patient presents with    Respiratory Distress     pt complains of resp distress and chest pain         HISTORY OF PRESENT ILLNESS   (Location/Symptom, Timing/Onset,Context/Setting, Quality, Duration, Modifying Factors, Severity)  Note limiting factors. Rufus Hines is a 71 y.o. male who presents to the emergency department with respiratory distress apparently this started at 1030 this morning he continues to have trouble breathing he does not use oxygen at home. His wife summoned EMS. EMS arrived they gave him 125 mg of Solu-Medrol, a DuoNeb treatment and placed him on BiPAP. The patient arrives in extremis. He is pale and diaphoretic he is short of breath. He does endorse some mild chest pain. The patient is slightly confused. He is sitting up he was in the hospital last month. His wife comes to the ER. At that time he was in complete distress requiring intubation. General weakness over the last couple weeks to months. He has not been completely well. She gives a history that he has some history of beryllium exposure. He does not use inhalers at home he does not use oxygen. He has not been on steroids or antibiotics recently. The patient is on Sinemet he started back on Monday. For seeing if it would help with weakness. Last visit she tells me that the heart doctor the lung doctor all said that it was not their issue and that there was no issue with his heart or his lungs. He does have a pacemaker he has no cardiac stents or open heart surgery. The history is provided by the patient, the EMS personnel, medical records and the spouse. The history is limited by the condition of the patient. NursingNotes were reviewed.     REVIEW OF SYSTEMS    (2-9 systems for level 4, RELEASE TABLET    Take 1 tablet by mouth daily    NITROGLYCERIN (NITROSTAT) 0.3 MG SL TABLET    up to max of 3 total doses. If no relief after 1 dose, call 911. PANTOPRAZOLE (PROTONIX) 40 MG TABLET    TAKE 1 TABLET BY MOUTH EVERY MORNING BEFORE BREAKFAST    ROPINIROLE (REQUIP) 1 MG TABLET    Take 1 tablet by mouth nightly    SYNTHROID 50 MCG TABLET    Take 1 tablet by mouth Daily       ALLERGIES     Codeine    FAMILY HISTORY       Family History   Problem Relation Age of Onset    Breast Cancer Mother     Colon Cancer Father     Colon Polyps Father     Prostate Cancer Brother           SOCIAL HISTORY       Social History     Socioeconomic History    Marital status:      Spouse name: Not on file    Number of children: Not on file    Years of education: Not on file    Highest education level: Not on file   Occupational History    Occupation: maintenance chemical plant   Social Needs    Financial resource strain: Not on file    Food insecurity     Worry: Not on file     Inability: Not on file    Transportation needs     Medical: Not on file     Non-medical: Not on file   Tobacco Use    Smoking status: Never Smoker    Smokeless tobacco: Never Used   Substance and Sexual Activity    Alcohol use:  Yes    Drug use: Never    Sexual activity: Not on file   Lifestyle    Physical activity     Days per week: Not on file     Minutes per session: Not on file    Stress: Not on file   Relationships    Social connections     Talks on phone: Not on file     Gets together: Not on file     Attends Buddhist service: Not on file     Active member of club or organization: Not on file     Attends meetings of clubs or organizations: Not on file     Relationship status: Not on file    Intimate partner violence     Fear of current or ex partner: Not on file     Emotionally abused: Not on file     Physically abused: Not on file     Forced sexual activity: Not on file   Other Topics Concern    Not on file   Social History Narrative    Not on file       SCREENINGS             PHYSICAL EXAM    (up to 7 for level 4, 8 or more for level 5)     ED Triage Vitals   BP Temp Temp Source Pulse Resp SpO2 Height Weight   03/13/20 1214 -- 03/13/20 1211 03/13/20 1211 03/13/20 1215 03/13/20 1215 03/13/20 1211 03/13/20 1211   (!) 154/139  Oral 109 (!) 40 (!) 62 % 5' 8\" (1.727 m) 255 lb (115.7 kg)       Physical Exam  Vitals signs and nursing note reviewed. Constitutional:       General: He is in acute distress. Appearance: He is obese. He is ill-appearing, toxic-appearing and diaphoretic. HENT:      Head: Normocephalic and atraumatic. Nose: Nose normal.      Mouth/Throat:      Mouth: Mucous membranes are moist.   Eyes:      Extraocular Movements: Extraocular movements intact. Pupils: Pupils are equal, round, and reactive to light. Neck:      Musculoskeletal: Normal range of motion and neck supple. Cardiovascular:      Rate and Rhythm: Tachycardia present. Pulses: Normal pulses. Heart sounds: Normal heart sounds. Pulmonary:      Effort: Respiratory distress present. Breath sounds: Rales present. Abdominal:      General: Abdomen is flat. Genitourinary:     Penis: Normal.    Musculoskeletal: Normal range of motion. General: No tenderness. Skin:     General: Skin is warm. Comments: Diaphoretic    Neurological:      Mental Status: He is alert and oriented to person, place, and time.       Comments: Hard to speak due to RR and distress   Psychiatric:      Comments: Anxious          DIAGNOSTIC RESULTS     EKG: All EKG's are interpreted by the Emergency Department Physician who either signs or Co-signs this chart in the absence of a cardiologist.    EKG sinus tachycardia rate 109 ST wave depression mild inferiorly aVR and V1 elevation mild ST depression laterally    EKG sinus tachycardia rate 104 ST elevation aVR V1 ST depression inferior laterally    RADIOLOGY:   Non-plain film images such BLOOD   POTASSIUM, WHOLE BLOOD   SEDIMENTATION RATE   POTASSIUM, WHOLE BLOOD    Narrative:     CALL  [facility] tel. ,  dr Luna Milo, 03/13/2020 13:24, by [address] RT REFLEX TO CULTURE   MISCELLANEOUS SENDOUT 1   TROPONIN   POCT GLUCOSE   POCT GLUCOSE   POCT GLUCOSE   POCT GLUCOSE       All other labs were within normal range or not returned as of this dictation. EMERGENCY DEPARTMENT COURSE and DIFFERENTIALDIAGNOSIS/MDM:   Vitals:    Vitals:    03/13/20 1359 03/13/20 1400 03/13/20 1401 03/13/20 1416   BP: 83/63  (!) 86/57 (!) 94/55   Pulse: 112 116 112 112   Resp: 20 20 20 20   Temp:       TempSrc:       SpO2: (!) 87% (!) 87% (!) 86% (!) 87%   Weight:       Height:           MDM  Number of Diagnoses or Management Options  Abnormal CXR: new and requires workup  Acute respiratory failure with hypoxia and hypercapnia ([facility] [address]): new and requires workup  Hypokalemia: new and requires workup  Lactic acidosis: new and requires workup  Pneumonia due to organism: new and requires workup  Septic shock [facility]): new and requires workup  Diagnosis management comments: Patient with respiratory distress in extremis on arrival.  I was hopeful that this was flash pulmonary edema and we be able to fix him with BiPAP. He clearly looks like a cardiac patient with pulmonary edema he sounded wet. He was hypertensive on arrival initially. He was given sublingual nitro he had a little bit of chest pain he was given IV morphine and Zofran. The patient's x-ray showed profound what looked to be pulmonary edema. This all started rather suddenly at 1030 this morning. He has a complicated past medical history EMS already given steroids and a DuoNeb. I watched him for a little bit hoping that we we could stave off intubation. However he declared himself and was unable to tolerate the BiPAP at which point I intubated him RSI. Although he had a fused neck I was able to intubate him quickly and easily.   Once the tube was passed vasopressin as well. The Levophed has been titrated upward about 17-20 now. Dr. Bernadette Valdez from the ICU came and saw the patient we discussed and evaluated him in the ER. He discussed with the wife. The patient is critical.  Again this is rather an odd presentation as he has normal white count normal ESR normal CRP essentially there is nothing really that remarkable. As well as his heart evaluation. The patient has no recent travel anywhere. He has not been febrile. This was rather sudden onset today. He does not use oxygen at home. I will send the patient for CTA chest prior to going to the ICU. So we can further evaluate his lungs and rule out PE as well. However that seems very less likely given the pattern on the x-ray. I did give potassium as well. I reassessed him throughout the last 2 hours. Patient is critical prognosis is very guarded to poor at this point given the pressors and oxygenation issues. Patient to be admitted to the ICU. Discussed with his wife. Amount and/or Complexity of Data Reviewed  Clinical lab tests: reviewed and ordered  Tests in the radiology section of CPT®: ordered and reviewed  Decide to obtain previous medical records or to obtain history from someone other than the patient: yes  Obtain history from someone other than the patient: yes  Discuss the patient with other providers: yes  Independent visualization of images, tracings, or specimens: yes    Risk of Complications, Morbidity, and/or Mortality  Presenting problems: high  Management options: high    Critical Care  Total time providing critical care:  minutes    Patient Progress  Patient progress: (Critical but improved )    CRITICAL CARE TIME   Total Critical Care time was 95 minutes, excluding separately reportable procedures. There was a high probability of clinically significant/life threatening deterioration in the patient's condition which required my urgent intervention.             CONSULTS:  MICHELLE supplies:  Triple lumen    Catheter size:  7 Fr    Landmarks identified: yes      Ultrasound guidance: yes      Sterile ultrasound techniques: Sterile gel and sterile probe covers were used      Number of attempts:  2    Successful placement: yes    Post-procedure details:     Post-procedure:  Dressing applied and line sutured    Assessment:  Blood return through all ports    Patient tolerance of procedure: Tolerated well, no immediate complications        FINAL IMPRESSION     1. Acute respiratory failure with hypoxia and hypercapnia (HCC)    2. Septic shock ([address]    3. Lactic acidosis    4. Abnormal CXR    5. Pneumonia due to organism    6.  Hypokalemia          DISPOSITION/PLAN   DISPOSITION        PATIENT REFERRED TO:  Gregg Potts MD  [facility] [phone]            DISCHARGE MEDICATIONS:  New Prescriptions    No medications on file          (Please note that portions of this note were completed with a voice recognition program.  Efforts were made to edit the dictations butoccasionally words are mis-transcribed.)    Qiana Springer MD (electronically signed)  AttendingEmergency Physician         Qiana Springer MD  03/13/20 7817

## 2020-03-13 NOTE — CONSULTS
Pulmonary [facility] Critical Care [facility]   MR# [id number]  Acct# [de-identified]  3/13/2020   4:32 PM CDT   Referring Provider: Ryanne Hinton MD  Chief Complaint: Mechanically ventilated    HPI: We are consulted to see this 71y.o. year old male born on 1950. Patient complains of severe worsening shortness of breathing the chest for 1 day profound leading to presentation and intubation and mechanical ventilation. Pt was hospitalized previously with weakness and underwent workup for that. There is hx copd and we have seen him at [facility]. Unfortunately for reasons that are inexplicable to me the Epic software is unable to retrieve the progress notes correctly in care everywhere, even despite [facility]'s being on an [employer] In any event he has remained hypoxic despite maximal oxygen and ventilation. An arterial line has been placed. Past Medical History  Past Medical History:   Diagnosis Date    Anemia     Anxiety     Back pain     Cellulitis     Depression     Elevated triglycerides with high cholesterol     GERD (gastroesophageal reflux disease)     Hypertension     Obstructive chronic bronchitis without exacerbation ([address] 6/10/2019    Osteoarthritis     Pacemaker 04/21/2017    Palliative care patient 03/13/2020    Sleep apnea, unspecified 11/22/2019     Surgical History  Past Surgical History:   Procedure Laterality Date    COLONOSCOPY  11/18/11        JOINT REPLACEMENT      Bilateral total knees    NECK SURGERY      PACEMAKER PLACEMENT      TOTAL KNEE ARTHROPLASTY      RIGHT AND LEFT KNEE    UPPER GASTROINTESTINAL ENDOSCOPY  2/2012         Allergies  Allergies   Allergen Reactions    Codeine Swelling     Had as a baby--not sure of reaction.      Medications    fentaNYL, , ,     hydrocortisone sodium succinate PF, 50 mg, Intravenous, Q6H    sodium chloride, 500 mL, Intravenous, Once   vancomycin, 1,750 mg, Intravenous, Q24H    vancomycin (VANCOCIN) intermittent dosing (placeholder), , Other, RX Placeholder    calcium gluconate IVPB, 1 g, Intravenous, Once    insulin lispro, 0-6 Units, Subcutaneous, TID WC    insulin lispro, 0-3 Units, Subcutaneous, Nightly    ipratropium-albuterol, 1 ampule, Inhalation, Q4H    polyvinyl alcohol, 1 drop, Both Eyes, Q4H **AND** artificial tears, , Both Eyes, Q4H    chlorhexidine, 15 mL, Mouth/Throat, BID    famotidine (PEPCID) injection, 20 mg, Intravenous, BID   Social History   reports that he has never smoked. He has never used smokeless tobacco. He reports current alcohol use. He reports that he does not use drugs. Family History  family history includes Breast Cancer in his mother; Colon Cancer in his father; Colon Polyps in his father; Prostate Cancer in his brother. Review of Systems:  Cannot obtain due to mechanical ventilation  Physical Exam:   height is 5' 8\" (1.727 m) and weight is 255 lb (115.7 kg). His rectal temperature is 97.8 °F (36.6 °C). His blood pressure is 94/55 (abnormal) and his pulse is 112. His respiration is 28 and oxygen saturation is 82% (abnormal). No intake or output data in the 24 hours ending 03/13/20 1632  Ventilator Settings:     Vent Mode: AC/VC/Vt Ordered: 600 mL/Rate Set: 26 bmp/ /PEEP/CPAP: 16/FiO2 : 100 %/   Peak Inspiratory Pressure: 45 cmH2O  Mean Airway Pressure: 24 cmH20  I:E Ratio: 1:2.40  Rate Measured: 25 br/min  Minute Volume: 15 Liters           Physical Exam  Constitutional:       General: He is not in acute distress. Appearance: He is not diaphoretic. Interventions: He is sedated and intubated. HENT:      Nose: Nose normal.   Eyes:      General: No scleral icterus. Cardiovascular:      Rate and Rhythm: Normal rate. Heart sounds: No friction rub. Pulmonary:      Effort: No accessory muscle usage or respiratory distress. He is intubated.       Breath sounds: Decreased breath sounds and wheezing present. Comments: Plateau pressure 27 with Vt 580 and peep 16  Abdominal:      Palpations: Abdomen is soft. Comments: obese   Musculoskeletal:      Right lower leg: Edema present. Left lower leg: Edema present. Skin:     General: Skin is warm and dry. Findings: No rash. Recent laboratory:  Recent Labs     03/13/20  1219   WBC 4.8   RBC 7.11*   HGB 20.7*   HCT 65.5*      MCV 92.1   MCH 29.1   MCHC 31.6*   RDW 14.1      Recent Labs     03/13/20  1219 03/13/20  1233 03/13/20  1246 03/13/20  1250 03/13/20  1323     --   --   --   --    K 3.2* 3.3 2.7  --  2.8   CL 98  --   --   --   --    CO2 20*  --   --   --   --    BUN 23  --   --   --   --    CREATININE 1.7*  --   --   --   --    CALCIUM 9.6  --   --   --   --    GLUCOSE 259*  --   --  336  --       Recent Labs     03/13/20  1233 03/13/20  1246 03/13/20  1323   PHART 6.930* 6.990* 7.010*   WAA5TYE 67.0* 104.0* 69.0*   PO2ART 47.0* 42.0* 51.0*   EOC0EAU 14.1* 25.1 17.4*   P4ODNIHS 70.0* 63.2* 76.6*   BEART -20.1* -10.3* -15.2*     Recent Labs     03/13/20  1219 03/13/20  1316 03/13/20  1423   AST 22  --   --    ALT <5*  --   --    ALKPHOS 170*  --   --    BILITOT 0.3  --   --    MG 2.4  --   --    CALCIUM 9.6  --   --    TROPONINI <0.01  --  0.17*   LACTA 9.1* 11.0*  --    INR 1.11  --   --    TSH 8.600*  --   --      Radiograph  Ct Cervical Spine Wo Contrast    Result Date: 2/18/2020  CT cervical spine 2/18/2020 2:45 PM HISTORY: Previous fusion, weakness TECHNIQUE: Axial images of the cervical spine were obtained without IV contrast. Coronal and sagittal reformatted images are reconstructed and reviewed. COMPARISON: None. DLP: 774 mGy cm Automated exposure control was utilized to minimize patient radiation dose. FINDINGS: There is anterior cervical fusion of C3-C7 with C6 partial vertebrectomy. There is straightening of the normal cervical curvature. Please moderate degenerative disc change seen at C7-T1.  There is ligamenta flava hypertrophy and epidural lipomatosis, with resulting moderate to severe thecal sac stenosis. Facet arthropathy contributing to mild to moderate bilateral foraminal stenosis. L3-L4 : Mild thecal sac stenosis related to disc bulge and epidural lipomatosis. Facet arthropathy contributing to mild to moderate left and mild right foraminal stenosis. L4-L5 : Disc bulge, ligamentum flavum hypertrophy, without thecal sac stenosis. Facet arthropathy contributing to mild to moderate bilateral foraminal stenosis. L5-S1 : No significant thecal sac stenosis. Facet arthropathy contributing to moderate to severe right and mild left foraminal stenosis. 1.  Degenerative changes and epidural lipomatosis with resulting moderate to severe thecal sac stenosis at L1-L2 and L2-L3. 2.  Additional milder degenerative changes as described above. Signed by Dr Cally Mina on 2/21/2020 4:12 PM    Mri Cervical Spine W Wo Contrast    Result Date: 2/20/2020  MRI CERVICAL SPINE W WO CONTRAST 2/20/2020 5:48 PM History: Generalized weakness and worsening ataxia. Pre and postcontrast MR imaging of the cervical spine. C3-C7 discectomy with anterior plate and screw fusion. Normally patent spinal canal. No cord compression and no abnormal cord signal. Detail is limited by patient motion. There appears to be bilateral foraminal stenosis at all levels from C2 through C6 based on facet hypertrophy. Postcontrast imaging shows no abnormal cord enhancement. 1. Normally patent spinal canal with no cord compression or abnormal cord enhancement. 2. Limited detail though there is evidence of bilateral foraminal stenosis from C2 through C6 based on bony changes. Signed by Dr Luis Baltazar on 2/20/2020 5:51 PM    Mri Thoracic Spine W Wo Contrast    Result Date: 2/20/2020  MRI THORACIC SPINE W WO CONTRAST 2/20/2020 5:22 PM History: Neck pain. Generalized weakness. Worsening ataxia. Pre and postcontrast thoracic spine MRI.  Images are affected by patient motion. Minimal left convex curvature. Multilevel mild disc space narrowing and endplate spurring. Multilevel degenerative endplate signal change. No spinal canal stenosis or cord compression. No significant disc herniation or foraminal stenosis is seen. No compression fracture. Postcontrast imaging shows no abnormal cord enhancement. 1. Multilevel degenerative disc and endplate changes with no fracture, stenosis, or neural compression. Signed by Dr Bandar Strickland on 2/20/2020 5:24 PM    Xr Chest Portable    Result Date: 3/13/2020  XR CHEST PORTABLE 3/13/2020 11:45 AM HISTORY: Status post intubation COMPARISON: 3/13/2020. FINDINGS: Status post intubation with endotracheal tube tip projecting 5.3 cm above the sasha. Reidentified bilateral diffuse lung opacities, not significant changed. Lungs appear to be well expanded. No pleural effusions or pneumothorax identified. Heart size is stable. Left chest wall dual chamber pacemaker identified. 1. Status post intubation with lungs appearing well expanded. Tube is in good position. 2. Reidentified stable bilateral diffuse lung opacities, favor pulmonary edema. Signed by Dr Verner Crow on 3/13/2020 12:56 PM    Xr Chest Portable    Result Date: 3/13/2020  EXAM: XR CHEST PORTABLE -- 3/13/2020 11:15 AM HISTORY: 69 years, Male, shortness of breath COMPARISON: 2/18/2020 TECHNIQUE:  1 images. Frontal view of the chest. FINDINGS:  Diffuse bilateral pulmonary opacities. No pneumothorax or large pleural effusion. Borderline cardiac silhouette. Upper mediastinum within normal limits. Cardiac pulse generator at the left chest with 2 grossly intact leads. 1. Diffuse bilateral pulmonary opacities, which could represent edema or infection.  Signed by Dr Roland Barth on 3/13/2020 12:30 PM    Xr Chest Portable    Result Date: 2/18/2020  EXAMINATION: XR CHEST PORTABLE 2/18/2020 4:02 PM HISTORY: XR CHEST PORTABLE 2/18/2020 2:45 PM HISTORY: Short of Small exophytic nodule from the upper pole of the right kidney is incompletely visualized and evaluated. This is similar to the previous study. No evidence of pulmonary embolism. No aortic aneurysm or dissection. Coronary arteries are not well-visualized for comment. There is a severe cardiogenic pulmonary edema and bilateral lower lobar lung consolidation which may suggest superimposed inflammatory/infectious process. Bilateral small basal pleural effusion. Pulmonary arterial hypertension and right ventricle strain. The above findings are recorded on a digital voice clip in PACS. Signed by Dr Taj Aguayo on 3/13/2020 3:13 PM    Mri Brain W Wo Contrast    Result Date: 2/18/2020  MRI BRAIN W WO CONTRAST 2/18/2020 5:38 PM History: Stroke symptoms. Generalized weakness. Pre and postcontrast MR imaging of the brain. No acute parenchymal ischemia based on the DWI sequence. Moderate diffuse cortical atrophy. Left maxillary sinus and ethmoid sinus mucosal thickening. Mild-to-moderate confluent small vessel disease throughout the periventricular white matter. No brain hemorrhage or abnormal calcification. Postcontrast imaging shows no enhancing brain lesion. No sign of vasculitis. The dural venous sinuses are patent. 1. Atrophy and small vessel disease. 2. No acute infarct. 3. No mass. Signed by Dr Jane Funes on 2/18/2020 5:39 PM    Fl Lumbar Puncture Diag    Result Date: 2/19/2020  Examination. FL LUMBAR PUNCTURE DIAG 2/19/2020 11:15 AM History: Weakness and loss of balance. The procedure was explained to the patient. The benefits, the risks and complications are discussed. The patient understood the procedure and signed the consent. The patient was placed on the fluoroscopy table and the lumbar spine was examined. The patient has moderately severe dextroscoliosis. A suitable spot opposite interspace L3-4 was selected for puncture. The spot was marked with an ink marker.  After sterile preparation and application of local anesthesia a size 20-gauge longer spine needle was introduced into the thecal sac and a FreeFlow of clear colorless thin fluid was established. The patient was immediately turned into a decubitus position. The outer hub of the needle was connected to a pressure measuring device and opening pressure was measured. The opening pressure is 15.5 cm water. 30 mL of CSF was withdrawn and dispensed in 4 separate test tubes which were sent to the laboratory for further evaluation as instructed by the attending physician. The needle was then withdrawn and the puncture site was covered with a sterile Band-Aid. The patient tolerated the procedure well. No immediate complications were noted. The patient was given post lumbar puncture care instructions. A successful lumbar puncture and opening pressure measurement. Fluoroscopy time 2 minutes 11 seconds. Total dose: [id number]. Signed by Dr Laurence Rasmussen on 2/19/2020 4:45 PM    My radiograph interpretation/independent review of imaging: diffuse infiltrates, ett ok  Other test results (not lab or imaging): echo 2017  Normal left ventricular size with preserved LV function and an estimated   ejection fraction of approximately 60%. Mild left ventricular hypertrophy noted. Grade I diastolic function with elevated left ventricular filling   pressure. No evidence of left ventricular mass or thrombus noted. This was a technically difficult study due to poor acoustical window. Systolic blunting of the pulmonary veins noted. IVC is normal in size with normal respiratory collapse.   Independent review of ekg: none currently available  Problem list as generated by [employer]:  Patient Active Problem List   Diagnosis    GERD (gastroesophageal reflux disease)    History of esophageal ulcer    History of adenomatous polyp of colon    Patulous lower esophageal sphincter    Hiatal hernia    Osteoarthritis    Chronic pain    NSAID long-term use   

## 2020-03-13 NOTE — PROGRESS NOTES
Pharmacy Note  Vancomycin Consult    Marlin Villegas is a 71 y.o. male started on Vancomycin for pneumonia; consult received from Dr. Maite uJdge to manage therapy. Also receiving the following antibiotics: azithromycin. Patient Active Problem List   Diagnosis    GERD (gastroesophageal reflux disease)    History of esophageal ulcer    History of adenomatous polyp of colon    Patulous lower esophageal sphincter    Hiatal hernia    Osteoarthritis    Chronic pain    NSAID long-term use    Encounter for colonoscopy due to history of adenomatous colonic polyps    Precordial pain    Sick sinus syndrome due to SA node dysfunction (HCC)    Abnormal nuclear stress test    Pacemaker    SVT (supraventricular tachycardia) (HCC)    Essential hypertension    Beryllium disease (HCC)    Acute hypoxemic respiratory failure (HCC)    Sleep disturbance    Elevated serum creatinine    Diastolic dysfunction    Flash pulmonary edema (HCC)    Sleep apnea, unspecified    Dyspnea on exertion    Loss of balance    Tick bite    Weakness    Fall    Gait difficulty    Abnormal brain scan    Septic shock (HCC)       Allergies:  Codeine     Temp max: 97.8    Recent Labs     03/13/20  1219   BUN 23       Recent Labs     03/13/20  1219   CREATININE 1.7*       Recent Labs     03/13/20  1219   WBC 4.8       No intake or output data in the 24 hours ending 03/13/20 1424    Culture Date Source Results   03/13/20 Respiratory Sent   03/13/20 Blood x 2 Sent            Ht Readings from Last 1 Encounters:   03/13/20 5' 8\" (1.727 m)        Wt Readings from Last 1 Encounters:   03/13/20 255 lb (115.7 kg)         Body mass index is 38.77 kg/m². Estimated Creatinine Clearance: 51 mL/min (A) (based on SCr of 1.7 mg/dL (H)). Assessment/Plan:  Will initiate vancomycin 1750 mg IV every 24 hours. Timing of trough level will be determined based on culture results, renal function, and clinical response. Thank you for the consult.   Will continue to follow.     MATTY FAROOQ, PHARM D, 3/13/2020, 2:25 PM

## 2020-03-14 ENCOUNTER — OUTSIDE FACILITY SERVICE (OUTPATIENT)
Dept: PULMONOLOGY | Facility: CLINIC | Age: 70
End: 2020-03-14

## 2020-03-14 PROCEDURE — 99233 SBSQ HOSP IP/OBS HIGH 50: CPT | Performed by: INTERNAL MEDICINE

## 2020-03-14 NOTE — PROGRESS NOTES
Contains critical data Blood gas, arterial   Status:  Final result    Ref Range & Units 03/14/20 0416   pH, Arterial 7.350 - 7.450 7.230Low Panic     pCO2, Arterial 35.0 - 45.0 mmHg 36.0    pO2, Arterial 80.0 - 100.0 mmHg 127. 0High     HCO3, Arterial 22.0 - 26.0 mmol/L 15.1Low     Base Excess, Arterial -2.0 - 2.0 mmol/L -11.5Low     Hemoglobin, Art, Extended 14.0 - 18.0 g/dL 18.0    O2 Sat, Arterial >92 % 96.5    Carboxyhgb, Arterial 0.0 - 5.0 % 0.4    Comment:      0.0-1.5   (Smokers 1.5-5.0)    Methemoglobin, Arterial <1.5 % 0.9    O2 Content, Arterial Not Established mL/dL 24.5    O2 Therapy  Unknown    Resulting Agency  [facility]   Narrative     CALL  doctor   tel. [phone],  T.  [id number] Moran RN CCU, 03/14/2020 04:16, by RUTH PABLO [facility]        Specimen Collected: 03/14/20 0416 Last Resulted: 03/14/20 0416 View Other Order Details        VC 26, , 100% FIO2, 14 PEEP  LB

## 2020-03-14 NOTE — PROGRESS NOTES
Pharmacy Renal Adjustment    Fouzia Chadwick is a 71 y.o. male. Pharmacy has renally adjusted medications per protocol. Recent Labs     03/13/20  1642 03/14/20  0300   BUN 23 29*       Recent Labs     03/13/20  1642 03/14/20  0300   CREATININE 2.0* 2.7*       Estimated Creatinine Clearance: 33 mL/min (A) (based on SCr of 2.7 mg/dL (H)).     Height:   Ht Readings from Last 1 Encounters:   03/13/20 5' 8\" (1.727 m)     Weight:  Wt Readings from Last 1 Encounters:   03/14/20 270 lb 3.2 oz (122.6 kg)     Baseline SrCr 0.9  in acute renal failure  Lexicomp recommendations for CrCl <20 2.25 gm every 6 hours    Plan: Adjust the following medications based on renal function:           Continue Zosyn to 2.25 grams every 6 hours    Electronically signed by Bree Valencia, [address] [place] [address] on 3/14/2020 at 7:25 AM

## 2020-03-14 NOTE — PROGRESS NOTES
Dr. Val Parada notified of lactic acid of 8.4. No new orders.     Electronically signed by Gwendolyn Vásquez RN on 3/14/20 at 6:31 AM CDT

## 2020-03-14 NOTE — PROGRESS NOTES
Pharmacy Renal Adjustment    Randy Jerry is a 71 y.o. male. Pharmacy has renally adjusted medications per protocol. Recent Labs     03/13/20  1642 03/14/20  0300   BUN 23 29*       Recent Labs     03/13/20  1642 03/14/20  0300   CREATININE 2.0* 2.7*       Estimated Creatinine Clearance: 33 mL/min (A) (based on SCr of 2.7 mg/dL (H)). Height:   Ht Readings from Last 1 Encounters:   03/13/20 5' 8\" (1.727 m)     Weight:  Wt Readings from Last 1 Encounters:   03/14/20 270 lb 3.2 oz (122.6 kg)     Baseline SCr: 0.9    Plan: Adjust the following medications based on renal function:           Change pepcid to 20 mg daily    CrCl <50 mL/minute: Administer 50% of dose or increase the dosing interval to every 36 to 48 hours.     Electronically signed by Mik White [address] [place] [address] on 3/14/2020 at 8:10 AM

## 2020-03-14 NOTE — PROGRESS NOTES
Pulmonary and Critical Care Progress Note [address] [facility]    Patient: Ankush Ball    1950    MR# [id number]    Acct# [de-identified]   03/14/20   5:35 PM  Referring Provider: Stacie Cárdenas MD    Chief Complaint: Mechanically ventilated    Interval history: He remains on vent overnight with no new developments today. Wife is here and we discussed progress over the past 24 hours. Nursing reports no new incidents  Medications:  piperacillin-tazobactam, 2.25 g, Intravenous, Q6H    famotidine (PEPCID) injection, 20 mg, Intravenous, Daily    bumetanide, 2 mg, Intravenous, Q12H    vancomycin, 1,750 mg, Intravenous, Once    hydrocortisone sodium succinate PF, 50 mg, Intravenous, Q6H    sodium chloride, 500 mL, Intravenous, Once    vancomycin (VANCOCIN) intermittent dosing (placeholder), , Other, RX Placeholder    insulin lispro, 0-6 Units, Subcutaneous, TID WC    insulin lispro, 0-3 Units, Subcutaneous, Nightly    ipratropium-albuterol, 1 ampule, Inhalation, Q4H    polyvinyl alcohol, 1 drop, Both Eyes, Q4H **AND** artificial tears, , Both Eyes, Q4H    chlorhexidine, 15 mL, Mouth/Throat, BID   Review of Systems:   Cannot obtain due to mechanical ventilation    Physical Exam:  Blood pressure 91/70, pulse 103, temperature 98.4 °F (36.9 °C), temperature source Temporal, resp. rate 22, height 5' 8\" (1.727 m), weight 270 lb 3.2 oz (122.6 kg), SpO2 96 %. Intake/Output Summary (Last 24 hours) at 3/14/2020 1735  Last data filed at 3/14/2020 1400  Gross per 24 hour   Intake 2161.88 ml   Output 1680 ml   Net 481.88 ml        Vent Mode: AC/VC/Vt Ordered: 580 mL/Rate Set: 26 bmp/ /PEEP/CPAP: 12/FiO2 : 65 %/   Peak Inspiratory Pressure: 51 cmH2O  Mean Airway Pressure: 20 cmH20  I:E Ratio: 1:3.40  Rate Measured: 26 br/min  Minute Volume: 22 Liters           Physical Exam  Constitutional:       General: He is not in acute distress. Appearance: He is not diaphoretic.       Interventions: He is sedated and intubated. HENT:      Nose: Nose normal.   Eyes:      General: No scleral icterus. Cardiovascular:      Rate and Rhythm: Normal rate. Heart sounds: No friction rub. Pulmonary:      Effort: Pulmonary effort is normal. No accessory muscle usage, prolonged expiration or respiratory distress. He is intubated. Breath sounds: No rhonchi. Abdominal:      Palpations: Abdomen is soft. Skin:     General: Skin is warm and dry. Neurological:      Comments: sedated       Recent Labs     03/13/20  1219 03/13/20  1642 03/14/20  0300   WBC 4.8 14.6* 18.7*   RBC 7.11* 6.62* 5.89   HGB 20.7* 19.2* 17.2   HCT 65.5* 60.5* 53.8*    211 196   MCV 92.1 91.4 91.3   MCH 29.1 29.0 29.2   MCHC 31.6* 31.7* 32.0*   RDW 14.1 13.8 13.5   BANDSPCT  --   --  23*      Recent Labs     03/13/20  1219  03/13/20  1250  03/13/20  1642 03/13/20  1701 03/14/20  0300 03/14/20  0416     --   --   --  136  --  132*  --    K 3.2*   < >  --    < > 2.5* 2.8 4.7 4.6   CL 98  --   --   --  97*  --  93*  --    CO2 20*  --   --   --  20*  --  18*  --    BUN 23  --   --   --  23  --  29*  --    CREATININE 1.7*  --   --   --  2.0*  --  2.7*  --    CALCIUM 9.6  --   --   --  8.1*  --  8.4*  --    GLUCOSE 259*  --  336  --  317*  --  275*  --     < > = values in this interval not displayed.       Recent Labs     03/13/20  1323 03/13/20  1701 03/14/20  0416   PHART 7.010* 7.090* 7.230*   HBV3QLR 69.0* 57.0* 36.0   PO2ART 51.0* 42.0* 127.0*   TWG1TIX 17.4* 17.3* 15.1*   F1NJJRJE 76.6* 72.2* 96.5   BEART -15.2* -13.5* -11.5*     Recent Labs     03/13/20  1219  03/14/20  0300 03/14/20  0920 03/14/20  1400   AST 22   < > 323*  --   --    ALT <5*   < > 67*  --   --    ALKPHOS 170*   < > 276*  --   --    BILITOT 0.3   < > 0.8  --   --    MG 2.4   < > 1.9  --   --    CALCIUM 9.6   < > 8.4*  --   --    TROPONINI <0.01   < >  --   --  0.31*   LACTA 9.1*   < >  --  6.6*  --    INR 1.11  --   --   --   --    TSH 8.600*  --   --   --   --     < > =

## 2020-03-14 NOTE — PROGRESS NOTES
Nutrition Assessment    Type and Reason for Visit: Initial, Consult    Nutrition Recommendations: If TFs initiated, would suggest Vital Hi Pro @ 10 ml/hr with Proteinex bolus 4 x /d, follow and adjust as needed. Nutrition Assessment: Pt at risk of nutritional compromise AEB NPO on vent. Consulted for TF, will assess and follow per clinical course. Malnutrition Assessment:  · Malnutrition Status: Insufficient data  · Context: Acute illness or injury  · Findings of the 6 clinical characteristics of malnutrition (Minimum of 2 out of 6 clinical characteristics is required to make the diagnosis of moderate or severe Protein Calorie Malnutrition based on AND/ASPEN Guidelines):  1. Energy Intake-Less than or equal to 50% of estimated energy requirement,      2. Weight Loss-Unable to assess,    3. Fat Loss-Unable to assess,    4. Muscle Loss-Unable to assess,    5. Fluid Accumulation-No significant fluid accumulation,    6.   Strength-Not measured    Nutrition Risk Level: High    Nutrient Needs:  · Estimated Daily Total Kcal: 980-1350(8-11 kcal/kg)  · Estimated Daily Protein (g): 175(2.5 g/kg)  · Estimated Daily Total Fluid (ml/day): 6416-3923    Nutrition Diagnosis:   · Problem: Inadequate oral intake  · Etiology: related to Impaired respiratory function-inability to consume food     Signs and symptoms:  as evidenced by Intubation    Objective Information:  · Current Nutrition Therapies:  · Oral Diet Orders: NPO   · Anthropometric Measures:  · Ht: 5' 8\" (172.7 cm)   · Admission Body Wt: 270 lb (122.5 kg)  · Ideal Body Wt: 154 lb (69.9 kg),  · BMI Classification: BMI > or equal to 40.0 Obese Class III    Nutrition Interventions:   Continue NPO  Continued Inpatient Monitoring    Nutrition Evaluation:   · Evaluation: Goals set   · Goals: Meet nutrition needs via EN    · Monitoring: Nutrition Progression, Weight, Pertinent Labs      Electronically signed by Farhad Wilson MS, RD, LD on 3/14/20 at 12:39 PM CDT    Contact Number: [id number]

## 2020-03-14 NOTE — PROGRESS NOTES
Length of Stay:   LOS: 1 day      Chief complaint:  Chief Complaint   Patient presents with    Respiratory Distress     pt complains of resp distress and chest pain         Subjective:  Sedated on MV    Physical Examination:  BP 91/70   Pulse 108   Temp 98.4 °F (36.9 °C) (Temporal)   Resp 26   Ht 5' 8\" (1.727 m)   Wt 270 lb 3.2 oz (122.6 kg)   SpO2 96%   BMI 41.08 kg/m²   Constitutional - Obese, Appropriately groomed, good nutritional status  Psychiatric - Sedated, intubated  Eyes - EOMI, conjunctivae and lids intact  ENMT - lips, teeth, gums intact; hearing intact  Respiratory - Coarse BS bilaterally  Cardiovascular - Clear S1, S2 no gross m/r/g; pedal pulses intact  Abd - Soft, non-tender; No gross hernia  MSK - UE and LE joints intact, no gross clubbing  Skin - No rashes or wounds; no gross capillary refill defects  Neurologic - Obtunded        Medications:  piperacillin-tazobactam, 2.25 g, Intravenous, Q6H    famotidine (PEPCID) injection, 20 mg, Intravenous, Daily    bumetanide, 2 mg, Intravenous, Q12H    hydrocortisone sodium succinate PF, 50 mg, Intravenous, Q6H    sodium chloride, 500 mL, Intravenous, Once    vancomycin (VANCOCIN) intermittent dosing (placeholder), , Other, RX Placeholder    insulin lispro, 0-6 Units, Subcutaneous, TID WC    insulin lispro, 0-3 Units, Subcutaneous, Nightly    ipratropium-albuterol, 1 ampule, Inhalation, Q4H    polyvinyl alcohol, 1 drop, Both Eyes, Q4H **AND** artificial tears, , Both Eyes, Q4H    chlorhexidine, 15 mL, Mouth/Throat, BID      Problem list:  Active Problems:    Dyspnea on exertion    Acute respiratory failure with hypoxia and hypercapnia (HCC)    Flash pulmonary edema (HCC)    Septic shock (HCC)    Palliative care patient    Pneumonia due to organism  Resolved Problems:    * No resolved hospital problems.  *        A/P:  []Septic shock, acute hypoxic respiratory failure, acute hypercapnic respiratory failure, COPD exacerbation  -Gross bilateral PNA, pulmonary edema seen on CT  Viral Resp PCR - positive for RSV  -Improving oxygenation, hypercapnea - wean Fio2, PEEP as tolerated  -Improving pressor requirement, levophed down to 8mcg  Continue steroids, nebs, abs  -Lactate downtrending  Shock liver  Gross bands    []ATN  -Renal following, secondary to septic shock    []Type 2 supply demand ischemia  -Secondary to septic shock     High risk of death, extensive discussion with family understand high mortality.   Palliative care following     []Hospital issues:  DVT prop - SCDs  Code - DNR/DNI  Disposition - Continue CCU        Bhanu Mcdonald M.D.,  3/14/2020

## 2020-03-14 NOTE — PROGRESS NOTES
Dr. Latrell Loera notified of gram positive cocci in clusters resembling Staphylococcus in the gram stain Aerobic bottle, no new orders placed at this time.     Electronically signed by Tommas Libman, RN on 3/14/20 at 6:26 AM CDT

## 2020-03-14 NOTE — CONSULTS
Nephrology ([facility]) Consult Note      Patient:  Clementina Taylor  YOB: 1950  Date of Service: 3/14/2020  MRN: [id number]   Acct: [de-identified]   Primary Care Physician: Catarino Bob MD  Advance Directive: [facility]  Admit Date: 3/13/2020       Hospital Day: 1  Referring Provider: Nhan Xiong MD    Patient independently seen and examined, Chart, Consults, Notes, Operative notes, Labs, Cardiology, and Radiology studies reviewed as able. Chief complaint: Abnormal labs. Subjective:  Clementina Taylor is a 71 y.o. male  whom we were consulted for acute kidney injury/metabolic acidosis. All the information's are taken from the chart as he is currently intubated and sedated. His baseline serum creatinine 0.9 mg about 2 months ago. He presented to the emergency room with increasing shortness of breath and hypoxemic respiratory failure. Chest x-ray shows patient has bilateral lower lobe consolidation and pulmonary edema. CT angiogram of the pulmonary arteries in the emergency room consistent with no evidence of pulmonary embolism. He is currently admitted to CCU, intubated sedated on a couple of pressors as he has septic shock. He has acute kidney injury and nephrology is consulted. Otherwise he has history of COPD, coronary artery disease and pacemaker implant.     Allergies:  Codeine    Medicines:  Current Facility-Administered Medications   Medication Dose Route Frequency Provider Last Rate Last Dose    morphine injection 1 mg  1 mg Intravenous Q4H PRN Clarice Carias MD   1 mg at 03/14/20 0641    sodium bicarbonate 75 mEq in sodium chloride 0.45 % 1,000 mL infusion   Intravenous Rodrick Ruby  mL/hr at 03/14/20 0834      piperacillin-tazobactam (ZOSYN) 2.25 g in dextrose 5 % 50 mL IVPB (mini-bag)  2.25 g Intravenous Q6H Tawanda Whittaker MD        famotidine (PEPCID) injection 20 mg  20 mg Intravenous Daily Partha Price MD   20 mg at 03/14/20 0445    norepinephrine (LEVOPHED) 16 mg in dextrose 5% 250 mL infusion  2 mcg/min Intravenous Continuous Lynn Abel MD 7 mL/hr at 03/14/20 0943 7.5 mcg/min at 03/14/20 0943    vasopressin 20 Units in dextrose 5 % 100 mL infusion  0.04 Units/min Intravenous Continuous Lynn Abel MD 12 mL/hr at 03/14/20 0530 0.04 Units/min at 03/14/20 0530    hydrocortisone sodium succinate PF (SOLU-CORTEF) injection 50 mg  50 mg Intravenous Q6H Flako Currie MD   50 mg at 03/14/20 0445    0.9 % sodium chloride bolus  500 mL Intravenous Once Flako Currie MD        vancomycin (VANCOCIN) intermittent dosing (placeholder)   Other RX Placeholder Lynn Abel MD        insulin lispro (HUMALOG) injection vial 0-6 Units  0-6 Units Subcutaneous TID WC Flako Currie MD   3 Units at 03/14/20 0838    insulin lispro (HUMALOG) injection vial 0-3 Units  0-3 Units Subcutaneous Nightly Flako Currie MD   Stopped at 03/13/20 2017    glucose (GLUTOSE) 40 % oral gel 15 g  15 g Oral PRN Flako Currie MD        dextrose 50 % IV solution  12.5 g Intravenous PRN Flako Currie MD        glucagon (rDNA) injection 1 mg  1 mg Intramuscular PRN Flako Currie MD        dextrose 5 % solution  100 mL/hr Intravenous PRN Flako Currie MD        ipratropium-albuterol (DUONEB) nebulizer solution 1 ampule  1 ampule Inhalation Q4H Zuleyma Loya MD   1 ampule at 03/14/20 1006    polyvinyl alcohol (LIQUIFILM TEARS) 1.4 % ophthalmic solution 1 drop  1 drop Both Eyes Q4H Zuleyma Loya MD   1 drop at 03/14/20 9630    And    Stye 31.9-57.7 % OINT   Both Eyes Q4H Zuleyma Loya MD        chlorhexidine (PERIDEX) 0.12 % solution 15 mL  15 mL Mouth/Throat BID Zuleyma Loya MD   15 mL at 03/14/20 2421    propofol injection  10 mcg/kg/min Intravenous Titrated Flako Currie MD 27.8 mL/hr at 03/14/20 0938 40 mcg/kg/min at 03/14/20 1947       Past Medical History:  Past Medical History:   Diagnosis Date    Anemia     Anxiety     Back pain  Cellulitis     Depression     Elevated triglycerides with high cholesterol     GERD (gastroesophageal reflux disease)     Hypertension     [facility]    Osteoarthritis     Pacemaker 04/21/2017    Palliative care patient 03/13/2020    Sleep apnea, unspecified 11/22/2019       Past Surgical History:  Past Surgical History:   Procedure Laterality Date    COLONOSCOPY  11/18/11        JOINT REPLACEMENT      Bilateral total knees    NECK SURGERY      PACEMAKER PLACEMENT      TOTAL KNEE ARTHROPLASTY      RIGHT AND LEFT KNEE    UPPER GASTROINTESTINAL ENDOSCOPY  2/2012           Family History  Family History   Problem Relation Age of Onset    Breast Cancer Mother     Colon Cancer Father     Colon Polyps Father     Prostate Cancer Brother        Social History  Social History     Socioeconomic History    Marital status:      Spouse name: Not on file    Number of children: Not on file    Years of education: Not on file    Highest education level: Not on file   Occupational History    Occupation: maintenance chemical plant   Social Needs    Financial resource strain: Not on file    Food insecurity     Worry: Not on file     Inability: Not on file    Transportation needs     Medical: Not on file     Non-medical: Not on file   Tobacco Use    Smoking status: Never Smoker    Smokeless tobacco: Never Used   Substance and Sexual Activity    Alcohol use:  Yes    Drug use: Never    Sexual activity: Not on file   Lifestyle    Physical activity     Days per week: Not on file     Minutes per session: Not on file    Stress: Not on file   Relationships    Social connections     Talks on phone: Not on file     Gets together: Not on file     Attends Alevism service: Not on file     Active member of club or organization: Not on file     Attends meetings of clubs or organizations: Not on file     Relationship status: Not on file    Intimate partner violence     Fear of current or ex partner: Not on file     Emotionally abused: Not on file     Physically abused: Not on file     Forced sexual activity: Not on file   Other Topics Concern    Not on file   Social History Narrative    Not on file         Review of Systems:  unAble to obtain as he is intubated and sedated.     Objective:  Patient Vitals for the past 24 hrs:   BP Temp Temp src Pulse Resp SpO2 Height Weight   03/14/20 1007 -- -- -- 116 26 99 % -- --   03/14/20 1000 101/75 -- -- 117 26 97 % -- --   03/14/20 0900 119/75 -- -- 121 26 98 % -- --   03/14/20 0800 117/80 98.5 °F (36.9 °C) Temporal 122 26 98 % -- --   03/14/20 0641 -- -- -- 115 26 99 % -- --   03/14/20 0600 113/80 97.9 °F (36.6 °C) Temporal 118 26 98 % -- --   03/14/20 0500 127/83 -- -- 123 26 97 % -- --   03/14/20 0417 -- -- -- -- 26 98 % -- --   03/14/20 0400 85/71 97.5 °F (36.4 °C) Temporal 123 26 97 % -- 270 lb 3.2 oz (122.6 kg)   03/14/20 0300 -- -- -- -- -- 94 % -- --   03/14/20 0205 -- -- -- 111 26 92 % -- --   03/14/20 0204 -- -- -- -- 26 92 % -- --   03/14/20 0200 117/72 -- -- 108 26 93 % -- --   03/14/20 0100 110/69 -- -- 115 26 92 % -- --   03/14/20 0000 108/73 97.1 °F (36.2 °C) Temporal 113 26 95 % -- --   03/13/20 2300 99/70 -- -- 112 26 92 % -- --   03/13/20 2231 109/70 96.5 °F (35.8 °C) Temporal 111 26 93 % -- --   03/13/20 2210 -- -- -- -- 26 90 % -- --   03/13/20 2200 92/72 -- -- 115 27 90 % -- --   03/13/20 2100 105/72 96.1 °F (35.6 °C) Temporal 111 (!) 33 (!) 85 % -- --   03/13/20 2000 96/68 95 °F (35 °C) Axillary 109 (!) 32 (!) 87 % -- --   03/13/20 1900 (!) 82/52 -- -- 104 27 (!) 81 % -- --   03/13/20 1820 -- -- -- 102 (!) 34 (!) 84 % -- --   03/13/20 1805 -- -- -- -- 26 (!) 84 % -- --   03/13/20 1800 (!) 76/47 -- -- 99 26 (!) 82 % -- --   03/13/20 1707 -- -- -- -- (!) 35 (!) 81 % -- --   03/13/20 1700 97/67 -- -- 107 26 (!) 79 % -- --   03/13/20 1550 -- -- -- 112 28 (!) 82 % -- -- 03/13/20 1416 (!) 94/55 -- -- 112 20 (!) 87 % -- --   03/13/20 1401 (!) 86/57 -- -- 112 20 (!) 86 % -- --   03/13/20 1400 -- -- -- 116 20 (!) 87 % -- --   03/13/20 1359 83/63 -- -- 112 20 (!) 87 % -- --   03/13/20 1352 82/60 -- -- 106 20 (!) 87 % -- --   03/13/20 1342 (!) 78/53 -- -- 105 20 (!) 87 % -- --   03/13/20 1341 -- -- -- 111 20 (!) 86 % -- --   03/13/20 1340 -- -- -- 112 20 (!) 87 % -- --   03/13/20 1339 (!) 75/53 -- -- 111 20 (!) 88 % -- --   03/13/20 1337 (!) 70/57 97.8 °F (36.6 °C) Rectal 110 21 (!) 85 % -- --   03/13/20 1336 82/60 -- -- 109 20 (!) 85 % -- --   03/13/20 1335 (!) 75/54 -- -- 109 17 (!) 87 % -- --   03/13/20 1329 94/63 -- -- 105 16 (!) 87 % -- --   03/13/20 1327 97/65 -- -- 105 16 (!) 87 % -- --   03/13/20 1313 (!) 140/72 -- -- 97 16 (!) 82 % -- --   03/13/20 1307 118/70 -- -- 96 16 (!) 77 % -- --   03/13/20 1302 (!) 77/57 -- -- 87 16 -- -- --   03/13/20 1259 (!) 63/51 -- -- 77 16 -- -- --   03/13/20 1252 (!) 80/50 -- -- -- -- -- -- --   03/13/20 1238 (!) 78/58 -- -- 101 14 (!) 68 % -- --   03/13/20 1237 (!) 80/57 -- -- 99 14 (!) 68 % -- --   03/13/20 1231 (!) 149/114 -- -- 102 (!) 38 -- -- --   03/13/20 1229 124/77 -- -- 110 (!) 33 -- -- --   03/13/20 1223 -- -- -- 132 (!) 41 (!) 70 % -- --   03/13/20 1222 114/74 -- -- 121 (!) 41 -- -- --   03/13/20 1220 113/77 -- -- 134 (!) 36 -- -- --   03/13/20 1215 (!) 169/144 -- -- 112 (!) 40 (!) 62 % -- --   03/13/20 1214 (!) 154/139 -- -- -- -- -- -- --   03/13/20 1211 -- -- Oral 109 -- -- 5' 8\" (1.727 m) 255 lb (115.7 kg)       Intake/Output Summary (Last 24 hours) at 3/14/2020 1042  Last data filed at 3/14/2020 0600  Gross per 24 hour   Intake 1308.98 ml   Output 1080 ml   Net 228.98 ml     General: Intubated/sedated. HEENT: Normocephalic atraumatic head/orotracheal intubation. Neck: Supple  With no JVD or carotid bruits. Chest:  rales present-bilaterally at the bases.   CVS: regular rate and rhythm  Abdominal: soft, nontender, normal bowel sounds  Extremities: no cyanosis or edema  Skin: warm and dry without rash      Labs:  BMP:   Recent Labs     03/13/20  1219  03/13/20  1642 03/13/20  1701 03/14/20  0300 03/14/20  0416     --  136  --  132*  --    K 3.2*   < > 2.5* 2.8 4.7 4.6   CL 98  --  97*  --  93*  --    CO2 20*  --  20*  --  18*  --    BUN 23  --  23  --  29*  --    CREATININE 1.7*  --  2.0*  --  2.7*  --    CALCIUM 9.6  --  8.1*  --  8.4*  --     < > = values in this interval not displayed. CBC:   Recent Labs     03/13/20 1219 03/13/20 1642 03/14/20  0300   WBC 4.8 14.6* 18.7*   HGB 20.7* 19.2* 17.2   HCT 65.5* 60.5* 53.8*   MCV 92.1 91.4 91.3    211 196     LIVER PROFILE:   Recent Labs     03/13/20 1219 03/13/20  1642 03/14/20  0300   AST 22 65* 323*   ALT <5* 7 67*   LIPASE 26  --   --    BILITOT 0.3 0.6 0.8   ALKPHOS 170* 232* 276*     PT/INR:   Recent Labs     03/13/20 1219   PROTIME 14.3   INR 1.11     APTT: No results for input(s): APTT in the last 72 hours. BNP:  No results for input(s): BNP in the last 72 hours. Ionized Calcium:No results for input(s): IONCA in the last 72 hours. Magnesium:  Recent Labs     03/13/20 1219 03/13/20  1644 03/14/20  0300   MG 2.4 2.2 1.9     Phosphorus:No results for input(s): PHOS in the last 72 hours. HgbA1C: No results for input(s): LABA1C in the last 72 hours. Hepatic:   Recent Labs     03/13/20  1219 03/13/20  1642 03/14/20  0300   ALKPHOS 170* 232* 276*   ALT <5* 7 67*   AST 22 65* 323*   PROT 7.2 5.6* 6.2*   BILITOT 0.3 0.6 0.8   LABALBU 4.2 2.9* 3.7     Lactic Acid:   Recent Labs     03/14/20  0920   LACTA 6.6*     Troponin: No results for input(s): CKTOTAL, CKMB, TROPONINT in the last 72 hours. ABGs: No results for input(s): PH, PCO2, PO2, HCO3, O2SAT in the last 72 hours. CRP:    Recent Labs     03/13/20  1219   CRP 1.35*     Sed Rate:    Recent Labs     03/13/20  1219   SEDRATE 7         Cultures:   No results for input(s): CULTURE in the last 72 hours.   Recent Labs     03/13/20  1255   BLOODCULT2 Gram stain Aerobic bottle:  Gram positive cocci in clusters  resembling Staphylococcus  Culture in progress  Please notify Physician  *     No results for input(s): CXSURG in the last 72 hours. Radiology reports as per the Radiologist  Radiology: Xr Chest Portable    Result Date: 3/14/2020  XR CHEST PORTABLE 3/14/2020 4:15 AM HISTORY: Mechanical ventilation Comparison: 3/13/2020 Findings: Endotracheal tube is in stable position. Reidentified bilateral pulmonary edema, stable or perhaps slightly decreased. The cardiomediastinal silhouette and pulmonary vascularity are unchanged. Left chest wall dual chamber pacemaker. Anterior cervical fusion hardware. No acute osseous or soft tissue abnormality is noted. Impression: 1. Reidentified pulmonary edema, stable or perhaps slightly decreased. Otherwise unchanged. Signed by Dr Florentino Ring on 3/14/2020 7:22 AM    Xr Chest Portable    Result Date: 3/13/2020  XR CHEST PORTABLE 3/13/2020 11:45 AM HISTORY: Status post intubation COMPARISON: 3/13/2020. FINDINGS: Status post intubation with endotracheal tube tip projecting 5.3 cm above the sasha. Reidentified bilateral diffuse lung opacities, not significant changed. Lungs appear to be well expanded. No pleural effusions or pneumothorax identified. Heart size is stable. Left chest wall dual chamber pacemaker identified. 1. Status post intubation with lungs appearing well expanded. Tube is in good position. 2. Reidentified stable bilateral diffuse lung opacities, favor pulmonary edema. Signed by Dr Florentino Ring on 3/13/2020 12:56 PM    Xr Chest Portable    Result Date: 3/13/2020  EXAM: XR CHEST PORTABLE -- 3/13/2020 11:15 AM HISTORY: 69 years, Male, shortness of breath COMPARISON: 2/18/2020 TECHNIQUE:  1 images. Frontal view of the chest. FINDINGS:  Diffuse bilateral pulmonary opacities. No pneumothorax or large pleural effusion. Borderline cardiac silhouette.  Upper mediastinum

## 2020-03-14 NOTE — PROGRESS NOTES
Pt in bed intubated and oxygen saturation is 99% on 100% FIO2. Oxygen turned down to 80%. Pt is tolerating it well. Pt turn and reposition as ordered.

## 2020-03-15 ENCOUNTER — OUTSIDE FACILITY SERVICE (OUTPATIENT)
Dept: PULMONOLOGY | Facility: CLINIC | Age: 70
End: 2020-03-15

## 2020-03-15 PROCEDURE — 99233 SBSQ HOSP IP/OBS HIGH 50: CPT | Performed by: INTERNAL MEDICINE

## 2020-03-15 NOTE — CONSULTS
Cardiovascular:      Rate and Rhythm: Tachycardia present. Rhythm irregular. Pulses: Normal pulses. Heart sounds: Normal heart sounds. Pulmonary:      Effort: Pulmonary effort is normal.      Breath sounds: Wheezing and rhonchi present. Abdominal:      General: Abdomen is flat. Musculoskeletal: Normal range of motion. Skin:     General: Skin is warm. Neurological:      General: No focal deficit present. Mental Status: He is alert. Labs:  Recent Labs     03/13/20  1642 03/14/20  0300 03/15/20  0418   WBC 14.6* 18.7* 12.3*   HGB 19.2* 17.2 15.3    196 145       Recent Labs     03/13/20  1642 03/13/20  1644  03/14/20  0300 03/14/20  0416 03/15/20  0351 03/15/20  0418     --   --  132*  --   --  138   K 2.5*  --    < > 4.7 4.6 4.1 4.6   CL 97*  --   --  93*  --   --  95*   CO2 20*  --   --  18*  --   --  22   BUN 23  --   --  29*  --   --  41*   CREATININE 2.0*  --   --  2.7*  --   --  3.2*   LABGLOM 33*  --   --  24*  --   --  19*   MG  --  2.2  --  1.9  --   --  2.0   CALCIUM 8.1*  --   --  8.4*  --   --  7.8*    < > = values in this interval not displayed. CK, CKMB, Troponin: @LABRCNT (CKTOTAL:3, CKMB:3, TROPONINI:3)@    Last 3 BNP:  No results for input(s): BNP in the last 72 hours. IMAGING:  Ct Cervical Spine Wo Contrast    Result Date: 2/18/2020  CT cervical spine 2/18/2020 2:45 PM HISTORY: Previous fusion, weakness TECHNIQUE: Axial images of the cervical spine were obtained without IV contrast. Coronal and sagittal reformatted images are reconstructed and reviewed. COMPARISON: None. DLP: 774 mGy cm Automated exposure control was utilized to minimize patient radiation dose. FINDINGS: There is anterior cervical fusion of C3-C7 with C6 partial vertebrectomy. There is straightening of the normal cervical curvature. Please moderate degenerative disc change seen at C7-T1. There is uncinate process hypertrophy at C6 and C7. There is mild facet arthropathy.  No severe thecal sac stenosis. Facet arthropathy contributing to mild to moderate bilateral foraminal stenosis. L3-L4 : Mild thecal sac stenosis related to disc bulge and epidural lipomatosis. Facet arthropathy contributing to mild to moderate left and mild right foraminal stenosis. L4-L5 : Disc bulge, ligamentum flavum hypertrophy, without thecal sac stenosis. Facet arthropathy contributing to mild to moderate bilateral foraminal stenosis. L5-S1 : No significant thecal sac stenosis. Facet arthropathy contributing to moderate to severe right and mild left foraminal stenosis. 1.  Degenerative changes and epidural lipomatosis with resulting moderate to severe thecal sac stenosis at L1-L2 and L2-L3. 2.  Additional milder degenerative changes as described above. Signed by Dr Sandra Bahena on 2/21/2020 4:12 PM    Mri Cervical Spine W Wo Contrast    Result Date: 2/20/2020  MRI CERVICAL SPINE W WO CONTRAST 2/20/2020 5:48 PM History: Generalized weakness and worsening ataxia. Pre and postcontrast MR imaging of the cervical spine. C3-C7 discectomy with anterior plate and screw fusion. Normally patent spinal canal. No cord compression and no abnormal cord signal. Detail is limited by patient motion. There appears to be bilateral foraminal stenosis at all levels from C2 through C6 based on facet hypertrophy. Postcontrast imaging shows no abnormal cord enhancement. 1. Normally patent spinal canal with no cord compression or abnormal cord enhancement. 2. Limited detail though there is evidence of bilateral foraminal stenosis from C2 through C6 based on bony changes. Signed by Dr Yusef Villarreal on 2/20/2020 5:51 PM    Mri Thoracic Spine W Wo Contrast    Result Date: 2/20/2020  MRI THORACIC SPINE W WO CONTRAST 2/20/2020 5:22 PM History: Neck pain. Generalized weakness. Worsening ataxia. Pre and postcontrast thoracic spine MRI. Images are affected by patient motion. Minimal left convex curvature.  Multilevel mild disc space narrowing and endplate spurring. Multilevel degenerative endplate signal change. No spinal canal stenosis or cord compression. No significant disc herniation or foraminal stenosis is seen. No compression fracture. Postcontrast imaging shows no abnormal cord enhancement. 1. Multilevel degenerative disc and endplate changes with no fracture, stenosis, or neural compression. Signed by Dr Audrey Corey on 2/20/2020 5:24 PM    Us Renal Complete    Result Date: 3/14/2020  US RENAL COMPLETE 3/14/2020 6:00 AM HISTORY: Elevated creatinine COMPARISON: None  TECHNIQUE: Multiple longitudinal and transverse realtime sonographic images of the kidneys and urinary bladder are obtained. FINDINGS: The right kidney measures 12.0 x 5.7 x 6.9 cm. The right kidney is normal in size, shape, contour and position. 2.1 cm simple cyst arising from the upper pole. The cortical thickness is normal, with preservation of the corticomedullary differentiation. The central echo complex is compact with no evidence for hydronephrosis. No nephrolithiasis or abnormal perinephric fluid collections are seen. No hydroureter. The left kidney measures 9.8 x 5.7 x 5.4 cm. The left kidney is normal in size, shape, contour and position. The cortical thickness is normal, with preservation of the corticomedullary differentiation. The central echo complex is compact with no evidence for hydronephrosis. No nephrolithiasis or abnormal perinephric fluid collections are seen. No hydroureter. Urinary bladder is decompressed by Hernández catheter. 1. Small simple right renal cyst. Kidneys are otherwise unremarkable. 2. Urinary bladder is decompressed by Hernández catheter. Signed by Dr Ericka Alcocer on 3/14/2020 1:02 PM    Xr Chest Portable    Result Date: 3/15/2020  XR CHEST PORTABLE 3/15/2020 5:00 AM HISTORY: Respiratory failure Comparison: 3/14/2020 Findings: Mild interval decrease in the underlying pulmonary edema.  There are no new or worsening consolidations. No pleural effusion or pneumothorax. Endotracheal tube is in good position. Heart size is stable. Left chest wall dual chamber pacemaker. Impression: 1. Mild interval decrease in the underlying pulmonary edema. Otherwise stable. Signed by Dr Quinton Davis on 3/15/2020 7:24 AM    Xr Chest Portable    Result Date: 3/14/2020  XR CHEST PORTABLE 3/14/2020 4:15 AM HISTORY: Mechanical ventilation Comparison: 3/13/2020 Findings: Endotracheal tube is in stable position. Reidentified bilateral pulmonary edema, stable or perhaps slightly decreased. The cardiomediastinal silhouette and pulmonary vascularity are unchanged. Left chest wall dual chamber pacemaker. Anterior cervical fusion hardware. No acute osseous or soft tissue abnormality is noted. Impression: 1. Reidentified pulmonary edema, stable or perhaps slightly decreased. Otherwise unchanged. Signed by Dr Quinton Davis on 3/14/2020 7:22 AM    Xr Chest Portable    Result Date: 3/13/2020  XR CHEST PORTABLE 3/13/2020 11:45 AM HISTORY: Status post intubation COMPARISON: 3/13/2020. FINDINGS: Status post intubation with endotracheal tube tip projecting 5.3 cm above the sasha. Reidentified bilateral diffuse lung opacities, not significant changed. Lungs appear to be well expanded. No pleural effusions or pneumothorax identified. Heart size is stable. Left chest wall dual chamber pacemaker identified. 1. Status post intubation with lungs appearing well expanded. Tube is in good position. 2. Reidentified stable bilateral diffuse lung opacities, favor pulmonary edema. Signed by Dr Quinton Davis on 3/13/2020 12:56 PM    Xr Chest Portable    Result Date: 3/13/2020  EXAM: XR CHEST PORTABLE -- 3/13/2020 11:15 AM HISTORY: 69 years, Male, shortness of breath COMPARISON: 2/18/2020 TECHNIQUE:  1 images. Frontal view of the chest. FINDINGS:  Diffuse bilateral pulmonary opacities. No pneumothorax or large pleural effusion. Borderline cardiac silhouette.  Upper the patient. The benefits, the risks and complications are discussed. The patient understood the procedure and signed the consent. The patient was placed on the fluoroscopy table and the lumbar spine was examined. The patient has moderately severe dextroscoliosis. A suitable spot opposite interspace L3-4 was selected for puncture. The spot was marked with an ink marker. After sterile preparation and application of local anesthesia a size 20-gauge longer spine needle was introduced into the thecal sac and a FreeFlow of clear colorless thin fluid was established. The patient was immediately turned into a decubitus position. The outer hub of the needle was connected to a pressure measuring device and opening pressure was measured. The opening pressure is 15.5 cm water. 30 mL of CSF was withdrawn and dispensed in 4 separate test tubes which were sent to the laboratory for further evaluation as instructed by the attending physician. The needle was then withdrawn and the puncture site was covered with a sterile Band-Aid. The patient tolerated the procedure well. No immediate complications were noted. The patient was given post lumbar puncture care instructions. A successful lumbar puncture and opening pressure measurement. Fluoroscopy time 2 minutes 11 seconds. Total dose: 1.814gCqx3. Signed by Dr Eduardo Reyna on 2/19/2020 4:45 PM    ECHO: NL EF. LVH. Negative lexiscan 2019. Assessment:  1. Acute resp failure  2. RSV pneumonia  3. NL EF  4. LVH  5. A fib  6. Acute renal failure      Recommendations:    Rate control. Heparin for anticoagulation. Supportive care. Patient remains critically  ill.

## 2020-03-15 NOTE — PROGRESS NOTES
3/15/2020  3:52 AM - Cinthya Hanna Incoming Lab Results From [facility]     Component Value Ref Range & Units Status Collected Lab   pH, Arterial 7.410  7.350 - 7.450 Final 03/15/2020  3:51 AM [facility]   pCO2, Arterial 35.0  35.0 - 45.0 mmHg Final 03/15/2020  3:51 AM [facility]   pO2, Arterial 75. 0Low   80.0 - 100.0 mmHg Final 03/15/2020  3:51 AM [facility]   HCO3, Arterial 22.2  22.0 - 26.0 mmol/L Final 03/15/2020  3:51 AM [facility] Excess, Arterial -1.8  -2.0 - 2.0 mmol/L Final 03/15/2020  3:51 AM [facility]   Hemoglobin, Art, Extended 16.1  14.0 - 18.0 g/dL Final 03/15/2020  3:51 AM [address] [facility]   O2 Sat, Arterial 94.2  >92 % Final 03/15/2020  3:51 AM [facility]   Carboxyhgb, Arterial 0.4  0.0 - 5.0 % Final 03/15/2020  3:51 AM [facility]        0.0-1.5   (Smokers 1.5-5.0)    Methemoglobin, Arterial 0.7  <1.5 % Final 03/15/2020  3:51 AM [facility]   O2 Content, Arterial 21.3  Not Established mL/dL Final 03/15/2020  3:51 AM [address] [facility]   O2 Therapy Unknown   Final 03/15/2020  3:51 AM  - [address] Performed By     Carmen Pablo Name Director Address Valid Date Range   [facility] - [address] LAB Simon Pandey  [place],[address]  [address] [address] 08/[birth year] 0733-Present   Lab and Collection     VC 26, , 65% FIO2, 12 PEEP  LB

## 2020-03-15 NOTE — H&P
Pt went into afibb rvr  Vitals:    03/15/20 0221   BP:    Pulse: 113   Resp: 26   Temp:    SpO2: 94%     A/p  Order ekg   Cardio consult in AM  Start cardizem bolus and gtt and monitor

## 2020-03-15 NOTE — PROGRESS NOTES
Units/kg Intravenous PRN Lizz Whitt MD        heparin 25,000 units in dextrose 5% 250 mL infusion  18 Units/kg/hr Intravenous Continuous Lizz Whitt MD        morphine injection 1 mg  1 mg Intravenous Q4H PRN Myranda Stein MD   1 mg at 03/14/20 0641    sodium bicarbonate 75 mEq in sodium chloride 0.45 % 1,000 mL infusion   Intravenous Continuous Yusra Priest  mL/hr at 03/15/20 0627      piperacillin-tazobactam (ZOSYN) 2.25 g in dextrose 5 % 50 mL IVPB (mini-bag)  2.25 g Intravenous Q6H Jim Wilburn MD   Stopped at 03/15/20 0746    famotidine (PEPCID) injection 20 mg  20 mg Intravenous Daily Jordyn Batista MD   20 mg at 03/15/20 1022    bumetanide (BUMEX) injection 2 mg  2 mg Intravenous Q12H Yusra Priest MD   2 mg at 03/14/20 2323    norepinephrine (LEVOPHED) 16 mg in dextrose 5% 250 mL infusion  2 mcg/min Intravenous Continuous Megan Sanches MD   Stopped at 03/14/20 1530    vasopressin 20 Units in dextrose 5 % 100 mL infusion  0.04 Units/min Intravenous Continuous Megan Sanches MD 12 mL/hr at 03/15/20 0957 0.04 Units/min at 03/15/20 0957    hydrocortisone sodium succinate PF (SOLU-CORTEF) injection 50 mg  50 mg Intravenous Q6H Jim Wilburn MD   50 mg at 03/15/20 0603    0.9 % sodium chloride bolus  500 mL Intravenous Once Jim Wilburn MD        vancomycin (VANCOCIN) intermittent dosing (placeholder)   Other RX Kinza Murphy MD        insulin lispro (HUMALOG) injection vial 0-6 Units  0-6 Units Subcutaneous TID WC Jim Wilburn MD   1 Units at 03/15/20 1023    insulin lispro (HUMALOG) injection vial 0-3 Units  0-3 Units Subcutaneous Nightly Jim Wilburn MD   1 Units at 03/14/20 2216    glucose (GLUTOSE) 40 % oral gel 15 g  15 g Oral PRN Jim Wilburn MD        dextrose 50 % IV solution  12.5 g Intravenous PRN Jim Wilburn MD        glucagon (rDNA) injection 1 mg  1 mg Intramuscular PRN Jim Wilburn MD        dextrose 5 % solution  100 file    Food insecurity     Worry: Not on file     Inability: Not on file    Transportation needs     Medical: Not on file     Non-medical: Not on file   Tobacco Use    Smoking status: Never Smoker    Smokeless tobacco: Never Used   Substance and Sexual Activity    Alcohol use: Yes    Drug use: Never    Sexual activity: Yes     Partners: Female   Lifestyle    Physical activity     Days per week: Not on file     Minutes per session: Not on file    Stress: Not on file   Relationships    Social connections     Talks on phone: Not on file     Gets together: Not on file     Attends [employer] service: Not on file     Active member of club or organization: Not on file     Attends meetings of clubs or organizations: Not on file     Relationship status: Not on file    Intimate partner violence     Fear of current or ex partner: Not on file     Emotionally abused: Not on file     Physically abused: Not on file     Forced sexual activity: Not on file   Other Topics Concern    Not on file   Social History Narrative    Not on file         Review of Systems:  unAble to obtain as he is intubated and sedated.     Objective:  Patient Vitals for the past 24 hrs:   BP Temp Temp src Pulse Resp SpO2 Weight   03/15/20 0700 102/62 -- -- 99 26 92 % --   03/15/20 0642 -- -- -- 94 26 93 % --   03/15/20 0974 -- -- -- -- -- -- 267 lb (121.1 kg)   03/15/20 0600 116/67 -- -- 100 27 91 % --   03/15/20 0500 112/66 -- -- 124 26 93 % --   03/15/20 0400 107/68 98 °F (36.7 °C) Axillary 128 26 93 % --   03/15/20 0353 -- -- -- -- 27 93 % --   03/15/20 0300 (!) 97/58 -- -- 157 30 93 % --   03/15/20 0221 -- -- -- 113 26 94 % --   03/15/20 0220 -- -- -- -- 26 94 % --   03/15/20 0200 137/72 -- -- 111 28 93 % --   03/15/20 0100 136/71 -- -- 107 26 92 % --   03/15/20 0000 139/75 98 °F (36.7 °C) Axillary 108 27 93 % --   03/14/20 2300 131/72 -- -- 106 26 92 % --   03/14/20 2230 -- -- -- -- 26 92 % --   03/14/20 2200 116/66 -- -- 106 26 92 % -- 03/14/20 2100 128/73 -- -- 106 27 92 % --   03/14/20 2003 -- -- -- 109 27 93 % --   03/14/20 2000 122/75 97.7 °F (36.5 °C) Axillary 107 27 93 % --   03/14/20 1900 125/70 -- -- 107 26 94 % --   03/14/20 1810 -- -- -- -- 26 93 % --   03/14/20 1800 95/62 -- -- 104 26 93 % --   03/14/20 1700 104/66 -- -- 111 26 94 % --   03/14/20 1600 104/64 98.6 °F (37 °C) Temporal 111 26 95 % --   03/14/20 1500 87/62 -- -- 110 26 95 % --   03/14/20 1439 -- -- -- 103 22 96 % --   03/14/20 1400 91/70 -- -- 108 26 96 % --   03/14/20 1300 104/63 -- -- 114 26 96 % --   03/14/20 1200 91/72 98.4 °F (36.9 °C) Temporal 114 (!) 31 98 % --   03/14/20 1100 105/66 -- -- 116 26 98 % --       Intake/Output Summary (Last 24 hours) at 3/15/2020 1027  Last data filed at 3/15/2020 0603  Gross per 24 hour   Intake 3362.28 ml   Output 2675 ml   Net 687.28 ml     General: Intubated/sedated. HEENT: Normocephalic atraumatic head/orotracheal intubation. Neck: Supple  With no JVD or carotid bruits. Chest:  rales present-bilaterally at the bases. CVS: regular rate and rhythm  Abdominal: soft, nontender, normal bowel sounds  Extremities: no cyanosis or edema  Skin: warm and dry without rash      Labs:  BMP:   Recent Labs     03/13/20  1642  03/14/20  0300 03/14/20  0416 03/15/20  0351 03/15/20  0418     --  132*  --   --  138   K 2.5*   < > 4.7 4.6 4.1 4.6   CL 97*  --  93*  --   --  95*   CO2 20*  --  18*  --   --  22   BUN 23  --  29*  --   --  41*   CREATININE 2.0*  --  2.7*  --   --  3.2*   CALCIUM 8.1*  --  8.4*  --   --  7.8*    < > = values in this interval not displayed.      CBC:   Recent Labs     03/13/20  1642 03/14/20  0300 03/15/20  0418   WBC 14.6* 18.7* 12.3*   HGB 19.2* 17.2 15.3   HCT 60.5* 53.8* 45.3   MCV 91.4 91.3 86.5    196 145     LIVER PROFILE:   Recent Labs     03/13/20  1219 03/13/20 1642 03/14/20  0300 03/15/20  0418   AST 22 65* 323* 138*   ALT <5* 7 67* 89*   LIPASE 26  --   --   --    BILITOT 0.3 0.6 0.8 0.8 ALKPHOS 170* 232* 276* 295*     PT/INR:   Recent Labs     03/13/20  1219   PROTIME 14.3   INR 1.11     APTT:   Recent Labs     03/15/20  0830   APTT 33.2     BNP:  No results for input(s): BNP in the last 72 hours. Ionized Calcium:No results for input(s): IONCA in the last 72 hours. Magnesium:  Recent Labs     03/13/20  1644 03/14/20  0300 03/15/20  0418   MG 2.2 1.9 2.0     Phosphorus:No results for input(s): PHOS in the last 72 hours. HgbA1C:   Recent Labs     03/15/20  0418   LABA1C 6.8*     Hepatic:   Recent Labs     03/13/20  1642 03/14/20  0300 03/15/20  0418   ALKPHOS 232* 276* 295*   ALT 7 67* 89*   AST 65* 323* 138*   PROT 5.6* 6.2* 5.6*   BILITOT 0.6 0.8 0.8   LABALBU 2.9* 3.7 3.0*     Lactic Acid:   Recent Labs     03/15/20  0410   LACTA 4.4*     Troponin: No results for input(s): CKTOTAL, CKMB, TROPONINT in the last 72 hours. ABGs: No results for input(s): PH, PCO2, PO2, HCO3, O2SAT in the last 72 hours. CRP:    Recent Labs     03/13/20  1219   CRP 1.35*     Sed Rate:    Recent Labs     03/13/20  1219   SEDRATE 7         Cultures:   No results for input(s): CULTURE in the last 72 hours. Recent Labs     03/13/20  1250 03/13/20  1255   BC No Growth to date. Any change in status will be called. --    BLOODCULT2  --  2 colony types  Isolated from Aerobic bottle  No further workup  This organism was isolated from one set. Susceptibility testing is not routinely done as this  organism frequently represents skin contamination. Additional testing can be ordered by calling the  Microbiology Department. No results for input(s): CXSURG in the last 72 hours. Radiology reports as per the Radiologist  Radiology: Xr Chest Portable    Result Date: 3/14/2020  XR CHEST PORTABLE 3/14/2020 4:15 AM HISTORY: Mechanical ventilation Comparison: 3/13/2020 Findings: Endotracheal tube is in stable position. Reidentified bilateral pulmonary edema, stable or perhaps slightly decreased.  The cardiomediastinal silhouette and pulmonary vascularity are unchanged. Left chest wall dual chamber pacemaker. Anterior cervical fusion hardware. No acute osseous or soft tissue abnormality is noted. Impression: 1. Reidentified pulmonary edema, stable or perhaps slightly decreased. Otherwise unchanged. Signed by Dr Enrico Figueredo on 3/14/2020 7:22 AM    Xr Chest Portable    Result Date: 3/13/2020  XR CHEST PORTABLE 3/13/2020 11:45 AM HISTORY: Status post intubation COMPARISON: 3/13/2020. FINDINGS: Status post intubation with endotracheal tube tip projecting 5.3 cm above the sasha. Reidentified bilateral diffuse lung opacities, not significant changed. Lungs appear to be well expanded. No pleural effusions or pneumothorax identified. Heart size is stable. Left chest wall dual chamber pacemaker identified. 1. Status post intubation with lungs appearing well expanded. Tube is in good position. 2. Reidentified stable bilateral diffuse lung opacities, favor pulmonary edema. Signed by Dr Enrico Figueredo on 3/13/2020 12:56 PM    Xr Chest Portable    Result Date: 3/13/2020  EXAM: XR CHEST PORTABLE -- 3/13/2020 11:15 AM HISTORY: 69 years, Male, shortness of breath COMPARISON: 2/18/2020 TECHNIQUE:  1 images. Frontal view of the chest. FINDINGS:  Diffuse bilateral pulmonary opacities. No pneumothorax or large pleural effusion. Borderline cardiac silhouette. Upper mediastinum within normal limits. Cardiac pulse generator at the left chest with 2 grossly intact leads. 1. Diffuse bilateral pulmonary opacities, which could represent edema or infection. Signed by Dr Rita Lara on 3/13/2020 12:30 PM    Cta Pulmonary W Contrast    Result Date: 3/13/2020  Examination. CTA PULMONARY W CONTRAST 3/13/2020 1:00 PM History: Hypoxia and respiratory failure. DLP: 706 mGycm. CT angiography of the chest is performed after intravenous contrast enhancement.  The images are acquired in axial plane with subsequent reconstruction in coronal and sagittal Septic shock-improving. 5.  Contrast-induced nephropathy. 6.  Acute respiratory failure-bilateral pneumonia. 7.  Pulmonary edema. 8.  Hypotensive/on pressors. Plan:  1. Change/decrease IV fluid. 2.  Intermittent diuretics. 3.  Renal ultrasound looks normal.  4.  Try to wean off pressors.       Jordon Cota MD  03/15/20  10:27 AM

## 2020-03-15 NOTE — PROGRESS NOTES
Pt HR currently 118-130 after cardizem 10 mg bolus. Waiting for cardizem drip from pharmacy and waiting for callback from Dr. Eli Alfred. Vasopressin drip infusing at 0.04. RR 28. /65(71). Pt currently in AFIB per EKG. Will continue to monitor.

## 2020-03-15 NOTE — PROGRESS NOTES
19 cmH20  I:E Ratio: 1:3.40  Rate Measured: 22 br/min  Minute Volume: 15 Liters           Physical Exam  Constitutional:       General: He is not in acute distress. Appearance: He is ill-appearing. He is not toxic-appearing or diaphoretic. Interventions: He is sedated and intubated. HENT:      Nose: Nose normal.   Eyes:      General: No scleral icterus. Cardiovascular:      Rate and Rhythm: Normal rate. Heart sounds: No friction rub. Pulmonary:      Effort: Pulmonary effort is normal. No accessory muscle usage, prolonged expiration or respiratory distress. He is intubated. Breath sounds: No wheezing or rhonchi. Abdominal:      Palpations: Abdomen is soft. Skin:     General: Skin is warm and dry. Neurological:      Comments: sedated       Recent Labs     03/13/20  1642 03/14/20  0300 03/15/20  0418   WBC 14.6* 18.7* 12.3*   RBC 6.62* 5.89 5.24   HGB 19.2* 17.2 15.3   HCT 60.5* 53.8* 45.3    196 145   MCV 91.4 91.3 86.5   MCH 29.0 29.2 29.2   MCHC 31.7* 32.0* 33.8   RDW 13.8 13.5 13.7   BANDSPCT  --  23*  --       Recent Labs     03/13/20  1642  03/14/20  0300 03/14/20  0416 03/15/20  0351 03/15/20  0418     --  132*  --   --  138   K 2.5*   < > 4.7 4.6 4.1 4.6   CL 97*  --  93*  --   --  95*   CO2 20*  --  18*  --   --  22   BUN 23  --  29*  --   --  41*   CREATININE 2.0*  --  2.7*  --   --  3.2*   CALCIUM 8.1*  --  8.4*  --   --  7.8*   GLUCOSE 317*  --  275*  --   --  183*    < > = values in this interval not displayed.       Recent Labs     03/13/20  1701 03/14/20 0416 03/15/20  0351   PHART 7.090* 7.230* 7.410   OQL5RDN 57.0* 36.0 35.0   PO2ART 42.0* 127.0* 75.0*   GGP2BXL 17.3* 15.1* 22.2   H0QUHZSJ 72.2* 96.5 94.2   BEART -13.5* -11.5* -1.8     Recent Labs     03/13/20  1219  03/15/20  0410 03/15/20  0418   AST 22   < >  --  138*   ALT <5*   < >  --  89*   ALKPHOS 170*   < >  --  295*   BILITOT 0.3   < >  --  0.8   MG 2.4   < >  --  2.0   CALCIUM 9.6   < >  --  7.8* TROPONINI <0.01   < >  --  0.23*   LACTA 9.1*   < > 4.4*  --    INR 1.11  --   --   --    TSH 8.600*  --   --   --     < > = values in this interval not displayed. Recent Labs     03/13/20  1250 03/13/20  1830   BC No Growth to date. Any change in status will be called. --    LABGRAM  --  Many WBC's (Polymorphonuclear) present  Few Gram positive cocci  in pairs     CULTRESP  --  No growth  to date     Radiograph: Impression:    1. Reidentified pulmonary edema, stable or perhaps slightly decreased. Otherwise unchanged. Signed by Dr Quinton Davis on 3/14/2020 7:22 AM       My radiograph interpretation: pulmonary edema     Pulmonary Assessment:  1. Acute respiratory failure with bilat infiltrates, slightly improved  2. RSV infection supportive care  3. Metabolic acidosis improved, on bicarbonate drip per nephrology now with normal pH  4. Acute renal failure due to acute illness  5. Elevated tsh, hypothyroid    Recommend:   · Continue vent as is for today. He is still not ready for spontaneous breathing trials until his hypotension and other decompensation above is stabilized  · Nutrition enteral underway  · Defer thyroid eval to others. · Polycythemia likely hemoconcentration, better.   · If he is off pressors tomorrow we could proceed with an attempt at spontaneous awakening trial and go from there with respect to spontaneous breathing trials    Electronically signed by Mariama Carlson on 3/15/2020 at 12:27 PM

## 2020-03-15 NOTE — PROGRESS NOTES
failure, COPD exacerbation  -Gross bilateral PNA, pulmonary edema seen on CT  Viral Resp PCR - positive for RSV  -Much improved hpercapnea, hypoxia, wean PEEP, FiO2 as tolerated  -Improving pressor requirement, levophed now off, attempt to stop vassopressin   Continue steroids, nebs, abs  -Lactate downtrending  Shock liver  Leukocytosis improving    []ATN  -Renal following, secondary to septic shock  Worsening renal function    []Afib w/RVR  -On diltiazem drip, rate controlled  -Cardiology following    []Type 2 supply demand ischemia  -Secondary to septic shock     High risk of death, extensive discussion with family understand high mortality.   Palliative care following     []Hospital issues:  DVT prop - SCDs  Code - DNR/DNI  Disposition - Continue CCU        Jose Reina M.D.,  3/15/2020

## 2020-03-15 NOTE — PROGRESS NOTES
Patient had increase if heart rate to 130-160. Dr. Lashawn Kidd notified. Orders obtained.  Will continue to monitor

## 2020-03-15 NOTE — PROGRESS NOTES
Pt. Heart rate running 100-120's Dr. Royer Jordan present gave orders to increase Metoprolol to 25mg and titrate Cardizem to maintain Hr <120

## 2020-03-16 ENCOUNTER — OUTSIDE FACILITY SERVICE (OUTPATIENT)
Dept: PULMONOLOGY | Facility: CLINIC | Age: 70
End: 2020-03-16

## 2020-03-16 PROCEDURE — 99233 SBSQ HOSP IP/OBS HIGH 50: CPT | Performed by: INTERNAL MEDICINE

## 2020-03-16 NOTE — PROGRESS NOTES
Cardiology Daily Note Prosper Manriquez MD      Patient:  Bradford Bronson  [id number]    Patient Active Problem List    Diagnosis Date Noted    Dyspnea on exertion 11/25/2019     Priority: High    Abnormal nuclear stress test      Priority: High    Precordial pain      Priority: High    Sick sinus syndrome due to SA node dysfunction (HCC)      Priority: High    Septic shock ([facility] [address] 03/13/2020     Priority: Low    Palliative care patient 03/13/2020     Priority: Low    Pneumonia due to organism      Priority: Low    Abnormal brain scan 03/03/2020     Priority: Low    Gait difficulty 02/20/2020     Priority: Low    Fall 02/19/2020     Priority: Low    Weakness 02/18/2020     Priority: Low    Loss of balance 02/14/2020     Priority: Low    Tick bite 02/14/2020     Priority: Low    Sleep apnea, unspecified 11/22/2019     Priority: Low    Sleep disturbance 09/30/2019     Priority: Low    Elevated serum creatinine 09/30/2019     Priority: Low    Diastolic dysfunction 73/47/6031     Priority: Low    Flash pulmonary edema (HCC) 09/30/2019     Priority: Low    Acute respiratory failure with hypoxia and hypercapnia (HCC) 09/14/2019     Priority: Low    Essential hypertension 06/10/2019     Priority: Low    Beryllium disease ([facility] [address] 06/10/2019     Priority: Low    SVT (supraventricular tachycardia) ([facility] [address] 09/10/2017     Priority: Low    Pacemaker 04/21/2017     Priority: Low    Encounter for colonoscopy due to history of adenomatous colonic polyps 11/24/2014     Priority: Low    NSAID long-term use 03/04/2014     Priority: Low    GERD (gastroesophageal reflux disease) 03/04/2013     Priority: Low    History of esophageal ulcer 03/04/2013     Priority: Low    History of adenomatous polyp of colon 03/04/2013     Priority: Low    Patulous lower esophageal sphincter 03/04/2013     Priority: Low    Hiatal hernia 03/04/2013     Priority: Low    Osteoarthritis 03/04/2013     Priority: Low    Chronic pain Subcutaneous Nightly    ipratropium-albuterol  1 ampule Inhalation Q4H    polyvinyl alcohol  1 drop Both Eyes Q4H    And    artificial tears   Both Eyes Q4H    chlorhexidine  15 mL Mouth/Throat BID       TELEMETRY: Sinus     Physical Exam:      Physical Exam      General: Intubated and sedated  Skin:  Warm and dry  Neck:  no jvd , no carotid bruits  Chest:  Clear to auscultation, no wheezing or rales  Cardiovascular:  RRR L4F3 no murmurs, clicks, gallups, or rubs  Abdomen:  Soft nontender, nondistended, bowel sounds present  Extremities:  Edema: none      Lab Data:  CBC:   Recent Labs     03/14/20  0300 03/15/20  0418 03/16/20  0425   WBC 18.7* 12.3* 9.8   HGB 17.2 15.3 13.6*   HCT 53.8* 45.3 40.0*   MCV 91.3 86.5 85.7    145 134     BMP:   Recent Labs     03/14/20  0300  03/15/20  0418 03/16/20  0417 03/16/20  0425   *  --  138  --  137   K 4.7   < > 4.6 3.6 3.9   CL 93*  --  95*  --  95*   CO2 18*  --  22  --  26   BUN 29*  --  41*  --  56*   CREATININE 2.7*  --  3.2*  --  3.4*    < > = values in this interval not displayed. LIVER PROFILE:   Recent Labs     03/13/20  1219  03/14/20  0300 03/15/20  0418 03/16/20  0425   AST 22   < > 323* 138* 48*   ALT <5*   < > 67* 89* 64*   LIPASE 26  --   --   --   --    BILITOT 0.3   < > 0.8 0.8 0.6   ALKPHOS 170*   < > 276* 295* 219*    < > = values in this interval not displayed. PT/INR:   Recent Labs     03/13/20  1219   PROTIME 14.3   INR 1.11     APTT:   Recent Labs     03/15/20  1520 03/15/20  2215 03/16/20  0425   APTT 158.6* 83.5* 68.6*     BNP:  No results for input(s): BNP in the last 72 hours. CK, CKMB, Troponin: @LABRCNT (CKTOTAL:3, CKMB:3, TROPONINI:3)@    IMAGING:  Xr Abdomen (kub) (single Ap View)    Result Date: 3/15/2020  XR ABDOMEN (KUB) (SINGLE AP VIEW) 3/15/2020 1:00 PM HISTORY: NG tube placement COMPARISON: Chest x-ray dated 3/15/2020 FINDINGS: Enteric tube identified with tip projecting over the gastric body.  Sidehole projects at the gastroesophageal junction. 1. Enteric tube tip is positioned in the stomach although the sidehole projects at the GE junction. Additional slight advancement may be considered. Signed by Dr Florentino Ring on 3/15/2020 2:29 PM    Ct Cervical Spine Wo Contrast    Result Date: 2/18/2020  CT cervical spine 2/18/2020 2:45 PM HISTORY: Previous fusion, weakness TECHNIQUE: Axial images of the cervical spine were obtained without IV contrast. Coronal and sagittal reformatted images are reconstructed and reviewed. COMPARISON: None. DLP: 774 mGy cm Automated exposure control was utilized to minimize patient radiation dose. FINDINGS: There is anterior cervical fusion of C3-C7 with C6 partial vertebrectomy. There is straightening of the normal cervical curvature. Please moderate degenerative disc change seen at C7-T1. There is uncinate process hypertrophy at C6 and C7. There is mild facet arthropathy. No acute cervical vertebral fracture. There is no hardware fracture or radiographic evidence of loosening. Mild canal stenosis suspected at C6/7 secondary to posterior endplate spurring. There is scattered moderate to severe bilateral neural foramen narrowing due to the uncinate process hypertrophy and facet osteoarthropathy, greatest at C5/6. No apical pneumothorax. Left subclavian cardiac pacer device suspected. 1. Anterior cervical fusion of C3-C7 with partial C6 vertebrectomy. Moderate to advanced degenerative disc change at C7-T1 with diffuse cervical  facet osteoarthropathy resulting in moderate to severe neural foramen narrowing. Only mild central canal stenosis. No acute cervical vertebral fracture. Signed by Dr Eben Olvera on 2/18/2020 4:16 PM    Mri Lumbar Spine Wo Contrast    Result Date: 2/21/2020  EXAM: MR LUMBOSACRAL SPINE WITHOUT IV CONTRAST 2/21/2020. COMPARISON: None. INDICATION: 71years-old Male.   Ataxia TECHNIQUE: Routine pulse sequences of the lumbar spine were obtained without IV contrast. spine. C3-C7 discectomy with anterior plate and screw fusion. Normally patent spinal canal. No cord compression and no abnormal cord signal. Detail is limited by patient motion. There appears to be bilateral foraminal stenosis at all levels from C2 through C6 based on facet hypertrophy. Postcontrast imaging shows no abnormal cord enhancement. 1. Normally patent spinal canal with no cord compression or abnormal cord enhancement. 2. Limited detail though there is evidence of bilateral foraminal stenosis from C2 through C6 based on bony changes. Signed by Dr Vilma Hutchison on 2/20/2020 5:51 PM    Mri Thoracic Spine W Wo Contrast    Result Date: 2/20/2020  MRI THORACIC SPINE W WO CONTRAST 2/20/2020 5:22 PM History: Neck pain. Generalized weakness. Worsening ataxia. Pre and postcontrast thoracic spine MRI. Images are affected by patient motion. Minimal left convex curvature. Multilevel mild disc space narrowing and endplate spurring. Multilevel degenerative endplate signal change. No spinal canal stenosis or cord compression. No significant disc herniation or foraminal stenosis is seen. No compression fracture. Postcontrast imaging shows no abnormal cord enhancement. 1. Multilevel degenerative disc and endplate changes with no fracture, stenosis, or neural compression. Signed by Dr Vilma Hutchison on 2/20/2020 5:24 PM    Us Renal Complete    Result Date: 3/14/2020  US RENAL COMPLETE 3/14/2020 6:00 AM HISTORY: Elevated creatinine COMPARISON: None  TECHNIQUE: Multiple longitudinal and transverse realtime sonographic images of the kidneys and urinary bladder are obtained. FINDINGS: The right kidney measures 12.0 x 5.7 x 6.9 cm. The right kidney is normal in size, shape, contour and position. 2.1 cm simple cyst arising from the upper pole. The cortical thickness is normal, with preservation of the corticomedullary differentiation. The central echo complex is compact with no evidence for hydronephrosis.  No vessel disease throughout the periventricular white matter. No brain hemorrhage or abnormal calcification. Postcontrast imaging shows no enhancing brain lesion. No sign of vasculitis. The dural venous sinuses are patent. 1. Atrophy and small vessel disease. 2. No acute infarct. 3. No mass. Signed by Dr Vilma Hutchison on 2/18/2020 5:39 PM    Fl Lumbar Puncture Diag    Result Date: 2/19/2020  Examination. FL LUMBAR PUNCTURE DIAG 2/19/2020 11:15 AM History: Weakness and loss of balance. The procedure was explained to the patient. The benefits, the risks and complications are discussed. The patient understood the procedure and signed the consent. The patient was placed on the fluoroscopy table and the lumbar spine was examined. The patient has moderately severe dextroscoliosis. A suitable spot opposite interspace L3-4 was selected for puncture. The spot was marked with an ink marker. After sterile preparation and application of local anesthesia a size 20-gauge longer spine needle was introduced into the thecal sac and a FreeFlow of clear colorless thin fluid was established. The patient was immediately turned into a decubitus position. The outer hub of the needle was connected to a pressure measuring device and opening pressure was measured. The opening pressure is 15.5 cm water. 30 mL of CSF was withdrawn and dispensed in 4 separate test tubes which were sent to the laboratory for further evaluation as instructed by the attending physician. The needle was then withdrawn and the puncture site was covered with a sterile Band-Aid. The patient tolerated the procedure well. No immediate complications were noted. The patient was given post lumbar puncture care instructions. A successful lumbar puncture and opening pressure measurement. Fluoroscopy time 2 minutes 11 seconds. Total dose: 1.239iAjp6. Signed by Dr Doug Hess on 2/19/2020 4:45 PM        Assessment:  1. Suspected septic shock  2.  Acute respiratory failure requiring intubation mechanical ventilatory support  3. Sinus node dysfunction  4. Previous pacemaker implantation  5. Gastroesophageal reflux disease  6. History of pulmonary edema  7. Elevated BUN 56 creatinine 3.4  8. MRI of the brain 2/18/2020 atrophy small vessel disease no acute infarct  9. Echocardiogram 3/13/2020 normal left ventricular systolic function ejection fraction greater than 50% moderate concentric LVH  10. CTA pulmonary 3/13/2020 no evidence of pulmonary embolism severe cardiogenic pulmonary edema bilateral lower lobar lung consolidation which may suggest superimposed inflammatory/infectious process  11. Lexiscan 12/11/2019 ejection fraction 67% normal perfusion study    Plan:  1.  Continue current medical treatment and monitor closely cardiac status appears stable    Audelia Bowens MD 3/16/2020 11:24 AM

## 2020-03-16 NOTE — PROGRESS NOTES
Palliative Care Progress Note  3/16/2020 12:54 PM  Subjective:   Admit Date: 3/13/2020  PCP: Gregg Potts MD    Chief Complaint: Intubated, mechanically ventilated    Interval History: Patient remains in CCU intubated and mechanically ventilated, sats improved to mid 80s. He remains on propofol, heparin, and has been able to be weaned from diltiazem, Levophed, and vasopressin. Attempts to wean sedation are underway for possible SBT's, he is currently opening eyes to name. CXR completed on 3/14/2020 with slight improvement. Renal function worsening with creatinine 3.4. He continues to be followed by cardiology, pulmonology, and nephrology. Review of Systems   Unable to obtain, intubated and mechanically ventilated    DIET TUBE FEED CONTINUOUS/CYCLIC NPO; Semi-elemental (Vital HP);  Orogastric; 10; 10; 24    Medications:   dilTIAZem Stopped (03/16/20 0603)    heparin (porcine) 13 Units/kg/hr (03/16/20 0036)    sodium chloride 50 mL/hr at 03/16/20 0550    norepinephrine Stopped (03/14/20 1530)    vasopressin (Septic Shock) infusion Stopped (03/15/20 1609)    dextrose      propofol 30 mcg/kg/min (03/16/20 0245)     Current Facility-Administered Medications   Medication Dose Route Frequency Provider Last Rate Last Dose    dilTIAZem 125 mg in dextrose 5 % 125 mL infusion  5 mg/hr Intravenous Continuous Sabael MD Bhavani   Stopped at 03/16/20 0603    heparin (porcine) injection 9,690 Units  80 Units/kg Intravenous PRN Philip Coronado MD        heparin (porcine) injection 4,840 Units  40 Units/kg Intravenous PRN Philip Coronado MD        heparin 25,000 units in dextrose 5% 250 mL infusion  18 Units/kg/hr Intravenous Continuous Philip Coronado MD 15.7 mL/hr at 03/16/20 0036 13 Units/kg/hr at 03/16/20 0036    0.9 % sodium chloride infusion   Intravenous Continuous Mohan Bragg MD 50 mL/hr at 03/16/20 0550      hydrocortisone sodium succinate PF (SOLU-CORTEF) injection 50 mg  50 mg Intravenous Q8H Norberta Gottron, MD   50 mg at 03/16/20 1127    metoprolol tartrate (LOPRESSOR) tablet 25 mg  25 mg Oral Q12H Norberta Gottron, MD   25 mg at 03/16/20 1238    morphine injection 1 mg  1 mg Intravenous Q4H PRN Shaq Dunlap MD   1 mg at 03/14/20 0641    piperacillin-tazobactam (ZOSYN) 2.25 g in dextrose 5 % 50 mL IVPB (mini-bag)  2.25 g Intravenous Q6H Norberta Gottron,  mL/hr at 03/16/20 1238 2.25 g at 03/16/20 1238    famotidine (PEPCID) injection 20 mg  20 mg Intravenous Daily Juan Alberto Carlin MD   20 mg at 03/16/20 0757    bumetanide (BUMEX) injection 2 mg  2 mg Intravenous Q12H Radha Aceves MD   2 mg at 03/16/20 1127    norepinephrine (LEVOPHED) 16 mg in dextrose 5% 250 mL infusion  2 mcg/min Intravenous Continuous Roger Vieira MD   Stopped at 03/14/20 1530    vasopressin 20 Units in dextrose 5 % 100 mL infusion  0.04 Units/min Intravenous Continuous Roger Vieira MD   Stopped at 03/15/20 1609    0.9 % sodium chloride bolus  500 mL Intravenous Once Norberta Gottron, MD        vancomycin (VANCOCIN) intermittent dosing (placeholder)   Other RX Placeholder Roger Vieira MD        insulin lispro (HUMALOG) injection vial 0-6 Units  0-6 Units Subcutaneous TID WC Norberta Gottron, MD   1 Units at 03/15/20 1722    insulin lispro (HUMALOG) injection vial 0-3 Units  0-3 Units Subcutaneous Nightly Norberta Gottron, MD   1 Units at 03/15/20 2101    glucose (GLUTOSE) 40 % oral gel 15 g  15 g Oral PRN Norberta Gottron, MD        dextrose 50 % IV solution  12.5 g Intravenous PRN Norberta Gottron, MD        glucagon (rDNA) injection 1 mg  1 mg Intramuscular PRN Norberta Gottron, MD        dextrose 5 % solution  100 mL/hr Intravenous PRN Norberta Gottron, MD        ipratropium-albuterol (DUONEB) nebulizer solution 1 ampule  1 ampule Inhalation Q4H Juan Alberto Cariln MD   1 ampule at 03/16/20 1012    polyvinyl alcohol (LIQUIFILM TEARS) 1.4 % ophthalmic solution 1 drop  1 drop Both Eyes Q4H Juan Alberto Carlin, TSH 8.600 03/13/2020     Troponin T:   Recent Labs     03/13/20  1642 03/14/20  1400 03/15/20  0418   TROPONINI 0.30* 0.31* 0.23*     INR: No results for input(s): INR in the last 72 hours. Objective:   Vitals: /71   Pulse 130   Temp 98.4 °F (36.9 °C)   Resp 23   Ht 5' 8\" (1.727 m)   Wt 263 lb 3.2 oz (119.4 kg)   SpO2 94%   BMI 40.02 kg/m²   24HR INTAKE/OUTPUT:      Intake/Output Summary (Last 24 hours) at 3/16/2020 1254  Last data filed at 3/16/2020 1129  Gross per 24 hour   Intake 2726.02 ml   Output 2300 ml   Net 426.02 ml     Physical Exam  General appearance: intubated, appears ill, toxic, pale  HEENT: atraumatic, eyes with clear conjunctiva and normal lids, pupils and irises normal, external ears and nose are normal,lips normal, ETT in place. Neck: without masses, supple  Lungs:  intubated and mechanically ventilated, ventilating bilaterally and diminished in the bases bilaterally  Heart: regular rate and rhythm and S1, S2 normal, tachycardic, distal pulses palpable  Abdomen: soft, round, unable to auscultate bowel sounds  Genitourinary: No bladder fullness, masses, or tenderness  Extremities: atraumatic, normal ROM  Neurologic: sedated and intubated, will open eyes briefly to name  Psychiatric: sedated and intubated  Skin: warm, dry        Assessment and Plan: Active Problems:    Dyspnea on exertion    Acute respiratory failure with hypoxia and hypercapnia (HCC)    Flash pulmonary edema (HCC)    Septic shock (HCC)    Palliative care patient    Pneumonia due to organism  Resolved Problems:    * No resolved hospital problems. *    Visit Summary: I saw the patient at the bedside with nursing staff present. Patient has improved since last visit. He does remain critically ill and does have continued issues of concern, which I discussed with the patient's family in the waiting room.   Patient's family appears to have good understanding that he does remain critically ill, they are aware that

## 2020-03-16 NOTE — PLAN OF CARE
Problem: Falls - Risk of:  Goal: Will remain free from falls  Outcome: Ongoing  Goal: Absence of physical injury  Outcome: Ongoing     Problem: Restraint Use - Nonviolent/Non-Self-Destructive Behavior:  Goal: Absence of restraint indications  Outcome: Ongoing  Goal: Absence of restraint-related injury  Outcome: Ongoing     Problem: Nutrition  Goal: Optimal nutrition therapy  Outcome: Ongoing     Problem: Discharge Planning:  Goal: Participates in care planning  Outcome: Ongoing  Goal: Discharged to appropriate level of care  Outcome: Ongoing     Problem: Airway Clearance - Ineffective:  Goal: Ability to maintain a clear airway will improve  Outcome: Ongoing     Problem: Aspiration:  Goal: Absence of aspiration  Outcome: Ongoing

## 2020-03-16 NOTE — PROGRESS NOTES
failure, COPD exacerbation  -Gross bilateral PNA, pulmonary edema seen on CT  Viral Resp PCR - positive for RSV  -Improving hypercapnea, hypoxia, wean PEEP, FiO2 as tolerated  -Now off pressors  Continue steroids, nebs, abs  -Lactate downtrending  Shock liver  Leukocytosis improving    []ATN  -Renal following, secondary to septic shock  Worsening renal function    []Afib w/RVR  -Now rate controlled  -Cardiology following    []Type 2 supply demand ischemia  -Secondary to septic shock     High risk of death, extensive discussion with family understand high mortality.   Palliative care following     []Hospital issues:  DVT prop - SCDs  Code - DNR/DNI  Disposition - Continue CCU        Tre Cruz M.D.,  3/16/2020

## 2020-03-16 NOTE — PROGRESS NOTES
trials as tolerated. I have seen and examined patient personally, performing a face-to-face diagnostic evaluation with plan of care reviewed and developed with APRN and nursing staff. I have addended and/or modified the above history of present illness, physical examination, and assessment and plan to reflect my findings and impressions. Essential elements of the care plan were discussed with APRN above. Agree with findings and assessment/plan as documented above.     Electronically signed by Sharee Brush, on 3/16/2020, 10:16 PM

## 2020-03-16 NOTE — PROGRESS NOTES
g protein, 26g CHO and 200 ml free water. Proteinex will add another 104g protein and 400 kcals. · Goal TF & Flush Orders Provides: 10ml = 240 kcals with 21 g protein, 26g CHO and 200 ml free water. Proteinex will add another 104g protein and 400 kcals.   · Additional Calories: Propofol 20.8ml = 549 NP kcals     · Anthropometric Measures:  · Ht: 5' 8\" (172.7 cm)   · Current Body Wt: 263 lb 3 oz (119.4 kg)  · Admission Body Wt: 270 lb (122.5 kg)  · Usual Body Wt: 265 lb (120.2 kg)(12/2019)  · Ideal Body Wt: 154 lb (69.9 kg), % Ideal Body 170.9%  · BMI Classification: BMI > or equal to 40.0 Obese Class III    Nutrition Interventions:   Continue NPO, Start Tube Feeding  Continued Inpatient Monitoring    Nutrition Evaluation:   · Evaluation: Progressing toward goals   · Goals: Meet nutrition needs via EN   · Monitoring: TF Intake, TF Tolerance, Skin Integrity, Weight, Pertinent Labs      Electronically signed by Sukhwinder Peng MS, RD, LD on 3/16/20 at 12:30 PM CDT    Contact Number: [phone]

## 2020-03-16 NOTE — PROGRESS NOTES
Nephrology ([address] [facility]) Progress Note      Patient:  Mary Campa  YOB: 1950  Date of Service: 3/16/2020  MRN: [id number]   Acct: [de-identified]   Primary Care Physician: Malu Mcfarlane MD  Advance Directive: [facility]  Admit Date: 3/13/2020       Hospital Day: 3  Referring Provider: Shannan Siegel MD    Patient independently seen and examined, Chart, Consults, Notes, Operative notes, Labs, Cardiology, and Radiology studies reviewed as able. Chief complaint: Abnormal labs. Subjective:  Mary Campa is a 71 y.o. male  whom we were consulted for acute kidney injury/metabolic acidosis. All the information's are taken from the chart as he is currently intubated and sedated. His baseline serum creatinine 0.9 mg about 2 months ago. He presented to the emergency room with increasing shortness of breath and hypoxemic respiratory failure. Chest x-ray shows patient has bilateral lower lobe consolidation and pulmonary edema. CT angiogram of the pulmonary arteries in the emergency room consistent with no evidence of pulmonary embolism. He is currently admitted to CCU, intubated sedated on a couple of pressors as he has septic shock. He has acute kidney injury and nephrology is consulted. Otherwise he has history of COPD, coronary artery disease and pacemaker implant. Hospital course remarkable for IV fluid resuscitation/worsening of renal function but has excellent urine output. Today, patient remained intubated/sedated. He is non-oliguric.      Allergies:  Codeine    Medicines:  Current Facility-Administered Medications   Medication Dose Route Frequency Provider Last Rate Last Dose    dilTIAZem 125 mg in dextrose 5 % 125 mL infusion  5 mg/hr Intravenous Continuous Judy Scott MD   Stopped at 03/16/20 0603    heparin (porcine) injection 9,690 Units  80 Units/kg Intravenous PRN Ada Aleman MD        heparin (porcine) injection 4,840 Units  40 Units/kg Intravenous PRN Eder Ding MD        heparin 25,000 units in dextrose 5% 250 mL infusion  18 Units/kg/hr Intravenous Continuous Eder Ding MD 15.7 mL/hr at 03/16/20 0036 13 Units/kg/hr at 03/16/20 0036    0.9 % sodium chloride infusion   Intravenous Continuous Ulysses Wood MD 50 mL/hr at 03/16/20 0550      hydrocortisone sodium succinate PF (SOLU-CORTEF) injection 50 mg  50 mg Intravenous Q8H Bhanu Mcdonald MD   50 mg at 03/16/20 0345    metoprolol tartrate (LOPRESSOR) tablet 25 mg  25 mg Oral Q12H Bhanu Mcdonald MD   25 mg at 03/16/20 0036    morphine injection 1 mg  1 mg Intravenous Q4H PRN Armin Nicole MD   1 mg at 03/14/20 0641    piperacillin-tazobactam (ZOSYN) 2.25 g in dextrose 5 % 50 mL IVPB (mini-bag)  2.25 g Intravenous Q6H Bhanu Mcdonald MD   Stopped at 03/16/20 0618    famotidine (PEPCID) injection 20 mg  20 mg Intravenous Daily Judy Kellogg MD   20 mg at 03/16/20 0757    bumetanide (BUMEX) injection 2 mg  2 mg Intravenous Q12H Ulysses Wood MD   2 mg at 03/15/20 2249    norepinephrine (LEVOPHED) 16 mg in dextrose 5% 250 mL infusion  2 mcg/min Intravenous Continuous Renee Scruggs MD   Stopped at 03/14/20 1530    vasopressin 20 Units in dextrose 5 % 100 mL infusion  0.04 Units/min Intravenous Continuous Renee Scruggs MD   Stopped at 03/15/20 1609    0.9 % sodium chloride bolus  500 mL Intravenous Once Bhanu Mcdonald MD        vancomycin (VANCOCIN) intermittent dosing (placeholder)   Other RX Bogdan Bryant MD        insulin lispro (HUMALOG) injection vial 0-6 Units  0-6 Units Subcutaneous TID WC Bhanu Mcdonald MD   1 Units at 03/15/20 1722    insulin lispro (HUMALOG) injection vial 0-3 Units  0-3 Units Subcutaneous Nightly Bhanu Mcdonald MD   1 Units at 03/15/20 2101    glucose (GLUTOSE) 40 % oral gel 15 g  15 g Oral PRN Bhanu Mcdonald MD        dextrose 50 % IV solution  12.5 g Intravenous PRN Bhanu Mcdonald MD        glucagon (rDNA) injection 1 mg  1 mg Intramuscular PRN Ryanne Hinton MD        dextrose 5 % solution  100 mL/hr Intravenous PRN Ryanne Hinton MD        ipratropium-albuterol (DUONEB) nebulizer solution 1 ampule  1 ampule Inhalation Q4H Luz Murcia MD   1 ampule at 03/16/20 1012    polyvinyl alcohol (LIQUIFILM TEARS) 1.4 % ophthalmic solution 1 drop  1 drop Both Eyes Q4H Luz Murcia MD   1 drop at 03/16/20 0757    And    Stye 31.9-57.7 % OINT   Both Eyes Q4H Luz Murcia MD        chlorhexidine (PERIDEX) 0.12 % solution 15 mL  15 mL Mouth/Throat BID Luz Murcia MD   15 mL at 03/16/20 0757    propofol injection  10 mcg/kg/min Intravenous Titrated Ryanne Hinton MD 20.8 mL/hr at 03/16/20 0245 30 mcg/kg/min at 03/16/20 0245       Past Medical History:  Past Medical History:   Diagnosis Date    Anemia     Anxiety     Back pain     Cellulitis     Depression     Elevated triglycerides with high cholesterol     GERD (gastroesophageal reflux disease)     Hypertension     [facility]10[facility]    Osteoarthritis     Pacemaker 04/21/2017    Palliative care patient 03/13/2020    Sleep apnea, unspecified 11/22/2019       Past Surgical History:  Past Surgical History:   Procedure Laterality Date    COLONOSCOPY  11/18/11        JOINT REPLACEMENT      Bilateral total knees    NECK SURGERY      PACEMAKER PLACEMENT      TOTAL KNEE ARTHROPLASTY      RIGHT AND LEFT KNEE    UPPER GASTROINTESTINAL ENDOSCOPY  2/2012           Family History  Family History   Problem Relation Age of Onset    Breast Cancer Mother     Colon Cancer Father     Colon Polyps Father     Prostate Cancer Brother        Social History  Social History     Socioeconomic History    Marital status:      Spouse name: Not on file    Number of children: Not on file    Years of education: Not on file    Highest education level: Not on file   Occupational History    Occupation: maintenance chemical plant   Social Needs    Financial resource strain: Not on file    Food insecurity     Worry: Not on file     Inability: Not on file    Transportation needs     Medical: Not on file     Non-medical: Not on file   Tobacco Use    Smoking status: Never Smoker    Smokeless tobacco: Never Used   Substance and Sexual Activity    Alcohol use: Yes    Drug use: Never    Sexual activity: Yes     Partners: Female   Lifestyle    Physical activity     Days per week: Not on file     Minutes per session: Not on file    Stress: Not on file   Relationships    Social connections     Talks on phone: Not on file     Gets together: Not on file     Attends Mu-ism service: Not on file     Active member of club or organization: Not on file     Attends meetings of clubs or organizations: Not on file     Relationship status: Not on file    Intimate partner violence     Fear of current or ex partner: Not on file     Emotionally abused: Not on file     Physically abused: Not on file     Forced sexual activity: Not on file   Other Topics Concern    Not on file   Social History Narrative    Not on file         Review of Systems:  unAble to obtain as he is intubated and sedated.     Objective:  Patient Vitals for the past 24 hrs:   BP Temp Temp src Pulse Resp SpO2 Weight   03/16/20 1012 -- -- -- 117 22 94 % --   03/16/20 1000 95/60 -- -- 113 23 95 % --   03/16/20 0900 109/65 -- -- 110 22 94 % --   03/16/20 0800 90/65 98.4 °F (36.9 °C) -- 114 22 95 % --   03/16/20 0700 105/60 -- -- 105 23 94 % --   03/16/20 0620 -- -- -- -- 22 90 % --   03/16/20 0619 -- -- -- 92 23 (!) 88 % --   03/16/20 0600 (!) 75/49 -- -- 91 23 90 % --   03/16/20 0500 (!) 80/44 -- -- 94 22 94 % --   03/16/20 0418 -- -- -- -- 22 95 % --   03/16/20 0400 (!) 85/51 -- -- 99 22 96 % --   03/16/20 0300 85/60 -- -- 119 24 92 % 263 lb 3.2 oz (119.4 kg)   03/16/20 0208 -- -- -- 115 22 95 % --   03/16/20 0207 -- -- -- -- 22 95 % --   03/16/20 0200  145 134     LIVER PROFILE:   Recent Labs     03/13/20  1219  03/14/20  0300 03/15/20  0418 03/16/20  0425   AST 22   < > 323* 138* 48*   ALT <5*   < > 67* 89* 64*   LIPASE 26  --   --   --   --    BILITOT 0.3   < > 0.8 0.8 0.6   ALKPHOS 170*   < > 276* 295* 219*    < > = values in this interval not displayed. PT/INR:   Recent Labs     03/13/20  1219   PROTIME 14.3   INR 1.11     APTT:   Recent Labs     03/15/20  1520 03/15/20  2215 03/16/20  0425   APTT 158.6* 83.5* 68.6*     BNP:  No results for input(s): BNP in the last 72 hours. Ionized Calcium:No results for input(s): IONCA in the last 72 hours. Magnesium:  Recent Labs     03/14/20  0300 03/15/20  0418 03/16/20  0425   MG 1.9 2.0 2.1     Phosphorus:No results for input(s): PHOS in the last 72 hours. HgbA1C:   Recent Labs     03/15/20  0418   LABA1C 6.8*     Hepatic:   Recent Labs     03/14/20  0300 03/15/20  0418 03/16/20  0425   ALKPHOS 276* 295* 219*   ALT 67* 89* 64*   * 138* 48*   PROT 6.2* 5.6* 5.6*   BILITOT 0.8 0.8 0.6   LABALBU 3.7 3.0* 2.7*     Lactic Acid:   Recent Labs     03/15/20  0410   LACTA 4.4*     Troponin: No results for input(s): CKTOTAL, CKMB, TROPONINT in the last 72 hours. ABGs: No results for input(s): PH, PCO2, PO2, HCO3, O2SAT in the last 72 hours. CRP:    Recent Labs     03/13/20  1219   CRP 1.35*     Sed Rate:    Recent Labs     03/13/20 1219   SEDRATE 7         Cultures:   No results for input(s): CULTURE in the last 72 hours. Recent Labs     03/13/20  1250 03/13/20  1255   BC No Growth to date. Any change in status will be called. --    BLOODCULT2  --  2 colony types  Isolated from Aerobic bottle  No further workup  This organism was isolated from one set. Susceptibility testing is not routinely done as this  organism frequently represents skin contamination. Additional testing can be ordered by calling the  Microbiology Department.        No results for input(s): CXSURG in the last 72 hours. Radiology reports as per the Radiologist  Radiology: Xr Chest Portable    Result Date: 3/14/2020  XR CHEST PORTABLE 3/14/2020 4:15 AM HISTORY: Mechanical ventilation Comparison: 3/13/2020 Findings: Endotracheal tube is in stable position. Reidentified bilateral pulmonary edema, stable or perhaps slightly decreased. The cardiomediastinal silhouette and pulmonary vascularity are unchanged. Left chest wall dual chamber pacemaker. Anterior cervical fusion hardware. No acute osseous or soft tissue abnormality is noted. Impression: 1. Reidentified pulmonary edema, stable or perhaps slightly decreased. Otherwise unchanged. Signed by Dr Carol Corado on 3/14/2020 7:22 AM    Xr Chest Portable    Result Date: 3/13/2020  XR CHEST PORTABLE 3/13/2020 11:45 AM HISTORY: Status post intubation COMPARISON: 3/13/2020. FINDINGS: Status post intubation with endotracheal tube tip projecting 5.3 cm above the sasha. Reidentified bilateral diffuse lung opacities, not significant changed. Lungs appear to be well expanded. No pleural effusions or pneumothorax identified. Heart size is stable. Left chest wall dual chamber pacemaker identified. 1. Status post intubation with lungs appearing well expanded. Tube is in good position. 2. Reidentified stable bilateral diffuse lung opacities, favor pulmonary edema. Signed by Dr Carol Corado on 3/13/2020 12:56 PM    Xr Chest Portable    Result Date: 3/13/2020  EXAM: XR CHEST PORTABLE -- 3/13/2020 11:15 AM HISTORY: 69 years, Male, shortness of breath COMPARISON: 2/18/2020 TECHNIQUE:  1 images. Frontal view of the chest. FINDINGS:  Diffuse bilateral pulmonary opacities. No pneumothorax or large pleural effusion. Borderline cardiac silhouette. Upper mediastinum within normal limits. Cardiac pulse generator at the left chest with 2 grossly intact leads. 1. Diffuse bilateral pulmonary opacities, which could represent edema or infection.  Signed by Dr Chyna Briscoe on 3/13/2020

## 2020-03-17 ENCOUNTER — OUTSIDE FACILITY SERVICE (OUTPATIENT)
Dept: PULMONOLOGY | Facility: CLINIC | Age: 70
End: 2020-03-17

## 2020-03-17 PROCEDURE — 99233 SBSQ HOSP IP/OBS HIGH 50: CPT | Performed by: INTERNAL MEDICINE

## 2020-03-17 NOTE — PROGRESS NOTES
3/17/2020  4:14 AM - Cinthya Hanna Incoming Lab Results From [employer] Value Ref Range & Units Status Collected Lab   pH, Arterial 7.510High Panic   7.350 - 7.450 Final 03/17/2020  4:12 AM [facility]   pCO2, Arterial 35.0  35.0 - 45.0 mmHg Final 03/17/2020  4:12 AM [facility]   pO2, Arterial 79. 0Low   80.0 - 100.0 mmHg Final 03/17/2020  4:12 AM [facility]   HCO3, Arterial 27. 9High   22.0 - 26.0 mmol/L Final 03/17/2020  4:12 AM [facility] Excess, Arterial 5.1High   -2.0 - 2.0 mmol/L Final 03/17/2020  4:12 AM [facility]   Hemoglobin, Art, Extended 18. 9High   14.0 - 18.0 g/dL Final 03/17/2020  4:12 AM [facility]   O2 Sat, Arterial 95.1  >92 % Final 03/17/2020  4:12 AM [facility]   Carboxyhgb, Arterial 0.0  0.0 - 5.0 % Final 03/17/2020  4:12 AM [facility]        0.0-1.5   (Smokers 1.5-5.0)    Methemoglobin, Arterial 0.4  <1.5 % Final 03/17/2020  4:12 AM [facility]   O2 Content, Arterial 25.2  Not Established mL/dL Final 03/17/2020  4:12 AM [facility]   O2 Therapy Unknown   Final 03/17/2020  4:12 AM [facility] Performed By     [address] Name Director Address Valid Date Range   [address] LAB Jennifer Sandhu  [place],[address]  [address] [facility] [id number] 08/30/17 0733-Present   Narrative   Performed by: Elvia Gonzales [id number]  doctor   tel. [phone] RN CCU, 03/17/2020 04:14, by RUTH PABLO [facility]   Lab and Collection     Blood gas, arterial - 3/16/2020   Result History     Blood gas, arterial on 3/17/2020   Result Information     Flag: CriticalPanic   Status: Final result (Collected: 3/17/2020 04:12) Provider Status: Ordered   Routing History     Priority Sent On From To Message Type    3/15/2020  4:33 AM Jacques, [facility] Lab Results From Chong Benavides MD

## 2020-03-17 NOTE — PROGRESS NOTES
Nutrition Assessment    Type and Reason for Visit: Reassess    Nutrition Recommendations: tf stopped at this time d/t increased residuals    Nutrition Assessment: Propofol has been decreased to 13.9ml/hr. TF is off at this time and OG is at [facility]. Aware of residual of 200ml. Has had BM's--last one 3/15    Malnutrition Assessment:  · Malnutrition Status: No malnutrition  · Context: Acute illness or injury  · Findings of the 6 clinical characteristics of malnutrition (Minimum of 2 out of 6 clinical characteristics is required to make the diagnosis of moderate or severe Protein Calorie Malnutrition based on AND/ASPEN Guidelines):  1. Energy Intake-Less than or equal to 75% of estimated energy requirement,      2. Weight Loss-1-2% loss, in 3 months  3. Fat Loss-No significant subcutaneous fat loss,    4. Muscle Loss-No significant muscle mass loss,    5. Fluid Accumulation-No significant fluid accumulation, Generalized  6.   Strength-Not measured    Nutrition Risk Level: High    Nutrient Needs:  · Estimated Daily Total Kcal: 980-1350(8-11 kcal/kg)  · Estimated Daily Protein (g): 175(2.5 g/kg)  · Estimated Daily Total Fluid (ml/day): 9843-6605    Nutrition Diagnosis:   · Problem: Inadequate oral intake  · Etiology: related to Impaired respiratory function-inability to consume food     Signs and symptoms:  as evidenced by NPO status due to medical condition, Intubation    Objective Information:  · Nutrition-Focused Physical Findings: well nourished  · Wound Type: None  · Current Nutrition Therapies:  · Oral Diet Orders: NPO   · Oral Diet intake: NPO  · Oral Nutrition Supplement (ONS) Orders: None  · ONS intake: NPO     · Anthropometric Measures:  · Ht: 5' 8\" (172.7 cm)   · Current Body Wt: 261 lb 11 oz (118.7 kg)  · Admission Body Wt: 270 lb (122.5 kg)  · Usual Body Wt: 265 lb (120.2 kg)(12/2019)  · Ideal Body Wt: 154 lb (69.9 kg), % Ideal Body 170.9%  · BMI Classification: BMI 35.0 - 39.9 Obese Class

## 2020-03-17 NOTE — PROGRESS NOTES
Nephrology ([address] [facility]) Progress Note      Patient:  Yohan Boyce  YOB: 1950  Date of Service: 3/17/2020  MRN: [id number]   Acct: [de-identified]   Primary Care Physician: Lisandra Uribe MD  Advance Directive: [facility]  Admit Date: 3/13/2020       Hospital Day: 4  Referring Provider: Fay Diaz MD    Patient independently seen and examined, Chart, Consults, Notes, Operative notes, Labs, Cardiology, and Radiology studies reviewed as able. Chief complaint: Abnormal labs. Subjective:  Yohan Boyce is a 71 y.o. male  whom we were consulted for acute kidney injury/metabolic acidosis. All the information's are taken from the chart as he is currently intubated and sedated. His baseline serum creatinine 0.9 mg about 2 months ago. He presented to the emergency room with increasing shortness of breath and hypoxemic respiratory failure. Chest x-ray shows patient has bilateral lower lobe consolidation and pulmonary edema. CT angiogram of the pulmonary arteries in the emergency room consistent with no evidence of pulmonary embolism. He is currently admitted to CCU, intubated sedated on a couple of pressors as he has septic shock. He has acute kidney injury and nephrology is consulted. Otherwise he has history of COPD, coronary artery disease and pacemaker implant. Hospital course remarkable for IV fluid resuscitation/worsening of renal function but has excellent urine output. Today, patient remained intubated/sedated. He is non-oliguric. No new oevrnight events.      Allergies:  Codeine    Medicines:  Current Facility-Administered Medications   Medication Dose Route Frequency Provider Last Rate Last Dose    hydrocortisone sodium succinate PF (SOLU-CORTEF) injection 50 mg  50 mg Intravenous Daily Fay Diaz MD   50 mg at 03/17/20 0904    dilTIAZem 125 mg in dextrose 5 % 125 mL infusion  5 mg/hr Intravenous Continuous Jenaro Pelaez MD   Stopped at 03/16/20 9005    for input(s): IONCA in the last 72 hours. Magnesium:  Recent Labs     03/15/20  0418 03/16/20  0425 03/17/20  0430   MG 2.0 2.1 2.5*     Phosphorus:No results for input(s): PHOS in the last 72 hours. HgbA1C:   Recent Labs     03/15/20  0418   LABA1C 6.8*     Hepatic:   Recent Labs     03/15/20  0418 03/16/20  0425 03/17/20  0430   ALKPHOS 295* 219* 199*   ALT 89* 64* 45*   * 48* 23   PROT 5.6* 5.6* 5.7*   BILITOT 0.8 0.6 0.6   LABALBU 3.0* 2.7* 2.4*     Lactic Acid:   Recent Labs     03/15/20  0410   LACTA 4.4*     Troponin: No results for input(s): CKTOTAL, CKMB, TROPONINT in the last 72 hours. ABGs: No results for input(s): PH, PCO2, PO2, HCO3, O2SAT in the last 72 hours. CRP:    No results for input(s): CRP in the last 72 hours. Sed Rate:    No results for input(s): SEDRATE in the last 72 hours. Cultures:   No results for input(s): CULTURE in the last 72 hours. No results for input(s): BC, Souleymane Estrin in the last 72 hours. No results for input(s): CXSURG in the last 72 hours. Radiology reports as per the Radiologist  Radiology: Xr Chest Portable    Result Date: 3/14/2020  XR CHEST PORTABLE 3/14/2020 4:15 AM HISTORY: Mechanical ventilation Comparison: 3/13/2020 Findings: Endotracheal tube is in stable position. Reidentified bilateral pulmonary edema, stable or perhaps slightly decreased. The cardiomediastinal silhouette and pulmonary vascularity are unchanged. Left chest wall dual chamber pacemaker. Anterior cervical fusion hardware. No acute osseous or soft tissue abnormality is noted. Impression: 1. Reidentified pulmonary edema, stable or perhaps slightly decreased. Otherwise unchanged. Signed by Dr Shelodn Hackett on 3/14/2020 7:22 AM    Xr Chest Portable    Result Date: 3/13/2020  XR CHEST PORTABLE 3/13/2020 11:45 AM HISTORY: Status post intubation COMPARISON: 3/13/2020. FINDINGS: Status post intubation with endotracheal tube tip projecting 5.3 cm above the sasha.  Reidentified bilateral

## 2020-03-17 NOTE — PROGRESS NOTES
Cardiology Daily Note Grayson Weber MD      Patient:  Mary Campa  [id number]    Patient Active Problem List    Diagnosis Date Noted    Dyspnea on exertion 11/25/2019     Priority: High    Abnormal nuclear stress test      Priority: High    Precordial pain      Priority: High    Sick sinus syndrome due to SA node dysfunction (HCC)      Priority: High    Septic shock ([facility] [address] 03/13/2020     Priority: Low    Palliative care patient 03/13/2020     Priority: Low    Pneumonia due to organism      Priority: Low    Abnormal brain scan 03/03/2020     Priority: Low    Gait difficulty 02/20/2020     Priority: Low    Fall 02/19/2020     Priority: Low    Weakness 02/18/2020     Priority: Low    Loss of balance 02/14/2020     Priority: Low    Tick bite 02/14/2020     Priority: Low    Sleep apnea, unspecified 11/22/2019     Priority: Low    Sleep disturbance 09/30/2019     Priority: Low    Elevated serum creatinine 09/30/2019     Priority: Low    Diastolic dysfunction 38/13/6061     Priority: Low    Flash pulmonary edema (HCC) 09/30/2019     Priority: Low    Acute respiratory failure with hypoxia and hypercapnia (HCC) 09/14/2019     Priority: Low    Essential hypertension 06/10/2019     Priority: Low    Beryllium disease ([facility] [address] 06/10/2019     Priority: Low    SVT (supraventricular tachycardia) ([facility] [address] 09/10/2017     Priority: Low    Pacemaker 04/21/2017     Priority: Low    Encounter for colonoscopy due to history of adenomatous colonic polyps 11/24/2014     Priority: Low    NSAID long-term use 03/04/2014     Priority: Low    GERD (gastroesophageal reflux disease) 03/04/2013     Priority: Low    History of esophageal ulcer 03/04/2013     Priority: Low    History of adenomatous polyp of colon 03/04/2013     Priority: Low    Patulous lower esophageal sphincter 03/04/2013     Priority: Low    Hiatal hernia 03/04/2013     Priority: Low    Osteoarthritis 03/04/2013     Priority: Low    Chronic pain minimize patient radiation dose. FINDINGS: There is anterior cervical fusion of C3-C7 with C6 partial vertebrectomy. There is straightening of the normal cervical curvature. Please moderate degenerative disc change seen at C7-T1. There is uncinate process hypertrophy at C6 and C7. There is mild facet arthropathy. No acute cervical vertebral fracture. There is no hardware fracture or radiographic evidence of loosening. Mild canal stenosis suspected at C6/7 secondary to posterior endplate spurring. There is scattered moderate to severe bilateral neural foramen narrowing due to the uncinate process hypertrophy and facet osteoarthropathy, greatest at C5/6. No apical pneumothorax. Left subclavian cardiac pacer device suspected. 1. Anterior cervical fusion of C3-C7 with partial C6 vertebrectomy. Moderate to advanced degenerative disc change at C7-T1 with diffuse cervical  facet osteoarthropathy resulting in moderate to severe neural foramen narrowing. Only mild central canal stenosis. No acute cervical vertebral fracture. Signed by Dr Janita Spurling on 2/18/2020 4:16 PM    Mri Lumbar Spine Wo Contrast    Result Date: 2/21/2020  EXAM: MR LUMBOSACRAL SPINE WITHOUT IV CONTRAST 2/21/2020. COMPARISON: None. INDICATION: 71years-old Male. Ataxia TECHNIQUE: Routine pulse sequences of the lumbar spine were obtained without IV contrast. FINDINGS: There is a 4 mm anterolisthesis of L4 on L5. 2 mm retrolisthesis of L2 on L3 and 2 mm retrolisthesis of L1 on L2. Lower curvature of the lumbar spine centered at L2. The bone marrow signal shows no evidence of fracture or a marrow replacing process. The conus terminates at T12 There are no significant spinal cord signal abnormalities. Disc desiccation and height loss at multiple levels. The prevertebral and paraspinal soft tissues are unremarkable. T2 hyperintense focus in the left renal pole is incompletely characterized.  Degenerative changes: L1-L2 : Disc osteophyte complex, ligamentum flavum hypertrophy, prominence of the epidural fat, with moderate to severe thecal sac stenosis. Facet arthropathy, contributing to moderate bilateral foraminal stenosis. L2-L3 : Disc osteophyte complex with disc bulge component, ligamenta flava hypertrophy and epidural lipomatosis, with resulting moderate to severe thecal sac stenosis. Facet arthropathy contributing to mild to moderate bilateral foraminal stenosis. L3-L4 : Mild thecal sac stenosis related to disc bulge and epidural lipomatosis. Facet arthropathy contributing to mild to moderate left and mild right foraminal stenosis. L4-L5 : Disc bulge, ligamentum flavum hypertrophy, without thecal sac stenosis. Facet arthropathy contributing to mild to moderate bilateral foraminal stenosis. L5-S1 : No significant thecal sac stenosis. Facet arthropathy contributing to moderate to severe right and mild left foraminal stenosis. 1.  Degenerative changes and epidural lipomatosis with resulting moderate to severe thecal sac stenosis at L1-L2 and L2-L3. 2.  Additional milder degenerative changes as described above. Signed by Dr Carolina Phillips on 2/21/2020 4:12 PM    Mri Cervical Spine W Wo Contrast    Result Date: 2/20/2020  MRI CERVICAL SPINE W WO CONTRAST 2/20/2020 5:48 PM History: Generalized weakness and worsening ataxia. Pre and postcontrast MR imaging of the cervical spine. C3-C7 discectomy with anterior plate and screw fusion. Normally patent spinal canal. No cord compression and no abnormal cord signal. Detail is limited by patient motion. There appears to be bilateral foraminal stenosis at all levels from C2 through C6 based on facet hypertrophy. Postcontrast imaging shows no abnormal cord enhancement. 1. Normally patent spinal canal with no cord compression or abnormal cord enhancement. 2. Limited detail though there is evidence of bilateral foraminal stenosis from C2 through C6 based on bony changes.  Signed by Dr Therese Aguilar on 2/20/2020 5:51 PM    Mri Thoracic Spine W Wo Contrast    Result Date: 2/20/2020  MRI THORACIC SPINE W WO CONTRAST 2/20/2020 5:22 PM History: Neck pain. Generalized weakness. Worsening ataxia. Pre and postcontrast thoracic spine MRI. Images are affected by patient motion. Minimal left convex curvature. Multilevel mild disc space narrowing and endplate spurring. Multilevel degenerative endplate signal change. No spinal canal stenosis or cord compression. No significant disc herniation or foraminal stenosis is seen. No compression fracture. Postcontrast imaging shows no abnormal cord enhancement. 1. Multilevel degenerative disc and endplate changes with no fracture, stenosis, or neural compression. Signed by Dr Vilma Mendoza on 2/20/2020 5:24 PM    Us Renal Complete    Result Date: 3/14/2020  US RENAL COMPLETE 3/14/2020 6:00 AM HISTORY: Elevated creatinine COMPARISON: None  TECHNIQUE: Multiple longitudinal and transverse realtime sonographic images of the kidneys and urinary bladder are obtained. FINDINGS: The right kidney measures 12.0 x 5.7 x 6.9 cm. The right kidney is normal in size, shape, contour and position. 2.1 cm simple cyst arising from the upper pole. The cortical thickness is normal, with preservation of the corticomedullary differentiation. The central echo complex is compact with no evidence for hydronephrosis. No nephrolithiasis or abnormal perinephric fluid collections are seen. No hydroureter. The left kidney measures 9.8 x 5.7 x 5.4 cm. The left kidney is normal in size, shape, contour and position. The cortical thickness is normal, with preservation of the corticomedullary differentiation. The central echo complex is compact with no evidence for hydronephrosis. No nephrolithiasis or abnormal perinephric fluid collections are seen. No hydroureter. Urinary bladder is decompressed by Hernández catheter. 1. Small simple right renal cyst. Kidneys are otherwise unremarkable.  2. Urinary bladder is decompressed by Hernández catheter. Signed by Dr Carina Barbosa on 3/14/2020 1:02 PM    Xr Chest Portable    Result Date: 3/17/2020  Frontal supine radiograph of the chest 3/17/2020 5:00 AM History: Respiratory failure Comparison: Chest x-ray dated 3/16/2020 Findings: Lines and tubes are stable in position. No new opacities or pneumothoraces are visualized in the chest. The cardiomediastinal silhouette and pulmonary vascularity are unchanged. No acute osseous or soft tissue abnormality is noted. Impression: 1. No significant interval change since previous exam. Signed by Dr Claudeen North on 3/17/2020 7:27 AM    Xr Chest Portable    Result Date: 3/16/2020  Examination. XR CHEST PORTABLE 3/16/2020 5:00 AM History: Respiratory failure. The patient is on a vent. A single frontal portable upright view of the chest is compared with the previous study dated 3/15/2020. No significant interval change. There is moderate pulmonary vascular congestion and left basal pleural effusion. No pneumothorax The heart size is not evaluated due to the portable projection. . The endotracheal tube is seen in a stable and unchanged position. The distal end of the nasogastric tube is not visible due to suboptimal exposure of the area. A dual-chamber cardiac pacer is seen in place. The hardware fusion of the lower cervical spine is incompletely visualized. No change. Persistent pulmonary congestion and left basal pleural effusion. The tubes and lines in stable and unchanged position. Signed by Dr Chris Duff on 3/16/2020 8:05 AM    Xr Chest Portable    Result Date: 3/15/2020  XR CHEST PORTABLE 3/15/2020 12:00 PM HISTORY: NG tube placement Comparison: 3/15/2020 Findings: Exam performed to evaluate new enteric tube. An enteric tube is present in the upper mediastinum. At the level of the cardiac silhouette the tube becomes obscured. Unable to visualize the tube tip. Endotracheal tube is in stable position.  Bilateral pulmonary opacities are unchanged. Impression: 1. Enteric tube is obscured by the cardiac silhouette. Unable to confirm tube position. Recommend abdominal/upper abdominal radiograph for tube confirmation. Signed by Dr Verner Crow on 3/15/2020 1:36 PM    Xr Chest Portable    Result Date: 3/15/2020  XR CHEST PORTABLE 3/15/2020 5:00 AM HISTORY: Respiratory failure Comparison: 3/14/2020 Findings: Mild interval decrease in the underlying pulmonary edema. There are no new or worsening consolidations. No pleural effusion or pneumothorax. Endotracheal tube is in good position. Heart size is stable. Left chest wall dual chamber pacemaker. Impression: 1. Mild interval decrease in the underlying pulmonary edema. Otherwise stable. Signed by Dr Verner Crow on 3/15/2020 7:24 AM    Xr Chest Portable    Result Date: 3/14/2020  XR CHEST PORTABLE 3/14/2020 4:15 AM HISTORY: Mechanical ventilation Comparison: 3/13/2020 Findings: Endotracheal tube is in stable position. Reidentified bilateral pulmonary edema, stable or perhaps slightly decreased. The cardiomediastinal silhouette and pulmonary vascularity are unchanged. Left chest wall dual chamber pacemaker. Anterior cervical fusion hardware. No acute osseous or soft tissue abnormality is noted. Impression: 1. Reidentified pulmonary edema, stable or perhaps slightly decreased. Otherwise unchanged. Signed by Dr Verner Crow on 3/14/2020 7:22 AM    Xr Chest Portable    Result Date: 3/13/2020  XR CHEST PORTABLE 3/13/2020 11:45 AM HISTORY: Status post intubation COMPARISON: 3/13/2020. FINDINGS: Status post intubation with endotracheal tube tip projecting 5.3 cm above the sasha. Reidentified bilateral diffuse lung opacities, not significant changed. Lungs appear to be well expanded. No pleural effusions or pneumothorax identified. Heart size is stable. Left chest wall dual chamber pacemaker identified. 1. Status post intubation with lungs appearing well expanded. Tube is in good position. appeared moderately dilated, right more than the left. Atheromatous changes thoracic aorta are seen. Without dilatation or dissection. Coronary arteries are not optimally visualized. Cardiac pacer leads are seen in the right atrium and right ventricle with extensive streak artifacts. There is a small pericardial effusion. The cardiac chambers does not appear enlarged and is marked thickening of the left ventricle wall. There is suggestion of right ventricular strain. There is a bilaterally symmetrical extensive lung opacities suggesting severe cardiogenic pulmonary edema. This is more severe consolidation in the lower lobes with air bronchograms. There is a small bibasilar pleural effusion. Thyroid gland is not optimally evaluated. There is no axillary lymphadenopathy. The included liver and spleen appear unremarkable. No gallstone. The adrenal glands are normal. Small exophytic nodule from the upper pole of the right kidney is incompletely visualized and evaluated. This is similar to the previous study. No evidence of pulmonary embolism. No aortic aneurysm or dissection. Coronary arteries are not well-visualized for comment. There is a severe cardiogenic pulmonary edema and bilateral lower lobar lung consolidation which may suggest superimposed inflammatory/infectious process. Bilateral small basal pleural effusion. Pulmonary arterial hypertension and right ventricle strain. The above findings are recorded on a digital voice clip in PACS. Signed by Dr Elias Prabhakar on 3/13/2020 3:13 PM    Mri Brain W Wo Contrast    Result Date: 2/18/2020  MRI BRAIN W WO CONTRAST 2/18/2020 5:38 PM History: Stroke symptoms. Generalized weakness. Pre and postcontrast MR imaging of the brain. No acute parenchymal ischemia based on the DWI sequence. Moderate diffuse cortical atrophy. Left maxillary sinus and ethmoid sinus mucosal thickening. Mild-to-moderate confluent small vessel disease throughout the periventricular white matter. No brain hemorrhage or abnormal calcification. Postcontrast imaging shows no enhancing brain lesion. No sign of vasculitis. The dural venous sinuses are patent. 1. Atrophy and small vessel disease. 2. No acute infarct. 3. No mass. Signed by Dr Justina Noriega on 2/18/2020 5:39 PM    Fl Lumbar Puncture Diag    Result Date: 2/19/2020  Examination. FL LUMBAR PUNCTURE DIAG 2/19/2020 11:15 AM History: Weakness and loss of balance. The procedure was explained to the patient. The benefits, the risks and complications are discussed. The patient understood the procedure and signed the consent. The patient was placed on the fluoroscopy table and the lumbar spine was examined. The patient has moderately severe dextroscoliosis. A suitable spot opposite interspace L3-4 was selected for puncture. The spot was marked with an ink marker. After sterile preparation and application of local anesthesia a size 20-gauge longer spine needle was introduced into the thecal sac and a FreeFlow of clear colorless thin fluid was established. The patient was immediately turned into a decubitus position. The outer hub of the needle was connected to a pressure measuring device and opening pressure was measured. The opening pressure is 15.5 cm water. 30 mL of CSF was withdrawn and dispensed in 4 separate test tubes which were sent to the laboratory for further evaluation as instructed by the attending physician. The needle was then withdrawn and the puncture site was covered with a sterile Band-Aid. The patient tolerated the procedure well. No immediate complications were noted. The patient was given post lumbar puncture care instructions. A successful lumbar puncture and opening pressure measurement. Fluoroscopy time 2 minutes 11 seconds. Total dose: [id number]. Signed by Dr Lula Walker on 2/19/2020 4:45 PM        Assessment:  1. Suspected septic shock  2.  Acute respiratory failure requiring intubation mechanical ventilatory support  3. Sinus node dysfunction  4. Previous pacemaker implantation  5. Gastroesophageal reflux disease  6. History of pulmonary edema  7. Elevated BUN 56 creatinine 3.4  8. MRI of the brain 2/18/2020 atrophy small vessel disease no acute infarct  9. Echocardiogram 3/13/2020 normal left ventricular systolic function ejection fraction greater than 50% moderate concentric LVH  10. CTA pulmonary 3/13/2020 no evidence of pulmonary embolism severe cardiogenic pulmonary edema bilateral lower lobar lung consolidation which may suggest superimposed inflammatory/infectious process  11. Lexiscan 12/11/2019 ejection fraction 67% normal perfusion study       Plan:  1.  Continue current treatment cardiac status stable    Sonia Travis MD 3/17/2020 10:57 AM

## 2020-03-17 NOTE — PROGRESS NOTES
ill-appearing. He is not toxic-appearing or diaphoretic. Interventions: He is sedated and intubated. HENT:      Nose: Nose normal.   Eyes:      General: No scleral icterus. Cardiovascular:      Rate and Rhythm: Normal rate. Heart sounds: No friction rub. Pulmonary:      Effort: Pulmonary effort is normal. No accessory muscle usage, prolonged expiration or respiratory distress. He is intubated. Breath sounds: No wheezing or rhonchi. Abdominal:      General: Bowel sounds are decreased. There is distension. Palpations: Abdomen is soft. Skin:     General: Skin is warm and dry. Neurological:      Comments: Sedated, opens eyes during exam, does not follow commands       Recent Labs     03/15/20  0418 03/16/20  0425 03/17/20  0430   WBC 12.3* 9.8 13.7*   RBC 5.24 4.67* 4.30*   HGB 15.3 13.6* 12.4*   HCT 45.3 40.0* 37.4*    134 149   MCV 86.5 85.7 87.0   MCH 29.2 29.1 28.8   MCHC 33.8 34.0 33.2   RDW 13.7 14.0 14.6*      Recent Labs     03/15/20  0418  03/16/20  0425 03/17/20 0412 03/17/20  0430     --  137  --  142   K 4.6   < > 3.9 3.2 3.4*   CL 95*  --  95*  --  101   CO2 22  --  26  --  27   BUN 41*  --  56*  --  58*   CREATININE 3.2*  --  3.4*  --  2.5*   CALCIUM 7.8*  --  7.8*  --  7.9*   GLUCOSE 183*  --  131*  --  166*    < > = values in this interval not displayed. Recent Labs     03/15/20  0351 03/16/20 0417 03/17/20 0412   PHART 7.410 7.460* 7.510*   EXX0JZV 35.0 38.0 35.0   PO2ART 75.0* 76.0* 79.0*   EMA3JSV 22.2 27.0* 27.9*   P1JEYJTR 94.2 94.9 95.1   BEART -1.8 3.1* 5.1*     Recent Labs     03/15/20  0410 03/15/20  0418  03/17/20  0430   AST  --  138*   < > 23   ALT  --  89*   < > 45*   ALKPHOS  --  295*   < > 199*   BILITOT  --  0.8   < > 0.6   MG  --  2.0   < > 2.5*   CALCIUM  --  7.8*   < > 7.9*   TROPONINI  --  0.23*  --   --    LACTA 4.4*  --   --   --     < > = values in this interval not displayed.      No results for input(s): BC, LABGRAM, CULTRESP,

## 2020-03-17 NOTE — PROGRESS NOTES
mg  25 mg Oral Q12H Jaja Jewell MD   25 mg at 03/17/20 0008    morphine injection 1 mg  1 mg Intravenous Q4H PRN Gina Harrington MD   1 mg at 03/14/20 0641    piperacillin-tazobactam (ZOSYN) 2.25 g in dextrose 5 % 50 mL IVPB (mini-bag)  2.25 g Intravenous Q6H Jaja Jewell MD   Stopped at 03/17/20 0547    bumetanide (BUMEX) injection 2 mg  2 mg Intravenous Q12H [place] MD   2 mg at 03/17/20 1025    norepinephrine (LEVOPHED) 16 mg in dextrose 5% 250 mL infusion  2 mcg/min Intravenous Continuous Renetta Corey MD   Stopped at 03/14/20 1530    vasopressin 20 Units in dextrose 5 % 100 mL infusion  0.04 Units/min Intravenous Continuous Renetta Corey MD   Stopped at 03/15/20 1609    0.9 % sodium chloride bolus  500 mL Intravenous Once Jaja Jewell MD        vancomycin (VANCOCIN) intermittent dosing (placeholder)   Other RX Ana Galan MD        insulin lispro (HUMALOG) injection vial 0-6 Units  0-6 Units Subcutaneous TID WC Jaja Jewell MD   1 Units at 03/15/20 1722    insulin lispro (HUMALOG) injection vial 0-3 Units  0-3 Units Subcutaneous Nightly Jaja Jewell MD   1 Units at 03/15/20 2101    glucose (GLUTOSE) 40 % oral gel 15 g  15 g Oral PRN Jaja Jewell MD        dextrose 50 % IV solution  12.5 g Intravenous PRN Jaja Jewell MD        glucagon (rDNA) injection 1 mg  1 mg Intramuscular PRN Jaja Jewell MD        dextrose 5 % solution  100 mL/hr Intravenous PRN Jaja Jewell MD        ipratropium-albuterol (DUONEB) nebulizer solution 1 ampule  1 ampule Inhalation Q4H Cleveland Elliott MD   1 ampule at 03/17/20 1003    propofol injection  10 mcg/kg/min Intravenous Titrated Jaja Jweell MD 13.9 mL/hr at 03/17/20 0509 20 mcg/kg/min at 03/17/20 0509        Labs:     Recent Labs     03/15/20  0418 03/16/20  0425 03/17/20  0430   WBC 12.3* 9.8 13.7*   RBC 5.24 4.67* 4.30*   HGB 15.3 13.6* 12.4*   HCT 45.3 40.0* 37.4*   MCV 86.5 85.7 87.0   MCH 29.2 29.1 28.8 MCHC 33.8 34.0 33.2    134 149     Recent Labs     03/15/20  0418  03/16/20  0425 03/17/20  0412 03/17/20  0430     --  137  --  142   K 4.6   < > 3.9 3.2 3.4*   ANIONGAP 21*  --  16  --  14   CL 95*  --  95*  --  101   CO2 22  --  26  --  27   BUN 41*  --  56*  --  58*   CREATININE 3.2*  --  3.4*  --  2.5*   GLUCOSE 183*  --  131*  --  166*   CALCIUM 7.8*  --  7.8*  --  7.9*    < > = values in this interval not displayed. Recent Labs     03/15/20  0418 03/16/20  0425 03/17/20  0430   MG 2.0 2.1 2.5*     Recent Labs     03/15/20  0418 03/16/20  0425 03/17/20  0430   * 48* 23   ALT 89* 64* 45*   BILITOT 0.8 0.6 0.6   ALKPHOS 295* 219* 199*     ABGs:  Recent Labs     03/15/20  0351 03/16/20 0417 03/17/20 0412   PHART 7.410 7.460* 7.510*   QDX0YPY 35.0 38.0 35.0   PO2ART 75.0* 76.0* 79.0*   YKD8IIW 22.2 27.0* 27.9*   BEART -1.8 3.1* 5.1*   HGBAE 16.1 14.1 18.9*   T5CLWSFM 94.2 94.9 95.1   CARBOXHGBART 0.4 0.7 0.0   02THERAPY Unknown Unknown Unknown     HgBA1c:   Recent Labs     03/15/20  0418   LABA1C 6.8*     FLP:    Lab Results   Component Value Date    TRIG 323 04/01/2019    HDL 65 04/01/2019    LDLCALC 100 04/01/2019    LDLDIRECT 93 10/30/2015     TSH:    Lab Results   Component Value Date    TSH 8.600 03/13/2020     Troponin T:   Recent Labs     03/14/20  1400 03/15/20  0418   TROPONINI 0.31* 0.23*     INR: No results for input(s): INR in the last 72 hours.     Objective:   Vitals: /63   Pulse 117   Temp 97.2 °F (36.2 °C) (Temporal)   Resp 18   Ht 5' 8\" (1.727 m)   Wt 261 lb 11.2 oz (118.7 kg)   SpO2 94%   BMI 39.79 kg/m²   24HR INTAKE/OUTPUT:      Intake/Output Summary (Last 24 hours) at 3/17/2020 1050  Last data filed at 3/17/2020 0600  Gross per 24 hour   Intake 2659.7 ml   Output 3425 ml   Net -765.3 ml     Physical Exam  General appearance: intubated, appears ill, no acute distress  HEENT: atraumatic, eyes with clear conjunctiva and normal lids, pupils and irises normal, external ears and nose are normal,lips normal, ETT in place. Neck: without masses, supple  Lungs:  intubated and mechanically ventilated, ventilating bilaterally and diminished in the bases bilaterally  Heart: irregular rate and rhythm and S1, S2 normal, tachycardic, distal pulses palpable  Abdomen: round, unable to auscultate bowel sounds  Genitourinary: No bladder fullness, masses, or tenderness  Extremities: atraumatic, normal ROM  Neurologic: sedated and intubated  Psychiatric: sedated and intubated  Skin: warm, dry        Assessment and Plan: Active Problems:    Dyspnea on exertion    Acute respiratory failure with hypoxia and hypercapnia (HCC)    Flash pulmonary edema (HCC)    Septic shock (HCC)    Palliative care patient    Pneumonia due to organism  Resolved Problems:    * No resolved hospital problems. *    Visit Summary: I saw the patient at the bedside with nursing staff present. Patient has some improvement since last visit. He does remain critically ill and does have continued issues of concern, which I discussed with the patient's family in the waiting room. Patient's family appears to have good understanding that he does remain critically ill and remain cautiously hopeful We will continue to discuss goals of care throughout the patient's hospitalization. I provided encouragement and emotional support to the patient's family. Nursing staff aware of the outcome of my visit. Palliative medicine will continue to follow. Recommendations: Continue to discuss goals of care, which will be dependent on the patient's ability to improve throughout hospitalization    Thank you for consulting Palliative Care and allowing us to participate in the care of this patient.        Electronically signed by ANDERS Bae on 3/17/2020 at 10:50 AM    (Please note that portions of this note were completed with a voice recognition program.  Ryann Kelly made to edit the dictations but occasionally

## 2020-03-18 ENCOUNTER — OUTSIDE FACILITY SERVICE (OUTPATIENT)
Dept: PULMONOLOGY | Facility: CLINIC | Age: 70
End: 2020-03-18

## 2020-03-18 PROCEDURE — 99233 SBSQ HOSP IP/OBS HIGH 50: CPT | Performed by: INTERNAL MEDICINE

## 2020-03-18 NOTE — PROGRESS NOTES
03/04/2013     Priority: Low       Admit Date:  3/13/2020    Admission Problem List: Present on Admission:   Septic shock ([facility] [address]   Palliative care patient   Dyspnea on exertion   Acute respiratory failure with hypoxia and hypercapnia (HCC)   Flash pulmonary edema (HCC)   Pneumonia due to organism      Cardiac Specific Data:  Specialty Problems        Cardiology Problems    Precordial pain        Sick sinus syndrome due to SA node dysfunction (HCC)        SVT (supraventricular tachycardia) (HCC)        Essential hypertension              Subjective:  Mr. Freddie Bailey seen today remains intubated rhythm atrial fibrillation heart rate 101 reported to have RSV. FiO2 50% on 8 of PEEP. No new cardiac issues reported. Objective:   /85   Pulse 128   Temp 98.4 °F (36.9 °C) (Temporal)   Resp 19   Ht 5' 8\" (1.727 m)   Wt 261 lb 11.2 oz (118.7 kg)   SpO2 (!) 88%   BMI 39.79 kg/m²       Intake/Output Summary (Last 24 hours) at 3/18/2020 1430  Last data filed at 3/18/2020 0600  Gross per 24 hour   Intake 2785.84 ml   Output 3255 ml   Net -469.16 ml       Prior to Admission medications    Medication Sig Start Date End Date Taking? Authorizing Provider   SYNTHROID 50 MCG tablet Take 1 tablet by mouth Daily 3/5/20   Kristian Wells MD   cloNIDine (CATAPRES) 0.3 MG tablet Take 1 tablet by mouth 2 times daily 2/23/20   Izaiah Gutierrez MD   carbidopa-levodopa (SINEMET)  MG per tablet Take 1 tablet by mouth 3 times daily  Patient not taking: Reported on 3/4/2020 2/23/20   Izaiah Gutierrez MD   busPIRone (BUSPAR) 10 MG tablet TAKE 1 TABLET BY MOUTH THREE TIMES A DAY 2/17/20   Kristian Wells MD   pantoprazole (PROTONIX) 40 MG tablet TAKE 1 TABLET BY MOUTH EVERY MORNING BEFORE BREAKFAST 1/28/20   Kristian Wells MD   nitroGLYCERIN (NITROSTAT) 0.3 MG SL tablet up to max of 3 total doses.  If no relief after 1 dose, call 911. 12/19/19   Divya Calderon MD   furosemide (LASIX) 40 MG tablet Take 1 tablet by mouth as needed (fluid retention) 11/25/19   Amira Crum MD   rOPINIRole (REQUIP) 1 MG tablet Take 1 tablet by mouth nightly 11/8/19 12/8/19  Romana Poll, MD   ipratropium-albuterol (DUONEB) 0.5-2.5 (3) MG/3ML SOLN nebulizer solution Inhale 1 vial into the lungs nightly    Historical Provider, MD   celecoxib (CELEBREX) 200 MG capsule TAKE 1 CAPSULE BY MOUTH EVERY DAY 9/12/19   ANDERS Hernandez   losartan (COZAAR) 100 MG tablet Take 1 tablet by mouth daily 9/12/19   ANDERS Hernandez   FLUoxetine (PROZAC) 20 MG capsule Take 1 capsule by mouth daily Take 40mg along with 20mg capsule daily 9/12/19   ANDERS Hernandez   FLUoxetine (PROZAC) 40 MG capsule Take 1 capsule by mouth daily 9/12/19   ANDERS Hernandez   NIFEdipine (PROCARDIA XL) 30 MG extended release tablet Take 1 tablet by mouth daily 8/16/18   Pallavi Yi MD   albuterol sulfate  (90 Base) MCG/ACT inhaler Inhale 2 puffs into the lungs every 6 hours as needed for Wheezing 4/17/18   Pallavi Yi MD   mometasone-formoterol [facility]) 200-5 MCG/ACT inhaler INHALE 2 PUFFS INTO THE LUNGS EVERY 12 HOURS 12/14/17   Pallavi Yi MD        metoprolol  5 mg Intravenous Q6H    levothyroxine  50 mcg Oral Daily    metoclopramide  10 mg Intravenous Q6H    piperacillin-tazobactam  3.375 g Intravenous Q8H    hydrocortisone sodium succinate PF  50 mg Intravenous Daily    bumetanide  2 mg Intravenous Q12H    sodium chloride  500 mL Intravenous Once    vancomycin (VANCOCIN) intermittent dosing (placeholder)   Other RX Placeholder    insulin lispro  0-6 Units Subcutaneous TID     insulin lispro  0-3 Units Subcutaneous Nightly    ipratropium-albuterol  1 ampule Inhalation Q4H       TELEMETRY: Atrial fibrillation     Physical Exam:      Physical Exam      General:  Awake, alert, NAD  Skin:  Warm and dry  Neck:  no jvd , no carotid bruits  Chest:  Clear to auscultation, no wheezing or rales  Cardiovascular: Diane Mcintosh on 2/20/2020 5:51 PM    Mri Thoracic Spine W Wo Contrast    Result Date: 2/20/2020  MRI THORACIC SPINE W WO CONTRAST 2/20/2020 5:22 PM History: Neck pain. Generalized weakness. Worsening ataxia. Pre and postcontrast thoracic spine MRI. Images are affected by patient motion. Minimal left convex curvature. Multilevel mild disc space narrowing and endplate spurring. Multilevel degenerative endplate signal change. No spinal canal stenosis or cord compression. No significant disc herniation or foraminal stenosis is seen. No compression fracture. Postcontrast imaging shows no abnormal cord enhancement. 1. Multilevel degenerative disc and endplate changes with no fracture, stenosis, or neural compression. Signed by Dr Diane Mcintosh on 2/20/2020 5:24 PM    Us Renal Complete    Result Date: 3/14/2020  US RENAL COMPLETE 3/14/2020 6:00 AM HISTORY: Elevated creatinine COMPARISON: None  TECHNIQUE: Multiple longitudinal and transverse realtime sonographic images of the kidneys and urinary bladder are obtained. FINDINGS: The right kidney measures 12.0 x 5.7 x 6.9 cm. The right kidney is normal in size, shape, contour and position. 2.1 cm simple cyst arising from the upper pole. The cortical thickness is normal, with preservation of the corticomedullary differentiation. The central echo complex is compact with no evidence for hydronephrosis. No nephrolithiasis or abnormal perinephric fluid collections are seen. No hydroureter. The left kidney measures 9.8 x 5.7 x 5.4 cm. The left kidney is normal in size, shape, contour and position. The cortical thickness is normal, with preservation of the corticomedullary differentiation. The central echo complex is compact with no evidence for hydronephrosis. No nephrolithiasis or abnormal perinephric fluid collections are seen. No hydroureter. Urinary bladder is decompressed by Hernández catheter.     1. Small simple right renal cyst. Kidneys are otherwise unremarkable. 2. Urinary bladder is decompressed by Hernández catheter. Signed by Dr Stanley Issa on 3/14/2020 1:02 PM    Xr Chest Portable    Result Date: 3/18/2020  Frontal supine radiograph of the chest 3/18/2020 4:15 AM History: Dyspnea Comparison: Chest x-ray dated 3/15/2020 Findings: Lines and tubes are stable in position. Increased bilateral central opacities and bilateral pleural effusions. . Mild-to-moderate cardiomegaly. .  No acute osseous or soft tissue abnormality is noted. Impression: 1. Increased opacities and pleural effusions. May reflect worsening fluid overload and/or infection. Signed by Dr Lima Mcfarland on 3/18/2020 8:41 AM    Xr Chest Portable    Result Date: 3/17/2020  Frontal supine radiograph of the chest 3/17/2020 5:00 AM History: Respiratory failure Comparison: Chest x-ray dated 3/16/2020 Findings: Lines and tubes are stable in position. No new opacities or pneumothoraces are visualized in the chest. The cardiomediastinal silhouette and pulmonary vascularity are unchanged. No acute osseous or soft tissue abnormality is noted. Impression: 1. No significant interval change since previous exam. Signed by Dr Lima Mcfarland on 3/17/2020 7:27 AM    Xr Chest Portable    Result Date: 3/16/2020  Examination. XR CHEST PORTABLE 3/16/2020 5:00 AM History: Respiratory failure. The patient is on a vent. A single frontal portable upright view of the chest is compared with the previous study dated 3/15/2020. No significant interval change. There is moderate pulmonary vascular congestion and left basal pleural effusion. No pneumothorax The heart size is not evaluated due to the portable projection. . The endotracheal tube is seen in a stable and unchanged position. The distal end of the nasogastric tube is not visible due to suboptimal exposure of the area. A dual-chamber cardiac pacer is seen in place. The hardware fusion of the lower cervical spine is incompletely visualized. No change. Persistent pulmonary congestion and left basal pleural effusion. The tubes and lines in stable and unchanged position. Signed by Dr Pantoja on 3/16/2020 8:05 AM    Xr Chest Portable    Result Date: 3/15/2020  XR CHEST PORTABLE 3/15/2020 12:00 PM HISTORY: NG tube placement Comparison: 3/15/2020 Findings: Exam performed to evaluate new enteric tube. An enteric tube is present in the upper mediastinum. At the level of the cardiac silhouette the tube becomes obscured. Unable to visualize the tube tip. Endotracheal tube is in stable position. Bilateral pulmonary opacities are unchanged. Impression: 1. Enteric tube is obscured by the cardiac silhouette. Unable to confirm tube position. Recommend abdominal/upper abdominal radiograph for tube confirmation. Signed by Dr Audrey Silva on 3/15/2020 1:36 PM    Xr Chest Portable    Result Date: 3/15/2020  XR CHEST PORTABLE 3/15/2020 5:00 AM HISTORY: Respiratory failure Comparison: 3/14/2020 Findings: Mild interval decrease in the underlying pulmonary edema. There are no new or worsening consolidations. No pleural effusion or pneumothorax. Endotracheal tube is in good position. Heart size is stable. Left chest wall dual chamber pacemaker. Impression: 1. Mild interval decrease in the underlying pulmonary edema. Otherwise stable. Signed by Dr Audrey Silva on 3/15/2020 7:24 AM    Xr Chest Portable    Result Date: 3/14/2020  XR CHEST PORTABLE 3/14/2020 4:15 AM HISTORY: Mechanical ventilation Comparison: 3/13/2020 Findings: Endotracheal tube is in stable position. Reidentified bilateral pulmonary edema, stable or perhaps slightly decreased. The cardiomediastinal silhouette and pulmonary vascularity are unchanged. Left chest wall dual chamber pacemaker. Anterior cervical fusion hardware. No acute osseous or soft tissue abnormality is noted. Impression: 1. Reidentified pulmonary edema, stable or perhaps slightly decreased. Otherwise unchanged.  Signed by Dr Amairani Champion Carolee on 3/14/2020 7:22 AM    Xr Chest Portable    Result Date: 3/13/2020  XR CHEST PORTABLE 3/13/2020 11:45 AM HISTORY: Status post intubation COMPARISON: 3/13/2020. FINDINGS: Status post intubation with endotracheal tube tip projecting 5.3 cm above the sasha. Reidentified bilateral diffuse lung opacities, not significant changed. Lungs appear to be well expanded. No pleural effusions or pneumothorax identified. Heart size is stable. Left chest wall dual chamber pacemaker identified. 1. Status post intubation with lungs appearing well expanded. Tube is in good position. 2. Reidentified stable bilateral diffuse lung opacities, favor pulmonary edema. Signed by Dr Earl Kelly on 3/13/2020 12:56 PM    Xr Chest Portable    Result Date: 3/13/2020  EXAM: XR CHEST PORTABLE -- 3/13/2020 11:15 AM HISTORY: 69 years, Male, shortness of breath COMPARISON: 2/18/2020 TECHNIQUE:  1 images. Frontal view of the chest. FINDINGS:  Diffuse bilateral pulmonary opacities. No pneumothorax or large pleural effusion. Borderline cardiac silhouette. Upper mediastinum within normal limits. Cardiac pulse generator at the left chest with 2 grossly intact leads. 1. Diffuse bilateral pulmonary opacities, which could represent edema or infection. Signed by Dr Clementina Mandujano on 3/13/2020 12:30 PM    Xr Chest Portable    Result Date: 2/18/2020  EXAMINATION: XR CHEST PORTABLE 2/18/2020 4:02 PM HISTORY: XR CHEST PORTABLE 2/18/2020 2:45 PM HISTORY: Short of breath COMPARISON: September 19, 2019. FINDINGS: The lungs are clear. Cardiac silhouette is normal. Pacing device is present soft tissues the left chest. Postsurgical change from anterior fusion of the cervical spine is noted. . The osseous structures and surrounding soft tissues demonstrate no acute abnormality. 1. No radiographic evidence of acute cardiopulmonary process.  Signed by Dr Waylon Schmidt on 2/18/2020 4:05 PM    Cta Pulmonary W Contrast    Result Date: 3/13/2020  Examination. CTA PULMONARY W CONTRAST 3/13/2020 1:00 PM History: Hypoxia and respiratory failure. DLP: 706 mGycm. CT angiography of the chest is performed after intravenous contrast enhancement. The images are acquired in axial plane with subsequent reconstruction in coronal and sagittal plane. The comparison is made with the previous study dated 9/14/2019. The correlation is made with chest radiograph obtained earlier today. There is good opacification of pulmonary artery and the branches bilaterally. No filling defects in the opacified pulmonary arterial bed. Both pulmonary arteries appeared moderately dilated, right more than the left. Atheromatous changes thoracic aorta are seen. Without dilatation or dissection. Coronary arteries are not optimally visualized. Cardiac pacer leads are seen in the right atrium and right ventricle with extensive streak artifacts. There is a small pericardial effusion. The cardiac chambers does not appear enlarged and is marked thickening of the left ventricle wall. There is suggestion of right ventricular strain. There is a bilaterally symmetrical extensive lung opacities suggesting severe cardiogenic pulmonary edema. This is more severe consolidation in the lower lobes with air bronchograms. There is a small bibasilar pleural effusion. Thyroid gland is not optimally evaluated. There is no axillary lymphadenopathy. The included liver and spleen appear unremarkable. No gallstone. The adrenal glands are normal. Small exophytic nodule from the upper pole of the right kidney is incompletely visualized and evaluated. This is similar to the previous study. No evidence of pulmonary embolism. No aortic aneurysm or dissection. Coronary arteries are not well-visualized for comment. There is a severe cardiogenic pulmonary edema and bilateral lower lobar lung consolidation which may suggest superimposed inflammatory/infectious process. Bilateral small basal pleural effusion. Pulmonary arterial hypertension and right ventricle strain. The above findings are recorded on a digital voice clip in PACS. Signed by Dr Doug Hess on 3/13/2020 3:13 PM    Mri Brain W Wo Contrast    Result Date: 2/18/2020  MRI BRAIN W WO CONTRAST 2/18/2020 5:38 PM History: Stroke symptoms. Generalized weakness. Pre and postcontrast MR imaging of the brain. No acute parenchymal ischemia based on the DWI sequence. Moderate diffuse cortical atrophy. Left maxillary sinus and ethmoid sinus mucosal thickening. Mild-to-moderate confluent small vessel disease throughout the periventricular white matter. No brain hemorrhage or abnormal calcification. Postcontrast imaging shows no enhancing brain lesion. No sign of vasculitis. The dural venous sinuses are patent. 1. Atrophy and small vessel disease. 2. No acute infarct. 3. No mass. Signed by Dr Vilma Hutchison on 2/18/2020 5:39 PM    Fl Lumbar Puncture Diag    Result Date: 2/19/2020  Examination. FL LUMBAR PUNCTURE DIAG 2/19/2020 11:15 AM History: Weakness and loss of balance. The procedure was explained to the patient. The benefits, the risks and complications are discussed. The patient understood the procedure and signed the consent. The patient was placed on the fluoroscopy table and the lumbar spine was examined. The patient has moderately severe dextroscoliosis. A suitable spot opposite interspace L3-4 was selected for puncture. The spot was marked with an ink marker. After sterile preparation and application of local anesthesia a size 20-gauge longer spine needle was introduced into the thecal sac and a FreeFlow of clear colorless thin fluid was established. The patient was immediately turned into a decubitus position. The outer hub of the needle was connected to a pressure measuring device and opening pressure was measured. The opening pressure is 15.5 cm water.  30 mL of CSF was withdrawn and dispensed in 4 separate test tubes which were sent to the laboratory for further evaluation as instructed by the attending physician. The needle was then withdrawn and the puncture site was covered with a sterile Band-Aid. The patient tolerated the procedure well. No immediate complications were noted. The patient was given post lumbar puncture care instructions. A successful lumbar puncture and opening pressure measurement. Fluoroscopy time 2 minutes 11 seconds. Total dose: [id number]. Signed by Dr Kailyn Cruz on 2/19/2020 4:45 PM        Assessment:  1. Suspected septic shock  2. Acute respiratory failure requiring intubation mechanical ventilatory support  3. Sinus node dysfunction  4. Previous pacemaker implantation  5. Gastroesophageal reflux disease  6. History of pulmonary edema  7. Elevated BUN 56 creatinine 3.4  8. MRI of the brain 2/18/2020 atrophy small vessel disease no acute infarct  9. Echocardiogram 3/13/2020 normal left ventricular systolic function ejection fraction greater than 50% moderate concentric LVH  10. CTA pulmonary 3/13/2020 no evidence of pulmonary embolism severe cardiogenic pulmonary edema bilateral lower lobar lung consolidation which may suggest superimposed inflammatory/infectious process  11. Lexiscan 12/11/2019 ejection fraction 67% normal perfusion study    Plan:  1.  Continue supportive care cardiac status stable    Juan Lorenzo MD 3/18/2020 2:30 PM

## 2020-03-18 NOTE — PROGRESS NOTES
Nephrology ([facility]) Progress Note      Patient:  Clementina Taylor  YOB: 1950  Date of Service: 3/18/2020  MRN: [id number]   Acct: [de-identified]   Primary Care Physician: Catarino Bob MD  Advance Directive: [facility]  Admit Date: 3/13/2020       Hospital Day: 5  Referring Provider: Ok Roy DO    Patient independently seen and examined, Chart, Consults, Notes, Operative notes, Labs, Cardiology, and Radiology studies reviewed as able. Chief complaint: Abnormal labs. Subjective:  Clementina Taylor is a 71 y.o. male  whom we were consulted for acute kidney injury/metabolic acidosis. All the information's are taken from the chart as he is currently intubated and sedated. His baseline serum creatinine 0.9 mg about 2 months ago. He presented to the emergency room with increasing shortness of breath and hypoxemic respiratory failure. Chest x-ray shows patient has bilateral lower lobe consolidation and pulmonary edema. CT angiogram of the pulmonary arteries in the emergency room consistent with no evidence of pulmonary embolism. He is currently admitted to CCU, intubated sedated on a couple of pressors as he has septic shock. He has acute kidney injury and nephrology is consulted. Otherwise he has history of COPD, coronary artery disease and pacemaker implant. Hospital course remarkable for IV fluids resuscitation/worsening of renal function but has excellent urine output. Today, patient remained intubated/sedated. He is non-oliguric. No new overnight events.      Allergies:  Codeine    Medicines:  Current Facility-Administered Medications   Medication Dose Route Frequency Provider Last Rate Last Dose    metoprolol (LOPRESSOR) injection 5 mg  5 mg Intravenous Q6H Ok Roy, DO   5 mg at 03/18/20 0751    levothyroxine (SYNTHROID) tablet 50 mcg  50 mcg Oral Daily Ok Roy, DO   50 mcg at 03/18/20 0934    metoclopramide (REGLAN) injection 10 mg glucose (GLUTOSE) 40 % oral gel 15 g  15 g Oral PRN Fiorella Juan MD        dextrose 50 % IV solution  12.5 g Intravenous PRN Fiorella Juan MD        glucagon (rDNA) injection 1 mg  1 mg Intramuscular PRN Fiorella Juan MD        dextrose 5 % solution  100 mL/hr Intravenous PRN Fiorella Juan MD        ipratropium-albuterol (DUONEB) nebulizer solution 1 ampule  1 ampule Inhalation Q4H Benji Hanson MD   1 ampule at 03/18/20 3961    propofol injection  10 mcg/kg/min Intravenous Titrated Fiorella Juan MD 24.3 mL/hr at 03/18/20 0630 35 mcg/kg/min at 03/18/20 0630       Past Medical History:  Past Medical History:   Diagnosis Date    Anemia     Anxiety     Back pain     Cellulitis     Depression     Elevated triglycerides with high cholesterol     GERD (gastroesophageal reflux disease)     Hypertension     Obstructive chronic bronchitis without exacerbation ([address]UST[address] 75.) 6/10/2019    Osteoarthritis     Pacemaker 04/21/2017    Palliative care patient 03/13/2020    Sleep apnea, unspecified 11/22/2019       Past Surgical History:  Past Surgical History:   Procedure Laterality Date    COLONOSCOPY  11/18/11        JOINT REPLACEMENT      Bilateral total knees    NECK SURGERY      PACEMAKER PLACEMENT      TOTAL KNEE ARTHROPLASTY      RIGHT AND LEFT KNEE    UPPER GASTROINTESTINAL ENDOSCOPY  2/2012           Family History  Family History   Problem Relation Age of Onset    Breast Cancer Mother     Colon Cancer Father     Colon Polyps Father     Prostate Cancer Brother        Social History  Social History     Socioeconomic History    Marital status:      Spouse name: Not on file    Number of children: Not on file    Years of education: Not on file    Highest education level: Not on file   Occupational History    Occupation: maintenance chemical plant   Social Needs    Financial resource strain: Not on file    Food insecurity     Worry: Not on file 03/18/20  0755   APTT 47.2* 49.5* 62.6*     BNP:  No results for input(s): BNP in the last 72 hours. Ionized Calcium:No results for input(s): IONCA in the last 72 hours. Magnesium:  Recent Labs     03/16/20 0425 03/17/20  0430 03/18/20  0215   MG 2.1 2.5* 2.7*     Phosphorus:No results for input(s): PHOS in the last 72 hours. HgbA1C:   No results for input(s): LABA1C in the last 72 hours. Hepatic:   Recent Labs     03/16/20 0425 03/17/20  0430 03/18/20  0215   ALKPHOS 219* 199* 204*   ALT 64* 45* 34   AST 48* 23 24   PROT 5.6* 5.7* 6.4*   BILITOT 0.6 0.6 0.8   LABALBU 2.7* 2.4* 2.9*     Lactic Acid:   No results for input(s): LACTA in the last 72 hours. Troponin: No results for input(s): CKTOTAL, CKMB, TROPONINT in the last 72 hours. ABGs: No results for input(s): PH, PCO2, PO2, HCO3, O2SAT in the last 72 hours. CRP:    No results for input(s): CRP in the last 72 hours. Sed Rate:    No results for input(s): SEDRATE in the last 72 hours. Cultures:   No results for input(s): CULTURE in the last 72 hours. No results for input(s): BC, [employer] in the last 72 hours. No results for input(s): CXSURG in the last 72 hours. Radiology reports as per the Radiologist  Radiology: Xr Chest Portable    Result Date: 3/14/2020  XR CHEST PORTABLE 3/14/2020 4:15 AM HISTORY: Mechanical ventilation Comparison: 3/13/2020 Findings: Endotracheal tube is in stable position. Reidentified bilateral pulmonary edema, stable or perhaps slightly decreased. The cardiomediastinal silhouette and pulmonary vascularity are unchanged. Left chest wall dual chamber pacemaker. Anterior cervical fusion hardware. No acute osseous or soft tissue abnormality is noted. Impression: 1. Reidentified pulmonary edema, stable or perhaps slightly decreased. Otherwise unchanged.  Signed by Dr Quinton Davis on 3/14/2020 7:22 AM    Xr Chest Portable    Result Date: 3/13/2020  XR CHEST PORTABLE 3/13/2020 11:45 AM HISTORY: Status post intubation arteries are not optimally visualized. Cardiac pacer leads are seen in the right atrium and right ventricle with extensive streak artifacts. There is a small pericardial effusion. The cardiac chambers does not appear enlarged and is marked thickening of the left ventricle wall. There is suggestion of right ventricular strain. There is a bilaterally symmetrical extensive lung opacities suggesting severe cardiogenic pulmonary edema. This is more severe consolidation in the lower lobes with air bronchograms. There is a small bibasilar pleural effusion. Thyroid gland is not optimally evaluated. There is no axillary lymphadenopathy. The included liver and spleen appear unremarkable. No gallstone. The adrenal glands are normal. Small exophytic nodule from the upper pole of the right kidney is incompletely visualized and evaluated. This is similar to the previous study. No evidence of pulmonary embolism. No aortic aneurysm or dissection. Coronary arteries are not well-visualized for comment. There is a severe cardiogenic pulmonary edema and bilateral lower lobar lung consolidation which may suggest superimposed inflammatory/infectious process. Bilateral small basal pleural effusion. Pulmonary arterial hypertension and right ventricle strain. The above findings are recorded on a digital voice clip in PACS. Signed by Dr Taj Aguayo on 3/13/2020 3:13 PM       Assessment   1. Acute kidney injury/ATN nonoliguric/- renal function is slightly improving  2. Metabolic acidemia-improving  3. Hyponatremia  4. Septic shock-improved. 5.  Contrast-induced nephropathy. 6.  Acute respiratory failure-bilateral pneumonia. 7.  Pulmonary edema. Plan:  1. Stop IV fluids. 2.  Intermittent diuretics. 3.  Follow up labs.          Andrea Mackenzie MD  03/18/20  11:03 AM

## 2020-03-18 NOTE — PROGRESS NOTES
water from formula. Proteineinex will add another 400kcals   · Goal TF & Flush Orders Provides: 10ml = 240 kcals with 21 g protein, 26g CHO and 200 ml free water. Proteinex will add another 104g protein and 400 kcals.   · Additional Calories: Propofol 27.8ml = 733 NP kcals     · Anthropometric Measures:  · Ht: 5' 8\" (172.7 cm)   · Current Body Wt: 261 lb 11 oz (118.7 kg)  · Admission Body Wt: 270 lb (122.5 kg)  · Usual Body Wt: 265 lb (120.2 kg)(12/2019)  · Ideal Body Wt: 154 lb (69.9 kg), % Ideal Body 170.9%  · BMI Classification: BMI 35.0 - 39.9 Obese Class II    Nutrition Interventions:   Continue NPO, Start Tube Feeding  Continued Inpatient Monitoring    Nutrition Evaluation:   · Evaluation: Progressing toward goals   · Goals: Meet nutrition needs via EN   · Monitoring: TF Intake, TF Tolerance, Skin Integrity, Pertinent Labs, Weight      Electronically signed by Martha Medina, MS, RD, LD on 3/18/20 at 1:23 PM CDT    Contact Number: [phone]

## 2020-03-18 NOTE — PROGRESS NOTES
Results for Gisell Thompson (MRN [id number]) as of 3/18/2020 04:19   Ref.  Range 3/18/2020 04:15   Hemoglobin, Art, Extended Latest Ref Range: 14.0 - 18.0 g/dL 12.9 (L)   pH, Arterial Latest Ref Range: 7.350 - 7.450  7.560 (HH)   pCO2, Arterial Latest Ref Range: 35.0 - 45.0 mmHg 37.0   pO2, Arterial Latest Ref Range: 80.0 - 100.0 mmHg 107.0 (H)   HCO3, Arterial Latest Ref Range: 22.0 - 26.0 mmol/L 33.1 (H)   Base Excess, Arterial Latest Ref Range: -2.0 - 2.0 mmol/L 10.2 (H)   O2 Sat, Arterial Latest Ref Range: >92 % 96.2   O2 Content, Arterial Latest Ref Range: Not Established mL/dL 17.6   Methemoglobin, Arterial Latest Ref Range: <1.5 % 0.5   Carboxyhgb, Arterial Latest Ref Range: 0.0 - 5.0 % 0.6   AC/ 18 50% +5    RR AT+

## 2020-03-18 NOTE — PROGRESS NOTES
Emotionally abused: Not on file     Physically abused: Not on file     Forced sexual activity: Not on file   Other Topics Concern    Not on file   Social History Narrative    Not on file         Review of Systems:    Review of Systems   Unable to perform ROS: Intubated           Objective:  Blood pressure 107/62, pulse 112, temperature 98 °F (36.7 °C), temperature source Temporal, resp. rate 18, height 5' 8\" (1.727 m), weight 261 lb 11.2 oz (118.7 kg), SpO2 (!) 89 %. Intake/Output Summary (Last 24 hours) at 3/18/2020 0915  Last data filed at 3/18/2020 0600  Gross per 24 hour   Intake 2785.84 ml   Output 4555 ml   Net -1769.16 ml       Physical Exam  Vitals signs reviewed. Constitutional:       Appearance: He is well-developed. HENT:      Head: Normocephalic and atraumatic. Eyes:      Conjunctiva/sclera: Conjunctivae normal.      Pupils: Pupils are equal, round, and reactive to light. Cardiovascular:      Rate and Rhythm: Normal rate and regular rhythm. Heart sounds: Normal heart sounds. Pulmonary:      Breath sounds: Wheezing and rhonchi present. Abdominal:      General: Abdomen is flat. Palpations: Abdomen is soft. Skin:     General: Skin is warm and dry. Labs:  BMP:   Recent Labs     03/16/20 0425 03/17/20 0430 03/18/20 0215 03/18/20 0415     --  142 147*  --    K 3.9   < > 3.4* 3.5 3.3   CL 95*  --  101 103  --    CO2 26  --  27 29  --    BUN 56*  --  58* 51*  --    CREATININE 3.4*  --  2.5* 1.7*  --    CALCIUM 7.8*  --  7.9* 8.2*  --     < > = values in this interval not displayed.      CBC:   Recent Labs     03/16/20 0425 03/17/20 0430 03/18/20 0215   WBC 9.8 13.7* 11.6*   HGB 13.6* 12.4* 13.1*   HCT 40.0* 37.4* 40.4*   MCV 85.7 87.0 89.8    149 147     LIVER PROFILE:   Recent Labs     03/16/20 0425 03/17/20 0430 03/18/20  0215   AST 48* 23 24   ALT 64* 45* 34   BILITOT 0.6 0.6 0.8   ALKPHOS 219* 199* 204*     PT/INR: No results for input(s): PROTIME, hydroureter. The left kidney measures 9.8 x 5.7 x 5.4 cm. The left kidney is normal in size, shape, contour and position. The cortical thickness is normal, with preservation of the corticomedullary differentiation. The central echo complex is compact with no evidence for hydronephrosis. No nephrolithiasis or abnormal perinephric fluid collections are seen. No hydroureter. Urinary bladder is decompressed by Hernández catheter. 1. Small simple right renal cyst. Kidneys are otherwise unremarkable. 2. Urinary bladder is decompressed by Hernández catheter. Signed by Dr Carol Corado on 3/14/2020 1:02 PM    Xr Chest Portable    Result Date: 3/14/2020  XR CHEST PORTABLE 3/14/2020 4:15 AM HISTORY: Mechanical ventilation Comparison: 3/13/2020 Findings: Endotracheal tube is in stable position. Reidentified bilateral pulmonary edema, stable or perhaps slightly decreased. The cardiomediastinal silhouette and pulmonary vascularity are unchanged. Left chest wall dual chamber pacemaker. Anterior cervical fusion hardware. No acute osseous or soft tissue abnormality is noted. Impression: 1. Reidentified pulmonary edema, stable or perhaps slightly decreased. Otherwise unchanged. Signed by Dr Carol Corado on 3/14/2020 7:22 AM    Xr Chest Portable    Result Date: 3/13/2020  XR CHEST PORTABLE 3/13/2020 11:45 AM HISTORY: Status post intubation COMPARISON: 3/13/2020. FINDINGS: Status post intubation with endotracheal tube tip projecting 5.3 cm above the sasha. Reidentified bilateral diffuse lung opacities, not significant changed. Lungs appear to be well expanded. No pleural effusions or pneumothorax identified. Heart size is stable. Left chest wall dual chamber pacemaker identified. 1. Status post intubation with lungs appearing well expanded. Tube is in good position. 2. Reidentified stable bilateral diffuse lung opacities, favor pulmonary edema.  Signed by Dr Carol Corado on 3/13/2020 12:56 PM    Xr Chest Portable    Result Date: 3/13/2020  EXAM: XR CHEST PORTABLE -- 3/13/2020 11:15 AM HISTORY: 69 years, Male, shortness of breath COMPARISON: 2/18/2020 TECHNIQUE:  1 images. Frontal view of the chest. FINDINGS:  Diffuse bilateral pulmonary opacities. No pneumothorax or large pleural effusion. Borderline cardiac silhouette. Upper mediastinum within normal limits. Cardiac pulse generator at the left chest with 2 grossly intact leads. 1. Diffuse bilateral pulmonary opacities, which could represent edema or infection. Signed by Dr Frandy Saab on 3/13/2020 12:30 PM    Cta Pulmonary W Contrast    Result Date: 3/13/2020  Examination. CTA PULMONARY W CONTRAST 3/13/2020 1:00 PM History: Hypoxia and respiratory failure. DLP: 706 mGycm. CT angiography of the chest is performed after intravenous contrast enhancement. The images are acquired in axial plane with subsequent reconstruction in coronal and sagittal plane. The comparison is made with the previous study dated 9/14/2019. The correlation is made with chest radiograph obtained earlier today. There is good opacification of pulmonary artery and the branches bilaterally. No filling defects in the opacified pulmonary arterial bed. Both pulmonary arteries appeared moderately dilated, right more than the left. Atheromatous changes thoracic aorta are seen. Without dilatation or dissection. Coronary arteries are not optimally visualized. Cardiac pacer leads are seen in the right atrium and right ventricle with extensive streak artifacts. There is a small pericardial effusion. The cardiac chambers does not appear enlarged and is marked thickening of the left ventricle wall. There is suggestion of right ventricular strain. There is a bilaterally symmetrical extensive lung opacities suggesting severe cardiogenic pulmonary edema. This is more severe consolidation in the lower lobes with air bronchograms. There is a small bibasilar pleural effusion. Thyroid gland is not optimally evaluated.  There is no axillary lymphadenopathy. The included liver and spleen appear unremarkable. No gallstone. The adrenal glands are normal. Small exophytic nodule from the upper pole of the right kidney is incompletely visualized and evaluated. This is similar to the previous study. No evidence of pulmonary embolism. No aortic aneurysm or dissection. Coronary arteries are not well-visualized for comment. There is a severe cardiogenic pulmonary edema and bilateral lower lobar lung consolidation which may suggest superimposed inflammatory/infectious process. Bilateral small basal pleural effusion. Pulmonary arterial hypertension and right ventricle strain. The above findings are recorded on a digital voice clip in PACS. Signed by Dr Pantoja on 3/13/2020 3:13 PM       Assessment     Acute respiratory failure with hypoxia and hypercapnia  Continue with vent support. RSV pneumonia. Continue ventilatory support. Antibiotics for secondary infection. Acute kidney injury. Prerenal versus acute tubular necrosis. Mild improvement. Decrease IV fluids. Continue diuretics. Septic shock. Continue pressor support. F/E/N  Reattempt tube feeds. Initiate Reglan. Please document 1 hour of critical care time.       Micheline Figueroa DO

## 2020-03-18 NOTE — PLAN OF CARE
Problem: Falls - Risk of:  Goal: Will remain free from falls  Description: Will remain free from falls  Outcome: Ongoing  Goal: Absence of physical injury  Description: Absence of physical injury  Outcome: Ongoing     Problem: Nutrition  Goal: Optimal nutrition therapy  Description: Nutrition Problem: Inadequate oral intake  Intervention: Food and/or Nutrient Delivery: Continue NPO  Nutritional Goals: Meet nutrition needs via EN     Outcome: Ongoing     Problem: Discharge Planning:  Goal: Participates in care planning  Description: Participates in care planning  Outcome: Ongoing  Goal: Discharged to appropriate level of care  Description: Discharged to appropriate level of care  Outcome: Ongoing     Problem: Airway Clearance - Ineffective:  Goal: Ability to maintain a clear airway will improve  Description: Ability to maintain a clear airway will improve  Outcome: Ongoing     Problem: Aspiration:  Goal: Absence of aspiration  Description: Absence of aspiration  Outcome: Ongoing     Problem: Urinary Elimination:  Goal: Signs and symptoms of infection will decrease  Description: Signs and symptoms of infection will decrease  Outcome: Ongoing  Goal: Complications related to the disease process, condition or treatment will be avoided or minimized  Description: Complications related to the disease process, condition or treatment will be avoided or minimized  Outcome: Ongoing     Problem: Infection - Central Venous Catheter-Associated Bloodstream Infection:  Goal: Will show no infection signs and symptoms  Description: Will show no infection signs and symptoms  Outcome: Ongoing     Problem: Infection - Ventilator-Associated Pneumonia:  Goal: Prevent a ventilator associated event (VAE)  Description: Prevent a ventilator associated event (VAE)  Outcome: Ongoing  Goal: Absence of pulmonary infection  Description: Absence of pulmonary infection  Outcome: Ongoing     Electronically signed by Dulce Maria Urias RN on 3/17/2020 at 11:50 PM

## 2020-03-18 NOTE — PROGRESS NOTES
Pharmacy Vancomycin Consult     Vancomycin Day: 6  Current Dosing: Vancomycin pulse dosing    Temp max:  97.8    Recent Labs     03/16/20  0425 03/17/20  0430   BUN 56* 58*       Recent Labs     03/16/20  0425 03/17/20  0430   CREATININE 3.4* 2.5*       Recent Labs     03/16/20  0425 03/17/20  0430   WBC 9.8 13.7*         Intake/Output Summary (Last 24 hours) at 3/18/2020 0250  Last data filed at 3/18/2020 0149  Gross per 24 hour   Intake 2587.97 ml   Output 5505 ml   Net -2917.03 ml     Culture Date Source Results   03/13/20 respiratory             Rare growth normal respiratory lilliana         Gram Stain Result Many WBC's (Polymorphonuclear) present   Few Gram positive cocci  in pairs       03/13/20 Blood #1 No growth   03/13/20  Blood #2  Coag neg Staph          Ht Readings from Last 1 Encounters:   03/13/20 5' 8\" (1.727 m)        Wt Readings from Last 1 Encounters:   03/17/20 261 lb 11.2 oz (118.7 kg)         Body mass index is 39.79 kg/m². Estimated Creatinine Clearance: 35 mL/min (A) (based on SCr of 2.5 mg/dL (H)). Random: 14.4    Assessment/Plan: Good level. Continue Vancomycin pulse dosing. Serum creatinine has improved from 3.4 on 03/16 to 2.5 on 03/17. Give Vancomycin 1750 mg IV x 1 dose today. Level ordered 24 hours post dose.     Electronically signed by Torie Mcneil, [facility] on 3/18/2020 at 2:50 AM

## 2020-03-18 NOTE — PROGRESS NOTES
Pharmacy Renal Adjustment    iTsha Mars is a 71 y.o. male. Pharmacy has renally adjusted medications per protocol. Recent Labs     03/17/20  0430 03/18/20  0215   BUN 58* 51*       Recent Labs     03/17/20  0430 03/18/20  0215   CREATININE 2.5* 1.7*       Estimated Creatinine Clearance: 51 mL/min (A) (based on SCr of 1.7 mg/dL (H)). Height:   Ht Readings from Last 1 Encounters:   03/13/20 5' 8\" (1.727 m)     Weight:  Wt Readings from Last 1 Encounters:   03/17/20 261 lb 11.2 oz (118.7 kg)       CKD stage: Unspecified         Baseline SCr: 0.9 - 1.2    Plan: Adjust the following medications based on renal function:           Zosyn 2.25 gm IV every six hours adjusted to Zosyn 4.5 gm IV every six hours.     Electronically signed by Caryle Lapidus, [address] [place] [address] on 3/18/2020 at 7:42 AM

## 2020-03-18 NOTE — PROGRESS NOTES
Palliative Care Progress Note  3/18/2020 1:55 PM  Subjective:   Admit Date: 3/13/2020  PCP: Shamika Sharpe MD    Chief Complaint: Intubated, mechanically ventilated    Interval History: Patient remains in CCU intubated and mechanically ventilated. He remains on propofol, heparin. He continues to be slowly weaned, FiO2 decreased to 50% today. CXR appearing more wet and he continues on IV Bumex, nephrology managing IVF which have been placed on hold. Reglan initiated and he is planned to begin slow TF. He is not yet ready to undergo SBT's. He continues to be followed by cardiology, pulmonology, and nephrology. Review of Systems   Unable to obtain, intubated and mechanically ventilated    DIET TUBE FEED CONTINUOUS/CYCLIC NPO; Semi-elemental (Vital HP);  Orogastric; 10; 10; 24  Dietary Nutrition Supplements: Other Oral Supplement (see comment)    Medications:   dilTIAZem Stopped (03/16/20 0603)    heparin (porcine) 14 Units/kg/hr (03/18/20 0220)    sodium chloride 10 mL/hr at 03/18/20 0910    norepinephrine Stopped (03/14/20 1530)    vasopressin (Septic Shock) infusion Stopped (03/15/20 1609)    dextrose      propofol 40 mcg/kg/min (03/18/20 1334)     Current Facility-Administered Medications   Medication Dose Route Frequency Provider Last Rate Last Dose    metoprolol (LOPRESSOR) injection 5 mg  5 mg Intravenous Q6H Annie Verdugo, DO   5 mg at 03/18/20 1334    levothyroxine (SYNTHROID) tablet 50 mcg  50 mcg Oral Daily Annie Verdugo DO   50 mcg at 03/18/20 0934    metoclopramide (REGLAN) injection 10 mg  10 mg Intravenous Q6H Annie Verdugo, DO   10 mg at 03/18/20 0934    piperacillin-tazobactam (ZOSYN) 3.375 g in dextrose 5 % 50 mL IVPB extended infusion (mini-bag)  3.375 g Intravenous Q8H Annie Verdugo DO 12.5 mL/hr at 03/18/20 1200 3.375 g at 03/18/20 1200    hydrocortisone sodium succinate PF (SOLU-CORTEF) injection 50 mg  50 mg Intravenous Daily Mary Kowalski MD   50  5' 8\" (1.727 m)   Wt 261 lb 11.2 oz (118.7 kg)   SpO2 92%   BMI 39.79 kg/m²   24HR INTAKE/OUTPUT:      Intake/Output Summary (Last 24 hours) at 3/18/2020 1355  Last data filed at 3/18/2020 0600  Gross per 24 hour   Intake 2785.84 ml   Output 3255 ml   Net -469.16 ml     Physical Exam  General appearance: intubated, appears ill, no acute distress  HEENT: atraumatic, eyes with clear conjunctiva and normal lids, pupils and irises normal, external ears and nose are normal,lips normal, ETT in place. Neck: without masses, supple  Lungs:  intubated and mechanically ventilated, ventilating bilaterally and diminished in the bases bilaterally  Heart: irregular rate and rhythm and S1, S2 normal, tachycardic, distal pulses palpable  Abdomen: round, hypoactive BS  Genitourinary: No bladder fullness, masses, or tenderness  Extremities: atraumatic, normal ROM  Neurologic: sedated and intubated  Psychiatric: sedated and intubated  Skin: warm, dry        Assessment and Plan: Active Problems:    Dyspnea on exertion    Acute respiratory failure with hypoxia and hypercapnia (HCC)    Flash pulmonary edema (HCC)    Septic shock (HCC)    Palliative care patient    Pneumonia due to organism  Resolved Problems:    * No resolved hospital problems. *    Visit Summary: I saw the patient at the bedside with nursing staff present. Patient continues to have some minimal improvements in areas but remains vertically ill, still unable to undergo SBT's. Met with the patient's family in the waiting room to provide an update. Patient's family appears to have good understanding that he does remain critically ill and remain cautiously hopeful. I did discuss that an area of concern they may need to consider is the patient's ability to wean from ventilator and some difficult decisions/discussions may need to occur if he is unable to liberate within the next several days.   Patient spouse verbalizes understanding and all questions were

## 2020-03-19 ENCOUNTER — OUTSIDE FACILITY SERVICE (OUTPATIENT)
Dept: PULMONOLOGY | Facility: CLINIC | Age: 70
End: 2020-03-19

## 2020-03-19 PROCEDURE — 99233 SBSQ HOSP IP/OBS HIGH 50: CPT | Performed by: INTERNAL MEDICINE

## 2020-03-19 NOTE — PROGRESS NOTES
Results for Anita Zamora (MRN [id number]) as of 3/19/2020 04:58   Ref.  Range 3/19/2020 04:22   Hemoglobin, Art, Extended Latest Ref Range: 14.0 - 18.0 g/dL 13.4 (L)   pH, Arterial Latest Ref Range: 7.350 - 7.450  7.620 (HH)   pCO2, Arterial Latest Ref Range: 35.0 - 45.0 mmHg 33.0 (L)   pO2, Arterial Latest Ref Range: 80.0 - 100.0 mmHg 103.0 (H)   HCO3, Arterial Latest Ref Range: 22.0 - 26.0 mmol/L 33.9 (H)   Base Excess, Arterial Latest Ref Range: -2.0 - 2.0 mmol/L 12.2 (H)   O2 Sat, Arterial Latest Ref Range: >92 % 96.0   O2 Content, Arterial Latest Ref Range: Not Established mL/dL 18.2   Methemoglobin, Arterial Latest Ref Range: <1.5 % 0.5   Carboxyhgb, Arterial Latest Ref Range: 0.0 - 5.0 % 0.4       AC/ 18 50% 8    RR AT+

## 2020-03-19 NOTE — PROGRESS NOTES
levels. The prevertebral and paraspinal soft tissues are unremarkable. T2 hyperintense focus in the left renal pole is incompletely characterized. Degenerative changes: L1-L2 : Disc osteophyte complex, ligamentum flavum hypertrophy, prominence of the epidural fat, with moderate to severe thecal sac stenosis. Facet arthropathy, contributing to moderate bilateral foraminal stenosis. L2-L3 : Disc osteophyte complex with disc bulge component, ligamenta flava hypertrophy and epidural lipomatosis, with resulting moderate to severe thecal sac stenosis. Facet arthropathy contributing to mild to moderate bilateral foraminal stenosis. L3-L4 : Mild thecal sac stenosis related to disc bulge and epidural lipomatosis. Facet arthropathy contributing to mild to moderate left and mild right foraminal stenosis. L4-L5 : Disc bulge, ligamentum flavum hypertrophy, without thecal sac stenosis. Facet arthropathy contributing to mild to moderate bilateral foraminal stenosis. L5-S1 : No significant thecal sac stenosis. Facet arthropathy contributing to moderate to severe right and mild left foraminal stenosis. 1.  Degenerative changes and epidural lipomatosis with resulting moderate to severe thecal sac stenosis at L1-L2 and L2-L3. 2.  Additional milder degenerative changes as described above. Signed by Dr Roxanne Ortiz on 2/21/2020 4:12 PM    Mri Cervical Spine W Wo Contrast    Result Date: 2/20/2020  MRI CERVICAL SPINE W WO CONTRAST 2/20/2020 5:48 PM History: Generalized weakness and worsening ataxia. Pre and postcontrast MR imaging of the cervical spine. C3-C7 discectomy with anterior plate and screw fusion. Normally patent spinal canal. No cord compression and no abnormal cord signal. Detail is limited by patient motion. There appears to be bilateral foraminal stenosis at all levels from C2 through C6 based on facet hypertrophy. Postcontrast imaging shows no abnormal cord enhancement.     1. Normally patent spinal canal with no cord compression or abnormal cord enhancement. 2. Limited detail though there is evidence of bilateral foraminal stenosis from C2 through C6 based on bony changes. Signed by Dr Ana Maria Strange on 2/20/2020 5:51 PM    Mri Thoracic Spine W Wo Contrast    Result Date: 2/20/2020  MRI THORACIC SPINE W WO CONTRAST 2/20/2020 5:22 PM History: Neck pain. Generalized weakness. Worsening ataxia. Pre and postcontrast thoracic spine MRI. Images are affected by patient motion. Minimal left convex curvature. Multilevel mild disc space narrowing and endplate spurring. Multilevel degenerative endplate signal change. No spinal canal stenosis or cord compression. No significant disc herniation or foraminal stenosis is seen. No compression fracture. Postcontrast imaging shows no abnormal cord enhancement. 1. Multilevel degenerative disc and endplate changes with no fracture, stenosis, or neural compression. Signed by Dr Ana Maria Straneg on 2/20/2020 5:24 PM    Us Renal Complete    Result Date: 3/14/2020  US RENAL COMPLETE 3/14/2020 6:00 AM HISTORY: Elevated creatinine COMPARISON: None  TECHNIQUE: Multiple longitudinal and transverse realtime sonographic images of the kidneys and urinary bladder are obtained. FINDINGS: The right kidney measures 12.0 x 5.7 x 6.9 cm. The right kidney is normal in size, shape, contour and position. 2.1 cm simple cyst arising from the upper pole. The cortical thickness is normal, with preservation of the corticomedullary differentiation. The central echo complex is compact with no evidence for hydronephrosis. No nephrolithiasis or abnormal perinephric fluid collections are seen. No hydroureter. The left kidney measures 9.8 x 5.7 x 5.4 cm. The left kidney is normal in size, shape, contour and position. The cortical thickness is normal, with preservation of the corticomedullary differentiation. The central echo complex is compact with no evidence for hydronephrosis.  No nephrolithiasis or abnormal perinephric fluid collections are seen. No hydroureter. Urinary bladder is decompressed by Hernández catheter. 1. Small simple right renal cyst. Kidneys are otherwise unremarkable. 2. Urinary bladder is decompressed by Hernández catheter. Signed by Dr Winifred White on 3/14/2020 1:02 PM    Xr Chest Portable    Result Date: 3/19/2020  XR CHEST PORTABLE 3/19/2020 5:00 AM HISTORY: Intubated COMPARISON: 3/18/2020 FINDINGS: Lines and tubes are stable in position. Previously seen opacities have improved since the previous study, and no new opacities are noted. The cardiomediastinal silhouette and pulmonary vascularity are unchanged. No acute osseous or soft tissue abnormality is noted. 1. Moderate interval improvement since previous exam. Signed by Dr Maria Luisa Trinidad on 3/19/2020 7:41 AM    Xr Chest Portable    Result Date: 3/18/2020  Frontal supine radiograph of the chest 3/18/2020 4:15 AM History: Dyspnea Comparison: Chest x-ray dated 3/15/2020 Findings: Lines and tubes are stable in position. Increased bilateral central opacities and bilateral pleural effusions. . Mild-to-moderate cardiomegaly. .  No acute osseous or soft tissue abnormality is noted. Impression: 1. Increased opacities and pleural effusions. May reflect worsening fluid overload and/or infection. Signed by Dr Amadeo Morgan on 3/18/2020 8:41 AM    Xr Chest Portable    Result Date: 3/17/2020  Frontal supine radiograph of the chest 3/17/2020 5:00 AM History: Respiratory failure Comparison: Chest x-ray dated 3/16/2020 Findings: Lines and tubes are stable in position. No new opacities or pneumothoraces are visualized in the chest. The cardiomediastinal silhouette and pulmonary vascularity are unchanged. No acute osseous or soft tissue abnormality is noted. Impression: 1. No significant interval change since previous exam. Signed by Dr Amadeo Morgan on 3/17/2020 7:27 AM    Xr Chest Portable    Result Date: 3/16/2020  Examination.  XR CHEST PORTABLE 3/16/2020 5:00 AM History: Respiratory failure. The patient is on a vent. A single frontal portable upright view of the chest is compared with the previous study dated 3/15/2020. No significant interval change. There is moderate pulmonary vascular congestion and left basal pleural effusion. No pneumothorax The heart size is not evaluated due to the portable projection. . The endotracheal tube is seen in a stable and unchanged position. The distal end of the nasogastric tube is not visible due to suboptimal exposure of the area. A dual-chamber cardiac pacer is seen in place. The hardware fusion of the lower cervical spine is incompletely visualized. No change. Persistent pulmonary congestion and left basal pleural effusion. The tubes and lines in stable and unchanged position. Signed by Dr Janis Johnson on 3/16/2020 8:05 AM    Xr Chest Portable    Result Date: 3/15/2020  XR CHEST PORTABLE 3/15/2020 12:00 PM HISTORY: NG tube placement Comparison: 3/15/2020 Findings: Exam performed to evaluate new enteric tube. An enteric tube is present in the upper mediastinum. At the level of the cardiac silhouette the tube becomes obscured. Unable to visualize the tube tip. Endotracheal tube is in stable position. Bilateral pulmonary opacities are unchanged. Impression: 1. Enteric tube is obscured by the cardiac silhouette. Unable to confirm tube position. Recommend abdominal/upper abdominal radiograph for tube confirmation. Signed by Dr Eliberto Aase on 3/15/2020 1:36 PM    Xr Chest Portable    Result Date: 3/15/2020  XR CHEST PORTABLE 3/15/2020 5:00 AM HISTORY: Respiratory failure Comparison: 3/14/2020 Findings: Mild interval decrease in the underlying pulmonary edema. There are no new or worsening consolidations. No pleural effusion or pneumothorax. Endotracheal tube is in good position. Heart size is stable. Left chest wall dual chamber pacemaker. Impression: 1.  Mild interval decrease in the underlying pulmonary 3/13/2020 12:30 PM    Xr Chest Portable    Result Date: 2/18/2020  EXAMINATION: XR CHEST PORTABLE 2/18/2020 4:02 PM HISTORY: XR CHEST PORTABLE 2/18/2020 2:45 PM HISTORY: Short of breath COMPARISON: September 19, 2019. FINDINGS: The lungs are clear. Cardiac silhouette is normal. Pacing device is present soft tissues the left chest. Postsurgical change from anterior fusion of the cervical spine is noted. . The osseous structures and surrounding soft tissues demonstrate no acute abnormality. 1. No radiographic evidence of acute cardiopulmonary process. Signed by Dr Miguel Villaseñor on 2/18/2020 4:05 PM    Cta Pulmonary W Contrast    Result Date: 3/13/2020  Examination. CTA PULMONARY W CONTRAST 3/13/2020 1:00 PM History: Hypoxia and respiratory failure. DLP: 706 mGycm. CT angiography of the chest is performed after intravenous contrast enhancement. The images are acquired in axial plane with subsequent reconstruction in coronal and sagittal plane. The comparison is made with the previous study dated 9/14/2019. The correlation is made with chest radiograph obtained earlier today. There is good opacification of pulmonary artery and the branches bilaterally. No filling defects in the opacified pulmonary arterial bed. Both pulmonary arteries appeared moderately dilated, right more than the left. Atheromatous changes thoracic aorta are seen. Without dilatation or dissection. Coronary arteries are not optimally visualized. Cardiac pacer leads are seen in the right atrium and right ventricle with extensive streak artifacts. There is a small pericardial effusion. The cardiac chambers does not appear enlarged and is marked thickening of the left ventricle wall. There is suggestion of right ventricular strain. There is a bilaterally symmetrical extensive lung opacities suggesting severe cardiogenic pulmonary edema. This is more severe consolidation in the lower lobes with air bronchograms.  There is a small bibasilar pleural patient has moderately severe dextroscoliosis. A suitable spot opposite interspace L3-4 was selected for puncture. The spot was marked with an ink marker. After sterile preparation and application of local anesthesia a size 20-gauge longer spine needle was introduced into the thecal sac and a FreeFlow of clear colorless thin fluid was established. The patient was immediately turned into a decubitus position. The outer hub of the needle was connected to a pressure measuring device and opening pressure was measured. The opening pressure is 15.5 cm water. 30 mL of CSF was withdrawn and dispensed in 4 separate test tubes which were sent to the laboratory for further evaluation as instructed by the attending physician. The needle was then withdrawn and the puncture site was covered with a sterile Band-Aid. The patient tolerated the procedure well. No immediate complications were noted. The patient was given post lumbar puncture care instructions. A successful lumbar puncture and opening pressure measurement. Fluoroscopy time 2 minutes 11 seconds. Total dose: [id number]. Signed by Dr Josee Owens on 2/19/2020 4:45 PM        Assessment:  1. Suspected septic shock  2. Acute respiratory failure requiring intubation mechanical ventilatory support  3. Sinus node dysfunction  4. Previous pacemaker implantation  5. Gastroesophageal reflux disease  6. History of pulmonary edema  7. Elevated BUN 56 creatinine 3.4  8. MRI of the brain 2/18/2020 atrophy small vessel disease no acute infarct  9. Echocardiogram 3/13/2020 normal left ventricular systolic function ejection fraction greater than 50% moderate concentric LVH  10. CTA pulmonary 3/13/2020 no evidence of pulmonary embolism severe cardiogenic pulmonary edema bilateral lower lobar lung consolidation which may suggest superimposed inflammatory/infectious process  11. Lexiscan 12/11/2019 ejection fraction 67% normal perfusion study    Plan:  1.  Continue current treatment

## 2020-03-19 NOTE — PROGRESS NOTES
Pulmonary and Critical Care Progress Note [facility]    Patient: Perla Reyes    1950    MR# [id number]    Acct# [de-identified]   03/19/20   9:16 AM  Referring Provider: Kena Soto DO    Chief Complaint: Mechanically ventilated    Interval history: Patient remains intubated and sedated. He is on a propofol, heparin. Hernández remains in place. Heart rate irregular at 132. Saturation 97% on PEEP of 8 and FiO2 0.50. He is passively breathing with the ventilator. CXR reviewed. ABGs Alkalotic with pH 7.620. Diamox ordered by attending. Diuresis on intermittent status. No family present. Per nursing, when sedation weaned, follows commands with weak grasp. Medications:  metoprolol, 7.5 mg, Intravenous, Q4H    acetaZOLAMIDE (DIAMOX) IVPB, 250 mg, Intravenous, Q8H    lidocaine 1 % injection, 5 mL, Intradermal, Once    sodium chloride flush, 10 mL, Intravenous, 2 times per day    levothyroxine, 50 mcg, Oral, Daily    metoclopramide, 10 mg, Intravenous, Q6H    piperacillin-tazobactam, 3.375 g, Intravenous, Q8H    hydrocortisone sodium succinate PF, 50 mg, Intravenous, Daily    sodium chloride, 500 mL, Intravenous, Once    vancomycin (VANCOCIN) intermittent dosing (placeholder), , Other, RX Placeholder    insulin lispro, 0-6 Units, Subcutaneous, TID WC    insulin lispro, 0-3 Units, Subcutaneous, Nightly    ipratropium-albuterol, 1 ampule, Inhalation, Q4H   Review of Systems:     Cannot obtain due to mechanical ventilation    Physical Exam:  Blood pressure 111/82, pulse 130, temperature 97.8 °F (36.6 °C), temperature source Temporal, resp. rate 18, height 5' 8\" (1.727 m), weight 261 lb 11.2 oz (118.7 kg), SpO2 97 %.     Intake/Output Summary (Last 24 hours) at 3/19/2020 0916  Last data filed at 3/19/2020 0800  Gross per 24 hour   Intake 1533.39 ml   Output 4050 ml   Net -2516.61 ml        Vent Mode: AC/VC/Vt Ordered: 580 mL/Rate Set: 18 bmp/ /PEEP/CPAP: 8/FiO2 : 50 %/   Peak Inspiratory Pressure: 49 cmH2O  Mean Airway Pressure: 13 cmH20  I:E Ratio: 1:4.20  Rate Measured: 18 br/min  Minute Volume: 6 Liters           Physical Exam  Constitutional:       General: He is not in acute distress. Appearance: He is ill-appearing. He is not toxic-appearing or diaphoretic. Interventions: He is sedated and intubated. HENT:      Nose: Nose normal.   Eyes:      General: No scleral icterus. Cardiovascular:      Rate and Rhythm: Normal rate. Heart sounds: No friction rub. Pulmonary:      Effort: Pulmonary effort is normal. No accessory muscle usage, prolonged expiration or respiratory distress. He is intubated. Breath sounds: No wheezing or rhonchi. Abdominal:      General: Bowel sounds are decreased. There is distension. Palpations: Abdomen is soft. Skin:     General: Skin is warm and dry.    Neurological:      Comments: Sedated       Recent Labs     03/17/20  0430 03/18/20 0215 03/19/20  0245   WBC 13.7* 11.6* 9.5   RBC 4.30* 4.50* 4.85   HGB 12.4* 13.1* 14.0   HCT 37.4* 40.4* 44.1    147 188   MCV 87.0 89.8 90.9   MCH 28.8 29.1 28.9   MCHC 33.2 32.4* 31.7*   RDW 14.6* 14.8* 14.8*      Recent Labs     03/17/20  0430 03/18/20 0215 03/18/20 0415 03/19/20 0245 03/19/20  0422    147*  --  150*  --    K 3.4* 3.5 3.3 3.3* 3.2    103  --  105  --    CO2 27 29  --  31*  --    BUN 58* 51*  --  47*  --    CREATININE 2.5* 1.7*  --  1.4*  --    CALCIUM 7.9* 8.2*  --  8.7*  --    GLUCOSE 166* 178*  --  128*  --       Recent Labs     03/17/20 0412 03/18/20 0415 03/19/20  0422   PHART 7.510* 7.560* 7.620*   NCW8YXT 35.0 37.0 33.0*   PO2ART 79.0* 107.0* 103.0*   IWT4EVK 27.9* 33.1* 33.9*   X3RSGHUQ 95.1 96.2 96.0   BEART 5.1* 10.2* 12.2*     Recent Labs     03/19/20  0245   AST 33   ALT 29   ALKPHOS 198*   BILITOT 0.6   MG 2.7*   CALCIUM 8.7*     Radiograph:   Frontal supine radiograph of the chest 3/17/2020 5:00 AM   History: Respiratory failure   Comparison: Chest x-ray dated 3/16/2020    Findings:    Lines and tubes are stable in position. No new opacities or   pneumothoraces are visualized in the chest. The cardiomediastinal   silhouette and pulmonary vascularity are unchanged.     No acute osseous or soft tissue abnormality is noted.        Impression   Impression:    1.   No significant interval change since previous exam.   Signed by Dr King Corey on 3/17/2020 7:27 AM       My radiograph interpretation: Endotracheal tube in good position, persistent-but improved pulmonary edema, hypoaeration, stable     Pulmonary Assessment:    1. Acute respiratory failure with bilat infiltrates, slightly improved  2. RSV infection supportive care  3. Metabolic acidosis improved, on bicarbonate drip per nephrology now with normal pH  4. Acute renal failure due to acute illness  5. Elevated tsh, hypothyroid    Recommend:     · Vent changes-Rate decreased to 14, Peep decreasecd to 6, FIO2 decreased to 45%. Repeat ABGs. Diamox ordered per attending. He is not ready for vent liberation at this time. Will reassess readiness in the morning  · RSV isolation  · Nutrition on hold for high residuals    · Antibiotics   · Diuresis    · Continue bronchodilators     Electronically signed by Omar Ibarra on 3/19/2020 at 9:16 AM     Physician Substantive portion:  Pt without new complaints and remains on the ventilator sedated and ventilated, isolation for RSV. Pt has had respiratory alkalosis. Breath sounds diminished. Continue vent as is. Renal failure improved. Cxr shows some improvement compared with several days ago. Discussed current status with his wife. I have seen and examined patient personally, performing a face-to-face diagnostic evaluation with plan of care reviewed and developed with APRN and nursing staff. I have addended and/or modified the above history of present illness, physical examination, and assessment and plan to reflect my findings and impressions. Essential elements of the care plan were discussed with APRN above. Agree with findings and assessment/plan as documented above.     Electronically signed by Zuleyma Loya, on 3/19/2020, 10:43 PM

## 2020-03-19 NOTE — PROGRESS NOTES
dextrose 5 % 1,000 mL infusion   Intravenous Continuous Tomasa Park,  mL/hr at 03/19/20 1213      levothyroxine (SYNTHROID) tablet 50 mcg  50 mcg Oral Daily Tomasa Park, DO   50 mcg at 03/19/20 0719    metoclopramide (REGLAN) injection 10 mg  10 mg Intravenous Q6H Tomasa Park, DO   10 mg at 03/19/20 9068    piperacillin-tazobactam (ZOSYN) 3.375 g in dextrose 5 % 50 mL IVPB extended infusion (mini-bag)  3.375 g Intravenous Q8H Tomasa Park, DO 12.5 mL/hr at 03/19/20 1140 3.375 g at 03/19/20 1140    hydrocortisone sodium succinate PF (SOLU-CORTEF) injection 50 mg  50 mg Intravenous Daily Bhanu Mcdonald MD   50 mg at 03/19/20 0816    dilTIAZem 125 mg in dextrose 5 % 125 mL infusion  10 mg/hr Intravenous Continuous Tomasa Park, DO 5 mL/hr at 03/19/20 1325 5 mg/hr at 03/19/20 1325    heparin (porcine) injection 9,690 Units  80 Units/kg Intravenous PRN Eder Ding MD        heparin (porcine) injection 4,840 Units  40 Units/kg Intravenous PRN Eder Ding MD   4,700 Units at 03/19/20 0849    heparin 25,000 units in dextrose 5% 250 mL infusion  18 Units/kg/hr Intravenous Continuous Eder Ding MD 18.2 mL/hr at 03/19/20 0848 15 Units/kg/hr at 03/19/20 0848    morphine injection 1 mg  1 mg Intravenous Q4H PRN Armin Nicole MD   1 mg at 03/14/20 0641    vasopressin 20 Units in dextrose 5 % 100 mL infusion  0.04 Units/min Intravenous Continuous Renee Scruggs MD   Stopped at 03/15/20 1609    vancomycin (VANCOCIN) intermittent dosing (placeholder)   Other RX Placeholder Renee Scruggs MD        insulin lispro (HUMALOG) injection vial 0-6 Units  0-6 Units Subcutaneous TID WC Bhanu Mcdonald MD   2 Units at 03/19/20 1140    insulin lispro (HUMALOG) injection vial 0-3 Units  0-3 Units Subcutaneous Nightly Bhanu Mcdonald MD   1 Units at 03/15/20 2108    glucose (GLUTOSE) 40 % oral gel 15 g  15 g Oral PRN Bhanu Mcdonald MD        dextrose 50 % IV solution  12.5 g Intravenous PRN Estee Larson MD        glucagon (rDNA) injection 1 mg  1 mg Intramuscular PRN Estee Larson MD        dextrose 5 % solution  100 mL/hr Intravenous PRN Estee Larson MD        ipratropium-albuterol (DUONEB) nebulizer solution 1 ampule  1 ampule Inhalation Q4H Mary Valladares MD   1 ampule at 03/19/20 1012    propofol injection  10 mcg/kg/min Intravenous Titrated Estee Larson MD 27.8 mL/hr at 03/19/20 1128 40 mcg/kg/min at 03/19/20 1128        Labs:     Recent Labs     03/17/20 0430 03/18/20 0215 03/19/20  0245   WBC 13.7* 11.6* 9.5   RBC 4.30* 4.50* 4.85   HGB 12.4* 13.1* 14.0   HCT 37.4* 40.4* 44.1   MCV 87.0 89.8 90.9   MCH 28.8 29.1 28.9   MCHC 33.2 32.4* 31.7*    147 188     Recent Labs     03/17/20 0430 03/18/20 0215 03/19/20  0245 03/19/20  0422 03/19/20  1315    147*  --  150*  --   --    K 3.4* 3.5   < > 3.3* 3.2 3.3   ANIONGAP 14 15  --  14  --   --     103  --  105  --   --    CO2 27 29  --  31*  --   --    BUN 58* 51*  --  47*  --   --    CREATININE 2.5* 1.7*  --  1.4*  --   --    GLUCOSE 166* 178*  --  128*  --   --    CALCIUM 7.9* 8.2*  --  8.7*  --   --     < > = values in this interval not displayed. Recent Labs     03/17/20 0430 03/18/20 0215 03/19/20  0245   MG 2.5* 2.7* 2.7*     Recent Labs     03/17/20 0430 03/18/20 0215 03/19/20  0245   AST 23 24 33   ALT 45* 34 29   BILITOT 0.6 0.8 0.6   ALKPHOS 199* 204* 198*     ABGs:  Recent Labs     03/17/20  0412 03/18/20  0415 03/19/20  0422 03/19/20  1315   PHART 7.510* 7.560* 7.620* 7.510*   LFC8LZM 35.0 37.0 33.0* 42.0   PO2ART 79.0* 107.0* 103.0* 91.0   ILI1FMP 27.9* 33.1* 33.9* 33.5*   BEART 5.1* 10.2* 12.2* 9.5*   HGBAE 18.9* 12.9* 13.4* 12.8*   W1GJENEI 95.1 96.2 96.0 95.1   CARBOXHGBART 0.0 0.6 0.4 0.5   02THERAPY Unknown Unknown Unknown Unknown     HgBA1c:   No results for input(s): LABA1C in the last 72 hours.   FLP:    Lab Results   Component Value Date    TRIG 323

## 2020-03-19 NOTE — PROGRESS NOTES
3/19/2020  1:21 PM - Cinthya Hanna Incoming Lab Results From [facility]     Component Value Ref Range & Units Status Collected Lab   pH, Arterial 7.510High Panic   7.350 - 7.450 Final 03/19/2020  1:15 PM [facility]   pCO2, Arterial 42.0  35.0 - 45.0 mmHg Final 03/19/2020  1:15 PM [facility]   pO2, Arterial 91.0  80.0 - 100.0 mmHg Final 03/19/2020  1:15 PM [facility]   HCO3, Arterial 33. 5High   22.0 - 26.0 mmol/L Final 03/19/2020  1:15 PM [facility] Excess, Arterial 9.5High   -2.0 - 2.0 mmol/L Final 03/19/2020  1:15 PM [address] [place] [address] Lab   Hemoglobin, Art, Extended 12.8Low   14.0 - 18.0 g/dL Final 03/19/2020  1:15 PM [address] [place] [address] Lab   O2 Sat, Arterial 95.1  >92 % Final 03/19/2020  1:15 PM [facility]   Carboxyhgb, Arterial 0.5  0.0 - 5.0 % Final 03/19/2020  1:15 PM [facility]        0.0-1.5   (Smokers 1.5-5.0)    Methemoglobin, Arterial 0.4  <1.5 % Final 03/19/2020  1:15 PM [address] [place] [address] Lab   O2 Content, Arterial 17.2  Not Established mL/dL Final 03/19/2020  1:15 PM [address] [place] [address] Lab   O2 Therapy Unknown   Final 03/19/2020  1:15 PM [address] [place] [address] Lab   Testing Performed By     Carmen Pablo Name Director Address Valid Date Range   [facility] - [address] LAB Alfred Nash  [facility],[address]  [address] [facility] [address] 08/30/17 0733-Present   Narrative   Performed by: Jillian   tel. ,  Jun Lara RN, CCU, 03/19/2020 13:21, by GARRETT       Left Radial, +MAT, 96%  VC 14, 580, 45%, +6

## 2020-03-19 NOTE — PROGRESS NOTES
Hospitalist Progress Note    Patient:  Citlalli Shelby  YOB: 1950  Date of Service: 3/19/2020  MRN: [id number]   Acct: [de-identified]   Primary Care Physician: Yuriy Hand MD  Advance Directive: [facility]  Admit Date: 3/13/2020       Hospital Day: 6  Referring Provider: Laurie Corrales DO    Patient Seen, Chart, Consults, Notes, Labs, Radiology studies reviewed. Subjective:  Citlalli Shelby is a 71 y.o. male  whom we are following for RSV pneumonia, hypoxemic respiratory failure, atrial fibrillation. No new events overnight. Hemodynamics are about the same. He remains in atrial fibrillation. He is hypernatremic. We will change the composition of his IV fluids. He was also alkalemia. I will add Diamox to his regimen. He was unable to tolerate tube feeds. He is having high OG output.     Allergies:  Codeine    Medicines:  Current Facility-Administered Medications   Medication Dose Route Frequency Provider Last Rate Last Dose    potassium chloride 20 mEq/50 mL IVPB (Central Line)  20 mEq Intravenous PRN Zoe Demarco MD 50 mL/hr at 03/19/20 0719 20 mEq at 03/19/20 0719    metoprolol (LOPRESSOR) injection 7.5 mg  7.5 mg Intravenous Q4H Laurie Corrales, DO        acetaZOLAMIDE (DIAMOX) 250 mg in dextrose 5 % 100 mL IVPB  250 mg Intravenous Q8H Laurie Corrales,  mL/hr at 03/19/20 0930 250 mg at 03/19/20 0930    lidocaine PF 1 % injection 5 mL  5 mL Intradermal Once Laurie Corrales, DO        sodium chloride flush 0.9 % injection 10 mL  10 mL Intravenous 2 times per day Laurie Antoni, DO   10 mL at 03/19/20 2195    sodium chloride flush 0.9 % injection 10 mL  10 mL Intravenous PRN Laurie Corrales, DO        potassium chloride 40 mEq in dextrose 5 % 1,000 mL infusion   Intravenous Continuous Laurie Corrales, DO        levothyroxine (SYNTHROID) tablet 50 mcg  50 mcg Oral Daily Laurie Corrales, DO   50 mcg at 03/19/20 0719    metoclopramide (REGLAN) injection 10 mg  10 mg Intravenous Q6H [place], DO   10 mg at 03/19/20 0923    piperacillin-tazobactam (ZOSYN) 3.375 g in dextrose 5 % 50 mL IVPB extended infusion (mini-bag)  3.375 g Intravenous Q8H [facility] [place], DO   Stopped at 03/19/20 1182    hydrocortisone sodium succinate PF (SOLU-CORTEF) injection 50 mg  50 mg Intravenous Daily Allyssa Gil MD   50 mg at 03/19/20 0816    dilTIAZem 125 mg in dextrose 5 % 125 mL infusion  10 mg/hr Intravenous Continuous [place], DO   Stopped at 03/16/20 0603    heparin (porcine) injection 9,690 Units  80 Units/kg Intravenous PRN Sheryle Lather, MD        heparin (porcine) injection 4,840 Units  40 Units/kg Intravenous PRN Sheryle Lather, MD   4,840 Units at 03/18/20 0220    heparin 25,000 units in dextrose 5% 250 mL infusion  18 Units/kg/hr Intravenous Continuous Sheryle Lather, MD 17 mL/hr at 03/19/20 0754 14 Units/kg/hr at 03/19/20 0754    morphine injection 1 mg  1 mg Intravenous Q4H PRN Eagle Tai MD   1 mg at 03/14/20 0641    vasopressin 20 Units in dextrose 5 % 100 mL infusion  0.04 Units/min Intravenous Continuous Marisa Cui MD   Stopped at 03/15/20 1609    vancomycin (VANCOCIN) intermittent dosing (placeholder)   Other RX Placeholder Marisa Cui MD        insulin lispro (HUMALOG) injection vial 0-6 Units  0-6 Units Subcutaneous TID WC Allyssa Gil MD   1 Units at 03/15/20 1722    insulin lispro (HUMALOG) injection vial 0-3 Units  0-3 Units Subcutaneous Nightly Allyssa Gil MD   1 Units at 03/15/20 2101    glucose (GLUTOSE) 40 % oral gel 15 g  15 g Oral PRN Allyssa Gil MD        dextrose 50 % IV solution  12.5 g Intravenous PRN Allyssa Gil MD        glucagon (rDNA) injection 1 mg  1 mg Intramuscular PRN Allyssa Gil MD        dextrose 5 % solution  100 mL/hr Intravenous PRN Allyssa Gil MD        ipratropium-albuterol (DUONEB) nebulizer solution 1 ampule  1 ampule Inhalation Q4H Stress: Not on file   Relationships    Social connections     Talks on phone: Not on file     Gets together: Not on file     Attends Sabianist service: Not on file     Active member of club or organization: Not on file     Attends meetings of clubs or organizations: Not on file     Relationship status: Not on file    Intimate partner violence     Fear of current or ex partner: Not on file     Emotionally abused: Not on file     Physically abused: Not on file     Forced sexual activity: Not on file   Other Topics Concern    Not on file   Social History Narrative    Not on file         Review of Systems:    Review of Systems   Unable to perform ROS: Intubated           Objective:  Blood pressure 111/82, pulse 111, temperature 97.8 °F (36.6 °C), temperature source Temporal, resp. rate 16, height 5' 8\" (1.727 m), weight 261 lb 11.2 oz (118.7 kg), SpO2 95 %. Intake/Output Summary (Last 24 hours) at 3/19/2020 1121  Last data filed at 3/19/2020 0800  Gross per 24 hour   Intake 1533.39 ml   Output 4050 ml   Net -2516.61 ml       Physical Exam  Vitals signs reviewed. Constitutional:       Appearance: He is well-developed. HENT:      Head: Normocephalic and atraumatic. Eyes:      Conjunctiva/sclera: Conjunctivae normal.      Pupils: Pupils are equal, round, and reactive to light. Cardiovascular:      Rate and Rhythm: Tachycardia present. Rhythm irregular. Heart sounds: Normal heart sounds. Pulmonary:      Breath sounds: Rhonchi present. Abdominal:      General: Abdomen is flat. Palpations: Abdomen is soft. Skin:     General: Skin is warm and dry.          Labs:  BMP:   Recent Labs     03/17/20  0430 03/18/20  0215 03/18/20  0415 03/19/20  0245 03/19/20  0422    147*  --  150*  --    K 3.4* 3.5 3.3 3.3* 3.2    103  --  105  --    CO2 27 29  --  31*  --    BUN 58* 51*  --  47*  --    CREATININE 2.5* 1.7*  --  1.4*  --    CALCIUM 7.9* 8.2*  --  8.7*  --      CBC:   Recent Labs right kidney is normal in size, shape, contour and position. 2.1 cm simple cyst arising from the upper pole. The cortical thickness is normal, with preservation of the corticomedullary differentiation. The central echo complex is compact with no evidence for hydronephrosis. No nephrolithiasis or abnormal perinephric fluid collections are seen. No hydroureter. The left kidney measures 9.8 x 5.7 x 5.4 cm. The left kidney is normal in size, shape, contour and position. The cortical thickness is normal, with preservation of the corticomedullary differentiation. The central echo complex is compact with no evidence for hydronephrosis. No nephrolithiasis or abnormal perinephric fluid collections are seen. No hydroureter. Urinary bladder is decompressed by Hernández catheter. 1. Small simple right renal cyst. Kidneys are otherwise unremarkable. 2. Urinary bladder is decompressed by Hernández catheter. Signed by Dr Arpita Villanueva on 3/14/2020 1:02 PM    Xr Chest Portable    Result Date: 3/14/2020  XR CHEST PORTABLE 3/14/2020 4:15 AM HISTORY: Mechanical ventilation Comparison: 3/13/2020 Findings: Endotracheal tube is in stable position. Reidentified bilateral pulmonary edema, stable or perhaps slightly decreased. The cardiomediastinal silhouette and pulmonary vascularity are unchanged. Left chest wall dual chamber pacemaker. Anterior cervical fusion hardware. No acute osseous or soft tissue abnormality is noted. Impression: 1. Reidentified pulmonary edema, stable or perhaps slightly decreased. Otherwise unchanged. Signed by Dr Arpita Villanueva on 3/14/2020 7:22 AM    Xr Chest Portable    Result Date: 3/13/2020  XR CHEST PORTABLE 3/13/2020 11:45 AM HISTORY: Status post intubation COMPARISON: 3/13/2020. FINDINGS: Status post intubation with endotracheal tube tip projecting 5.3 cm above the sasha. Reidentified bilateral diffuse lung opacities, not significant changed. Lungs appear to be well expanded.  No pleural effusions or pneumothorax identified. Heart size is stable. Left chest wall dual chamber pacemaker identified. 1. Status post intubation with lungs appearing well expanded. Tube is in good position. 2. Reidentified stable bilateral diffuse lung opacities, favor pulmonary edema. Signed by Dr Ellyn Caldwell on 3/13/2020 12:56 PM    Xr Chest Portable    Result Date: 3/13/2020  EXAM: XR CHEST PORTABLE -- 3/13/2020 11:15 AM HISTORY: 69 years, Male, shortness of breath COMPARISON: 2/18/2020 TECHNIQUE:  1 images. Frontal view of the chest. FINDINGS:  Diffuse bilateral pulmonary opacities. No pneumothorax or large pleural effusion. Borderline cardiac silhouette. Upper mediastinum within normal limits. Cardiac pulse generator at the left chest with 2 grossly intact leads. 1. Diffuse bilateral pulmonary opacities, which could represent edema or infection. Signed by Dr Jose Miguel Ang on 3/13/2020 12:30 PM    Cta Pulmonary W Contrast    Result Date: 3/13/2020  Examination. CTA PULMONARY W CONTRAST 3/13/2020 1:00 PM History: Hypoxia and respiratory failure. DLP: 706 mGycm. CT angiography of the chest is performed after intravenous contrast enhancement. The images are acquired in axial plane with subsequent reconstruction in coronal and sagittal plane. The comparison is made with the previous study dated 9/14/2019. The correlation is made with chest radiograph obtained earlier today. There is good opacification of pulmonary artery and the branches bilaterally. No filling defects in the opacified pulmonary arterial bed. Both pulmonary arteries appeared moderately dilated, right more than the left. Atheromatous changes thoracic aorta are seen. Without dilatation or dissection. Coronary arteries are not optimally visualized. Cardiac pacer leads are seen in the right atrium and right ventricle with extensive streak artifacts. There is a small pericardial effusion.  The cardiac chambers does not appear enlarged and is marked thickening of

## 2020-03-19 NOTE — PLAN OF CARE
Nutrition Problem: Inadequate oral intake  Intervention: Food and/or Nutrient Delivery: Continue NPO  Nutritional Goals: Meet nutrition needs via EN

## 2020-03-19 NOTE — CONSULTS
Nutrition Assessment    Type and Reason for Visit: Reassess    Nutrition Recommendations: stop tf d/t increased residual.  Follow for result from KUB    Nutrition Assessment: TF stopped. Tf started yesterday and rn for approx 5 hours at 10ml/hr. At 1600 had a residual of 250ml+. Aware checking KUB. Pt NPO at this time. Malnutrition Assessment:  · Malnutrition Status: At risk for malnutrition  · Context: Acute illness or injury  · Findings of the 6 clinical characteristics of malnutrition (Minimum of 2 out of 6 clinical characteristics is required to make the diagnosis of moderate or severe Protein Calorie Malnutrition based on AND/ASPEN Guidelines):  1. Energy Intake-Less than or equal to 75% of estimated energy requirement,      2. Weight Loss-1-2% loss, in 3 months  3. Fat Loss-No significant subcutaneous fat loss,    4. Muscle Loss-No significant muscle mass loss,    5. Fluid Accumulation-Mild fluid accumulation, Generalized  6.   Strength-Not measured    Nutrition Risk Level: High    Nutrient Needs:  · Estimated Daily Total Kcal: 980-1350(8-11 kcal/kg)  · Estimated Daily Protein (g): 175(2.5 g/kg)  · Estimated Daily Total Fluid (ml/day): 7251-8360    Nutrition Diagnosis:   · Problem: Inadequate oral intake  · Etiology: related to Acute injury/trauma, Impaired respiratory function-inability to consume food     Signs and symptoms:  as evidenced by NPO status due to medical condition, Intubation    Objective Information:  · Nutrition-Focused Physical Findings: well nourished  · Wound Type: None  · Current Nutrition Therapies:  · Oral Diet Orders: NPO   · Oral Diet intake: NPO  · Oral Nutrition Supplement (ONS) Orders: None  · ONS intake: NPO     · Anthropometric Measures:  · Ht: 5' 8\" (172.7 cm)   · Current Body Wt: 261 lb 11 oz (118.7 kg)  · Admission Body Wt: 270 lb (122.5 kg)  · Usual Body Wt: 265 lb (120.2 kg)(12/2019)  · Ideal Body Wt: 154 lb (69.9 kg), % Ideal Body 170.9%  · BMI Classification: BMI 35.0 - 39.9 Obese Class II    Nutrition Interventions:   Continue NPO  Continued Inpatient Monitoring    Nutrition Evaluation:   · Evaluation: Progress towards goals declining   · Goals: Meet nutrition needs via EN    · Monitoring: Skin Integrity, Weight, Pertinent Labs      Electronically signed by Jeannie Fletcher MS, RD, LD on 3/19/20 at 12:41 PM CDT    Contact Number: [phone]

## 2020-03-19 NOTE — PROGRESS NOTES
Nephrology ([facility]) Progress Note      Patient:  Josué Dhaliwal  YOB: 1950  Date of Service: 3/19/2020  MRN: [id number]   Acct: [de-identified]   Primary Care Physician: Romana Poll, MD  Advance Directive: [facility]  Admit Date: 3/13/2020       Hospital Day: 6  Referring Provider: Milady Stover DO    Patient independently seen and examined, Chart, Consults, Notes, Operative notes, Labs, Cardiology, and Radiology studies reviewed as able. Chief complaint: Abnormal labs. Subjective:  Josué Dhaliwal is a 71 y.o. male  whom we were consulted for acute kidney injury/metabolic acidosis. All the information's are taken from the chart as he is currently intubated and sedated. His baseline serum creatinine 0.9 mg about 2 months ago. He presented to the emergency room with increasing shortness of breath and hypoxemic respiratory failure. Chest x-ray shows patient has bilateral lower lobe consolidation and pulmonary edema. CT angiogram of the pulmonary arteries in the emergency room consistent with no evidence of pulmonary embolism. He is currently admitted to CCU, intubated sedated on a couple of pressors as he has septic shock. He has acute kidney injury and nephrology is consulted. Otherwise he has history of COPD, coronary artery disease and pacemaker implant. Hospital course remarkable for IV fluids resuscitation/worsening of renal function but has excellent urine output. Patient remained intubated/sedated. He is non-oliguric. No new overnight events.      Allergies:  Codeine    Medicines:  Current Facility-Administered Medications   Medication Dose Route Frequency Provider Last Rate Last Dose    potassium chloride 20 mEq/50 mL IVPB (Central Line)  20 mEq Intravenous PRN Luke Blunt MD 50 mL/hr at 03/19/20 0719 20 mEq at 03/19/20 0719    metoprolol (LOPRESSOR) injection 7.5 mg  7.5 mg Intravenous Q4H Milady Stover DO        acetaZOLAMIDE (DIAMOX) 250 mg Not on file    Number of children: Not on file    Years of education: Not on file    Highest education level: Not on file   Occupational History    Occupation: maintenance chemical plant   Social Needs    Financial resource strain: Not on file    Food insecurity     Worry: Not on file     Inability: Not on file    Transportation needs     Medical: Not on file     Non-medical: Not on file   Tobacco Use    Smoking status: Never Smoker    Smokeless tobacco: Never Used   Substance and Sexual Activity    Alcohol use: Yes    Drug use: Never    Sexual activity: Yes     Partners: Female   Lifestyle    Physical activity     Days per week: Not on file     Minutes per session: Not on file    Stress: Not on file   Relationships    Social connections     Talks on phone: Not on file     Gets together: Not on file     Attends Advent service: Not on file     Active member of club or organization: Not on file     Attends meetings of clubs or organizations: Not on file     Relationship status: Not on file    Intimate partner violence     Fear of current or ex partner: Not on file     Emotionally abused: Not on file     Physically abused: Not on file     Forced sexual activity: Not on file   Other Topics Concern    Not on file   Social History Narrative    Not on file         Review of Systems:  unAble to obtain as he is intubated and sedated.     Objective:  Patient Vitals for the past 24 hrs:   BP Temp Temp src Pulse Resp SpO2   03/19/20 1015 -- -- -- 111 16 95 %   03/19/20 0800 111/82 97.8 °F (36.6 °C) Temporal 130 18 97 %   03/19/20 0700 111/64 -- -- 113 18 96 %   03/19/20 0600 120/62 -- -- 133 18 96 %   03/19/20 0500 (!) 138/118 -- -- 128 18 97 %   03/19/20 0400 119/76 98 °F (36.7 °C) Temporal 123 18 97 %   03/19/20 0300 110/69 -- -- 115 18 97 %   03/19/20 0204 -- -- -- -- 18 96 %   03/19/20 0200 (!) 114/55 -- -- 120 18 96 %   03/19/20 0100 120/71 -- -- 128 18 96 %   03/19/20 0000 116/73 98.6 °F (37 °C) 3/13/2020  XR CHEST PORTABLE 3/13/2020 11:45 AM HISTORY: Status post intubation COMPARISON: 3/13/2020. FINDINGS: Status post intubation with endotracheal tube tip projecting 5.3 cm above the sasha. Reidentified bilateral diffuse lung opacities, not significant changed. Lungs appear to be well expanded. No pleural effusions or pneumothorax identified. Heart size is stable. Left chest wall dual chamber pacemaker identified. 1. Status post intubation with lungs appearing well expanded. Tube is in good position. 2. Reidentified stable bilateral diffuse lung opacities, favor pulmonary edema. Signed by Dr Migue Huynh on 3/13/2020 12:56 PM    Xr Chest Portable    Result Date: 3/13/2020  EXAM: XR CHEST PORTABLE -- 3/13/2020 11:15 AM HISTORY: 69 years, Male, shortness of breath COMPARISON: 2/18/2020 TECHNIQUE:  1 images. Frontal view of the chest. FINDINGS:  Diffuse bilateral pulmonary opacities. No pneumothorax or large pleural effusion. Borderline cardiac silhouette. Upper mediastinum within normal limits. Cardiac pulse generator at the left chest with 2 grossly intact leads. 1. Diffuse bilateral pulmonary opacities, which could represent edema or infection. Signed by Dr Kayode Cota on 3/13/2020 12:30 PM    Cta Pulmonary W Contrast    Result Date: 3/13/2020  Examination. CTA PULMONARY W CONTRAST 3/13/2020 1:00 PM History: Hypoxia and respiratory failure. DLP: 706 mGycm. CT angiography of the chest is performed after intravenous contrast enhancement. The images are acquired in axial plane with subsequent reconstruction in coronal and sagittal plane. The comparison is made with the previous study dated 9/14/2019. The correlation is made with chest radiograph obtained earlier today. There is good opacification of pulmonary artery and the branches bilaterally. No filling defects in the opacified pulmonary arterial bed. Both pulmonary arteries appeared moderately dilated, right more than the left.  Atheromatous

## 2020-03-19 NOTE — PROCEDURES
PICC Line Insertion Procedure Note    Procedure: Insertion of #6 FR  PICC- Triple Lumen    Indications:  Poor Access/Removal of femoral line    Procedure Details   Informed consent was obtained for the procedure, including local anesthetic. Risks and benefits were discussed. #6 FR PICC inserted to the R Basilic vein per hospital protocol. Blood return:  yes    Findings:  Catheter inserted to 44 cm, with 1 cm exposed. Catheter was flushed with 30 cc NS. Patient did tolerate procedure well. Recommendations:  CXR ordered to verify placement. Tip at cavoatrial junction. Okay to use. PICC Brochure given to patient with teaching instruction.

## 2020-03-20 ENCOUNTER — OUTSIDE FACILITY SERVICE (OUTPATIENT)
Dept: PULMONOLOGY | Facility: CLINIC | Age: 70
End: 2020-03-20
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
## 2020-03-20 PROCEDURE — 99233 SBSQ HOSP IP/OBS HIGH 50: CPT | Performed by: INTERNAL MEDICINE

## 2020-03-20 NOTE — PROGRESS NOTES
Dr. Cherise Otoole telephoned at office to speak with Patient's Spouse about condition after speaking with Palliative care this afternoon. Patient's wife discussed LTach and Hospice with Dr. Cherise Otoole, Spouse stated she needs to discuss the options with her children this weekend and thanked us for contacting Dr. Cherise Otoole for her.

## 2020-03-20 NOTE — PROGRESS NOTES
chloride 40 mEq in dextrose 5 % 1,000 mL infusion   Intravenous Continuous Anna Brill,  mL/hr at 03/20/20 1215      levothyroxine (SYNTHROID) tablet 50 mcg  50 mcg Oral Daily Anna Brill, DO   50 mcg at 03/20/20 3384    metoclopramide (REGLAN) injection 10 mg  10 mg Intravenous Q6H Anna Brill, DO   10 mg at 03/20/20 0919    piperacillin-tazobactam (ZOSYN) 3.375 g in dextrose 5 % 50 mL IVPB extended infusion (mini-bag)  3.375 g Intravenous Q8H Anna Brill, DO 12.5 mL/hr at 03/20/20 1216 3.375 g at 03/20/20 1216    hydrocortisone sodium succinate PF (SOLU-CORTEF) injection 50 mg  50 mg Intravenous Daily Kady German MD   50 mg at 03/20/20 0853    dilTIAZem 125 mg in dextrose 5 % 125 mL infusion  10 mg/hr Intravenous Continuous Anna Brill, DO 2.5 mL/hr at 03/19/20 1841 2.5 mg/hr at 03/19/20 1841    heparin (porcine) injection 9,690 Units  80 Units/kg Intravenous PRN Aston Foster MD   9,690 Units at 03/20/20 0433    heparin (porcine) injection 4,840 Units  40 Units/kg Intravenous PRN Aston Foster MD   4,700 Units at 03/19/20 0849    heparin 25,000 units in dextrose 5% 250 mL infusion  18 Units/kg/hr Intravenous Continuous Aston Foster MD 17 mL/hr at 03/20/20 1101 14 Units/kg/hr at 03/20/20 1101    morphine injection 1 mg  1 mg Intravenous Q4H PRN Meghan Toribio MD   1 mg at 03/14/20 0641    vancomycin (VANCOCIN) intermittent dosing (placeholder)   Other RX Placeholder Vini Goff MD        insulin lispro (HUMALOG) injection vial 0-6 Units  0-6 Units Subcutaneous TID WC Kady German MD   1 Units at 03/20/20 1218    insulin lispro (HUMALOG) injection vial 0-3 Units  0-3 Units Subcutaneous Nightly Kady German MD   Stopped at 03/19/20 2113    glucose (GLUTOSE) 40 % oral gel 15 g  15 g Oral PRN Kady German MD        dextrose 50 % IV solution  12.5 g Intravenous PRN Kady German MD        glucagon (rDNA) injection 1 mg  1 encouraged her to begin thinking about whether or not she would want the patient to undergo trach/PEG/LTAC if he does not show improvement over the next day or so. Spouse is aware that she will need to consider whether to continue aggressive treatments versus move toward comfort/hospice care fairly soon if he does not show improvement and I have encouraged her to discuss this with the attending and pulmonology so she can gain insight and mark information to make good decisions on the patient's behalf. I also spoke with the patient's daughter via phone to provide same update and encouraged her to speak with her mother over the next couple days to help determine what direction they would like for the patient's care to go, in the event he does not improve. I provided encouragement and emotional support to the patient's family. Nursing staff aware of the outcome of my visit. Palliative medicine will continue to follow. Recommendations: Continue to discuss goals of care, which will be dependent on the patient's ability to improve throughout hospitalization    Thank you for consulting Palliative Care and allowing us to participate in the care of this patient.        Electronically signed by ANDERS Dale on 3/20/2020 at 2:32 PM    (Please note that portions of this note were completed with a voice recognition program.  Lucy Vincent made to edit the dictations but occasionally words are mis-transcribed.)

## 2020-03-20 NOTE — PROGRESS NOTES
Hospitalist Progress Note    Patient:  James Webb  YOB: 1950  Date of Service: 3/20/2020  MRN: [id number]   Acct: [de-identified]   Primary Care Physician: Corina Bazan MD  Advance Directive: [facility]  Admit Date: 3/13/2020       Hospital Day: 7  Referring Provider: Radha Still DO    Patient Seen, Chart, Consults, Notes, Labs, Radiology studies reviewed. Subjective:  James Webb is a 71 y.o. male  whom we are following for RSV pneumonia, hypoxemic respiratory failure, atrial fibrillation, high NG output. Hemodynamics are about the same. He remains in atrial fibrillation. GFR is improved. Sodium has also improved. No headway as far as weaning at this point. He is still having high NG output and unable to tolerate tube feeds.     Allergies:  Codeine    Medicines:  Current Facility-Administered Medications   Medication Dose Route Frequency Provider Last Rate Last Dose    vancomycin (VANCOCIN) 1,750 mg in dextrose 5 % 500 mL IVPB  1,750 mg Intravenous Q18H Lynn Abel  mL/hr at 03/20/20 0920 1,750 mg at 03/20/20 0920    potassium chloride 20 mEq/50 mL IVPB (Central Line)  20 mEq Intravenous PRN Tiera Bose MD 50 mL/hr at 03/19/20 0719 20 mEq at 03/19/20 0719    metoprolol (LOPRESSOR) injection 7.5 mg  7.5 mg Intravenous Q4H Radha Poncho, DO   7.5 mg at 03/20/20 6832    acetaZOLAMIDE (DIAMOX) 250 mg in dextrose 5 % 100 mL IVPB  250 mg Intravenous Q8H Radha Poncho, DO   Stopped at 03/20/20 1023    lidocaine PF 1 % injection 5 mL  5 mL Intradermal Once Radha Poncho, DO        sodium chloride flush 0.9 % injection 10 mL  10 mL Intravenous 2 times per day Radha Poncho, DO   10 mL at 03/20/20 0855    sodium chloride flush 0.9 % injection 10 mL  10 mL Intravenous PRN Radha Poncho, DO        potassium chloride 40 mEq in dextrose 5 % 1,000 mL infusion   Intravenous Continuous Radha Poncho,  mL/hr at 03/20/20 0417      levothyroxine (SYNTHROID) tablet 50 mcg  50 mcg Oral Daily King Notch, DO   50 mcg at 03/20/20 8744    metoclopramide (REGLAN) injection 10 mg  10 mg Intravenous Q6H King Notch, DO   10 mg at 03/20/20 0919    piperacillin-tazobactam (ZOSYN) 3.375 g in dextrose 5 % 50 mL IVPB extended infusion (mini-bag)  3.375 g Intravenous Q8H King Notch, DO   Stopped at 03/20/20 0900    hydrocortisone sodium succinate PF (SOLU-CORTEF) injection 50 mg  50 mg Intravenous Daily Allyssa Gil MD   50 mg at 03/20/20 0853    dilTIAZem 125 mg in dextrose 5 % 125 mL infusion  10 mg/hr Intravenous Continuous King Notch, DO 2.5 mL/hr at 03/19/20 1841 2.5 mg/hr at 03/19/20 1841    heparin (porcine) injection 9,690 Units  80 Units/kg Intravenous PRN Sheryle Lather, MD   9,690 Units at 03/20/20 0433    heparin (porcine) injection 4,840 Units  40 Units/kg Intravenous PRN Sheryle Lather, MD   4,700 Units at 03/19/20 0849    heparin 25,000 units in dextrose 5% 250 mL infusion  18 Units/kg/hr Intravenous Continuous Sheryle Lather, MD 17 mL/hr at 03/20/20 0436 14 Units/kg/hr at 03/20/20 0436    morphine injection 1 mg  1 mg Intravenous Q4H PRN Eagle Tai MD   1 mg at 03/14/20 0641    vasopressin 20 Units in dextrose 5 % 100 mL infusion  0.04 Units/min Intravenous Continuous Marisa Cui MD   Stopped at 03/15/20 1609    vancomycin (VANCOCIN) intermittent dosing (placeholder)   Other RX Placeholder Marisa Cui MD        insulin lispro (HUMALOG) injection vial 0-6 Units  0-6 Units Subcutaneous TID WC Allyssa Gil MD   1 Units at 03/20/20 0909    insulin lispro (HUMALOG) injection vial 0-3 Units  0-3 Units Subcutaneous Nightly Allyssa Gil MD   Stopped at 03/19/20 2113    glucose (GLUTOSE) 40 % oral gel 15 g  15 g Oral PRN Allyssa Gil MD        dextrose 50 % IV solution  12.5 g Intravenous PRN Allyssa Gil MD        glucagon (rDNA) injection 1 mg  1 mg Intramuscular  Alcohol use: Yes    Drug use: Never    Sexual activity: Yes     Partners: Female   Lifestyle    Physical activity     Days per week: Not on file     Minutes per session: Not on file    Stress: Not on file   Relationships    Social connections     Talks on phone: Not on file     Gets together: Not on file     Attends Bahai service: Not on file     Active member of club or organization: Not on file     Attends meetings of clubs or organizations: Not on file     Relationship status: Not on file    Intimate partner violence     Fear of current or ex partner: Not on file     Emotionally abused: Not on file     Physically abused: Not on file     Forced sexual activity: Not on file   Other Topics Concern    Not on file   Social History Narrative    Not on file         Review of Systems:    Review of Systems   Unable to perform ROS: Intubated           Objective:  Blood pressure (!) 116/58, pulse 82, temperature 97.9 °F (36.6 °C), temperature source Axillary, resp. rate 16, height 5' 8\" (1.727 m), weight 261 lb 11.2 oz (118.7 kg), SpO2 96 %. Intake/Output Summary (Last 24 hours) at 3/20/2020 1057  Last data filed at 3/20/2020 0700  Gross per 24 hour   Intake 2715.37 ml   Output 4040 ml   Net -1324.63 ml       Physical Exam  Vitals signs reviewed. Constitutional:       Appearance: He is well-developed. HENT:      Head: Normocephalic and atraumatic. Eyes:      Conjunctiva/sclera: Conjunctivae normal.      Pupils: Pupils are equal, round, and reactive to light. Cardiovascular:      Rate and Rhythm: Normal rate and regular rhythm. Heart sounds: Normal heart sounds. Pulmonary:      Breath sounds: Rhonchi present. Skin:     General: Skin is warm and dry.          Labs:  BMP:   Recent Labs     03/18/20  0215  03/19/20  0245  03/19/20  1653 03/20/20  0422 03/20/20  0522   *  --  150*  --   --   --  144   K 3.5   < > 3.3*   < > 3.6 3.4 3.6     --  105  --   --   --  104   CO2 29  -- Elevated creatinine COMPARISON: None  TECHNIQUE: Multiple longitudinal and transverse realtime sonographic images of the kidneys and urinary bladder are obtained. FINDINGS: The right kidney measures 12.0 x 5.7 x 6.9 cm. The right kidney is normal in size, shape, contour and position. 2.1 cm simple cyst arising from the upper pole. The cortical thickness is normal, with preservation of the corticomedullary differentiation. The central echo complex is compact with no evidence for hydronephrosis. No nephrolithiasis or abnormal perinephric fluid collections are seen. No hydroureter. The left kidney measures 9.8 x 5.7 x 5.4 cm. The left kidney is normal in size, shape, contour and position. The cortical thickness is normal, with preservation of the corticomedullary differentiation. The central echo complex is compact with no evidence for hydronephrosis. No nephrolithiasis or abnormal perinephric fluid collections are seen. No hydroureter. Urinary bladder is decompressed by Hernández catheter. 1. Small simple right renal cyst. Kidneys are otherwise unremarkable. 2. Urinary bladder is decompressed by Hernández catheter. Signed by Dr Migue Huynh on 3/14/2020 1:02 PM    Xr Chest Portable    Result Date: 3/14/2020  XR CHEST PORTABLE 3/14/2020 4:15 AM HISTORY: Mechanical ventilation Comparison: 3/13/2020 Findings: Endotracheal tube is in stable position. Reidentified bilateral pulmonary edema, stable or perhaps slightly decreased. The cardiomediastinal silhouette and pulmonary vascularity are unchanged. Left chest wall dual chamber pacemaker. Anterior cervical fusion hardware. No acute osseous or soft tissue abnormality is noted. Impression: 1. Reidentified pulmonary edema, stable or perhaps slightly decreased. Otherwise unchanged. Signed by Dr Migue Huynh on 3/14/2020 7:22 AM    Xr Chest Portable    Result Date: 3/13/2020  XR CHEST PORTABLE 3/13/2020 11:45 AM HISTORY: Status post intubation COMPARISON: 3/13/2020.  FINDINGS: Status post intubation with endotracheal tube tip projecting 5.3 cm above the sasha. Reidentified bilateral diffuse lung opacities, not significant changed. Lungs appear to be well expanded. No pleural effusions or pneumothorax identified. Heart size is stable. Left chest wall dual chamber pacemaker identified. 1. Status post intubation with lungs appearing well expanded. Tube is in good position. 2. Reidentified stable bilateral diffuse lung opacities, favor pulmonary edema. Signed by Dr Carol Corado on 3/13/2020 12:56 PM    Xr Chest Portable    Result Date: 3/13/2020  EXAM: XR CHEST PORTABLE -- 3/13/2020 11:15 AM HISTORY: 69 years, Male, shortness of breath COMPARISON: 2/18/2020 TECHNIQUE:  1 images. Frontal view of the chest. FINDINGS:  Diffuse bilateral pulmonary opacities. No pneumothorax or large pleural effusion. Borderline cardiac silhouette. Upper mediastinum within normal limits. Cardiac pulse generator at the left chest with 2 grossly intact leads. 1. Diffuse bilateral pulmonary opacities, which could represent edema or infection. Signed by Dr Chyna Briscoe on 3/13/2020 12:30 PM    Cta Pulmonary W Contrast    Result Date: 3/13/2020  Examination. CTA PULMONARY W CONTRAST 3/13/2020 1:00 PM History: Hypoxia and respiratory failure. DLP: 706 mGycm. CT angiography of the chest is performed after intravenous contrast enhancement. The images are acquired in axial plane with subsequent reconstruction in coronal and sagittal plane. The comparison is made with the previous study dated 9/14/2019. The correlation is made with chest radiograph obtained earlier today. There is good opacification of pulmonary artery and the branches bilaterally. No filling defects in the opacified pulmonary arterial bed. Both pulmonary arteries appeared moderately dilated, right more than the left. Atheromatous changes thoracic aorta are seen. Without dilatation or dissection. Coronary arteries are not optimally visualized. Cardiac pacer leads are seen in the right atrium and right ventricle with extensive streak artifacts. There is a small pericardial effusion. The cardiac chambers does not appear enlarged and is marked thickening of the left ventricle wall. There is suggestion of right ventricular strain. There is a bilaterally symmetrical extensive lung opacities suggesting severe cardiogenic pulmonary edema. This is more severe consolidation in the lower lobes with air bronchograms. There is a small bibasilar pleural effusion. Thyroid gland is not optimally evaluated. There is no axillary lymphadenopathy. The included liver and spleen appear unremarkable. No gallstone. The adrenal glands are normal. Small exophytic nodule from the upper pole of the right kidney is incompletely visualized and evaluated. This is similar to the previous study. No evidence of pulmonary embolism. No aortic aneurysm or dissection. Coronary arteries are not well-visualized for comment. There is a severe cardiogenic pulmonary edema and bilateral lower lobar lung consolidation which may suggest superimposed inflammatory/infectious process. Bilateral small basal pleural effusion. Pulmonary arterial hypertension and right ventricle strain. The above findings are recorded on a digital voice clip in PACS. Signed by Dr Norbert Orozco on 3/13/2020 3:13 PM       Assessment     Acute respiratory failure with hypoxia and hypercapnia  Continue with vent support.     RSV pneumonia. Continue ventilatory support. Antibiotics for secondary infection.     Acute kidney injury. Improving.     Hypernatremia. Improved.     Septic shock. Resolved.     F/E/N  Unable to tolerate tube feeds.   Check KUB.       King Notch, DO

## 2020-03-20 NOTE — PROGRESS NOTES
Cardiology Daily Note Reinaldo Smith MD      Patient:  James Webb  [id number]    Patient Active Problem List    Diagnosis Date Noted    Dyspnea on exertion 11/25/2019     Priority: High    Abnormal nuclear stress test      Priority: High    Precordial pain      Priority: High    Sick sinus syndrome due to SA node dysfunction (HCC)      Priority: High    Septic shock ([facility] [address] 03/13/2020     Priority: Low    Palliative care patient 03/13/2020     Priority: Low    Pneumonia due to organism      Priority: Low    Abnormal brain scan 03/03/2020     Priority: Low    Gait difficulty 02/20/2020     Priority: Low    Fall 02/19/2020     Priority: Low    Weakness 02/18/2020     Priority: Low    Loss of balance 02/14/2020     Priority: Low    Tick bite 02/14/2020     Priority: Low    Sleep apnea, unspecified 11/22/2019     Priority: Low    Sleep disturbance 09/30/2019     Priority: Low    Elevated serum creatinine 09/30/2019     Priority: Low    Diastolic dysfunction 64/68/4099     Priority: Low    Flash pulmonary edema (HCC) 09/30/2019     Priority: Low    Acute respiratory failure with hypoxia and hypercapnia (HCC) 09/14/2019     Priority: Low    Essential hypertension 06/10/2019     Priority: Low    Beryllium disease ([facility] [address] 06/10/2019     Priority: Low    [facility] 09/10/2017     Priority: Low    Pacemaker 04/21/2017     Priority: Low    Encounter for colonoscopy due to history of adenomatous colonic polyps 11/24/2014     Priority: Low    NSAID long-term use 03/04/2014     Priority: Low    GERD (gastroesophageal reflux disease) 03/04/2013     Priority: Low    History of esophageal ulcer 03/04/2013     Priority: Low    History of adenomatous polyp of colon 03/04/2013     Priority: Low    Patulous lower esophageal sphincter 03/04/2013     Priority: Low    Hiatal hernia 03/04/2013     Priority: Low    Osteoarthritis 03/04/2013     Priority: Low    Chronic pain 03/04/2013     Priority: Low       Admit Date:  3/13/2020    Admission Problem List: Present on Admission:   Septic shock ([facility] [address]   Palliative care patient   Dyspnea on exertion   Acute respiratory failure with hypoxia and hypercapnia (HCC)   Flash pulmonary edema (HCC)   Pneumonia due to organism      Cardiac Specific Data:  Specialty Problems        Cardiology Problems    Precordial pain        Sick sinus syndrome due to SA node dysfunction (HCC)        SVT (supraventricular tachycardia) (HCC)        Essential hypertension              Subjective:  Mr. Pastor Amaro seen today intubated and sedated appears about the same. Remains in atrial fibrillation rate controlled blood pressure stable 110/73. No new cardiovascular issues reported. Objective:   /73   Pulse 99   Temp 96.9 °F (36.1 °C) (Axillary)   Resp 17   Ht 5' 8\" (1.727 m)   Wt 261 lb 11.2 oz (118.7 kg)   SpO2 95%   BMI 39.79 kg/m²       Intake/Output Summary (Last 24 hours) at 3/20/2020 1419  Last data filed at 3/20/2020 1216  Gross per 24 hour   Intake 4701.79 ml   Output 4740 ml   Net -38.21 ml       Prior to Admission medications    Medication Sig Start Date End Date Taking? Authorizing Provider   SYNTHROID 50 MCG tablet Take 1 tablet by mouth Daily 3/5/20   Aristides Wood MD   cloNIDine (CATAPRES) 0.3 MG tablet Take 1 tablet by mouth 2 times daily 2/23/20   Daphne Muñoz MD   carbidopa-levodopa (SINEMET)  MG per tablet Take 1 tablet by mouth 3 times daily  Patient not taking: Reported on 3/4/2020 2/23/20   Daphne Muñoz MD   busPIRone (BUSPAR) 10 MG tablet TAKE 1 TABLET BY MOUTH THREE TIMES A DAY 2/17/20   Aristides Wood MD   pantoprazole (PROTONIX) 40 MG tablet TAKE 1 TABLET BY MOUTH EVERY MORNING BEFORE BREAKFAST 1/28/20   Aristides Wood MD   nitroGLYCERIN (NITROSTAT) 0.3 MG SL tablet up to max of 3 total doses.  If no relief after 1 dose, call 911. 12/19/19   Jarrett Dawson MD   furosemide (LASIX) 40 MG tablet Take 1 tablet by mouth as needed (fluid retention) 11/25/19   Umu Puente MD   rOPINIRole (REQUIP) 1 MG tablet Take 1 tablet by mouth nightly 11/8/19 12/8/19  Miquel Huang MD   ipratropium-albuterol (DUONEB) 0.5-2.5 (3) MG/3ML SOLN nebulizer solution Inhale 1 vial into the lungs nightly    Historical Provider, MD   celecoxib (CELEBREX) 200 MG capsule TAKE 1 CAPSULE BY MOUTH EVERY DAY 9/12/19   ANDERS Segovia   losartan (COZAAR) 100 MG tablet Take 1 tablet by mouth daily 9/12/19   ANDERS Segovia   FLUoxetine (PROZAC) 20 MG capsule Take 1 capsule by mouth daily Take 40mg along with 20mg capsule daily 9/12/19   ANDERS Segovia   FLUoxetine (PROZAC) 40 MG capsule Take 1 capsule by mouth daily 9/12/19   ANDERS Segovia   NIFEdipine (PROCARDIA XL) 30 MG extended release tablet Take 1 tablet by mouth daily 8/16/18   Adele Miramontes MD   albuterol sulfate  (90 Base) MCG/ACT inhaler Inhale 2 puffs into the lungs every 6 hours as needed for Wheezing 4/17/18   Adele Miramontes MD   mometasone-formoterol [facility]) 200-5 MCG/ACT inhaler INHALE 2 PUFFS INTO THE LUNGS EVERY 12 HOURS 12/14/17   Adele Miramontes MD        vancomycin  1,750 mg Intravenous Q18H    metoprolol  7.5 mg Intravenous Q4H    acetaZOLAMIDE (DIAMOX) IVPB  250 mg Intravenous Q8H    lidocaine 1 % injection  5 mL Intradermal Once    sodium chloride flush  10 mL Intravenous 2 times per day    levothyroxine  50 mcg Oral Daily    metoclopramide  10 mg Intravenous Q6H    piperacillin-tazobactam  3.375 g Intravenous Q8H    hydrocortisone sodium succinate PF  50 mg Intravenous Daily    vancomycin (VANCOCIN) intermittent dosing (placeholder)   Other RX Placeholder    insulin lispro  0-6 Units Subcutaneous TID WC    insulin lispro  0-3 Units Subcutaneous Nightly    ipratropium-albuterol  1 ampule Inhalation Q4H       TELEMETRY: Atrial fibrillation     Physical Exam:      Physical Exam      General: acute abdominopelvic process. The previously noted gaseous distention of the small bowel is not visualized on this supine radiograph. Signed by Dr Ana Jackson on 3/19/2020 1:24 PM    Xr Abdomen (kub) (single Ap View)    Result Date: 3/15/2020  XR ABDOMEN (KUB) (SINGLE AP VIEW) 3/15/2020 1:00 PM HISTORY: NG tube placement COMPARISON: Chest x-ray dated 3/15/2020 FINDINGS: Enteric tube identified with tip projecting over the gastric body. Sidehole projects at the gastroesophageal junction. 1. Enteric tube tip is positioned in the stomach although the sidehole projects at the GE junction. Additional slight advancement may be considered. Signed by Dr Migue Huynh on 3/15/2020 2:29 PM    Mri Lumbar Spine Wo Contrast    Result Date: 2/21/2020  EXAM: MR LUMBOSACRAL SPINE WITHOUT IV CONTRAST 2/21/2020. COMPARISON: None. INDICATION: 71years-old Male. Ataxia TECHNIQUE: Routine pulse sequences of the lumbar spine were obtained without IV contrast. FINDINGS: There is a 4 mm anterolisthesis of L4 on L5. 2 mm retrolisthesis of L2 on L3 and 2 mm retrolisthesis of L1 on L2. Lower curvature of the lumbar spine centered at L2. The bone marrow signal shows no evidence of fracture or a marrow replacing process. The conus terminates at T12 There are no significant spinal cord signal abnormalities. Disc desiccation and height loss at multiple levels. The prevertebral and paraspinal soft tissues are unremarkable. T2 hyperintense focus in the left renal pole is incompletely characterized. Degenerative changes: L1-L2 : Disc osteophyte complex, ligamentum flavum hypertrophy, prominence of the epidural fat, with moderate to severe thecal sac stenosis. Facet arthropathy, contributing to moderate bilateral foraminal stenosis. L2-L3 : Disc osteophyte complex with disc bulge component, ligamenta flava hypertrophy and epidural lipomatosis, with resulting moderate to severe thecal sac stenosis.  Facet arthropathy contributing to mild to signal change. No spinal canal stenosis or cord compression. No significant disc herniation or foraminal stenosis is seen. No compression fracture. Postcontrast imaging shows no abnormal cord enhancement. 1. Multilevel degenerative disc and endplate changes with no fracture, stenosis, or neural compression. Signed by Dr Vilma Hutchison on 2/20/2020 5:24 PM    Us Renal Complete    Result Date: 3/14/2020  US RENAL COMPLETE 3/14/2020 6:00 AM HISTORY: Elevated creatinine COMPARISON: None  TECHNIQUE: Multiple longitudinal and transverse realtime sonographic images of the kidneys and urinary bladder are obtained. FINDINGS: The right kidney measures 12.0 x 5.7 x 6.9 cm. The right kidney is normal in size, shape, contour and position. 2.1 cm simple cyst arising from the upper pole. The cortical thickness is normal, with preservation of the corticomedullary differentiation. The central echo complex is compact with no evidence for hydronephrosis. No nephrolithiasis or abnormal perinephric fluid collections are seen. No hydroureter. The left kidney measures 9.8 x 5.7 x 5.4 cm. The left kidney is normal in size, shape, contour and position. The cortical thickness is normal, with preservation of the corticomedullary differentiation. The central echo complex is compact with no evidence for hydronephrosis. No nephrolithiasis or abnormal perinephric fluid collections are seen. No hydroureter. Urinary bladder is decompressed by Hernández catheter. 1. Small simple right renal cyst. Kidneys are otherwise unremarkable. 2. Urinary bladder is decompressed by Hernández catheter. Signed by Dr Kunal Osborne on 3/14/2020 1:02 PM    Xr Chest Portable    Result Date: 3/20/2020  EXAMINATION: XR CHEST PORTABLE 3/20/2020 6:58 AM HISTORY: XR CHEST PORTABLE 3/20/2020 5:00 AM HISTORY: Patient on ventilator COMPARISON: March 19, 2020. FINDINGS: Increase in density in the lung bases is noted. Most likely bilateral posterior pleural effusions.  This AM History: Dyspnea Comparison: Chest x-ray dated 3/15/2020 Findings: Lines and tubes are stable in position. Increased bilateral central opacities and bilateral pleural effusions. . Mild-to-moderate cardiomegaly. .  No acute osseous or soft tissue abnormality is noted. Impression: 1. Increased opacities and pleural effusions. May reflect worsening fluid overload and/or infection. Signed by Dr Sosa Gonzalez on 3/18/2020 8:41 AM    Xr Chest Portable    Result Date: 3/17/2020  Frontal supine radiograph of the chest 3/17/2020 5:00 AM History: Respiratory failure Comparison: Chest x-ray dated 3/16/2020 Findings: Lines and tubes are stable in position. No new opacities or pneumothoraces are visualized in the chest. The cardiomediastinal silhouette and pulmonary vascularity are unchanged. No acute osseous or soft tissue abnormality is noted. Impression: 1. No significant interval change since previous exam. Signed by Dr Sosa Gonzalez on 3/17/2020 7:27 AM    Xr Chest Portable    Result Date: 3/16/2020  Examination. XR CHEST PORTABLE 3/16/2020 5:00 AM History: Respiratory failure. The patient is on a vent. A single frontal portable upright view of the chest is compared with the previous study dated 3/15/2020. No significant interval change. There is moderate pulmonary vascular congestion and left basal pleural effusion. No pneumothorax The heart size is not evaluated due to the portable projection. . The endotracheal tube is seen in a stable and unchanged position. The distal end of the nasogastric tube is not visible due to suboptimal exposure of the area. A dual-chamber cardiac pacer is seen in place. The hardware fusion of the lower cervical spine is incompletely visualized. No change. Persistent pulmonary congestion and left basal pleural effusion. The tubes and lines in stable and unchanged position.  Signed by Dr Velasquez Payton on 3/16/2020 8:05 AM    Xr Chest Portable    Result Date: 3/15/2020  XR CHEST PORTABLE 3/15/2020 12:00 PM HISTORY: NG tube placement Comparison: 3/15/2020 Findings: Exam performed to evaluate new enteric tube. An enteric tube is present in the upper mediastinum. At the level of the cardiac silhouette the tube becomes obscured. Unable to visualize the tube tip. Endotracheal tube is in stable position. Bilateral pulmonary opacities are unchanged. Impression: 1. Enteric tube is obscured by the cardiac silhouette. Unable to confirm tube position. Recommend abdominal/upper abdominal radiograph for tube confirmation. Signed by Dr Ellyn Caldwell on 3/15/2020 1:36 PM    Xr Chest Portable    Result Date: 3/15/2020  XR CHEST PORTABLE 3/15/2020 5:00 AM HISTORY: Respiratory failure Comparison: 3/14/2020 Findings: Mild interval decrease in the underlying pulmonary edema. There are no new or worsening consolidations. No pleural effusion or pneumothorax. Endotracheal tube is in good position. Heart size is stable. Left chest wall dual chamber pacemaker. Impression: 1. Mild interval decrease in the underlying pulmonary edema. Otherwise stable. Signed by Dr Ellyn Caldwell on 3/15/2020 7:24 AM    Xr Chest Portable    Result Date: 3/14/2020  XR CHEST PORTABLE 3/14/2020 4:15 AM HISTORY: Mechanical ventilation Comparison: 3/13/2020 Findings: Endotracheal tube is in stable position. Reidentified bilateral pulmonary edema, stable or perhaps slightly decreased. The cardiomediastinal silhouette and pulmonary vascularity are unchanged. Left chest wall dual chamber pacemaker. Anterior cervical fusion hardware. No acute osseous or soft tissue abnormality is noted. Impression: 1. Reidentified pulmonary edema, stable or perhaps slightly decreased. Otherwise unchanged. Signed by Dr Ellyn Caldwell on 3/14/2020 7:22 AM    Xr Chest Portable    Result Date: 3/13/2020  XR CHEST PORTABLE 3/13/2020 11:45 AM HISTORY: Status post intubation COMPARISON: 3/13/2020.  FINDINGS: Status post intubation with endotracheal tube tip and right ventricle with extensive streak artifacts. There is a small pericardial effusion. The cardiac chambers does not appear enlarged and is marked thickening of the left ventricle wall. There is suggestion of right ventricular strain. There is a bilaterally symmetrical extensive lung opacities suggesting severe cardiogenic pulmonary edema. This is more severe consolidation in the lower lobes with air bronchograms. There is a small bibasilar pleural effusion. Thyroid gland is not optimally evaluated. There is no axillary lymphadenopathy. The included liver and spleen appear unremarkable. No gallstone. The adrenal glands are normal. Small exophytic nodule from the upper pole of the right kidney is incompletely visualized and evaluated. This is similar to the previous study. No evidence of pulmonary embolism. No aortic aneurysm or dissection. Coronary arteries are not well-visualized for comment. There is a severe cardiogenic pulmonary edema and bilateral lower lobar lung consolidation which may suggest superimposed inflammatory/infectious process. Bilateral small basal pleural effusion. Pulmonary arterial hypertension and right ventricle strain. The above findings are recorded on a digital voice clip in PACS. Signed by Dr Elodia Hilliard on 3/13/2020 3:13 PM    Fl Lumbar Puncture Diag    Result Date: 2/19/2020  Examination. FL LUMBAR PUNCTURE DIAG 2/19/2020 11:15 AM History: Weakness and loss of balance. The procedure was explained to the patient. The benefits, the risks and complications are discussed. The patient understood the procedure and signed the consent. The patient was placed on the fluoroscopy table and the lumbar spine was examined. The patient has moderately severe dextroscoliosis. A suitable spot opposite interspace L3-4 was selected for puncture. The spot was marked with an ink marker.  After sterile preparation and application of local anesthesia a size 20-gauge longer spine needle was introduced into the thecal sac and a FreeFlow of clear colorless thin fluid was established. The patient was immediately turned into a decubitus position. The outer hub of the needle was connected to a pressure measuring device and opening pressure was measured. The opening pressure is 15.5 cm water. 30 mL of CSF was withdrawn and dispensed in 4 separate test tubes which were sent to the laboratory for further evaluation as instructed by the attending physician. The needle was then withdrawn and the puncture site was covered with a sterile Band-Aid. The patient tolerated the procedure well. No immediate complications were noted. The patient was given post lumbar puncture care instructions. A successful lumbar puncture and opening pressure measurement. Fluoroscopy time 2 minutes 11 seconds. Total dose: [id number]. Signed by Dr Chris Duff on 2/19/2020 4:45 PM        Assessment:  1. Suspected septic shock  2. Acute respiratory failure requiring intubation mechanical ventilatory support  3. Sinus node dysfunction  4. Previous pacemaker implantation  5. Gastroesophageal reflux disease  6. History of pulmonary edema  7. Elevated BUN 56 creatinine 3.4  8. MRI of the brain 2/18/2020 atrophy small vessel disease no acute infarct  9. Echocardiogram 3/13/2020 normal left ventricular systolic function ejection fraction greater than 50% moderate concentric LVH  10. CTA pulmonary 3/13/2020 no evidence of pulmonary embolism severe cardiogenic pulmonary edema bilateral lower lobar lung consolidation which may suggest superimposed inflammatory/infectious process  11. Lexiscan 12/11/2019 ejection fraction 67% normal perfusion study    Plan:  1.  Continue current treatment stable from a cardiac standpoint    Reinaldo Smith MD 3/20/2020 2:19 PM

## 2020-03-20 NOTE — PROGRESS NOTES
Nutrition Assessment    Type and Reason for Visit: Reassess    Nutrition Recommendations: if restarting tf is desired, suggest present formula at 5ml per hour. Pt may do better if feeding tube place in duodenum or jejunum also. Nutrition Assessment: TF remains off. Aware KUB results showed no issues. If wanting to try tf again, suggest just \"dripping it in\" so only run at 5 ml an hour and evaluate for tolerance. If not tolerated, pt may do better with tube being place in duodum or jejunum. Malnutrition Assessment:  · Malnutrition Status: At risk for malnutrition  · Context: Acute illness or injury  · Findings of the 6 clinical characteristics of malnutrition (Minimum of 2 out of 6 clinical characteristics is required to make the diagnosis of moderate or severe Protein Calorie Malnutrition based on AND/ASPEN Guidelines):  1. Energy Intake-Less than or equal to 75% of estimated energy requirement,      2. Weight Loss-1-2% loss, in 3 months  3. Fat Loss-No significant subcutaneous fat loss,    4. Muscle Loss-No significant muscle mass loss,    5. Fluid Accumulation-Mild fluid accumulation, Generalized  6.   Strength-Not measured    Nutrition Risk Level: High    Nutrient Needs:  · Estimated Daily Total Kcal: 980-1350(8-11 kcal/kg)  · Estimated Daily Protein (g): 175(2.5 g/kg)  · Estimated Daily Total Fluid (ml/day): 0045-0646    Nutrition Diagnosis:   · Problem: Inadequate oral intake  · Etiology: related to Acute injury/trauma, Impaired respiratory function-inability to consume food     Signs and symptoms:  as evidenced by NPO status due to medical condition, Intubation    Objective Information:  · Nutrition-Focused Physical Findings: well nourished  · Wound Type: None  · Current Nutrition Therapies:  · Oral Diet Orders: NPO   · Oral Diet intake: NPO  · Oral Nutrition Supplement (ONS) Orders: None  · ONS intake: NPO     · Anthropometric Measures:  · Ht: 5' 8\" (172.7 cm)   · Current Body Wt: 261 lb 11 oz (118.7 kg)  · Admission Body Wt: 270 lb (122.5 kg)  · Usual Body Wt: 265 lb (120.2 kg)(12/2019)  · Ideal Body Wt: 154 lb (69.9 kg), % Ideal Body 170.9%  · BMI Classification: BMI 35.0 - 39.9 Obese Class II    Nutrition Interventions:   Continue NPO  Continued Inpatient Monitoring    Nutrition Evaluation:   · Evaluation: Progress towards goals declining   · Goals: Meet nutrition needs via EN    · Monitoring: TF Intake, TF Tolerance, Weight, Pertinent Labs      Electronically signed by Jerrod Lowe, MS, RD, LD on 3/20/20 at 10:56 AM CDT    Contact Number: [phone]

## 2020-03-20 NOTE — PROGRESS NOTES
Pulmonary and Critical Care Progress Note [facility]    Patient: Samuel Araujo    1950    MR# [id number]    Acct# [de-identified]   03/20/20   7:18 AM  Referring Provider: Katy Witt DO    Chief Complaint: Mechanically ventilated    Interval history: Patient remains intubated and sedated. He is on a propofol, heparin, cardizem, Vanc, Zosyn and D50. Hernández remains in place. Fecal drainage tube added last night for loose stools. Heart rate irregular at 118. Saturation 93% on PEEP of 10 and FiO2 0.50. He is breathing over the ventilator. CXR reviewed. ABGs improved. Diuresis on intermittent status. He is Net (-) 4484. No family present. Per nursing, when sedation weaned, follows commands with weak grasp. Medications:    vancomycin, 1,750 mg, Intravenous, Q18H    metoprolol, 7.5 mg, Intravenous, Q4H    acetaZOLAMIDE (DIAMOX) IVPB, 250 mg, Intravenous, Q8H    lidocaine 1 % injection, 5 mL, Intradermal, Once    sodium chloride flush, 10 mL, Intravenous, 2 times per day    levothyroxine, 50 mcg, Oral, Daily    metoclopramide, 10 mg, Intravenous, Q6H    piperacillin-tazobactam, 3.375 g, Intravenous, Q8H    hydrocortisone sodium succinate PF, 50 mg, Intravenous, Daily    vancomycin (VANCOCIN) intermittent dosing (placeholder), , Other, RX Placeholder    insulin lispro, 0-6 Units, Subcutaneous, TID WC    insulin lispro, 0-3 Units, Subcutaneous, Nightly    ipratropium-albuterol, 1 ampule, Inhalation, Q4H   Review of Systems:     Cannot obtain due to mechanical ventilation    Physical Exam:  Blood pressure (!) 144/110, pulse 117, temperature 98.4 °F (36.9 °C), temperature source Axillary, resp. rate 22, height 5' 8\" (1.727 m), weight 261 lb 11.2 oz (118.7 kg), SpO2 94 %.     Intake/Output Summary (Last 24 hours) at 3/20/2020 0718  Last data filed at 3/20/2020 0437  Gross per 24 hour   Intake 1922.27 ml   Output 3530 ml   Net -1607.73 ml        Vent Mode: AC/VC/Vt Ordered: 580 29.0*   Y1RCUABO 95.1 96.6 95.6   BEART 9.5* 9.3* 5.2*     Recent Labs     03/20/20  0522   AST 31   ALT 24   ALKPHOS 154*   BILITOT 0.5   MG 2.7*   CALCIUM 8.4*     Radiograph:   Frontal supine radiograph of the chest 3/17/2020 5:00 AM   History: Respiratory failure   Comparison: Chest x-ray dated 3/16/2020    Findings:    Lines and tubes are stable in position. No new opacities or   pneumothoraces are visualized in the chest. The cardiomediastinal   silhouette and pulmonary vascularity are unchanged.     No acute osseous or soft tissue abnormality is noted.        Impression   Impression:    1.   No significant interval change since previous exam.   Signed by Dr Sumaya Zelaya on 3/17/2020 7:27 AM       My radiograph interpretation: Endotracheal tube in good position, persistent pulmonary edema, hypoaeration, stable     Pulmonary Assessment:    1. Acute respiratory failure with bilat infiltrates, slightly improved  2. RSV infection supportive care  3. Metabolic acidosis improved, on bicarbonate drip per nephrology now with normal pH  4. Acute renal failure due to acute illness  5. Elevated tsh, hypothyroid    Recommend:     · Day # 8 on vent. Continue current vent settings. He is not ready for vent liberation at this time. Will reassess readiness in the morning  · RSV isolation  · Nutrition on hold for high residuals    · Antibiotics   · Diuresis    · Continue bronchodilators     Addendum: I was called by the nursing who reported the peak pressures were staying around 90. She states she suctioned him and they dropped to 70. I presented to the room with ANDERS Mckeon and a plateu pressure was noted to be 25. Adjustments were made to flow decreased to 55 and then 50 with plateu of 19. Peep left at 8 and Fio2 left at 50.      Electronically signed by Lester Brower on 3/20/2020 at 7:18 AM      Physician Substantive portion:  Pt essentially unchanged, still on rsv isolation,no vent dyssynchrony,  Breath sounds diminished. Cxr shows bilat effusions. Wide gradient between peak and plateau pressures. flow decreased and recommend continue aggressive suction and tubing maintenance. Continue support. Discussed overall progress with his wife. Discussed ltac vs hospice as ultimate disposition. Acidosis resolved with mechanical ventilation and interventions by nephrology. I have seen and examined patient personally, performing a face-to-face diagnostic evaluation with plan of care reviewed and developed with APRN and nursing staff. I have addended and/or modified the above history of present illness, physical examination, and assessment and plan to reflect my findings and impressions. Essential elements of the care plan were discussed with APRN above. Agree with findings and assessment/plan as documented above.     Electronically signed by Wilfredo Yassine, on 3/20/2020, 9:28 PM

## 2020-03-20 NOTE — PROGRESS NOTES
Nephrology ([facility]) Progress Note      Patient:  Surekha Glasgow  YOB: 1950  Date of Service: 3/20/2020  MRN: [id number]   Acct: [de-identified]   Primary Care Physician: Soun Mckeon MD  Advance Directive: [facility]  Admit Date: 3/13/2020       Hospital Day: 7  Referring Provider: Malika Betts DO    Patient independently seen and examined, Chart, Consults, Notes, Operative notes, Labs, Cardiology, and Radiology studies reviewed as able. Chief complaint: Abnormal labs. Subjective:  Suerkha Glasgow is a 71 y.o. male  whom we were consulted for acute kidney injury/metabolic acidosis. All the information's are taken from the chart as he is currently intubated and sedated. His baseline serum creatinine 0.9 mg about 2 months ago. He presented to the emergency room with increasing shortness of breath and hypoxemic respiratory failure. Chest x-ray shows patient has bilateral lower lobe consolidation and pulmonary edema. CT angiogram of the pulmonary arteries in the emergency room consistent with no evidence of pulmonary embolism. He is currently admitted to CCU, intubated sedated on a couple of pressors as he has septic shock. He has acute kidney injury and nephrology is consulted. Otherwise he has history of COPD, coronary artery disease and pacemaker implant. Hospital course remarkable for IV fluids resuscitation/worsening of renal function but has excellent urine output. Patient remained intubated/sedated. He is non-oliguric. No new overnight events.      Allergies:  Codeine    Medicines:  Current Facility-Administered Medications   Medication Dose Route Frequency Provider Last Rate Last Dose    vancomycin (VANCOCIN) 1,750 mg in dextrose 5 % 500 mL IVPB  1,750 mg Intravenous Q18H Shwetha Doll  mL/hr at 03/20/20 0920 1,750 mg at 03/20/20 0920    potassium chloride 20 mEq/50 mL IVPB (Central Line)  20 mEq Intravenous PRN Mason Robert MD 50 mL/hr at 03/19/20 0719 20 mEq at 03/19/20 0719    metoprolol (LOPRESSOR) injection 7.5 mg  7.5 mg Intravenous Q4H Terryer Singh, DO   7.5 mg at 03/20/20 9364    acetaZOLAMIDE (DIAMOX) 250 mg in dextrose 5 % 100 mL IVPB  250 mg Intravenous Q8H Bhargav Singh, DO 0 mL/hr at 03/20/20 0419 250 mg at 03/20/20 0920    lidocaine PF 1 % injection 5 mL  5 mL Intradermal Once Christpateler Francisco, DO        sodium chloride flush 0.9 % injection 10 mL  10 mL Intravenous 2 times per day Bhargav iSngh, DO   10 mL at 03/20/20 0310    sodium chloride flush 0.9 % injection 10 mL  10 mL Intravenous PRN Bhargav Singh, DO        potassium chloride 40 mEq in dextrose 5 % 1,000 mL infusion   Intravenous Continuous Bhargav Singh,  mL/hr at 03/20/20 3624      levothyroxine (SYNTHROID) tablet 50 mcg  50 mcg Oral Daily Bhargav Singh, DO   50 mcg at 03/20/20 9059    metoclopramide (REGLAN) injection 10 mg  10 mg Intravenous Q6H Bhargav Singh, DO   10 mg at 03/20/20 0919    piperacillin-tazobactam (ZOSYN) 3.375 g in dextrose 5 % 50 mL IVPB extended infusion (mini-bag)  3.375 g Intravenous Q8H Bhargav Singh, DO   Stopped at 03/20/20 0900    hydrocortisone sodium succinate PF (SOLU-CORTEF) injection 50 mg  50 mg Intravenous Daily Arpita Roy MD   50 mg at 03/20/20 0853    dilTIAZem 125 mg in dextrose 5 % 125 mL infusion  10 mg/hr Intravenous Continuous Bhargav Singh, DO 2.5 mL/hr at 03/19/20 1841 2.5 mg/hr at 03/19/20 1841    heparin (porcine) injection 9,690 Units  80 Units/kg Intravenous PRN Ivie Cooks, MD   9,690 Units at 03/20/20 0433    heparin (porcine) injection 4,840 Units  40 Units/kg Intravenous PRN Ivie Cooks, MD   4,700 Units at 03/19/20 0849    heparin 25,000 units in dextrose 5% 250 mL infusion  18 Units/kg/hr Intravenous Continuous Euniceie Cooks, MD 17 mL/hr at 03/20/20 0436 14 Units/kg/hr at 03/20/20 0436    morphine injection 1 mg  1 mg Family History  Family History   Problem Relation Age of Onset    Breast Cancer Mother     Colon Cancer Father     Colon Polyps Father     Prostate Cancer Brother        Social History  Social History     Socioeconomic History    Marital status:      Spouse name: Not on file    Number of children: Not on file    Years of education: Not on file    Highest education level: Not on file   Occupational History    Occupation: maintenance chemical plant   Social Needs    Financial resource strain: Not on file    Food insecurity     Worry: Not on file     Inability: Not on file    Transportation needs     Medical: Not on file     Non-medical: Not on file   Tobacco Use    Smoking status: Never Smoker    Smokeless tobacco: Never Used   Substance and Sexual Activity    Alcohol use: Yes    Drug use: Never    Sexual activity: Yes     Partners: Female   Lifestyle    Physical activity     Days per week: Not on file     Minutes per session: Not on file    Stress: Not on file   Relationships    Social connections     Talks on phone: Not on file     Gets together: Not on file     Attends [employer] service: Not on file     Active member of club or organization: Not on file     Attends meetings of clubs or organizations: Not on file     Relationship status: Not on file    Intimate partner violence     Fear of current or ex partner: Not on file     Emotionally abused: Not on file     Physically abused: Not on file     Forced sexual activity: Not on file   Other Topics Concern    Not on file   Social History Narrative    Not on file         Review of Systems:  unAble to obtain as he is intubated and sedated.     Objective:  Patient Vitals for the past 24 hrs:   BP Temp Temp src Pulse Resp SpO2   03/20/20 1016 -- -- -- 82 16 96 %   03/20/20 0900 (!) 116/58 -- -- 87 15 95 %   03/20/20 0800 102/71 97.9 °F (36.6 °C) Axillary 87 14 94 %   03/20/20 0700 94/81 -- -- 120 16 93 %   03/20/20 0637 -- -- -- 117 22 94 %   03/20/20 0600 122/73 -- -- 103 17 95 %   03/20/20 0500 122/67 -- -- 108 18 95 %   03/20/20 0437 -- -- -- -- 19 95 %   03/20/20 0400 (!) 144/110 -- -- 111 20 94 %   03/20/20 0300 133/67 -- -- 113 20 94 %   03/20/20 0218 -- -- -- 90 14 95 %   03/20/20 0216 -- -- -- -- 14 95 %   03/20/20 0200 116/70 -- -- 107 18 94 %   03/20/20 0100 (!) 104/57 -- -- 85 15 95 %   03/20/20 0000 110/68 98.4 °F (36.9 °C) Axillary 87 17 94 %   03/19/20 2300 114/69 -- -- 103 17 94 %   03/19/20 2200 -- -- -- -- -- 90 %   03/19/20 2100 116/72 -- -- 99 18 95 %   03/19/20 2000 110/64 98.9 °F (37.2 °C) Axillary 114 18 92 %   03/19/20 1900 106/64 -- -- 106 16 94 %   03/19/20 1800 113/73 -- -- 87 17 95 %   03/19/20 1700 105/68 -- -- 76 16 97 %   03/19/20 1600 116/61 97.8 °F (36.6 °C) Temporal 93 17 94 %   03/19/20 1502 (!) 98/56 -- -- 108 16 91 %   03/19/20 1431 -- -- -- 95 17 (!) 88 %   03/19/20 1400 115/60 -- -- 99 19 92 %   03/19/20 1300 102/67 -- -- 96 18 97 %   03/19/20 1200 106/65 98.7 °F (37.1 °C) Temporal 107 17 97 %   03/19/20 1102 102/61 -- -- 120 17 96 %       Intake/Output Summary (Last 24 hours) at 3/20/2020 1018  Last data filed at 3/20/2020 0700  Gross per 24 hour   Intake 2715.37 ml   Output 4040 ml   Net -1324.63 ml     General: Intubated/sedated. HEENT: Normocephalic atraumatic head/orotracheal intubation. Neck: Supple with no JVD or carotid bruits. Chest:  rales present-bilaterally at the bases.   CVS: regular rate and rhythm  Abdominal: soft, nontender, normal bowel sounds  Extremities: no cyanosis or edema  Skin: warm and dry without rash      Labs:  BMP:   Recent Labs     03/18/20  0215  03/19/20  0245  03/19/20  1653 03/20/20  0422 03/20/20  0522   *  --  150*  --   --   --  144   K 3.5   < > 3.3*   < > 3.6 3.4 3.6     --  105  --   --   --  104   CO2 29  --  31*  --   --   --  27   BUN 51*  --  47*  --   --   --  38*   CREATININE 1.7*  --  1.4*  --   --   --  1.2   CALCIUM 8.2*  --  8.7*  --   --   --

## 2020-03-20 NOTE — PROGRESS NOTES
Rectal tube inserted with 45 cc water in bulb. Pt having constant leakage of watery stool that looks somewhat blood-tinged. AFter insertion, there was some bright red blood that did appear to resolve. Pt is on heparin drip for Afib.

## 2020-03-21 ENCOUNTER — OUTSIDE FACILITY SERVICE (OUTPATIENT)
Dept: PULMONOLOGY | Facility: CLINIC | Age: 70
End: 2020-03-21

## 2020-03-21 PROCEDURE — 99233 SBSQ HOSP IP/OBS HIGH 50: CPT | Performed by: INTERNAL MEDICINE

## 2020-03-21 NOTE — PROGRESS NOTES
Hospitalist Progress Note    Patient:  Brittany Lindquist  YOB: 1950  Date of Service: 3/21/2020  MRN: [id number]   Acct: [de-identified]   Primary Care Physician: León Banuelos MD  Advance Directive: [facility]  Admit Date: 3/13/2020       Hospital Day: 8  Referring Provider: Natividad Snow DO    Patient Seen, Chart, Consults, Notes, Labs, Radiology studies reviewed. Subjective:  Brittany Lindquist is a 71 y.o. male  whom we are following for RSV pneumonia, hypoxemic respiratory failure. Tube feeds were restarted at a low dose. He seems to be tolerating at this point. Still not making any progress from the standpoint of weaning. We are approaching a transitional point where we may need to look at trach and PEG versus withdrawal of care.     Allergies:  Codeine    Medicines:  Current Facility-Administered Medications   Medication Dose Route Frequency Provider Last Rate Last Dose    vancomycin (VANCOCIN) 1,750 mg in dextrose 5 % 500 mL IVPB  1,750 mg Intravenous Q18H Jose R Barney MD   Stopped at 03/21/20 0415    potassium chloride 20 mEq/50 mL IVPB (Central Line)  20 mEq Intravenous PRN Tierra Campos MD 50 mL/hr at 03/19/20 0719 20 mEq at 03/19/20 0719    metoprolol (LOPRESSOR) injection 7.5 mg  7.5 mg Intravenous Q4H Natividad Bellemont, DO   7.5 mg at 03/21/20 0959    acetaZOLAMIDE (DIAMOX) 250 mg in dextrose 5 % 100 mL IVPB  250 mg Intravenous Q8H Natividad Bellemont, DO 0 mL/hr at 03/21/20 0311 250 mg at 03/21/20 1119    lidocaine PF 1 % injection 5 mL  5 mL Intradermal Once Natividad Bellemont, DO        sodium chloride flush 0.9 % injection 10 mL  10 mL Intravenous 2 times per day Natividad Edy, DO   10 mL at 03/21/20 1000    sodium chloride flush 0.9 % injection 10 mL  10 mL Intravenous PRN Natividad Edy, DO        potassium chloride 40 mEq in dextrose 5 % 1,000 mL infusion   Intravenous Continuous Natividad Edy, DO   Stopped at 03/21/20 1204    levothyroxine (SYNTHROID) tablet 50 mcg  50 mcg Oral Daily Lenor Goodpasture, DO   50 mcg at 03/21/20 0631    metoclopramide (REGLAN) injection 10 mg  10 mg Intravenous Q6H Lenor Goodpasture, DO   10 mg at 03/21/20 1000    piperacillin-tazobactam (ZOSYN) 3.375 g in dextrose 5 % 50 mL IVPB extended infusion (mini-bag)  3.375 g Intravenous Q8H Lenor Goodpasture, DO 12.5 mL/hr at 03/21/20 1204 3.375 g at 03/21/20 1204    hydrocortisone sodium succinate PF (SOLU-CORTEF) injection 50 mg  50 mg Intravenous Daily Rasheeda Castano MD   50 mg at 03/21/20 0959    dilTIAZem 125 mg in dextrose 5 % 125 mL infusion  10 mg/hr Intravenous Continuous Lenor Goodpasture, DO   Stopped at 03/20/20 2000    heparin (porcine) injection 9,690 Units  80 Units/kg Intravenous PRN Rahul Orozco MD   9,690 Units at 03/20/20 0433    heparin (porcine) injection 4,840 Units  40 Units/kg Intravenous PRN Rahul Orozco MD   4,700 Units at 03/19/20 0849    heparin 25,000 units in dextrose 5% 250 mL infusion  18 Units/kg/hr Intravenous Continuous Rahul Orozco MD 19.4 mL/hr at 03/21/20 0628 16 Units/kg/hr at 03/21/20 9427    morphine injection 1 mg  1 mg Intravenous Q4H PRN Kavon Goddard MD   1 mg at 03/14/20 0641    vancomycin (VANCOCIN) intermittent dosing (placeholder)   Other RX Placeholder Dayna Horn MD        insulin lispro (HUMALOG) injection vial 0-6 Units  0-6 Units Subcutaneous TID WC Rasheeda Castano MD   1 Units at 03/20/20 1218    insulin lispro (HUMALOG) injection vial 0-3 Units  0-3 Units Subcutaneous Nightly Rasheeda Csatano MD   Stopped at 03/19/20 2113    glucose (GLUTOSE) 40 % oral gel 15 g  15 g Oral PRN Rasheeda Castano MD        dextrose 50 % IV solution  12.5 g Intravenous PRN Rasheeda Castano MD        glucagon (rDNA) injection 1 mg  1 mg Intramuscular PRN Rasheeda Castano MD        dextrose 5 % solution  100 mL/hr Intravenous PRN Rasheeda Castano MD        ipratropium-albuterol (DUONEB) nebulizer solution 1 ampule  1 ampule Inhalation Q4H Wilfredo Metzger MD   1 ampule at 03/21/20 1032    propofol injection  10 mcg/kg/min Intravenous Titrated Jose Reina MD 20.8 mL/hr at 03/21/20 1121 30 mcg/kg/min at 03/21/20 1121       Past Medical History:  Past Medical History:   Diagnosis Date    Anemia     Anxiety     Back pain     Cellulitis     Depression     Elevated triglycerides with high cholesterol     GERD (gastroesophageal reflux disease)     Hypertension     Obstructive chronic bronchitis without exacerbation ([address] 6/10/2019    Osteoarthritis     Pacemaker 04/21/2017    Palliative care patient 03/13/2020    Sleep apnea, unspecified 11/22/2019       Past Surgical History:  Past Surgical History:   Procedure Laterality Date    COLONOSCOPY  11/18/11        JOINT REPLACEMENT      Bilateral total knees    NECK SURGERY      PACEMAKER PLACEMENT      TOTAL KNEE ARTHROPLASTY      RIGHT AND LEFT KNEE    UPPER GASTROINTESTINAL ENDOSCOPY  2/2012           Family History  Family History   Problem Relation Age of Onset    Breast Cancer Mother     Colon Cancer Father     Colon Polyps Father     Prostate Cancer Brother        Social History  Social History     Socioeconomic History    Marital status:      Spouse name: Not on file    Number of children: Not on file    Years of education: Not on file    Highest education level: Not on file   Occupational History    Occupation: maintenance chemical plant   Social Needs    Financial resource strain: Not on file    Food insecurity     Worry: Not on file     Inability: Not on file    Transportation needs     Medical: Not on file     Non-medical: Not on file   Tobacco Use    Smoking status: Never Smoker    Smokeless tobacco: Never Used   Substance and Sexual Activity    Alcohol use:  Yes    Drug use: Never    Sexual activity: Yes     Partners: Female   Lifestyle    Physical activity     Days per week: Not on CALCIUM 8.7*  --  8.4*  --  8.4*    < > = values in this interval not displayed. CBC:   Recent Labs     03/19/20 0245 03/20/20 0522 03/21/20 0420   WBC 9.5 13.0* 14.3*   HGB 14.0 12.5* 12.0*   HCT 44.1 39.0* 38.0*   MCV 90.9 91.3 93.1    239 302  297     LIVER PROFILE:   Recent Labs     03/19/20 0245 03/20/20 0522 03/21/20 0420   AST 33 31 26   ALT 29 24 23   BILITOT 0.6 0.5 0.6   ALKPHOS 198* 154* 142*     PT/INR: No results for input(s): PROTIME, INR in the last 72 hours. APTT:   Recent Labs     03/20/20  2145 03/21/20 0420 03/21/20  1010   APTT 48.8* 48.3* 58.0*     BNP:  No results for input(s): BNP in the last 72 hours. Ionized Calcium:No results for input(s): IONCA in the last 72 hours. Magnesium:  Recent Labs     03/19/20 0245 03/20/20 0522   MG 2.7* 2.7*     Phosphorus:No results for input(s): PHOS in the last 72 hours. HgbA1C: No results for input(s): LABA1C in the last 72 hours. Hepatic:   Recent Labs     03/19/20 0245 03/20/20 0522 03/21/20 0420   ALKPHOS 198* 154* 142*   ALT 29 24 23   AST 33 31 26   PROT 6.6 6.5* 6.3*   BILITOT 0.6 0.5 0.6   LABALBU 2.9* 2.9* 2.8*     Lactic Acid: No results for input(s): LACTA in the last 72 hours. Troponin: No results for input(s): CKTOTAL, CKMB, TROPONINT in the last 72 hours. ABGs: No results for input(s): PH, PCO2, PO2, HCO3, O2SAT in the last 72 hours. CRP:  No results for input(s): CRP in the last 72 hours. Sed Rate:  No results for input(s): SEDRATE in the last 72 hours. Cultures:   No results for input(s): CULTURE in the last 72 hours. No results for input(s): BC, Raymona Blind in the last 72 hours. No results for input(s): CXSURG in the last 72 hours.     Radiology reports as per the Radiologist  Radiology: Us Renal Complete    Result Date: 3/14/2020  US RENAL COMPLETE 3/14/2020 6:00 AM HISTORY: Elevated creatinine COMPARISON: None  TECHNIQUE: Multiple longitudinal and transverse realtime sonographic images of the kidneys and urinary bladder are obtained. FINDINGS: The right kidney measures 12.0 x 5.7 x 6.9 cm. The right kidney is normal in size, shape, contour and position. 2.1 cm simple cyst arising from the upper pole. The cortical thickness is normal, with preservation of the corticomedullary differentiation. The central echo complex is compact with no evidence for hydronephrosis. No nephrolithiasis or abnormal perinephric fluid collections are seen. No hydroureter. The left kidney measures 9.8 x 5.7 x 5.4 cm. The left kidney is normal in size, shape, contour and position. The cortical thickness is normal, with preservation of the corticomedullary differentiation. The central echo complex is compact with no evidence for hydronephrosis. No nephrolithiasis or abnormal perinephric fluid collections are seen. No hydroureter. Urinary bladder is decompressed by Hernández catheter. 1. Small simple right renal cyst. Kidneys are otherwise unremarkable. 2. Urinary bladder is decompressed by Hernández catheter. Signed by Dr Carina Barbosa on 3/14/2020 1:02 PM    Xr Chest Portable    Result Date: 3/14/2020  XR CHEST PORTABLE 3/14/2020 4:15 AM HISTORY: Mechanical ventilation Comparison: 3/13/2020 Findings: Endotracheal tube is in stable position. Reidentified bilateral pulmonary edema, stable or perhaps slightly decreased. The cardiomediastinal silhouette and pulmonary vascularity are unchanged. Left chest wall dual chamber pacemaker. Anterior cervical fusion hardware. No acute osseous or soft tissue abnormality is noted. Impression: 1. Reidentified pulmonary edema, stable or perhaps slightly decreased. Otherwise unchanged. Signed by Dr Carina Barbosa on 3/14/2020 7:22 AM    Xr Chest Portable    Result Date: 3/13/2020  XR CHEST PORTABLE 3/13/2020 11:45 AM HISTORY: Status post intubation COMPARISON: 3/13/2020. FINDINGS: Status post intubation with endotracheal tube tip projecting 5.3 cm above the sasha.  Reidentified bilateral diffuse lung opacities, not significant changed. Lungs appear to be well expanded. No pleural effusions or pneumothorax identified. Heart size is stable. Left chest wall dual chamber pacemaker identified. 1. Status post intubation with lungs appearing well expanded. Tube is in good position. 2. Reidentified stable bilateral diffuse lung opacities, favor pulmonary edema. Signed by Dr Ericka Alcocer on 3/13/2020 12:56 PM    Xr Chest Portable    Result Date: 3/13/2020  EXAM: XR CHEST PORTABLE -- 3/13/2020 11:15 AM HISTORY: 69 years, Male, shortness of breath COMPARISON: 2/18/2020 TECHNIQUE:  1 images. Frontal view of the chest. FINDINGS:  Diffuse bilateral pulmonary opacities. No pneumothorax or large pleural effusion. Borderline cardiac silhouette. Upper mediastinum within normal limits. Cardiac pulse generator at the left chest with 2 grossly intact leads. 1. Diffuse bilateral pulmonary opacities, which could represent edema or infection. Signed by Dr Anand Edwards on 3/13/2020 12:30 PM    Cta Pulmonary W Contrast    Result Date: 3/13/2020  Examination. CTA PULMONARY W CONTRAST 3/13/2020 1:00 PM History: Hypoxia and respiratory failure. DLP: 706 mGycm. CT angiography of the chest is performed after intravenous contrast enhancement. The images are acquired in axial plane with subsequent reconstruction in coronal and sagittal plane. The comparison is made with the previous study dated 9/14/2019. The correlation is made with chest radiograph obtained earlier today. There is good opacification of pulmonary artery and the branches bilaterally. No filling defects in the opacified pulmonary arterial bed. Both pulmonary arteries appeared moderately dilated, right more than the left. Atheromatous changes thoracic aorta are seen. Without dilatation or dissection. Coronary arteries are not optimally visualized. Cardiac pacer leads are seen in the right atrium and right ventricle with extensive streak artifacts.  There is a small pericardial effusion. The cardiac chambers does not appear enlarged and is marked thickening of the left ventricle wall. There is suggestion of right ventricular strain. There is a bilaterally symmetrical extensive lung opacities suggesting severe cardiogenic pulmonary edema. This is more severe consolidation in the lower lobes with air bronchograms. There is a small bibasilar pleural effusion. Thyroid gland is not optimally evaluated. There is no axillary lymphadenopathy. The included liver and spleen appear unremarkable. No gallstone. The adrenal glands are normal. Small exophytic nodule from the upper pole of the right kidney is incompletely visualized and evaluated. This is similar to the previous study. No evidence of pulmonary embolism. No aortic aneurysm or dissection. Coronary arteries are not well-visualized for comment. There is a severe cardiogenic pulmonary edema and bilateral lower lobar lung consolidation which may suggest superimposed inflammatory/infectious process. Bilateral small basal pleural effusion. Pulmonary arterial hypertension and right ventricle strain. The above findings are recorded on a digital voice clip in PACS. Signed by Dr Bekah López on 3/13/2020 3:13 PM       Assessment     Acute respiratory failure with hypoxia and hypercapnia  Continue with vent support.     RSV pneumonia. Continue ventilatory support. Antibiotics for secondary infection.     Acute kidney injury. Resolved.     Hypernatremia. Resolved.     Septic shock. Resolved.     F/E/N  Tolerating low dose TFs.       Lenor Goodpasture, DO

## 2020-03-21 NOTE — PROGRESS NOTES
Cardiology Daily Note Pearle Nageotte, MD      Patient:  Randy Jerry  [id number]    Patient Active Problem List    Diagnosis Date Noted    Dyspnea on exertion 11/25/2019     Priority: High    Abnormal nuclear stress test      Priority: High    Precordial pain      Priority: High    Sick sinus syndrome due to SA node dysfunction (HCC)      Priority: High    Septic shock ([facility] [address] 03/13/2020     Priority: Low    Palliative care patient 03/13/2020     Priority: Low    Pneumonia due to organism      Priority: Low    Abnormal brain scan 03/03/2020     Priority: Low    Gait difficulty 02/20/2020     Priority: Low    Weakness 02/18/2020     Priority: Low    Loss of balance 02/14/2020     Priority: Low    Tick bite 02/14/2020     Priority: Low    Sleep apnea, unspecified 11/22/2019     Priority: Low    Sleep disturbance 09/30/2019     Priority: Low    Elevated serum creatinine 09/30/2019     Priority: Low    Diastolic dysfunction 57/66/1875     Priority: Low    Flash pulmonary edema ([facility] [address] 09/30/2019     Priority: Low    Acute respiratory failure with hypoxia and hypercapnia (HCC) 09/14/2019     Priority: Low    Essential hypertension 06/10/2019     Priority: Low    Beryllium disease ([address] 06/10/2019     Priority: Low    SVT (supraventricular tachycardia) ([facility] [address] 09/10/2017     Priority: Low    Pacemaker 04/21/2017     Priority: Low    Encounter for colonoscopy due to history of adenomatous colonic polyps 11/24/2014     Priority: Low    NSAID long-term use 03/04/2014     Priority: Low    GERD (gastroesophageal reflux disease) 03/04/2013     Priority: Low    History of esophageal ulcer 03/04/2013     Priority: Low    History of adenomatous polyp of colon 03/04/2013     Priority: Low    Patulous lower esophageal sphincter 03/04/2013     Priority: Low    Hiatal hernia 03/04/2013     Priority: Low    Osteoarthritis 03/04/2013     Priority: Low    Chronic pain 03/04/2013     Priority: Low       Admit Date: 3/13/2020    Admission Problem List: Present on Admission:   Septic shock ([facility] [address]   Palliative care patient   Dyspnea on exertion   Acute respiratory failure with hypoxia and hypercapnia (HCC)   Flash pulmonary edema (HCC)   Pneumonia due to organism      Cardiac Specific Data:  Specialty Problems        Cardiology Problems    Precordial pain        Sick sinus syndrome due to SA node dysfunction (HCC)        SVT (supraventricular tachycardia) (HCC)        Essential hypertension              Subjective:  Mr. Miguelito Cali seen today remains intubated and sedated remains in atrial fibrillation rate controlled no new cardiac issues reported    Objective:   /70   Pulse 107   Temp 97.6 °F (36.4 °C) (Axillary)   Resp 24   Ht 5' 8\" (1.727 m)   Wt 226 lb 1.6 oz (102.6 kg)   SpO2 95%   BMI 34.38 kg/m²       Intake/Output Summary (Last 24 hours) at 3/21/2020 1323  Last data filed at 3/21/2020 1204  Gross per 24 hour   Intake 5035.45 ml   Output 5000 ml   Net 35.45 ml       Prior to Admission medications    Medication Sig Start Date End Date Taking? Authorizing Provider   SYNTHROID 50 MCG tablet Take 1 tablet by mouth Daily 3/5/20   Sylvia Blair MD   cloNIDine (CATAPRES) 0.3 MG tablet Take 1 tablet by mouth 2 times daily 2/23/20   Joby Jaimes MD   carbidopa-levodopa (SINEMET)  MG per tablet Take 1 tablet by mouth 3 times daily  Patient not taking: Reported on 3/4/2020 2/23/20   Joby Jaimes MD   busPIRone (BUSPAR) 10 MG tablet TAKE 1 TABLET BY MOUTH THREE TIMES A DAY 2/17/20   Sylvia Blair MD   pantoprazole (PROTONIX) 40 MG tablet TAKE 1 TABLET BY MOUTH EVERY MORNING BEFORE BREAKFAST 1/28/20   Sylvia Blair MD   nitroGLYCERIN (NITROSTAT) 0.3 MG SL tablet up to max of 3 total doses.  If no relief after 1 dose, call 911. 12/19/19   Ethan Wilson MD   furosemide (LASIX) 40 MG tablet Take 1 tablet by mouth as needed (fluid retention) 11/25/19   Ethan Wilson MD   rOPINIRole (REQUIP) 1 MG tablet Take 1 tablet by mouth nightly 11/8/19 12/8/19  Corina Bazan MD   ipratropium-albuterol (DUONEB) 0.5-2.5 (3) MG/3ML SOLN nebulizer solution Inhale 1 vial into the lungs nightly    Historical Provider, MD   celecoxib (CELEBREX) 200 MG capsule TAKE 1 CAPSULE BY MOUTH EVERY DAY 9/12/19   ANDERS Dale   losartan (COZAAR) 100 MG tablet Take 1 tablet by mouth daily 9/12/19   ANDERS Dale   FLUoxetine (PROZAC) 20 MG capsule Take 1 capsule by mouth daily Take 40mg along with 20mg capsule daily 9/12/19   ANDERS Dale   FLUoxetine (PROZAC) 40 MG capsule Take 1 capsule by mouth daily 9/12/19   ANDERS Dale   NIFEdipine (PROCARDIA XL) 30 MG extended release tablet Take 1 tablet by mouth daily 8/16/18   Prasanna Hernández MD   albuterol sulfate  (90 Base) MCG/ACT inhaler Inhale 2 puffs into the lungs every 6 hours as needed for Wheezing 4/17/18   Prasanna Hernández MD   mometasone-formoterol [facility]) 200-5 MCG/ACT inhaler INHALE 2 PUFFS INTO THE LUNGS EVERY 12 HOURS 12/14/17   Prasanna Hernández MD        vancomycin  1,750 mg Intravenous Q18H    metoprolol  7.5 mg Intravenous Q4H    acetaZOLAMIDE (DIAMOX) IVPB  250 mg Intravenous Q8H    lidocaine 1 % injection  5 mL Intradermal Once    sodium chloride flush  10 mL Intravenous 2 times per day    levothyroxine  50 mcg Oral Daily    metoclopramide  10 mg Intravenous Q6H    piperacillin-tazobactam  3.375 g Intravenous Q8H    hydrocortisone sodium succinate PF  50 mg Intravenous Daily    vancomycin (VANCOCIN) intermittent dosing (placeholder)   Other RX Placeholder    insulin lispro  0-6 Units Subcutaneous TID WC    insulin lispro  0-3 Units Subcutaneous Nightly    ipratropium-albuterol  1 ampule Inhalation Q4H       TELEMETRY: Atrial fibrillation     Physical Exam:      Physical Exam      General: Intubated and sedated   skin:  Warm and dry  Neck:  no jvd , no carotid bruits  Chest:  Clear to auscultation, no wheezing or rales  Cardiovascular:  IRRR [id number] no murmurs, clicks, gallups, or rubs  Abdomen:  Soft nontender, nondistended, bowel sounds present  Extremities:  Edema: none       Lab Data:  CBC:   Recent Labs     03/19/20  0245 03/20/20  0522 03/21/20  0420   WBC 9.5 13.0* 14.3*   HGB 14.0 12.5* 12.0*   HCT 44.1 39.0* 38.0*   MCV 90.9 91.3 93.1    239 302  297     BMP:   Recent Labs     03/19/20  0245  03/20/20  0522 03/21/20  0416 03/21/20  0420   *  --  144  --  139   K 3.3*   < > 3.6 3.7 3.8     --  104  --  105   CO2 31*  --  27  --  21*   BUN 47*  --  38*  --  21   CREATININE 1.4*  --  1.2  --  0.9    < > = values in this interval not displayed. LIVER PROFILE:   Recent Labs     03/19/20  0245 03/20/20  0522 03/21/20  0420   AST 33 31 26   ALT 29 24 23   BILITOT 0.6 0.5 0.6   ALKPHOS 198* 154* 142*     PT/INR: No results for input(s): PROTIME, INR in the last 72 hours. APTT:   Recent Labs     03/20/20  2145 03/21/20  0420 03/21/20  1010   APTT 48.8* 48.3* 58.0*     BNP:  No results for input(s): BNP in the last 72 hours. CK, CKMB, Troponin: @LABRCNT (CKTOTAL:3, CKMB:3, TROPONINI:3)@    IMAGING:  Xr Abdomen (kub) (single Ap View)    Result Date: 3/19/2020  XR ABDOMEN (KUB) (SINGLE AP VIEW) 3/19/2020 10:30 AM HISTORY: Possible ileus. COMPARISON: 3/15/2020. FINDINGS: Frontal supine radiograph of the abdomen was obtained. A right femoral venous catheter is present. A Hernández catheter is also noted. There is a nonobstructive bowel gas pattern. No pathologic calcification or organomegaly is visualized. Evaluation for free intraperitoneal air is limited on a supine radiograph, but there are no definite findings of free intraperitoneal air. Degenerative changes are seen in the spine. 1. No radiographic evidence of acute abdominopelvic process. The previously noted gaseous distention of the small bowel is not visualized on this supine radiograph.   Signed by Dr Conn Stable on exam. Signed by Dr Nae Vanegas on 3/21/2020 7:41 AM    Xr Chest Portable    Result Date: 3/20/2020  EXAMINATION: XR CHEST PORTABLE 3/20/2020 6:58 AM HISTORY: XR CHEST PORTABLE 3/20/2020 5:00 AM HISTORY: Patient on ventilator COMPARISON: March 19, 2020. FINDINGS: Increase in density in the lung bases is noted. Most likely bilateral posterior pleural effusions. This is increased compared to March 19, 2020. Payton Walker The cardiac silhouette is normal. Endotracheal tube nasogastric tubes are satisfactorily positioned. Right PIC catheter is present. Pacing device is present in the soft tissues the left chest.. The osseous structures and surrounding soft tissues demonstrate no acute abnormality. 1. Increase in density in the lung bases. Most likely this is bilateral os tear pleural effusions. Signed by Dr Srini Barrera on 3/20/2020 6:59 AM    Xr Chest Portable    Result Date: 3/19/2020  Examination. XR CHEST PORTABLE 3/19/2020 12:00 PM History: PICC line location. A frontal portable semiupright view of the chest is compared with the previous study dated 3/19/2020. There is a persistent pulmonary vascular congestion, left lower lobar consolidation and left basal pleural effusion. There is moderate cardiomegaly. Endotracheal tube and nasogastric tubes are in stable position. A right-sided PICC line is seen with distal end just below the atriocaval junction. Hardware fusion of the lower cervical spine is noted. The distal end of the right-sided PICC line is seen just below the atriocaval junction in the right atrium. There is a persistent moderate pulmonary vascular congestion, left lower lobar consolidation and left basal pleural effusion. Signed by Dr Benigno Camp on 3/19/2020 1:23 PM    Xr Chest Portable    Result Date: 3/19/2020  XR CHEST PORTABLE 3/19/2020 5:00 AM HISTORY: Intubated COMPARISON: 3/18/2020 FINDINGS: Lines and tubes are stable in position.  Previously seen opacities have improved since the previous study, and no new opacities are noted. The cardiomediastinal silhouette and pulmonary vascularity are unchanged. No acute osseous or soft tissue abnormality is noted. 1. Moderate interval improvement since previous exam. Signed by Dr Grayson Mike on 3/19/2020 7:41 AM    Xr Chest Portable    Result Date: 3/18/2020  Frontal supine radiograph of the chest 3/18/2020 4:15 AM History: Dyspnea Comparison: Chest x-ray dated 3/15/2020 Findings: Lines and tubes are stable in position. Increased bilateral central opacities and bilateral pleural effusions. . Mild-to-moderate cardiomegaly. .  No acute osseous or soft tissue abnormality is noted. Impression: 1. Increased opacities and pleural effusions. May reflect worsening fluid overload and/or infection. Signed by Dr Sandra Bahena on 3/18/2020 8:41 AM    Xr Chest Portable    Result Date: 3/17/2020  Frontal supine radiograph of the chest 3/17/2020 5:00 AM History: Respiratory failure Comparison: Chest x-ray dated 3/16/2020 Findings: Lines and tubes are stable in position. No new opacities or pneumothoraces are visualized in the chest. The cardiomediastinal silhouette and pulmonary vascularity are unchanged. No acute osseous or soft tissue abnormality is noted. Impression: 1. No significant interval change since previous exam. Signed by Dr Sandra Bahena on 3/17/2020 7:27 AM    Xr Chest Portable    Result Date: 3/16/2020  Examination. XR CHEST PORTABLE 3/16/2020 5:00 AM History: Respiratory failure. The patient is on a vent. A single frontal portable upright view of the chest is compared with the previous study dated 3/15/2020. No significant interval change. There is moderate pulmonary vascular congestion and left basal pleural effusion. No pneumothorax The heart size is not evaluated due to the portable projection. . The endotracheal tube is seen in a stable and unchanged position.  The distal end of the nasogastric tube is not visible due to suboptimal exposure of the area. A dual-chamber cardiac pacer is seen in place. The hardware fusion of the lower cervical spine is incompletely visualized. No change. Persistent pulmonary congestion and left basal pleural effusion. The tubes and lines in stable and unchanged position. Signed by Dr Patsy Mcgarry on 3/16/2020 8:05 AM    Xr Chest Portable    Result Date: 3/15/2020  XR CHEST PORTABLE 3/15/2020 12:00 PM HISTORY: NG tube placement Comparison: 3/15/2020 Findings: Exam performed to evaluate new enteric tube. An enteric tube is present in the upper mediastinum. At the level of the cardiac silhouette the tube becomes obscured. Unable to visualize the tube tip. Endotracheal tube is in stable position. Bilateral pulmonary opacities are unchanged. Impression: 1. Enteric tube is obscured by the cardiac silhouette. Unable to confirm tube position. Recommend abdominal/upper abdominal radiograph for tube confirmation. Signed by Dr Earl Kelly on 3/15/2020 1:36 PM    Xr Chest Portable    Result Date: 3/15/2020  XR CHEST PORTABLE 3/15/2020 5:00 AM HISTORY: Respiratory failure Comparison: 3/14/2020 Findings: Mild interval decrease in the underlying pulmonary edema. There are no new or worsening consolidations. No pleural effusion or pneumothorax. Endotracheal tube is in good position. Heart size is stable. Left chest wall dual chamber pacemaker. Impression: 1. Mild interval decrease in the underlying pulmonary edema. Otherwise stable. Signed by Dr Earl Kelly on 3/15/2020 7:24 AM    Xr Chest Portable    Result Date: 3/14/2020  XR CHEST PORTABLE 3/14/2020 4:15 AM HISTORY: Mechanical ventilation Comparison: 3/13/2020 Findings: Endotracheal tube is in stable position. Reidentified bilateral pulmonary edema, stable or perhaps slightly decreased. The cardiomediastinal silhouette and pulmonary vascularity are unchanged. Left chest wall dual chamber pacemaker. Anterior cervical fusion hardware.  No acute osseous or soft tissue creatinine 3.4  8. MRI of the brain 2/18/2020 atrophy small vessel disease no acute infarct  9. Echocardiogram 3/13/2020 normal left ventricular systolic function ejection fraction greater than 50% moderate concentric LVH  10. CTA pulmonary 3/13/2020 no evidence of pulmonary embolism severe cardiogenic pulmonary edema bilateral lower lobar lung consolidation which may suggest superimposed inflammatory/infectious process  11. Lexiscan 12/11/2019 ejection fraction 67% normal perfusion study  12. RSV infection       Plan:  1.  Continue current treatment cardiac status stable    Neo Jewell MD 3/21/2020 1:23 PM

## 2020-03-21 NOTE — PROGRESS NOTES
normal rate, no murmur   Skin:    No rash    Abdomen:     Obese. Soft. No tenderness. No dullness. Positive bowel sounds. Positive bowel sounds   Neuro/psych:  Sedated, on mechanical ventilation. Extremities: No cyanosis, clubbing but edema in legs, +2-3. Results Review:    Recent Labs     03/19/20  0245 03/20/20 0522 03/21/20  0420   WBC 9.5 13.0* 14.3*   RBC 4.85 4.27* 4.08*   HGB 14.0 12.5* 12.0*   HCT 44.1 39.0* 38.0*    239 302  297   MCV 90.9 91.3 93.1   MCH 28.9 29.3 29.4   MCHC 31.7* 32.1* 31.6*   RDW 14.8* 15.1* 14.7*      Recent Labs     03/19/20  0245  03/20/20 0522 03/21/20 0416 03/21/20  0420   *  --  144  --  139   K 3.3*   < > 3.6 3.7 3.8     --  104  --  105   CO2 31*  --  27  --  21*   BUN 47*  --  38*  --  21   CREATININE 1.4*  --  1.2  --  0.9   CALCIUM 8.7*  --  8.4*  --  8.4*   GLUCOSE 128*  --  153*  --  215*    < > = values in this interval not displayed. Recent Labs     03/19/20  1653 03/20/20  0422 03/21/20  0416   PHART 7.500* 7.480* 7.380   XFA6NAM 43.0 39.0 38.0   PO2ART 118.0* 91.0 151.0*   NSZ9ZNM 33.5* 29.0* 22.5   W1PLNZRK 96.6 95.6 96.6   BEART 9.3* 5.2* -2.3*     Recent Labs     03/20/20 0522 03/21/20  0420   AST 31 26   ALT 24 23   ALKPHOS 154* 142*   BILITOT 0.5 0.6   MG 2.7*  --    CALCIUM 8.4* 8.4*     No results for input(s): BC, LABGRAM, CULTRESP, BFCX in the last 72 hours. I reviewed the patient's new clinical results.   I personally viewed and interpreted the patient's CXR        Medication Review:    vancomycin  1,750 mg Intravenous Q18H    metoprolol  7.5 mg Intravenous Q4H    acetaZOLAMIDE (DIAMOX) IVPB  250 mg Intravenous Q8H    lidocaine 1 % injection  5 mL Intradermal Once    sodium chloride flush  10 mL Intravenous 2 times per day    levothyroxine  50 mcg Oral Daily    metoclopramide  10 mg Intravenous Q6H    piperacillin-tazobactam  3.375 g Intravenous Q8H    hydrocortisone sodium succinate PF  50 mg Intravenous Daily    vancomycin (VANCOCIN) intermittent dosing (placeholder)   Other RX Placeholder    insulin lispro  0-6 Units Subcutaneous TID WC    insulin lispro  0-3 Units Subcutaneous Nightly    ipratropium-albuterol  1 ampule Inhalation Q4H        IV infusion builder 50 mL/hr at 03/21/20 1604    dilTIAZem Stopped (03/20/20 2000)    heparin (porcine) 16 Units/kg/hr (03/21/20 0920)    dextrose      propofol 30 mcg/kg/min (03/21/20 1508)       Diagnostic imaging:  I personally and independently reviewed the following images:  CXR 3/21/2020 compared to 3/20/2020:  Bilateral pleural effusion much more prominent on the left. Partial improvement on the right lower base. CT chest 3/13/2020: There is bilateral pulmonary consolidation or citrates. Mild bilateral pleural effusion      Assessment:  1. Acute hypoxic respiratory failure, mild ARDS  2. Diffuse pneumonia, RSV  3. Bilateral pleural effusion  4. Possible superimposed bacterial pneumonia  5. Acute renal failure     Plan      · Mechanical ventilation management: Increase PEEP to 10 due to obesity and ARDS-like picture. · We will perform a weaning trial tomorrow with PEEP 10 and if he tolerates then consider extubation. If he does not tolerate then we will target the pleural effusion and see if we can drain it. · Continue diuresis per nephrology.           Briana Preston  Pulmonary, Critical care and sleep medicine  03/21/20  4:57 PM          This note was dictated utilizing Dragon dictation

## 2020-03-22 ENCOUNTER — OUTSIDE FACILITY SERVICE (OUTPATIENT)
Dept: PULMONOLOGY | Facility: CLINIC | Age: 70
End: 2020-03-22

## 2020-03-22 PROCEDURE — 99233 SBSQ HOSP IP/OBS HIGH 50: CPT | Performed by: INTERNAL MEDICINE

## 2020-03-22 NOTE — PROGRESS NOTES
bowel sounds   Neuro/psych:  Sedated, on mechanical ventilation. Extremities: No cyanosis, clubbing but edema in legs, +2-3. Results Review:    Recent Labs     03/20/20 0522 03/21/20 0420 03/22/20 0325   WBC 13.0* 14.3* 16.0*   RBC 4.27* 4.08* 4.29*   HGB 12.5* 12.0* 12.2*   HCT 39.0* 38.0* 39.2*    302  297 372   MCV 91.3 93.1 91.4   MCH 29.3 29.4 28.4   MCHC 32.1* 31.6* 31.1*   RDW 15.1* 14.7* 14.6*      Recent Labs     03/20/20 0522 03/21/20 0420 03/22/20 0325 03/22/20 0428     --  139 138  --    K 3.6   < > 3.8 3.8 3.7     --  105 106  --    CO2 27  --  21* 20*  --    BUN 38*  --  21 23  --    CREATININE 1.2  --  0.9 0.8  --    CALCIUM 8.4*  --  8.4* 8.8  --    GLUCOSE 153*  --  215* 154*  --     < > = values in this interval not displayed. Recent Labs     03/20/20 0422 03/21/20 0416 03/22/20 0428   PHART 7.480* 7.380 7.380   [id number] 39.0 38.0 33.0*   PO2ART 91.0 151.0* 117.0*   SSS0WYX 29.0* 22.5 19.5*   [id number] 95.6 96.6 96.3   BEART 5.2* -2.3* -4.8*     Recent Labs     03/20/20 0522 03/22/20 0325   AST 31   < > 31   ALT 24   < > 28   ALKPHOS 154*   < > 141*   BILITOT 0.5   < > 0.7   MG 2.7*  --   --    CALCIUM 8.4*   < > 8.8    < > = values in this interval not displayed. No results for input(s): BC, LABGRAM, CULTRESP, BFCX in the last 72 hours. I reviewed the patient's new clinical results.   I personally viewed and interpreted the patient's CXR        Medication Review:    vancomycin  1,250 mg Intravenous Q12H    metoprolol  7.5 mg Intravenous Q4H    lidocaine 1 % injection  5 mL Intradermal Once    sodium chloride flush  10 mL Intravenous 2 times per day    levothyroxine  50 mcg Oral Daily    metoclopramide  10 mg Intravenous Q6H    piperacillin-tazobactam  3.375 g Intravenous Q8H    vancomycin (VANCOCIN) intermittent dosing (placeholder)   Other RX Placeholder    ipratropium-albuterol  1 ampule Inhalation Q4H        heparin (porcine) 16 Units/kg/hr (03/22/20 1047)    dilTIAZem Stopped (03/20/20 2000)    dextrose      propofol 15 mcg/kg/min (03/22/20 1346)       Diagnostic imaging:  I personally and independently reviewed the following images:  CXR 3/21/2020 compared to 3/20/2020:  Bilateral pleural effusion much more prominent on the left. Partial improvement on the right lower base. CT chest 3/13/2020: There is bilateral pulmonary consolidation or citrates. Mild bilateral pleural effusion      CXR 3/22/2020: Persistent vascular congestion and bilateral consolidation/effusion    Assessment:  1. Acute hypoxic respiratory failure, mild ARDS-improving  2. Diffuse pneumonia, RSV  3. Bilateral pleural effusion  4. Possible superimposed bacterial pneumonia  5. Acute renal failure, resolved  6. ICU myopathy/neuropathy  7. Protein calorie malnutrition    Plan      · Mechanical ventilation management: Placed on spontaneous breathing trial with pressure support of 7. He tolerated very well over 1 hour with good RSB I and tidal volume and maintain the minute ventilation around 12 with no respiratory distress. However, he appears to be extremely weak. He was barely able to squeeze with his hands on verbal commands. He could not lift his head off the bed. We will proceed with extubation. He will be at risk of decompensation with aspiration and respiratory failure due to his significant weakness but I do not think this will improve if we keep him on the ventilator. As a matter of fact, his neuropathy/myopathy is going to keep getting worse as long as he stays in the ICU and on mechanical ventilation and sedation. Therefore, we have a chance to get him off the ventilator it is our best shot now. If he does not do well after extubation then he will need to be reintubated if his family wishes and get tracheostomy and go to an LTAC facility for rehab  · I gave him 1 dose of Lasix 40 mg IV prior to extubation.   · Nasal tube feeding post

## 2020-03-22 NOTE — PROGRESS NOTES
[id number]       Past Medical History:  Past Medical History:   Diagnosis Date    Anemia     Anxiety     Back pain     Cellulitis     Depression     Elevated triglycerides with high cholesterol     GERD (gastroesophageal reflux disease)     Hypertension     Ob[facility] 6/10/2019    Osteoarthritis     Pacemaker 04/21/2017    Palliative care patient 03/13/2020    Sleep apnea, unspecified 11/22/2019       Past Surgical History:  Past Surgical History:   Procedure Laterality Date    COLONOSCOPY  11/18/11        JOINT REPLACEMENT      Bilateral total knees    NECK SURGERY      PACEMAKER PLACEMENT      TOTAL KNEE ARTHROPLASTY      RIGHT AND LEFT KNEE    UPPER GASTROINTESTINAL ENDOSCOPY  2/2012           Family History  Family History   Problem Relation Age of Onset    Breast Cancer Mother     Colon Cancer Father     Colon Polyps Father     Prostate Cancer Brother        Social History  Social History     Socioeconomic History    Marital status:      Spouse name: Not on file    Number of children: Not on file    Years of education: Not on file    Highest education level: Not on file   Occupational History    Occupation: maintenance chemical plant   Social Needs    Financial resource strain: Not on file    Food insecurity     Worry: Not on file     Inability: Not on file    Transportation needs     Medical: Not on file     Non-medical: Not on file   Tobacco Use    Smoking status: Never Smoker    Smokeless tobacco: Never Used   Substance and Sexual Activity    Alcohol use:  Yes    Drug use: Never    Sexual activity: Yes     Partners: Female   Lifestyle    Physical activity     Days per week: Not on file     Minutes per session: Not on file    Stress: Not on file   Relationships    Social connections     Talks on phone: Not on file     Gets together: Not on file     Attends Tenriism service: Not on file     Active 03/21/20  0420 03/22/20  0325   WBC 13.0* 14.3* 16.0*   HGB 12.5* 12.0* 12.2*   HCT 39.0* 38.0* 39.2*   MCV 91.3 93.1 91.4    302  297 372     LIVER PROFILE:   Recent Labs     03/20/20  0522 03/21/20 0420 03/22/20  0325   AST 31 26 31   ALT 24 23 28   BILITOT 0.5 0.6 0.7   ALKPHOS 154* 142* 141*     PT/INR: No results for input(s): PROTIME, INR in the last 72 hours. APTT:   Recent Labs     03/21/20  1540 03/21/20 2140 03/22/20 0325   APTT 55.2* 64.8* 58.1*     BNP:  No results for input(s): BNP in the last 72 hours. Ionized Calcium:No results for input(s): IONCA in the last 72 hours. Magnesium:  Recent Labs     03/20/20 0522   MG 2.7*     Phosphorus:No results for input(s): PHOS in the last 72 hours. HgbA1C: No results for input(s): LABA1C in the last 72 hours. Hepatic:   Recent Labs     03/20/20  0522 03/21/20 0420 03/22/20  0325   ALKPHOS 154* 142* 141*   ALT 24 23 28   AST 31 26 31   PROT 6.5* 6.3* 6.5*   BILITOT 0.5 0.6 0.7   LABALBU 2.9* 2.8* 3.0*     Lactic Acid: No results for input(s): LACTA in the last 72 hours. Troponin: No results for input(s): CKTOTAL, CKMB, TROPONINT in the last 72 hours. ABGs: No results for input(s): PH, PCO2, PO2, HCO3, O2SAT in the last 72 hours. CRP:  No results for input(s): CRP in the last 72 hours. Sed Rate:  No results for input(s): SEDRATE in the last 72 hours. Cultures:   No results for input(s): CULTURE in the last 72 hours. No results for input(s): BC, Sallye Callow in the last 72 hours. No results for input(s): CXSURG in the last 72 hours. Radiology reports as per the Radiologist  Radiology: Us Renal Complete    Result Date: 3/14/2020  US RENAL COMPLETE 3/14/2020 6:00 AM HISTORY: Elevated creatinine COMPARISON: None  TECHNIQUE: Multiple longitudinal and transverse realtime sonographic images of the kidneys and urinary bladder are obtained. FINDINGS: The right kidney measures 12.0 x 5.7 x 6.9 cm.  The right kidney is normal in size, shape, contour chest wall dual chamber pacemaker identified. 1. Status post intubation with lungs appearing well expanded. Tube is in good position. 2. Reidentified stable bilateral diffuse lung opacities, favor pulmonary edema. Signed by Dr Migue Huynh on 3/13/2020 12:56 PM    Xr Chest Portable    Result Date: 3/13/2020  EXAM: XR CHEST PORTABLE -- 3/13/2020 11:15 AM HISTORY: 69 years, Male, shortness of breath COMPARISON: 2/18/2020 TECHNIQUE:  1 images. Frontal view of the chest. FINDINGS:  Diffuse bilateral pulmonary opacities. No pneumothorax or large pleural effusion. Borderline cardiac silhouette. Upper mediastinum within normal limits. Cardiac pulse generator at the left chest with 2 grossly intact leads. 1. Diffuse bilateral pulmonary opacities, which could represent edema or infection. Signed by Dr Kayode Cota on 3/13/2020 12:30 PM    Cta Pulmonary W Contrast    Result Date: 3/13/2020  Examination. CTA PULMONARY W CONTRAST 3/13/2020 1:00 PM History: Hypoxia and respiratory failure. DLP: 706 mGycm. CT angiography of the chest is performed after intravenous contrast enhancement. The images are acquired in axial plane with subsequent reconstruction in coronal and sagittal plane. The comparison is made with the previous study dated 9/14/2019. The correlation is made with chest radiograph obtained earlier today. There is good opacification of pulmonary artery and the branches bilaterally. No filling defects in the opacified pulmonary arterial bed. Both pulmonary arteries appeared moderately dilated, right more than the left. Atheromatous changes thoracic aorta are seen. Without dilatation or dissection. Coronary arteries are not optimally visualized. Cardiac pacer leads are seen in the right atrium and right ventricle with extensive streak artifacts. There is a small pericardial effusion. The cardiac chambers does not appear enlarged and is marked thickening of the left ventricle wall.  There is suggestion of right ventricular strain. There is a bilaterally symmetrical extensive lung opacities suggesting severe cardiogenic pulmonary edema. This is more severe consolidation in the lower lobes with air bronchograms. There is a small bibasilar pleural effusion. Thyroid gland is not optimally evaluated. There is no axillary lymphadenopathy. The included liver and spleen appear unremarkable. No gallstone. The adrenal glands are normal. Small exophytic nodule from the upper pole of the right kidney is incompletely visualized and evaluated. This is similar to the previous study. No evidence of pulmonary embolism. No aortic aneurysm or dissection. Coronary arteries are not well-visualized for comment. There is a severe cardiogenic pulmonary edema and bilateral lower lobar lung consolidation which may suggest superimposed inflammatory/infectious process. Bilateral small basal pleural effusion. Pulmonary arterial hypertension and right ventricle strain. The above findings are recorded on a digital voice clip in PACS. Signed by Dr Joao Chavez on 3/13/2020 3:13 PM       Assessment     Acute respiratory failure with hypoxia and hypercapnia  Attempt weaning trial today.     RSV pneumonia. Continue ventilatory support. Antibiotics for secondary infection.     Acute kidney injury. Resolved.     Hypernatremia. Resolved.     Septic shock. Resolved.     F/E/N  Tolerating low dose TFs.       Idalia Potter DO

## 2020-03-22 NOTE — PROGRESS NOTES
Dr. Millicent Daily instructed to turn pt's PEEP down to 5 starting at 1155 which was done. Will continue to closely monitor pt.

## 2020-03-22 NOTE — PROGRESS NOTES
Have weaned, propofol turned off for spontaneous waking trial. Dr. Severa Campi assessed patient and ordered CPAP trial. RT notified.

## 2020-03-22 NOTE — PROGRESS NOTES
3/22/2020  4:29 AM - Cinthya Hanna Incoming Lab Results From [employer] Value Ref Range & Units Status Collected Lab   pH, Arterial 7.380  7.350 - 7.450 Final 03/22/2020  4:28 AM [facility]   pCO2, Arterial 33. 0Low   35.0 - 45.0 mmHg Final 03/22/2020  4:28 AM [facility]   pO2, Arterial 117. 0High   80.0 - 100.0 mmHg Final 03/22/2020  4:28 AM [facility]   HCO3, Arterial 19.5Low   22.0 - 26.0 mmol/L Final 03/22/2020  4:28 AM [facility] Excess, Arterial -4.8Low   -2.0 - 2.0 mmol/L Final 03/22/2020  4:28 AM [facility]   Hemoglobin, Art, Extended 12.5Low   14.0 - 18.0 g/dL Final 03/22/2020  4:28 AM [facility]   O2 Sat, Arterial 96.3  >92 % Final 03/22/2020  4:28 AM [facility]   Carboxyhgb, Arterial 0.2  0.0 - 5.0 % Final 03/22/2020  4:28 AM [facility]        0.0-1.5   (Smokers 1.5-5.0)    Methemoglobin, Arterial 0.5  <1.5 % Final 03/22/2020  4:28 AM [facility]   O2 Content, Arterial 17.1  Not Established mL/dL Final 03/22/2020  4:28 AM [facility]   O2 Therapy Unknown   Final 03/22/2020  4:28 AM [facility]   Testing Performed By     Carmen Pablo Name Director Address Valid Date Range   [address] LAB [place] Britt [place] [address] [address]  [address] 08/30/17 0733-Present     VC 14, , peep +10, 40%, down to 35%  Lt. Rad.  AT unable

## 2020-03-22 NOTE — PROGRESS NOTES
Pharmacy Vancomycin Consult     Vancomycin Day: 9  Current Dosing: Vancomycin 1750 mg IV every 18 hours    Temp max:  97.6    Recent Labs     03/20/20  0522 03/21/20  0420   BUN 38* 21       Recent Labs     03/20/20  0522 03/21/20  0420   CREATININE 1.2 0.9       Recent Labs     03/20/20  0522 03/21/20  0420   WBC 13.0* 14.3*         Intake/Output Summary (Last 24 hours) at 3/21/2020 2245  Last data filed at 3/21/2020 1834  Gross per 24 hour   Intake 3355.2 ml   Output 4375 ml   Net -1019.8 ml       Culture Date Source Results   03/13/20 respiratory                        Rare growth normal respiratory lilliana         Gram Stain Result Many WBC's (Polymorphonuclear) present   Few Gram positive cocci  in pairs       03/13/20 Blood #1 No growth   03/13/20  Blood #2  Coag neg Staph       Ht Readings from Last 1 Encounters:   03/13/20 5' 8\" (1.727 m)        Wt Readings from Last 1 Encounters:   03/21/20 226 lb 1.6 oz (102.6 kg)         Body mass index is 34.38 kg/m². Estimated Creatinine Clearance: 90 mL/min (based on SCr of 0.9 mg/dL). Trough: 12.0    Assessment/Plan: Therapeutic trough. The patients weight has decreased and the serum creatinine has improved. Will change Vancomycin to 1250 mg IV every 12 hours. Level ordered prior to the third dose of this regimen.     Electronically signed by Norbert Monzon, [address] [place] [address] on 3/21/2020 at 10:45 PM

## 2020-03-22 NOTE — PROGRESS NOTES
Evening assessments complete and charted at this time. No s/s of distress. Patient positioned for comfort.  Will continue to monitor

## 2020-03-23 ENCOUNTER — OUTSIDE FACILITY SERVICE (OUTPATIENT)
Dept: PULMONOLOGY | Facility: CLINIC | Age: 70
End: 2020-03-23

## 2020-03-23 PROCEDURE — 99233 SBSQ HOSP IP/OBS HIGH 50: CPT | Performed by: INTERNAL MEDICINE

## 2020-03-23 NOTE — PROGRESS NOTES
Evening assessment complete and charted at this time. Patient awake and slow to respond to verbal commands. Will withdrawal from pain. Will stick out tongue. Patient has slow garbled speech. Family present in room.  Will continue to monitor

## 2020-03-23 NOTE — PROGRESS NOTES
Hospitalist Progress Note    Patient:  Mary Campa  YOB: 1950  Date of Service: 3/23/2020  MRN: [id number]   Acct: [de-identified]   Primary Care Physician: Malu Mcfarlane MD  Advance Directive: [facility]  Admit Date: 3/13/2020       Hospital Day: 10  Referring Provider: Arlie Fleischer, DO    Patient Seen, Chart, Consults, Notes, Labs, Radiology studies reviewed. Subjective:  Mary Campa is a 71 y.o. male  whom we are following for RSV pneumonia, respiratory failure. He was successfully extubated last night. He did well overnight. He is profoundly weak. However he is maintaining his saturation in his airway. He is alert. However not very conversant.     Allergies:  Codeine    Medicines:  Current Facility-Administered Medications   Medication Dose Route Frequency Provider Last Rate Last Dose    scopolamine (TRANSDERM-SCOP) transdermal patch 1 patch  1 patch Transdermal Q72H Zully Zelaya MD   1 patch at 03/22/20 2147    vancomycin (VANCOCIN) 1,250 mg in dextrose 5 % 250 mL IVPB  1,250 mg Intravenous Q12H Narda Pollock MD   Stopped at 03/23/20 1126    heparin (porcine) injection 4,100 Units  40 Units/kg Intravenous PRN Ada Aleman MD        heparin (porcine) injection 8,210 Units  80 Units/kg Intravenous PRN Ada Aleman MD        heparin 25,000 units in dextrose 5% 250 mL infusion  18 Units/kg/hr Intravenous Continuous Ada Aleman MD 16.4 mL/hr at 03/23/20 0037 16 Units/kg/hr at 03/23/20 0037    potassium chloride 20 mEq/50 mL IVPB (Central Line)  20 mEq Intravenous PRN Zully Zelaya MD 50 mL/hr at 03/19/20 0719 20 mEq at 03/19/20 0719    metoprolol (LOPRESSOR) injection 7.5 mg  7.5 mg Intravenous Q4H Arlie Fleischer, DO   7.5 mg at 03/23/20 1110    lidocaine PF 1 % injection 5 mL  5 mL Intradermal Once Arlie Fleischer, DO        sodium chloride flush 0.9 % injection 10 mL  10 mL Intravenous 2 times per day Arlie Fleischer, DO   10 mL at 03/23/20 7550    sodium chloride flush 0.9 % injection 10 mL  10 mL Intravenous PRN Lenor Goodpasture, DO        levothyroxine (SYNTHROID) tablet 50 mcg  50 mcg Oral Daily Lenor Goodpasture, DO   50 mcg at 03/22/20 0618    metoclopramide (REGLAN) injection 10 mg  10 mg Intravenous Q6H Lenor Goodpasture, DO   10 mg at 03/23/20 0959    piperacillin-tazobactam (ZOSYN) 3.375 g in dextrose 5 % 50 mL IVPB extended infusion (mini-bag)  3.375 g Intravenous Q8H Lenor Goodpasture, DO   Stopped at 03/23/20 8350    dilTIAZem 125 mg in dextrose 5 % 125 mL infusion  10 mg/hr Intravenous Continuous Lenor Goodpasture, DO   Stopped at 03/20/20 2000    morphine injection 1 mg  1 mg Intravenous Q4H PRN Kavon Goddard MD   1 mg at 03/21/20 1509    vancomycin (VANCOCIN) intermittent dosing (placeholder)   Other RX Placeholder Dayna Horn MD        glucose (GLUTOSE) 40 % oral gel 15 g  15 g Oral PRN Rasheeda Castano MD        dextrose 50 % IV solution  12.5 g Intravenous PRN Rasheeda Castano MD        glucagon (rDNA) injection 1 mg  1 mg Intramuscular PRN Rasheeda Castano MD        dextrose 5 % solution  100 mL/hr Intravenous PRN Rasheeda Castano MD        ipratropium-albuterol (DUONEB) nebulizer solution 1 ampule  1 ampule Inhalation Q4H Ximena Valle MD   1 ampule at 03/23/20 1111    propofol injection  10 mcg/kg/min Intravenous Titrated Rasheeda Castano MD   Stopped at 03/22/20 1445       Past Medical History:  Past Medical History:   Diagnosis Date    Anemia     Anxiety     Back pain     Cellulitis     Depression     Elevated triglycerides with high cholesterol     GERD (gastroesophageal reflux disease)     Hypertension     Obstructive chronic bronchitis without exacerbation ([facility] 6/10/2019    Osteoarthritis     Pacemaker 04/21/2017    Palliative care patient 03/13/2020    Sleep apnea, unspecified 11/22/2019       Past Surgical History:  Past Surgical History:   Procedure Laterality Date    COLONOSCOPY 11/18/11        JOINT REPLACEMENT      Bilateral total knees    NECK SURGERY      PACEMAKER PLACEMENT      TOTAL KNEE ARTHROPLASTY      RIGHT AND LEFT KNEE    UPPER GASTROINTESTINAL ENDOSCOPY  2/2012           Family History  Family History   Problem Relation Age of Onset    Breast Cancer Mother     Colon Cancer Father     Colon Polyps Father     Prostate Cancer Brother        Social History  Social History     Socioeconomic History    Marital status:      Spouse name: Not on file    Number of children: Not on file    Years of education: Not on file    Highest education level: Not on file   Occupational History    Occupation: maintenance chemical plant   Social Needs    Financial resource strain: Not on file    Food insecurity     Worry: Not on file     Inability: Not on file    Transportation needs     Medical: Not on file     Non-medical: Not on file   Tobacco Use    Smoking status: Never Smoker    Smokeless tobacco: Never Used   Substance and Sexual Activity    Alcohol use: Yes    Drug use: Never    Sexual activity: Yes     Partners: Female   Lifestyle    Physical activity     Days per week: Not on file     Minutes per session: Not on file    Stress: Not on file   Relationships    Social connections     Talks on phone: Not on file     Gets together: Not on file     Attends Anabaptism service: Not on file     Active member of club or organization: Not on file     Attends meetings of clubs or organizations: Not on file     Relationship status: Not on file    Intimate partner violence     Fear of current or ex partner: Not on file     Emotionally abused: Not on file     Physically abused: Not on file     Forced sexual activity: Not on file   Other Topics Concern    Not on file   Social History Narrative    Not on file         Review of Systems:    Review of Systems   Constitutional: Positive for activity change. Negative for fever.    Respiratory: Positive for cough. Negative for shortness of breath. Cardiovascular: Negative for chest pain and leg swelling. Gastrointestinal: Negative for constipation, diarrhea, nausea and vomiting. Genitourinary: Negative for difficulty urinating and dysuria. Musculoskeletal: Positive for gait problem. Negative for back pain and neck pain. Neurological: Positive for weakness. Negative for dizziness and light-headedness. Objective:  Blood pressure (!) 144/65, pulse 79, temperature 98.4 °F (36.9 °C), temperature source Axillary, resp. rate (!) 31, height 5' 8\" (1.727 m), weight 226 lb 1.6 oz (102.6 kg), SpO2 95 %. Intake/Output Summary (Last 24 hours) at 3/23/2020 1134  Last data filed at 3/23/2020 1126  Gross per 24 hour   Intake 1186.55 ml   Output 5150 ml   Net -3963.45 ml       Physical Exam  Vitals signs reviewed. Constitutional:       Appearance: He is well-developed. He is ill-appearing. HENT:      Head: Normocephalic and atraumatic. Eyes:      Conjunctiva/sclera: Conjunctivae normal.      Pupils: Pupils are equal, round, and reactive to light. Cardiovascular:      Rate and Rhythm: Normal rate and regular rhythm. Heart sounds: Normal heart sounds. Pulmonary:      Effort: Pulmonary effort is normal.      Breath sounds: Rhonchi present. Abdominal:      Palpations: Abdomen is soft. Musculoskeletal:         General: Swelling present. Skin:     General: Skin is warm and dry. Neurological:      General: No focal deficit present. Mental Status: He is alert.          Labs:  BMP:   Recent Labs     03/21/20 0420 03/22/20 0325 03/22/20 0428 03/23/20  0340    138  --  141   K 3.8 3.8 3.7 3.5    106  --  108   CO2 21* 20*  --  19*   BUN 21 23  --  27*   CREATININE 0.9 0.8  --  0.9   CALCIUM 8.4* 8.8  --  9.0     CBC:   Recent Labs     03/21/20 0420 03/22/20 0325 03/23/20  0340   WBC 14.3* 16.0* 15.2*   HGB 12.0* 12.2* 11.8*   HCT 38.0* 39.2* 37.5*   MCV 93.1 91.4 90.4   PLT good position. 2. Reidentified stable bilateral diffuse lung opacities, favor pulmonary edema. Signed by Dr Krista Braswell on 3/13/2020 12:56 PM    Xr Chest Portable    Result Date: 3/13/2020  EXAM: XR CHEST PORTABLE -- 3/13/2020 11:15 AM HISTORY: 69 years, Male, shortness of breath COMPARISON: 2/18/2020 TECHNIQUE:  1 images. Frontal view of the chest. FINDINGS:  Diffuse bilateral pulmonary opacities. No pneumothorax or large pleural effusion. Borderline cardiac silhouette. Upper mediastinum within normal limits. Cardiac pulse generator at the left chest with 2 grossly intact leads. 1. Diffuse bilateral pulmonary opacities, which could represent edema or infection. Signed by Dr Frandy Saab on 3/13/2020 12:30 PM    Cta Pulmonary W Contrast    Result Date: 3/13/2020  Examination. CTA PULMONARY W CONTRAST 3/13/2020 1:00 PM History: Hypoxia and respiratory failure. DLP: 706 mGycm. CT angiography of the chest is performed after intravenous contrast enhancement. The images are acquired in axial plane with subsequent reconstruction in coronal and sagittal plane. The comparison is made with the previous study dated 9/14/2019. The correlation is made with chest radiograph obtained earlier today. There is good opacification of pulmonary artery and the branches bilaterally. No filling defects in the opacified pulmonary arterial bed. Both pulmonary arteries appeared moderately dilated, right more than the left. Atheromatous changes thoracic aorta are seen. Without dilatation or dissection. Coronary arteries are not optimally visualized. Cardiac pacer leads are seen in the right atrium and right ventricle with extensive streak artifacts. There is a small pericardial effusion. The cardiac chambers does not appear enlarged and is marked thickening of the left ventricle wall. There is suggestion of right ventricular strain.  There is a bilaterally symmetrical extensive lung opacities suggesting severe cardiogenic pulmonary

## 2020-03-23 NOTE — PROGRESS NOTES
LOS: 10 days   Patient Care Team:  Corina Bazan MD as PCP - General (Internal Medicine)  Corina Bazan MD as PCP - REHABIL[facility]  Javon Porras DO as Consulting Physician (Gastroenterology)  ANDERS Sinclair as Nurse Practitioner (Family Nurse Practitioner)  Adrian Cuellar MD as Consulting Physician (Interventional Cardiology)    Chief Complaint:  Follow-up respiratory failure,  pneumonia, ICU care and medical problems listed below    Interval History:       Liberated of mechanical ventilation yesterday. Failed swallow evaluation. Dobbhoff tube was inserted. Staff reported that he intermittently coughs but does not produce phlegm. Otherwise he is on oxygen 2 L/min. He cannot really provide with any history. He is extremely weak and cannot really voice anything. REVIEW OF SYSTEMS:   Cannot obtain due to weakness. Vital Signs   height is 5' 8\" (1.727 m) and weight is 226 lb 1.6 oz (102.6 kg). His axillary temperature is 98.6 °F (37 °C). His blood pressure is 148/68 (abnormal) and his pulse is 77. His respiration is 32 (abnormal) and oxygen saturation is 93%. Intake/Output Summary (Last 24 hours) at 3/23/2020 1422  Last data filed at 3/23/2020 1126  Gross per 24 hour   Intake 1186.55 ml   Output 4425 ml   Net -3238.45 ml       Physical Exam:   General Appearance:   Obese. Not in acute distress. HEENT:  Stafford score 3. Moist mucous membrane   Neck:   Trachea midline. No thyromegaly   Lungs:    Diminished breath sounds at the bases with bilateral basal crackles but with no wheezing. Nonlabored breathing    Heart:    Regular rhythm and normal rate, no murmur   Skin:    No rash    Abdomen:     Obese. Soft. No tenderness. No dullness. Positive bowel sounds. Positive bowel sounds   Neuro/psych:  Moves all extremities but extremely weak provide does not follow commands consistently.    Extremities: No cyanosis, clubbing of positive pressure ventilation. Assessment:  1. Acute hypoxic respiratory failure, mild ARDS-improving. Liberated off mechanical ventilation on 3/22/2020  2. Diffuse pneumonia, RSV with possible superimposed bacterial pneumonia  3. Bilateral pleural effusion  4. Pulmonary vascular edema/congestion, NEW  5. Possible superimposed bacterial pneumonia  6. Acute renal failure, resolved  7. ICU myopathy/neuropathy  8. Protein calorie malnutrition  9. Transaminitis    Plan      · N.p.o.  · Start tube feeding  · Start DuoNeb 4 times daily to improve mucus clearance  · Incentive spirometry  · Gentle diuresis when blood pressure/electrolytes permit.   I will start him on Lasix 20 mg IV daily  · PT OT          Briana Preston  Pulmonary, Critical care and sleep medicine  03/23/20  2:22 PM          This note was dictated utilizing Dragon dictation

## 2020-03-23 NOTE — PROGRESS NOTES
Restriction  Other position/activity restrictions: NG tube--keep HOB elevated  Vision/Hearing        Subjective  General  Patient assessed for rehabilitation services?: Yes  Diagnosis: septic shock  Follows Commands: Within Functional Limits  Subjective  Subjective: Pt noted to groan and grunt during eval, but does not verbalize words. wife present and states pt has been awake and alert at times but she is not sure if pt recognizes her  Pain Screening  Patient Currently in Pain: No          Orientation     Social/Functional History  Social/Functional History  Lives With: Spouse  Home Equipment: Rolling walker  ADL Assistance: Independent  Ambulation Assistance: Independent  Transfer Assistance: Independent  Cognition        Objective          PROM RLE (degrees)  RLE PROM: WFL  PROM LLE (degrees)  LLE PROM: WFL  Strength Other  Other: no active movement noted in bilat le's        Bed mobility  Rolling to Left: Dependent/Total  Rolling to Right: Dependent/Total  Supine to Sit: Dependent/Total  Sit to Supine: Dependent/Total  Comment: brought Pt up into a semi sitting on eob and worked with pt on holding his head up  Transfers  Sit to Stand: Unable to assess  Stand to sit: Unable to assess  Bed to Chair: Unable to assess  Ambulation  Ambulation?: No              Plan   Plan  Times per week: 3-7  Plan weeks: 2  Current Treatment Recommendations: Strengthening, Patient/Caregiver Education & Training, Cognitive Reorientation, Balance Training, Neuromuscular Re-education, Functional Mobility Training, Positioning, Transfer Training, Safety Education & Training  Plan Comment: CO TREAT WITH OT AS ABLE. CONTINUE TO PROGRESS MOBILITY AS Pt IS ABLE TO PARTICIPATE.   Safety Devices  Type of devices: Left in bed, Bed alarm in place(WIFE IN ROOM WITH pt)    G-Code       OutComes Score                                                  AM-PAC Score             Goals  Short term goals  Time Frame for Short term goals: 2 wks  Short term

## 2020-03-23 NOTE — PROGRESS NOTES
Nutrition Assessment (Enteral Nutrition)    Type and Reason for Visit: Reassess    Nutrition Recommendations: restart tf--Vital HP with goal rate of 56ml/hr, 15ml free water flush hourly via tf pump    Nutrition Assessment: Aware pt has been extubated. SLP evaluated and recoommends NPO continue. TF has been stopped d/t no access. Aware DHT to be placed and TF to be restarted. .  Suggest restarting Vital HP and goal rate will be 56ml/hr as there is no Propofol running. Malnutrition Assessment:  · Malnutrition Status: At risk for malnutrition  · Context: Acute illness or injury  · Findings of the 6 clinical characteristics of malnutrition (Minimum of 2 out of 6 clinical characteristics is required to make the diagnosis of moderate or severe Protein Calorie Malnutrition based on AND/ASPEN Guidelines):  1. Energy Intake-Less than or equal to 50% of estimated energy requirement, Greater than or equal to 5 days    2. Weight Loss-10% loss or greater, (6 days)  3. Fat Loss-No significant subcutaneous fat loss,    4. Muscle Loss-No significant muscle mass loss,    5. Fluid Accumulation-Mild fluid accumulation, Generalized  6.   Strength-Not measured    Nutrition Risk Level: High    Nutrition Needs:  · Estimated Daily Total Kcal: 980-1350(8-11 kcal/kg)  · Estimated Daily Protein (g): 175(2.5 g/kg)  · Estimated Daily Fluid (ml/day): 8746-4342    Nutrition Diagnosis:   · Problem: Inadequate oral intake  · Etiology: related to Acute injury/trauma, Difficulty swallowing     Signs and symptoms:  as evidenced by Swallow study results, NPO status due to medical condition    Objective Information:  · Nutrition-Focused Physical Findings: well nourished  · Wound Type: None  · Current Nutrition Therapies:  · Oral Diet Orders: NPO   · Oral Diet intake: NPO  · Oral Nutrition Supplement (ONS) Orders: None  · ONS intake: NPO  · Tube Feeding (TF) Orders:   · Feeding Route: Nasogastric  · Formula: Semi-elemental  · Rate

## 2020-03-23 NOTE — PROGRESS NOTES
Complete bed bath and bed change done at this time. Patient tolerated without s/s of distress. Family present in waiting room.  Will continue to monitor

## 2020-03-23 NOTE — PROGRESS NOTES
Palliative follow up. Nurse reports pt was extubated yesterday and now on 2L O2. Spoke with family in ICU waiting area. Family was allowed to come and see pt d/t extubation and not sure how he would do. Family states \"We realize he has a long road ahead since he has been in bed for 10 days\". Spouse asking if [facility] would accept. Explained SW would be in touch and we will move forward as needed/indicated. Explained Palliative medicine/care will follow. Spiritual support welcomed.   Electronically signed by Ba Matt RN on 3/23/2020 at 3:29 PM

## 2020-03-23 NOTE — PROGRESS NOTES
Speech Language Pathology  Facility/Department: [facility] CORONARY CARE UNIT   CLINICAL BEDSIDE SWALLOW EVALUATION    NAME: Priyanka Dobbins  :   MRN: [id number]    ADMISSION DATE: 3/13/2020  ADMITTING DIAGNOSIS: has GERD (gastroesophageal reflux disease); History of esophageal ulcer; History of adenomatous polyp of colon; Patulous lower esophageal sphincter; Hiatal hernia; Osteoarthritis; Chronic pain; NSAID long-term use; Encounter for colonoscopy due to history of adenomatous colonic polyps; Precordial pain; Sick sinus syndrome due to SA node dysfunction ([address] Abnormal nuclear stress test; Pacemaker; SVT (supraventricular tachycardia) ([address] Essential hypertension; Beryllium disease ([address] Acute respiratory failure with hypoxia and hypercapnia ([address] Sleep disturbance; Elevated serum creatinine; Diastolic dysfunction; Flash pulmonary edema ([address] Sleep apnea, unspecified; Dyspnea on exertion; Loss of balance; Tick bite; Weakness; Gait difficulty; Abnormal brain scan; Septic shock ([address] Palliative care patient; and Pneumonia due to organism on their problem list.    Date of Eval: 3/23/2020  Evaluating Therapist: Sandra Quintero    Reason for Referral  Priyanka Dobbins was referred for a bedside swallow evaluation to assess the efficiency of his swallow function, identify signs and symptoms of aspiration and make recommendations regarding safe dietary consistencies, effective compensatory strategies, and safe eating environment. Impression  Assessed patient's swallowing function S/P extubation 20. Patient exhibits slow attempt at oral transit and inconsistent absent swallows. At times, patient exhibited sluggish, mild-moderately decreased laryngeal elevation for swallow airway protection. Other  times, patient exhibited just laryngeal rocking.  Patient exhibits decreased airway detection with no outward coughs/throat clears noted following probable penetration/aspiration of PO trials (small, controlled trials thin H2O squeezed in the cheek via toothette by SLP). At this time, would recommend continuation NPO status; consider short-term alternative means of nutrition. Recommend daily oral care, via staff, as question management of secretions. If patient receives mouth care prior to intake, okay for swabs of thin H2O for comfort. Will continue to follow. Thank you for this consult. Treatment Plan  Requires SLP Intervention: Yes     Recommended Diet and Intervention  Diet Solids Recommendation: NPO  Liquid Consistency Recommendation: NPO  Therapeutic Interventions: Patient/Family education;Oral Care; Therapeutic PO trials with SLP     Treatment/Goals  Timeframe for Short-term Goals: 1x/day for 3 days   Goal 1: Patient NPO. Goal 2: Patient staff will follow swallow safety recommendations. Goal 3: Patient will receive daily oral care, via staff, to decrease bacteria from the oral cavity. Goal 4: Re-assessment of swallow function for potential PO intake. Goal 5: Speech language assessment. Goal 6: Trial oral motor, lingual, and pharyngeal exercises with provision of mod cues/prompts. General  Chart Reviewed: Yes  Behavior/Cognition: Lethargic   O2 Device: Nasal   Communication Observation: (Patient exhibited no verbalizations during assessment. Patient exhibited vocalizations/moans when attempting to reposition bed to 90 degrees.)  Patient Positioning: Upright in bed  Consistencies Administered: Thin      Assessed patient's swallowing function with the following observations noted:     Oral Phase Dysfunction  Oral Phase: Exceptions  Oral Phase Dysfunction  Suspected Premature Bolus Loss: Patient exhibited slow attempt at oral transit of thin H2O trials squeezed in the cheek via toothette by SLP. Indicators of Pharyngeal Phase Dysfunction  Pharyngeal Phase: Exceptions  Indicators of Pharyngeal Phase Dysfunction  Suspected Swallow Delay:  Thin (Suspect secondary to oral transit times.)  Decreased

## 2020-03-23 NOTE — PLAN OF CARE
Nutrition Problem: Inadequate oral intake  Intervention: Food and/or Nutrient Delivery: Continue NPO, restart tf  Nutritional Goals: Meet nutrition needs via EN

## 2020-03-23 NOTE — PROGRESS NOTES
Patient's family have arrived. Dr. Pita De spoke with them regarding plans for extubation and continued care thereafter.

## 2020-03-23 NOTE — CARE COORDINATION
Extubated and improving. PT/OT requested and will determine pt's dc planning. Expected SNF rehab services for dc needs.

## 2020-03-23 NOTE — PROGRESS NOTES
Occupational Therapy   Occupational Therapy Initial Assessment  Date: 3/23/2020   Patient Name: Rufus Hines  MRN: [id number]     : 1950    Date of Service: 3/23/2020    Discharge Recommendations:  Patient would benefit from continued therapy after discharge       Assessment   Assessment: Evaluation completed and tx initiated. Will continue to follow for stim to promote purposeful responses. Treatment Diagnosis: Respiratory Failure, Sepsis  REQUIRES OT FOLLOW UP: Yes  Activity Tolerance  Activity Tolerance: Treatment limited secondary to decreased cognition  Safety Devices  Safety Devices in place: Not Applicable(left with nsg)           Patient Diagnosis(es): The primary encounter diagnosis was Acute respiratory failure with hypoxia and hypercapnia ([address] Diagnoses of Septic shock ([address] Lactic acidosis, Abnormal CXR, Pneumonia due to organism, and Hypokalemia were also pertinent to this visit. has a past medical history of Anemia, Anxiety, Back pain, Cellulitis, Depression, Elevated triglycerides with high cholesterol, GERD (gastroesophageal reflux disease), Hypertension, Obstructive chronic bronchitis without exacerbation ([address] Osteoarthritis, Pacemaker, Palliative care patient, and Sleep apnea, unspecified. has a past surgical history that includes Total knee arthroplasty; Colonoscopy (11); Upper gastrointestinal endoscopy (2012); joint replacement; pacemaker placement; and Neck surgery.     Treatment Diagnosis: Respiratory Failure, Sepsis      Restrictions  Restrictions/Precautions  Restrictions/Precautions: Fall Risk  Position Activity Restriction  Other position/activity restrictions: NG tube--keep HOB elevated    Subjective   General  Chart Reviewed: Yes  Patient assessed for rehabilitation services?: Yes  Family / Caregiver Present: Yes(stepped out for portion of tx)  Patient Currently in Pain: No  Vital Signs  Patient Currently in Pain: No  Social/Functional History  Social/Functional History  Lives With: Spouse  Home Equipment: Rolling walker  ADL Assistance: Independent  Ambulation Assistance: Independent  Transfer Assistance: Independent       Objective              Balance  Sitting Balance: (Did not take the patient all the way to EOB but did bring him to a sitting position--no righting or protective responses but did become more alert)  Standing Balance: (not appropriate to attempt)  Toilet Transfers  Toilet Transfer: Dependent/Total  ADL  Feeding: Dependent/Total  Grooming: Dependent/Total  UE Bathing: Dependent/Total  LE Bathing: Dependent/Total  UE Dressing: Dependent/Total  LE Dressing: Dependent/Total  Toileting: Dependent/Total   Unable to use Adaptive Equipment at this time   Shower transfers:  dependent     Bed mobility  Rolling to Left: Dependent/Total  Rolling to Right: Dependent/Total  Supine to Sit: Dependent/Total  Sit to Supine: Dependent/Total  Transfers  Transfer Comments: Used DIANNA LIFT to transfer to a new hospital bed     Cognition  Cognition Comment: No verbal responses, occasional moan, minimal tracking, but does open eyes. LUE PROM (degrees)  LUE PROM: WNL  LUE AROM (degrees)  LUE AROM : WNL  RUE PROM (degrees)  RUE PROM: WNL  RUE AROM (degrees)  RUE AROM : WNL      Strength:  Significantly impaired   FM coord:  Significantly impaired             Tx initiated:   Therapeutic positioning, stim activities (15 mins)       Plan   Plan  Times per week: 3-5x weekly    G-Code     OutComes Score                                                  AM-PAC Score             Goals  Short term goals  Time Frame for Short term goals: 1-2 weeks  Short term goal 1: The patient will demonstrate any purposeful responses  Short term goal 2: Progress to EOB sitting  Short term goal 3: Patient and family will be independent to restate  positioning strategies  Short term goal 4: Upgrade activity as tolerated       Therapy Time   Individual Concurrent Group Co-treatment   Time In

## 2020-03-24 ENCOUNTER — OUTSIDE FACILITY SERVICE (OUTPATIENT)
Dept: PULMONOLOGY | Facility: CLINIC | Age: 70
End: 2020-03-24

## 2020-03-24 PROCEDURE — 99232 SBSQ HOSP IP/OBS MODERATE 35: CPT | Performed by: INTERNAL MEDICINE

## 2020-03-24 NOTE — PROGRESS NOTES
Nutrition Assessment (Enteral Nutrition)    Type and Reason for Visit: Reassess    Nutrition Recommendations: continue to work toward goal rate    Nutrition Assessment: TF continues to slowly be advanced to goal rate. TF now at 25ml/hr with good tolerance--residuals 5-25ml. Malnutrition Assessment:  · Malnutrition Status: At risk for malnutrition  · Context: Acute illness or injury  · Findings of the 6 clinical characteristics of malnutrition (Minimum of 2 out of 6 clinical characteristics is required to make the diagnosis of moderate or severe Protein Calorie Malnutrition based on AND/ASPEN Guidelines):  1. Energy Intake-Less than or equal to 50% of estimated energy requirement, Greater than or equal to 7 days    2. Weight Loss-10% loss or greater, (6 days)  3. Fat Loss-No significant subcutaneous fat loss,    4. Muscle Loss-No significant muscle mass loss,    5. Fluid Accumulation-Mild fluid accumulation, Generalized  6.  Strength-Not measured    Nutrition Risk Level: High    Nutrition Needs:  · Estimated Daily Total Kcal: 980-1350(8-11 kcal/kg)  · Estimated Daily Protein (g): 175(2.5 g/kg)  · Estimated Daily Fluid (ml/day): 6957-0935    Nutrition Diagnosis:   · Problem: Inadequate oral intake  · Etiology: related to Acute injury/trauma, Difficulty swallowing     Signs and symptoms:  as evidenced by NPO status due to medical condition, Nutrition support - EN    Objective Information:  · Nutrition-Focused Physical Findings: well nourished  · Wound Type: None  · Current Nutrition Therapies:  · Oral Diet Orders: NPO   · Oral Diet intake: NPO  · Oral Nutrition Supplement (ONS) Orders: None  · ONS intake: NPO  · Tube Feeding (TF) Orders:   · Feeding Route: Nasogastric  · Formula: Semi-elemental  · Rate (ml/hr):15ml at present. Goal rate 56ml    · Volume (ml/day): 360ml = 15ml/hr.   1344ml = 56ml/hr  · Duration: Continuous  · Additives/Modulars: None  · Water Flushes: 15ml every hour  · Current TF & Flush Orders Provides: 15ml = 360 kcals with 31g protein, 40g CHO and 300ml free water froom formual, 360ml free water from tf pump flush. · Goal TF & Flush Orders Provides: 56ml = 1344 kcals with 117 g protein, 150g CHO and 1123 ml free water from formula and 360ml free water from tf pump flush.   · Additional Calories: 0     · Anthropometric Measures:  · Ht: 5' 8\" (172.7 cm)   · Current Body Wt: 226 lb 1 oz (102.5 kg)  · Admission Body Wt: 270 lb (122.5 kg)  · Usual Body Wt: 265 lb (120.2 kg)(12/2019)  · Weight Change: 13.5% weight decrease in 6 days   · Ideal Body Wt: 154 lb (69.9 kg), % Ideal Body 146.8%  · BMI Classification: BMI 30.0 - 34.9 Obese Class I    Nutrition Interventions:   Continue NPO, Continue current Tube Feeding  Continued Inpatient Monitoring    Nutrition Evaluation:   · Evaluation: Progressing toward goals   · Goals: Meet nutrition needs via EN   · Monitoring: TF Intake, TF Tolerance, Skin Integrity, Weight, Pertinent Labs, Chewing/Swallowing      Electronically signed by Camille Anders, MS, RD, LD on 3/24/20 at 9:50 AM CDT    Contact Number: [phone]

## 2020-03-24 NOTE — PROGRESS NOTES
OutComes Score                                                  AM-PAC Score             Goals  Short term goals  Time Frame for Short term goals: 1-2 weeks  Short term goal 1: The patient will demonstrate any purposeful responses  Short term goal 2: Progress to EOB sitting  Short term goal 3: Patient and family will be independent to restate  positioning strategies  Short term goal 4: Upgrade activity as tolerated       Therapy Time   Individual Concurrent Group Co-treatment   Time In           Time Out           Minutes                   KINDRA Murrieta/JOSSELIN

## 2020-03-24 NOTE — PROGRESS NOTES
3/24/2020 Dr. Valeria Mckinley, please review central line necessity and place order for PIV and remove triple lumen PICC line if in agreement that central line no longer necessary.  Electronically signed by Chapis Waters RN on 3/24/2020 at 9:17 AM

## 2020-03-24 NOTE — PROGRESS NOTES
(activity): 0    General  Chart Reviewed: Yes  Behavior/Cognition: Lethargic   O2 Device: NC 3LPM  Positioning: Upright in bed  Consistencies Administered: Nectar thick liquids via cup (controlled sips administered)  WBC: 15.7  Intubated: 3/13/20  Extubated: 3/22/20      Pt seen by speech therapy on 3/23/20 and the patient demonstrated decreased hyolaryngeal elevation, absent swallow response and was recommended to remain NPO. P.O. Trials: Today, the patient was sitting at the edge of the bed with OT and PT. He was unable to lift his head without assistance. He did appear drowsy. He was repositioned upright in the bed and accepted a few controlled sips of nectar thick liquids via cup. Delayed oral transit and swallow response noted. Good hyolaryngeal elevation noted. No overt s/s of aspiration observed. A second presentation of controlled nectar thick liquids via cup did not result in any swallow response. Suspected absent swallow. Anterior loss of the entire bolus was noted. Impressions: The patient exhibited one swallow response with nectar thick liquids. Absent swallow with labial loss noted with a second presentation. He is not safe for oral intake at this time. Due to leukocytosis, pneumonia, and lengthy intubation, he will remain at high risk for silent aspiration and aspiration pneumonia. He is recommended to be NPO and SLP to re-assess for possible p.o. intake. He is recommended to receive short term alternative means of nutrition, hydration and medication. Oral care should be completed daily to prevent aspiration of bacteria laden secretions. Plan:    Continued daily Dysphagia treatment with goals per plan of care. Treatment/Goals  Timeframe for Short-term Goals: 1x/day for 3 days   Goal 1: Patient NPO. Goal 2: Patient staff will follow swallow safety recommendations. Goal 3: Patient will receive daily oral care, via staff, to decrease bacteria from the oral cavity.    Goal

## 2020-03-24 NOTE — PLAN OF CARE
Problem: Falls - Risk of:  Goal: Will remain free from falls  Description: Will remain free from falls  Outcome: Ongoing  Goal: Absence of physical injury  Description: Absence of physical injury  Outcome: Ongoing     Problem: Nutrition  Goal: Optimal nutrition therapy  Description: Nutrition Problem: Inadequate oral intake  Intervention: Food and/or Nutrient Delivery: Continue NPO, restart tf  Nutritional Goals: Meet nutrition needs via EN      3/24/2020 0105 by Ritu Iraheta RN  Outcome: Ongoing  3/23/2020 1400 by Allie Echevarria MS, RD, LD  Outcome: Ongoing     Problem: Discharge Planning:  Goal: Participates in care planning  Description: Participates in care planning  Outcome: Ongoing  Goal: Discharged to appropriate level of care  Description: Discharged to appropriate level of care  Outcome: Ongoing     Problem: Airway Clearance - Ineffective:  Goal: Ability to maintain a clear airway will improve  Description: Ability to maintain a clear airway will improve  Outcome: Ongoing     Problem: Aspiration:  Goal: Absence of aspiration  Description: Absence of aspiration  Outcome: Ongoing     Problem: Urinary Elimination:  Goal: Signs and symptoms of infection will decrease  Description: Signs and symptoms of infection will decrease  Outcome: Ongoing  Goal: Complications related to the disease process, condition or treatment will be avoided or minimized  Description: Complications related to the disease process, condition or treatment will be avoided or minimized  Outcome: Ongoing     Problem: Infection - Central Venous Catheter-Associated Bloodstream Infection:  Goal: Will show no infection signs and symptoms  Description: Will show no infection signs and symptoms  Outcome: Ongoing     Problem: Infection - Ventilator-Associated Pneumonia:  Goal: Prevent a ventilator associated event (VAE)  Description: Prevent a ventilator associated event (VAE)  Outcome: Ongoing  Goal: Absence of pulmonary infection  Description: Absence of pulmonary infection  Outcome: Ongoing     Problem:  Activity:  Goal: Ability to tolerate increased activity will improve  Description: Ability to tolerate increased activity will improve  Outcome: Ongoing  Goal: Expression of feelings of increased energy will increase  Description: Expression of feelings of increased energy will increase  Outcome: Ongoing     Problem: Cardiac:  Goal: Complications related to the disease process, condition or treatment will be avoided or minimized  Description: Complications related to the disease process, condition or treatment will be avoided or minimized  Outcome: Ongoing  Goal: Ability to maintain an adequate cardiac output will improve  Description: Ability to maintain an adequate cardiac output will improve  Outcome: Ongoing  Goal: Hemodynamic stability will improve  Description: Hemodynamic stability will improve  Outcome: Ongoing  Goal: Cerebral tissue perfusion will improve  Description: Cerebral tissue perfusion will improve  Outcome: Ongoing     Problem: Coping:  Goal: Level of anxiety will decrease  Description: Level of anxiety will decrease  Outcome: Ongoing  Goal: General experience of comfort will improve  Description: General experience of comfort will improve  Outcome: Ongoing  Goal: Ability to identify and develop effective coping behavior will improve  Description: Ability to identify and develop effective coping behavior will improve  Outcome: Ongoing     Problem: Health Behavior:  Goal: Ability to manage health-related needs will improve  Description: Ability to manage health-related needs will improve  Outcome: Ongoing  Goal: Identification of resources available to assist in meeting health care needs will improve  Description: Identification of resources available to assist in meeting health care needs will improve  Outcome: Ongoing     Problem: Safety:  Goal: Ability to remain free from injury will improve  Description:

## 2020-03-24 NOTE — PROGRESS NOTES
Palliative follow up visit. Pt is resting, slightly turns his head toward this nurse's voice. Spouse is present. She tells me pt worked with PT today. States he sat up on side of bed and \"did some other things\". Asked if pt was following commands, spouse states \"mostly\". Pt appears comfortable at present times. Spouse does not verbalize any needs at this time.   Electronically signed by Ba Matt RN on 3/24/2020 at 3:17 PM

## 2020-03-24 NOTE — PROGRESS NOTES
Musculoskeletal: Normal range of motion. Cardiovascular:      Rate and Rhythm: Normal rate and regular rhythm. Pulmonary:      Comments: Breath sounds diminished upon auscultation bilaterally  Abdominal:      Tenderness: There is no abdominal tenderness. There is no guarding or rebound. Musculoskeletal:      Right lower leg: No edema. Left lower leg: No edema. Comments: Capable of moving lower extremities    PICC line right basilic   Skin:     General: Skin is warm. Capillary Refill: Capillary refill takes less than 2 seconds. Neurological:      General: No focal deficit present. Mental Status: He is alert and oriented to person, place, and time. Mental status is at baseline. Cranial Nerves: No cranial nerve deficit. Psychiatric:         Mood and Affect: Mood normal.         Labs/Imaging/Diagnostics   Labs:  CBC:  Recent Labs     03/22/20 0325 03/23/20 0340 03/24/20  0459   WBC 16.0* 15.2* 15.7*   RBC 4.29* 4.15* 3.98*   HGB 12.2* 11.8* 11.7*   HCT 39.2* 37.5* 37.0*   MCV 91.4 90.4 93.0   RDW 14.6* 14.6* 14.7*    444* 462*     CHEMISTRIES:  Recent Labs     03/22/20 0325 03/22/20  0428 03/23/20  0340 03/24/20  0459     --  141 146*   K 3.8 3.7 3.5 3.8     --  108 112*   CO2 20*  --  19* 22   BUN 23  --  27* 29*   CREATININE 0.8  --  0.9 0.7   GLUCOSE 154*  --  138* 146*     PT/INR:No results for input(s): PROTIME, INR in the last 72 hours. APTT:  Recent Labs     03/22/20  2150 03/23/20  0340 03/23/20  1000   APTT 60.9* 52.3* 62.5*     LIVER PROFILE:  Recent Labs     03/22/20 0325 03/23/20  0340 03/24/20  0459   AST 31 69* 81*   ALT 28 50* 74*   BILITOT 0.7 1.0 0.8   ALKPHOS 141* 141* 148*       Imaging Last 24 Hours:  Xr Chest Portable    Result Date: 3/23/2020  XR CHEST PORTABLE - 3/23/2020 11:00 AM Indication: Feeding tube placement Comparison: 3/22/2020 Findings: Cardiac silhouette is stable.  Decrease infrahilar lower lobe interstitial/airspace Accumulation-Mild fluid accumulation, Generalized  6.  Strength-Not measured  -Dietary consulted, appreciate recommendations     Generalized weakness -continue PT/OT/SLP appreciate recommendations    Paroxysmal atrial fibrillation-patient currently now in normal sinus rhythm, continue with diltiazem/metoprolol, Lovenox    Nursing communication order has been placed to remove PICC line if successful peripheral IV can be initiated. Electronically signed by   Aravind Doe   Internal Medicine Hospitalist  On 3/24/2020  At 10:15 AM    EMR Dragon/Transcription disclaimer:   Much of this encounter note is an electronic transcription/translation of spoken language to printed text.  The electronic translation of spoken language may permit erroneous, or at times, nonsensical words or phrases to be inadvertently transcribed; although attempts have made to review the note for such errors, some may still exist.

## 2020-03-24 NOTE — PROGRESS NOTES
Pulmonary and Critical Care Progress Note [facility]    Patient: Bradford Bronson  1950   MR# [id number]   Acct# [de-identified]  03/24/20   9:46 AM  Referring Provider: Antonella Vinson MD    Chief Complaint: Status post respiratory failure, pneumonia, status post RSV  Interval history: She is resting in bed on nasal cannula 3 L. Significant other is at bedside and states that the patient uses oxygen at home as needed. The patient has a NG present. Is unable to voice and is extremely weak. No other aggravating or alleviating factors or associated symptoms. Medications:   enoxaparin, 40 mg, Subcutaneous, Daily    metoprolol tartrate, 50 mg, Oral, BID    dilTIAZem, 30 mg, Oral, 4 times per day    furosemide, 20 mg, Intravenous, Daily    lidocaine 1 % injection, 5 mL, Intradermal, Once    sodium chloride flush, 10 mL, Intravenous, 2 times per day    levothyroxine, 50 mcg, Oral, Daily    ipratropium-albuterol, 1 ampule, Inhalation, Q4H   Review of Systems: Review of Systems   Unable to perform ROS: Acuity of condition     Physical Exam:  Blood pressure (!) 148/71, pulse 75, temperature 97.8 °F (36.6 °C), temperature source Temporal, resp. rate 20, height 5' 8\" (1.727 m), weight 226 lb 1.6 oz (102.6 kg), SpO2 93 %. Intake/Output Summary (Last 24 hours) at 3/24/2020 0946  Last data filed at 3/24/2020 6948  Gross per 24 hour   Intake 271 ml   Output 2800 ml   Net -2529 ml     Physical Exam  Vitals signs and nursing note reviewed. Constitutional:       General: He is not in acute distress. Appearance: He is well-developed. Interventions: Nasal cannula in place. HENT:      Head: Normocephalic and atraumatic. Left Ear: Ear canal normal.   Eyes:      Pupils: Pupils are equal, round, and reactive to light. Neck:      Musculoskeletal: Normal range of motion and neck supple. Cardiovascular:      Rate and Rhythm: Normal rate and regular rhythm.       Heart sounds: Normal heart sounds. Pulmonary:      Effort: Pulmonary effort is normal. No respiratory distress. Breath sounds: Decreased breath sounds present. No wheezing, rhonchi or rales. Chest:      Chest wall: No tenderness. Abdominal:      General: Bowel sounds are normal.      Palpations: Abdomen is soft. Musculoskeletal: Normal range of motion. Skin:     General: Skin is warm and dry. Neurological:      Mental Status: He is alert. Comments: The patient is awake. It really voice anything. Recent Labs     03/22/20 0325 03/23/20 0340 03/24/20 0459   WBC 16.0* 15.2* 15.7*   RBC 4.29* 4.15* 3.98*   HGB 12.2* 11.8* 11.7*   HCT 39.2* 37.5* 37.0*    444* 462*   MCV 91.4 90.4 93.0   MCH 28.4 28.4 29.4   MCHC 31.1* 31.5* 31.6*   RDW 14.6* 14.6* 14.7*      Recent Labs     03/22/20 0325 03/22/20 0428 03/23/20 0340 03/24/20 0459     --  141 146*   K 3.8 3.7 3.5 3.8     --  108 112*   CO2 20*  --  19* 22   BUN 23  --  27* 29*   CREATININE 0.8  --  0.9 0.7   CALCIUM 8.8  --  9.0 9.2   GLUCOSE 154*  --  138* 146*   PHART  --  7.380  --   --    BFT5VNA  --  33.0*  --   --    PO2ART  --  117.0*  --   --    ORK5DPN  --  19.5*  --   --    U2OZGCZX  --  96.3  --   --    BEART  --  -4.8*  --   --    AST 31  --  69* 81*   ALT 28  --  50* 74*   ALKPHOS 141*  --  141* 148*   BILITOT 0.7  --  1.0 0.8      No results for input(s): BC, LABGRAM, CULTRESP, BFCX in the last 72 hours. Radiograph:   My radiograph interpretation: none today   Pulmonary Assessment:    1. Acute hypoxic respiratory failure, mild ARDS-improving. Liberated off mechanical ventilation on 3/22/2020  2. Diffuse pneumonia, RSV with possible superimposed bacterial pneumonia  3. Bilateral pleural effusion  4. Pulmonary vascular edema/congestion, NEW  5. Possible superimposed bacterial pneumonia  6. Acute renal failure, resolved  7. ICU myopathy/neuropathy  8. Protein calorie malnutrition  9.  Transaminitis    Recommend:   · Continue bronchodilators  · Continue pulmonary hygiene measures  · Defer isolation precautions to infection control   · Sign off, call as needed    Electronically signed by Farida Gaines on 3/24/2020 at 9:46 AM    Physician Substantive portion:  Patient is without new complaints, out of ICU. On 3 L per nasal cannula. Exam shows no distress. No accessory muscle use. Abdomen nondistended. Remains on isolation precautions. Recommend wean oxygen as tolerated. Isolation as per policy. No additional pulmonary plans. We will sign off, available. I have seen and examined patient personally, performing a face-to-face diagnostic evaluation with plan of care reviewed and developed with APRN and nursing staff. I have addended and/or modified the above history of present illness, physical examination, and assessment and plan to reflect my findings and impressions. Essential elements of the care plan were discussed with APRN above. Agree with findings and assessment/plan as documented above.     Electronically signed by Sharee Brush on 3/24/2020, 3:01 PM

## 2020-03-25 PROBLEM — J96.01 ACUTE RESPIRATORY FAILURE WITH HYPOXIA (HCC): Status: ACTIVE | Noted: 2020-01-01

## 2020-03-25 NOTE — PROGRESS NOTES
Speech Language Pathology  Facility/Department: [facility] 3 JUAN/VAS/MED  SWALLOW THERAPY     NAME: James Webb  : 1950  MRN: [id number]    ADMISSION DATE: 3/13/2020  ADMITTING DIAGNOSIS: has GERD (gastroesophageal reflux disease); History of esophageal ulcer; History of adenomatous polyp of colon; Patulous lower esophageal sphincter; Hiatal hernia; Osteoarthritis; Chronic pain; NSAID long-term use; Encounter for colonoscopy due to history of adenomatous colonic polyps; Precordial pain; Sick sinus syndrome due to SA node dysfunction ([address] Abnormal nuclear stress test; Pacemaker; SVT (supraventricular tachycardia) ([address] Essential hypertension; Beryllium disease ([address] Acute respiratory failure with hypoxia and hypercapnia ([address] Sleep disturbance; Elevated serum creatinine; Diastolic dysfunction; Flash pulmonary edema ([address] Sleep apnea, unspecified; Dyspnea on exertion; Loss of balance; Tick bite; Weakness; Gait difficulty; Abnormal brain scan; Septic shock ([address] Palliative care patient; and Pneumonia due to organism on their problem list.    Date of Treat: 3/25/2020  Evaluating Therapist: Mary Jo Kirby    Reason for Referral  James Webb was referred for a bedside swallow evaluation to assess the efficiency of his swallow function, identify signs and symptoms of aspiration and make recommendations regarding safe dietary consistencies, effective compensatory strategies, and safe eating environment. Impression  Re-assessed patient's swallowing function S/P extubation 20. Patient exhibits slow attempt at oral transit and absent swallows with no laryngeal elevation noted just min laryngeal rocking observed. Patient exhibits decreased airway detection with no outward coughs/throat clears noted following probable penetration/aspiration of PO trials (small, controlled trials thin H2O squeezed in the cheek via toothette by SLP).      At this time, would recommend continuation NPO status; continue short-term alternative means of nutrition. Recommend daily oral care, via staff, as question management of secretions. If patient receives mouth care prior to intake, okay for swabs of thin H2O for comfort. Will continue to follow.     Treatment Plan  Requires SLP Intervention: Yes     Recommended Diet and Intervention  Diet Solids Recommendation: NPO  Liquid Consistency Recommendation: NPO  Therapeutic Interventions: Patient/Family education;Oral Care; Therapeutic PO trials with SLP     Treatment/Goals  Timeframe for Short-term Goals: 1x/day for 3 days   Goal 1: Patient NPO. Goal 2: Patient staff will follow swallow safety recommendations. Goal 3: Patient will receive daily oral care, via staff, to decrease bacteria from the oral cavity. Goal 4: Re-assessment of swallow function for potential PO intake. Goal 5: Speech language assessment. Goal 6: Trial oral motor, lingual, and pharyngeal exercises with provision of mod cues/prompts.      General  Chart Reviewed: Yes  Behavior/Cognition: Lethargic   O2 Device: Nasal   Communication Observation: (Patient exhibited no verbalizations during treatment session.)  Patient Positioning: Upright in bed  Consistencies Administered: Thin      Re-assessed patient's swallowing function with the following observations noted:     Oral Phase Dysfunction  Oral Phase: Exceptions  Oral Phase Dysfunction  Suspected Premature Bolus Loss: Patient exhibited slow attempt at oral transit of thin H2O trials squeezed in the cheek via toothette by SLP.    Indicators of Pharyngeal Phase Dysfunction  Pharyngeal Phase: Exceptions  Indicators of Pharyngeal Phase Dysfunction  Suspected Swallow Delay: Thin (Suspect secondary to oral transit times.)  Absent Swallows: Thin (Patient exhibited absent swallows with no laryngeal elevation noted and just min laryngeal rocking observed.  Patient exhibited decreased airway detection with no outward coughs/throat clears noted following probable penetration/aspiration of PO trials.)  Pharyngeal: As previously mentioned, patient exhibited absent swallows with no laryngeal elevation noted just min laryngeal rocking observed. Patient exhibits decreased airway detection with no outward coughs/throat clears noted following probable penetration/aspiration of PO trials (small, controlled trials thin H2O squeezed in the cheek via toothette by SLP).    At this time, would recommend continuation NPO status; continue short-term alternative means of nutrition. Recommend daily oral care, via staff, as question management of secretions. If patient receives mouth care prior to intake, okay for swabs of thin H2O for comfort. Will continue to follow.     Electronically signed by FELIX Zhao on 3/25/2020 at 10:09 AM

## 2020-03-25 NOTE — PLAN OF CARE
Problem: Falls - Risk of:  Goal: Will remain free from falls  Description: Will remain free from falls  3/25/2020 1031 by Sheng Kevin RN  Outcome: Ongoing  3/25/2020 0201 by Rafa Larsen RN  Outcome: Ongoing  Goal: Absence of physical injury  Description: Absence of physical injury  3/25/2020 1031 by Sheng Kevin RN  Outcome: Ongoing  3/25/2020 0201 by Rafa Larsen RN  Outcome: Ongoing     Problem: Nutrition  Goal: Optimal nutrition therapy  Description: Nutrition Problem: Inadequate oral intake  Intervention: Food and/or Nutrient Delivery: Continue NPO, Continue current Tube Feeding  Nutritional Goals: Meet nutrition needs via EN       3/25/2020 1031 by Sheng Kevni RN  Outcome: Ongoing  3/25/2020 0201 by Rafa Larsen RN  Outcome: Ongoing     Problem: Discharge Planning:  Goal: Participates in care planning  Description: Participates in care planning  3/25/2020 1031 by Sheng Kevin RN  Outcome: Ongoing  3/25/2020 0201 by Rafa Larsen RN  Outcome: Ongoing  Goal: Discharged to appropriate level of care  Description: Discharged to appropriate level of care  3/25/2020 1031 by Sheng Kevin RN  Outcome: Ongoing  3/25/2020 0201 by Rafa Larsen RN  Outcome: Ongoing     Problem: Airway Clearance - Ineffective:  Goal: Ability to maintain a clear airway will improve  Description: Ability to maintain a clear airway will improve  3/25/2020 1031 by Sheng Kevin RN  Outcome: Ongoing  3/25/2020 0201 by Rafa Larsen RN  Outcome: Ongoing     Problem: Aspiration:  Goal: Absence of aspiration  Description: Absence of aspiration  3/25/2020 1031 by Sheng Kevin RN  Outcome: Ongoing  3/25/2020 0201 by Rafa Larsen RN  Outcome: Ongoing     Problem: Urinary Elimination:  Goal: Signs and symptoms of infection will decrease  Description: Signs and symptoms of infection will decrease  3/25/2020 1031 by Sheng Kevin RN  Outcome: Ongoing  3/25/2020 0201 by Rafa Larsen RN  Outcome: Ongoing  Goal: Complications related to the disease process, condition or treatment will be avoided or minimized  Description: Complications related to the disease process, condition or treatment will be avoided or minimized  3/25/2020 1031 by Tito Leon RN  Outcome: Ongoing  3/25/2020 0201 by Sheri Moy RN  Outcome: Ongoing     Problem: Infection - Central Venous Catheter-Associated Bloodstream Infection:  Goal: Will show no infection signs and symptoms  Description: Will show no infection signs and symptoms  3/25/2020 1031 by Tito Leon RN  Outcome: Ongoing  3/25/2020 0201 by Sheri Moy RN  Outcome: Ongoing     Problem: Infection - Ventilator-Associated Pneumonia:  Goal: Prevent a ventilator associated event (VAE)  Description: Prevent a ventilator associated event (VAE)  3/25/2020 1031 by Tito Leon RN  Outcome: Ongoing  3/25/2020 0201 by Sheri Moy RN  Outcome: Ongoing  Goal: Absence of pulmonary infection  Description: Absence of pulmonary infection  3/25/2020 1031 by Tito Leon RN  Outcome: Ongoing  3/25/2020 0201 by Sheri Moy RN  Outcome: Ongoing     Problem:  Activity:  Goal: Ability to tolerate increased activity will improve  Description: Ability to tolerate increased activity will improve  3/25/2020 1031 by Tito Leon RN  Outcome: Ongoing  3/25/2020 0201 by Sheri Moy RN  Outcome: Ongoing  Goal: Expression of feelings of increased energy will increase  Description: Expression of feelings of increased energy will increase  3/25/2020 1031 by Tito Leon RN  Outcome: Ongoing  3/25/2020 0201 by Sheri Moy RN  Outcome: Ongoing     Problem: Cardiac:  Goal: Complications related to the disease process, condition or treatment will be avoided or minimized  Description: Complications related to the disease process, condition or treatment will be avoided or minimized  3/25/2020 1031 by Tito Leon RN  Outcome: Ongoing  3/25/2020 0201 by Sheri Moy RN  Outcome: Ongoing  Goal: Ability to maintain an adequate cardiac output will improve  Description: Ability to maintain an adequate cardiac output will improve  3/25/2020 1031 by Aguila Hancock RN  Outcome: Ongoing  3/25/2020 0201 by Jose Alonzo RN  Outcome: Ongoing  Goal: Hemodynamic stability will improve  Description: Hemodynamic stability will improve  3/25/2020 1031 by Aguila Hancock RN  Outcome: Ongoing  3/25/2020 0201 by Jose Alonzo RN  Outcome: Ongoing  Goal: Cerebral tissue perfusion will improve  Description: Cerebral tissue perfusion will improve  3/25/2020 1031 by Aguila Hancock RN  Outcome: Ongoing  3/25/2020 0201 by Jose Alonzo RN  Outcome: Ongoing     Problem: Coping:  Goal: Level of anxiety will decrease  Description: Level of anxiety will decrease  3/25/2020 1031 by Aguila Hancock RN  Outcome: Ongoing  3/25/2020 0201 by Jose Alonzo RN  Outcome: Ongoing  Goal: General experience of comfort will improve  Description: General experience of comfort will improve  3/25/2020 1031 by Aguila Hancock RN  Outcome: Ongoing  3/25/2020 0201 by Jose Alonzo RN  Outcome: Ongoing  Goal: Ability to identify and develop effective coping behavior will improve  Description: Ability to identify and develop effective coping behavior will improve  3/25/2020 1031 by Aguila Hancock RN  Outcome: Ongoing  3/25/2020 0201 by Jose Alonzo RN  Outcome: Ongoing     Problem: Health Behavior:  Goal: Ability to manage health-related needs will improve  Description: Ability to manage health-related needs will improve  3/25/2020 1031 by Aguila Hancock RN  Outcome: Ongoing  3/25/2020 0201 by Jose Alonzo RN  Outcome: Ongoing  Goal: Identification of resources available to assist in meeting health care needs will improve  Description: Identification of resources available to assist in meeting health care needs will improve  3/25/2020 1031 by Aguila Hancock RN  Outcome: Ongoing  3/25/2020 0201 by Latoya Hopkins

## 2020-03-25 NOTE — PROGRESS NOTES
Pharmacy Consult      Val Lam is a 71 y.o. male for whom pharmacy has been consulted to dose Cefepime. Patient Active Problem List   Diagnosis    GERD (gastroesophageal reflux disease)    History of esophageal ulcer    History of adenomatous polyp of colon    Patulous lower esophageal sphincter    Hiatal hernia    Osteoarthritis    Chronic pain    NSAID long-term use    Encounter for colonoscopy due to history of adenomatous colonic polyps    Precordial pain    Sick sinus syndrome due to SA node dysfunction (HCC)    Abnormal nuclear stress test    Pacemaker    SVT (supraventricular tachycardia) (HCC)    Essential hypertension    Beryllium disease (HCC)    Acute respiratory failure with hypoxia and hypercapnia (HCC)    Sleep disturbance    Elevated serum creatinine    Diastolic dysfunction    Flash pulmonary edema (HCC)    Sleep apnea, unspecified    Dyspnea on exertion    Loss of balance    Tick bite    Weakness    Gait difficulty    Abnormal brain scan    Septic shock (HCC)    Palliative care patient    Pneumonia due to organism       Allergies:  Codeine     Recent Labs     03/23/20  0340 03/24/20  0459   CREATININE 0.9 0.7       Ht/Wt:   Ht Readings from Last 1 Encounters:   03/13/20 5' 8\" (1.727 m)        Wt Readings from Last 1 Encounters:   03/25/20 247 lb 14.4 oz (112.4 kg)         Estimated Creatinine Clearance: 121 mL/min (based on SCr of 0.7 mg/dL). Assessment/Plan:    Start Cefepime 2gm IV q 8 hours. Thank you for the consult. Will continue to follow.     MATTY FAROOQ, PHARM D, 3/25/2020, 9:41 AM

## 2020-03-25 NOTE — DISCHARGE SUMMARY
3/13/2020  Examination. CTA PULMONARY W CONTRAST 3/13/2020 1:00 PM History: Hypoxia and respiratory failure. DLP: 706 mGycm. CT angiography of the chest is performed after intravenous contrast enhancement. The images are acquired in axial plane with subsequent reconstruction in coronal and sagittal plane. The comparison is made with the previous study dated 9/14/2019. The correlation is made with chest radiograph obtained earlier today. There is good opacification of pulmonary artery and the branches bilaterally. No filling defects in the opacified pulmonary arterial bed. Both pulmonary arteries appeared moderately dilated, right more than the left. Atheromatous changes thoracic aorta are seen. Without dilatation or dissection. Coronary arteries are not optimally visualized. Cardiac pacer leads are seen in the right atrium and right ventricle with extensive streak artifacts. There is a small pericardial effusion. The cardiac chambers does not appear enlarged and is marked thickening of the left ventricle wall. There is suggestion of right ventricular strain. There is a bilaterally symmetrical extensive lung opacities suggesting severe cardiogenic pulmonary edema. This is more severe consolidation in the lower lobes with air bronchograms. There is a small bibasilar pleural effusion. Thyroid gland is not optimally evaluated. There is no axillary lymphadenopathy. The included liver and spleen appear unremarkable. No gallstone. The adrenal glands are normal. Small exophytic nodule from the upper pole of the right kidney is incompletely visualized and evaluated. This is similar to the previous study. No evidence of pulmonary embolism. No aortic aneurysm or dissection. Coronary arteries are not well-visualized for comment. There is a severe cardiogenic pulmonary edema and bilateral lower lobar lung consolidation which may suggest superimposed inflammatory/infectious process. Bilateral small basal pleural effusion. today for further management. Patient still with NGT in place and continues to fail swallow evaluations. Physical Exam:  Vitals: BP (!) 167/77   Pulse 92   Temp 98.3 °F (36.8 °C) (Temporal)   Resp 24   Ht 5' 8\" (1.727 m)   Wt 247 lb 14.4 oz (112.4 kg)   SpO2 90%   BMI 37.69 kg/m²   24HR INTAKE/OUTPUT:      Intake/Output Summary (Last 24 hours) at 3/25/2020 1141  Last data filed at 3/25/2020 0914  Gross per 24 hour   Intake 874 ml   Output 3250 ml   Net -2376 ml     General appearance: Alert  HEENT: AT/NC  Lungs: Tachypneic, wheezing bilaterally, coarse breath sounds bilaterally  Heart: regular rate and rhythm, S1, S2 normal, no murmur, click, rub or gallop  Abdomen: soft, non-tender; bowel sounds normal; no masses,  no organomegaly  Extremities: extremities normal, atraumatic, no cyanosis or edema  Neurologic: Alert, non-cooperative with neurological examination  Skin: warm    Discharge Medications:       Delmi Wilkinson   Home Medication Instructions [id number]    Printed on:03/25/20 1141   Medication Information                      ipratropium-albuterol (DUONEB) 0.5-2.5 (3) MG/3ML SOLN nebulizer solution  Inhale 1 vial into the lungs nightly             mometasone-formoterol (DULERA) 200-5 MCG/ACT inhaler  INHALE 2 PUFFS INTO THE LUNGS EVERY 12 HOURS                 Discharge Instructions: Follow up with Joyce Condon MD in 7 days. Take medications as directed. Resume activity as tolerated. Diet: DIET TUBE FEED CONTINUOUS/CYCLIC NPO; Semi-elemental (Vital HP); Orogastric; 5; 56; 24     Disposition: Patient is medically stable and will be discharged inpatient hospice. Time spent on discharge 35 minutes.     Signed:  Uma Butt MD 3/25/2020 11:41 AM

## 2020-03-25 NOTE — PROGRESS NOTES
03/25/20 1413   Conditions Requiring Skilled Therapeutic Intervention   Assessment PT attempted to see patient, spoke with nursing and stated he is discharging to hospice and is declining. Family members were present in the room.

## 2020-03-25 NOTE — PROGRESS NOTES
PC nurse in to see pt/spouse. Pt appears uncomfortable with resp at 30/min, labored, O2 sats 90 on 3L O2. When pt is asked if he is in pain, he will slighty nod his head no, unsure if pt understands. He will lightly squeeze your hand when qued. When pt asked to move his lower ext, does not follow command. He will gaze forward, will follow with his eyes. Spouse states pt is a DNR and does not want to do anything that will 'prolong\" his life. Pt has failed S/L eval. Family wants comfort measures at this time. Spouse understands [address] is for temp means to get pt comfortable and managed. She would like to take him home with hospice to follow. Palliative following.   Electronically signed by Josselyn Astudillo RN on 3/25/2020 at 10:31 AM

## 2020-03-25 NOTE — PROGRESS NOTES
Pharmacy Note  Vancomycin Consult    James Webb is a 71 y.o. male started on Vancomycin for pneumonia; consult received from Dr. Bari Beaulieu to manage therapy. Also receiving the following antibiotics: cefepime. Patient Active Problem List   Diagnosis    GERD (gastroesophageal reflux disease)    History of esophageal ulcer    History of adenomatous polyp of colon    Patulous lower esophageal sphincter    Hiatal hernia    Osteoarthritis    Chronic pain    NSAID long-term use    Encounter for colonoscopy due to history of adenomatous colonic polyps    Precordial pain    Sick sinus syndrome due to SA node dysfunction (HCC)    Abnormal nuclear stress test    Pacemaker    SVT (supraventricular tachycardia) (HCC)    Essential hypertension    Beryllium disease (HCC)    Acute respiratory failure with hypoxia and hypercapnia (HCC)    Sleep disturbance    Elevated serum creatinine    Diastolic dysfunction    Flash pulmonary edema (HCC)    Sleep apnea, unspecified    Dyspnea on exertion    Loss of balance    Tick bite    Weakness    Gait difficulty    Abnormal brain scan    Septic shock ([facility] [address]    Palliative care patient    Pneumonia due to organism       Allergies:  Codeine     Temp max: 101.5    Recent Labs     03/23/20  0340 03/24/20  0459   BUN 27* 29*       Recent Labs     03/23/20  0340 03/24/20  0459   CREATININE 0.9 0.7       Recent Labs     03/23/20  0340 03/24/20  0459   WBC 15.2* 15.7*         Intake/Output Summary (Last 24 hours) at 3/25/2020 0948  Last data filed at 3/25/2020 0914  Gross per 24 hour   Intake 874 ml   Output 3250 ml   Net -2376 ml       Culture Date Source Results   03/13/20 Blood No growth   03/13/20 Respiratory Few G+ cocci   03/13/20 Blood Staph coagulase -       Ht Readings from Last 1 Encounters:   03/13/20 5' 8\" (1.727 m)        Wt Readings from Last 1 Encounters:   03/25/20 247 lb 14.4 oz (112.4 kg)         Body mass index is 37.69 kg/m².     Estimated Creatinine Clearance: 121 mL/min (based on SCr of 0.7 mg/dL). Assessment/Plan:  Will initiate vancomycin 1750 mg IV every 12 hours. Timing of trough level will be determined based on culture results, renal function, and clinical response. Thank you for the consult. Will continue to follow.     MATTY FAROOQ, PHARM D, 3/25/2020, 9:52 AM

## 2020-03-25 NOTE — PROGRESS NOTES
Physical Therapy  Chart notes pT will be moving to [facility] Pt will be removed from PT services at this time.   Electronically signed by Raghav Lee, RAMSEY on 3/25/2020 at 2:42 PM

## 2020-03-25 NOTE — PLAN OF CARE
injury will improve  Description: Ability to remain free from injury will improve  3/25/2020 0201 by Gerardo Armenta RN  Outcome: Ongoing  3/24/2020 1259 by Cathryn Multani RN  Outcome: Ongoing  Goal: Will show no signs and symptoms of excessive bleeding  Description: Will show no signs and symptoms of excessive bleeding  3/25/2020 0201 by Gerardo Armenta RN  Outcome: Ongoing  3/24/2020 1259 by Cathryn Multani RN  Outcome: Ongoing     Problem: Nutritional:  Goal: Ability to identify appropriate dietary choices will improve  Description: Ability to identify appropriate dietary choices will improve  3/25/2020 0201 by Gerardo Armenta RN  Outcome: Ongoing  3/24/2020 1259 by Cathryn Multani RN  Outcome: Ongoing     Problem: Pain:  Goal: Pain level will decrease  Description: Pain level will decrease  3/25/2020 0201 by Gerardo Armenta RN  Outcome: Ongoing  3/24/2020 1259 by Cathryn Multani RN  Outcome: Ongoing  Goal: Control of acute pain  Description: Control of acute pain  3/25/2020 0201 by Gerardo Armenta RN  Outcome: Ongoing  3/24/2020 1259 by Cathryn Multani RN  Outcome: Ongoing  Goal: Control of chronic pain  Description: Control of chronic pain  3/25/2020 0201 by Gerardo Armenta RN  Outcome: Ongoing  3/24/2020 1259 by Cathryn Multani RN  Outcome: Ongoing

## 2020-03-26 PROBLEM — J44.1 CHRONIC OBSTRUCTIVE PULMONARY DISEASE WITH ACUTE EXACERBATION (HCC): Status: ACTIVE | Noted: 2020-01-01

## 2020-03-26 PROBLEM — J12.1 RSV (RESPIRATORY SYNCYTIAL VIRUS PNEUMONIA): Status: ACTIVE | Noted: 2020-01-01

## 2020-05-08 ENCOUNTER — TELEPHONE (OUTPATIENT)
Dept: PULMONOLOGY | Facility: CLINIC | Age: 70
End: 2020-05-08

## 2020-05-08 NOTE — TELEPHONE ENCOUNTER
"This patient is . His wife called asking for a letter from you stating his underlying COPD and Beryllium sensitivity caused him to go on the ventilator and made it \"hard for him to overcome the RSV\".    "

## 2020-05-28 RX ORDER — LEVOTHYROXINE SODIUM 50 MCG
TABLET ORAL
Qty: 90 TABLET | Refills: 0 | OUTPATIENT
Start: 2020-05-28

## 2021-01-01 NOTE — PROGRESS NOTES
40 MG tablet Take 1 tablet by mouth daily 30 tablet 0    busPIRone (BUSPAR) 10 MG tablet Take 1 tablet by mouth 3 times daily 90 tablet 0    celecoxib (CELEBREX) 200 MG capsule TAKE 1 CAPSULE BY MOUTH EVERY DAY 90 capsule 2    rOPINIRole (REQUIP) 1 MG tablet Take 1 tablet by mouth nightly 90 tablet 2    losartan (COZAAR) 100 MG tablet Take 1 tablet by mouth daily 90 tablet 2    hydrochlorothiazide (HYDRODIURIL) 25 MG tablet TAKE 1 TABLET BY MOUTH EVERY Morning 90 tablet 2    FLUoxetine (PROZAC) 20 MG capsule Take 1 capsule by mouth daily Take 40mg along with 20mg capsule daily 90 capsule 2    FLUoxetine (PROZAC) 40 MG capsule Take 1 capsule by mouth daily 90 capsule 2    pantoprazole (PROTONIX) 40 MG tablet TAKE 1 TABLET BY MOUTH EVERY MORNING BEFORE BREAKFAST 90 tablet 3    cetirizine (ZYRTEC) 10 MG tablet Take 10 mg by mouth daily      NIFEdipine (PROCARDIA XL) 30 MG extended release tablet Take 1 tablet by mouth daily 30 tablet 3    albuterol sulfate  (90 Base) MCG/ACT inhaler Inhale 2 puffs into the lungs every 6 hours as needed for Wheezing 1 Inhaler 3    mometasone-formoterol (DULERA) 200-5 MCG/ACT inhaler INHALE 2 PUFFS INTO THE LUNGS EVERY 12 HOURS 13 g 11    nitroGLYCERIN (NITROSTAT) 0.3 MG SL tablet up to max of 3 total doses. If no relief after 1 dose, call 911. 30 tablet 3     No current facility-administered medications for this visit. Allergies   Allergen Reactions    Codeine Swelling     Had as a baby--not sure of reaction. Subjective:     Review of Systems   Constitutional: Positive for fatigue. Negative for activity change, appetite change and fever. HENT: Negative for congestion, hearing loss, sinus pressure, sore throat and trouble swallowing. Eyes: Negative for discharge and itching. Respiratory: Positive for shortness of breath. Negative for wheezing. Cardiovascular: Negative for chest pain, palpitations and leg swelling.    Gastrointestinal: Negative for abdominal distention, abdominal pain, blood in stool, nausea and vomiting. Endocrine: Negative for cold intolerance, heat intolerance and polydipsia. Genitourinary: Negative for flank pain, frequency, hematuria and urgency. Musculoskeletal: Negative for arthralgias, back pain and joint swelling. Skin: Negative for rash and wound. Allergic/Immunologic: Negative for environmental allergies and food allergies. Neurological: Negative for dizziness, tremors, syncope, weakness, numbness and headaches. Hematological: Negative for adenopathy. Psychiatric/Behavioral: Negative for agitation and hallucinations. The patient is not nervous/anxious. Objective:      /74   Pulse 82   Temp 99.1 °F (37.3 °C)   SpO2 98%    Physical Exam   Constitutional: He is oriented to person, place, and time. He appears well-developed and well-nourished. No distress. HENT:   Head: Normocephalic and atraumatic. Right Ear: External ear normal.   Left Ear: External ear normal.   Nose: Nose normal.   Mouth/Throat: Oropharynx is clear and moist. No oropharyngeal exudate. Eyes: Pupils are equal, round, and reactive to light. Conjunctivae and EOM are normal. Right eye exhibits no discharge. Left eye exhibits no discharge. No scleral icterus. Neck: Normal range of motion. Neck supple. No JVD present. No tracheal deviation present. No thyromegaly present. Cardiovascular: Normal rate, regular rhythm, normal heart sounds and intact distal pulses. Exam reveals no gallop and no friction rub. No murmur heard. Pulmonary/Chest: Effort normal and breath sounds normal. No respiratory distress. He has no wheezes. He has no rales. Abdominal: Soft. Bowel sounds are normal. He exhibits no distension and no mass. There is no tenderness. There is no rebound and no guarding. Musculoskeletal: Normal range of motion. He exhibits no edema, tenderness or deformity. Lymphadenopathy:     He has no cervical adenopathy. Neurological: He is alert and oriented to person, place, and time. He has normal reflexes. He displays normal reflexes. No cranial nerve deficit. He exhibits normal muscle tone. Coordination normal.   Skin: Skin is warm and dry. No rash noted. He is not diaphoretic. No erythema. No pallor. Psychiatric: He has a normal mood and affect. His behavior is normal. Judgment and thought content normal.        Assessment:      Diagnosis Orders   1. Atrial fibrillation with slow ventricular response [facility])  External Referral To Pulmonology    Sleep Study with PAP Titration   2. Gastroesophageal reflux disease without esophagitis  External Referral To Pulmonology    Sleep Study with PAP Titration   3. Osteoarthritis, unspecified osteoarthritis type, unspecified site  External Referral To Pulmonology    Sleep Study with PAP Titration   4. Essential hypertension  External Referral To Pulmonology    Sleep Study with PAP Titration   5. Sleep disturbance  External Referral To Pulmonology    Sleep Study with PAP Titration            Plan:     Atrial fibrillation with a rapid ventricular response and subsequent congestive failure. He is doing better since he was in the hospital and diuresed and placed on different medications. He saw cardiology today and is doing better. Chronic gastroesophageal reflux which is stable on his medication. Osteoarthritis. He has multiple complaints of arthritis and takes medication and arthritis is limiting his ability to exercise as much as he would like. Essential hypertension. Since he was in the hospital with a been monitoring his blood pressure some of his medicines were stopped and his blood pressure has not bounced back so he is really just watching that at this point time and will not be resuming medication until the levels bounced back up. Sleep disturbance.   He needs a sleep study done and he also needs a referral to pulmonology per the request of the hospital.  We will make Declines

## 2021-09-27 NOTE — TELEPHONE ENCOUNTER
DWIGHT was reviewed today per office protocol. Report shows No discrepancies. Fill pattern is consistent from single provider(s) at single pharmacy(s). Prescription escribed.  Patient is aware to check within the pharmacy home

## 2022-01-01 NOTE — PROGRESS NOTES
02/23/2020     02/23/2020    CO2 26 02/23/2020    BUN 39 (H) 02/23/2020    CREATININE 1.2 02/23/2020    GLUCOSE 151 (H) 02/23/2020    CALCIUM 9.2 02/23/2020    PROT 6.9 02/23/2020    LABALBU 3.9 02/23/2020    BILITOT <0.2 02/23/2020    ALKPHOS 83 02/23/2020    AST 28 02/23/2020    ALT 7 02/23/2020    LABGLOM 60 (A) 02/23/2020    GLOB 2.8 04/20/2017           Assessment    ICD-10-CM    1. Gait difficulty R26.9    2. Weakness R53.1    3. Loss of balance R26.89    4. Beryllium disease ([facility] [address] J63.2    5. Abnormal brain scan R94.02        His neurological examination today was unremarkable except for some slightly slowed and shuffled gait. At this time he was instructed to stop his Sinemet. If he worsens after a week he is to restart this medication to see if there is improvement. If Sinemet is not effective we will need to purse evaluation for shunt placement for normal pressure hydrocephalus. There are to contract me once he is off Sinemet for one week to determine how to proceed. Plan    No orders of the defined types were placed in this encounter. No orders of the defined types were placed in this encounter. Return if symptoms worsen or fail to improve. EMR Dragon/transcription disclaimer:Significant part of this  encounter note is electronic transcription/translation of spoken language to printed text. The electronic translation of spoken language may be erroneous, or at times, nonsensical words or phrases may be inadvertently transcribed.  Although I have reviewed the note for such errors, some may still exist.  I have personally seen and examined this patient with the resident/fellow/student.  I have fully participated in the care of this patient. I have reviewed all pertinent clinical information, including history, physical exam, plan and the Resident/Fellow’s note and agree except as noted. See MDM

## 2024-03-20 NOTE — PLAN OF CARE
Problem: Falls - Risk of:  Goal: Will remain free from falls  Description: Will remain free from falls  Outcome: Ongoing  Goal: Absence of physical injury  Description: Absence of physical injury  Outcome: Ongoing     Problem: Nutrition  Goal: Optimal nutrition therapy  Description: Nutrition Problem: Inadequate oral intake  Intervention: Food and/or Nutrient Delivery: Continue NPO  Nutritional Goals: Meet nutrition needs via EN     Outcome: Ongoing     Problem: Discharge Planning:  Goal: Participates in care planning  Description: Participates in care planning  Outcome: Ongoing  Goal: Discharged to appropriate level of care  Description: Discharged to appropriate level of care  Outcome: Ongoing     Problem: Airway Clearance - Ineffective:  Goal: Ability to maintain a clear airway will improve  Description: Ability to maintain a clear airway will improve  Outcome: Ongoing     Problem: Aspiration:  Goal: Absence of aspiration  Description: Absence of aspiration  Outcome: Ongoing     Problem: Urinary Elimination:  Goal: Signs and symptoms of infection will decrease  Description: Signs and symptoms of infection will decrease  Outcome: Ongoing  Goal: Complications related to the disease process, condition or treatment will be avoided or minimized  Description: Complications related to the disease process, condition or treatment will be avoided or minimized  Outcome: Ongoing     Problem: Infection - Central Venous Catheter-Associated Bloodstream Infection:  Goal: Will show no infection signs and symptoms  Description: Will show no infection signs and symptoms  Outcome: Ongoing     Problem: Infection - Ventilator-Associated Pneumonia:  Goal: Prevent a ventilator associated event (VAE)  Description: Prevent a ventilator associated event (VAE)  Outcome: Ongoing  Goal: Absence of pulmonary infection  Description: Absence of pulmonary infection  Outcome: Ongoing     Electronically signed by Salina Palmer RN on 3/19/2020 The patient is a 34y Male complaining of

## (undated) DEVICE — 3.0MM PRECISION NEURO (MATCH HEAD)

## (undated) DEVICE — PACK,UNIVERSAL,NO GOWNS: Brand: MEDLINE

## (undated) DEVICE — PIN DISTRACT TI 14MM STRL

## (undated) DEVICE — GOWN,NON-REINFORCED,SIRUS,SET IN SLV,XXL: Brand: MEDLINE

## (undated) DEVICE — CODMAN® SURGICAL PATTIES 1/2" X1 1/2" (1.27CM X 3.81CM): Brand: CODMAN®

## (undated) DEVICE — GLV SURG SENSICARE MICRO PF LF 9 STRL

## (undated) DEVICE — ELECTRD NDL EDGE/INSUL/PFTE.787MM 2.84IN

## (undated) DEVICE — GLV SURG TRIUMPH ORTHO W/ALOE PF LTX 8.5 STRL

## (undated) DEVICE — RESERVOIR,SUCTION,100CC,SILICONE: Brand: MEDLINE

## (undated) DEVICE — HALTR TRACT HD DLX PAD UNIV

## (undated) DEVICE — TOTAL TRAY, 16FR 10ML SIL FOLEY, URN: Brand: MEDLINE

## (undated) DEVICE — DRAPE,UTILITY,TAPE,15X26,STERILE: Brand: MEDLINE

## (undated) DEVICE — APPL CHLORAPREP W/TINT 26ML ORNG

## (undated) DEVICE — CLTH CLENS READYCLEANSE PERI CARE PK/5

## (undated) DEVICE — Device

## (undated) DEVICE — ANTIBACTERIAL UNDYED BRAIDED (POLYGLACTIN 910), SYNTHETIC ABSORBABLE SUTURE: Brand: COATED VICRYL

## (undated) DEVICE — GLV SURG NEOLON 2G PF LF 8 STRL

## (undated) DEVICE — GLV SURG NEOLON 2G PF LF 7.5 STRL

## (undated) DEVICE — TP SILK DURAPORE 3IN

## (undated) DEVICE — GLV SURG TRIUMPH MICRO PF LTX 8.5 STRL

## (undated) DEVICE — CATH IV ANGIO FEP 12G 3IN LTBLU 10PK

## (undated) DEVICE — SPNG DISSCT CHRRY 3/8IN STRL PK/5

## (undated) DEVICE — PK TURNOVER RM ADV

## (undated) DEVICE — PK SPINE CERV ANT 30

## (undated) DEVICE — GLV SURG TRIUMPH NATURAL W/ALOE PF LTX 8 STRL